# Patient Record
Sex: FEMALE | Race: WHITE | NOT HISPANIC OR LATINO | Employment: UNEMPLOYED | URBAN - METROPOLITAN AREA
[De-identification: names, ages, dates, MRNs, and addresses within clinical notes are randomized per-mention and may not be internally consistent; named-entity substitution may affect disease eponyms.]

---

## 2017-02-03 ENCOUNTER — POSTPARTUM VISIT (OUTPATIENT)
Dept: OBSTETRICS AND GYNECOLOGY | Facility: CLINIC | Age: 37
End: 2017-02-03
Attending: OBSTETRICS & GYNECOLOGY
Payer: COMMERCIAL

## 2017-02-03 VITALS
HEIGHT: 63 IN | WEIGHT: 122.81 LBS | BODY MASS INDEX: 21.76 KG/M2 | DIASTOLIC BLOOD PRESSURE: 74 MMHG | SYSTOLIC BLOOD PRESSURE: 112 MMHG

## 2017-02-03 PROCEDURE — 99999 PR PBB SHADOW E&M-EST. PATIENT-LVL II: CPT | Mod: PBBFAC,,, | Performed by: OBSTETRICS & GYNECOLOGY

## 2017-02-03 PROCEDURE — 0503F POSTPARTUM CARE VISIT: CPT | Mod: S$GLB,,, | Performed by: OBSTETRICS & GYNECOLOGY

## 2017-02-03 NOTE — PROGRESS NOTES
"CC: 6 wk pp check    Arabella Catalan is a 36 y.o. female  6 wk pp , baby with cleft lip/palate.  Doing well. Gets surgery 3-4 mo.  Not sure if going back to work.  breastfeeding    Past Medical History   Diagnosis Date    Abnormal Pap smear of cervix     HPV (human papilloma virus) infection        Past Surgical History   Procedure Laterality Date    Right oophorectomy Right     Oophorectomy       stage 1a dysgerminoma    Vaginal delivery  2016       OB History    Para Term  AB SAB TAB Ectopic Multiple Living   3 3 3 0 0 0 0 0 0 2      # Outcome Date GA Lbr Moose/2nd Weight Sex Delivery Anes PTL Lv   3 Term 16 39w3d  3.685 kg (8 lb 2 oz) M Vag-Spont EPI     2 Term 14 40w3d  3.572 kg (7 lb 14 oz) M INDUCTION  N Y      Complications: Nuchal cord   1 Term 12 40w0d  3.827 kg (8 lb 7 oz) M INDUCTION  N Y          Family History   Problem Relation Age of Onset    Breast cancer Mother 38       Social History   Substance Use Topics    Smoking status: Former Smoker    Smokeless tobacco: None    Alcohol use No       Visit Vitals    /74    Ht 5' 3" (1.6 m)    Wt 55.7 kg (122 lb 12.7 oz)    LMP 2016 (Exact Date)    Breastfeeding Yes    BMI 21.75 kg/m2       ROS:  GENERAL: Denies weight gain or weight loss. Feeling well overall.   SKIN: Denies rash or lesions.   HEAD: Denies head injury or headache.   NODES: Denies enlarged lymph nodes.   CHEST: Denies chest pain or shortness of breath.   CARDIOVASCULAR: Denies palpitations or left sided chest pain.   ABDOMEN: No abdominal pain, constipation, diarrhea, nausea, vomiting or rectal bleeding.   URINARY: No frequency, dysuria, hematuria, or burning on urination.  REPRODUCTIVE: See HPI.   BREASTS: denies pain, lumps, or nipple discharge.   HEMATOLOGIC: No easy bruisability or excessive bleeding.  MUSCULOSKELETAL: Denies joint pain or swelling.   NEUROLOGIC: Denies syncope or weakness.   PSYCHIATRIC: " Denies depression, anxiety or mood swings.    Physical Exam:    APPEARANCE: Well nourished, well developed, in no acute distress.  AFFECT: WNL, alert and oriented x 3  SKIN: No acne or hirsutism  NECK: Neck symmetric without masses or thyromegaly  NODES: No inguinal, cervical, axillary, or femoral lymph node enlargement  CHEST: Good respiratory effect  ABDOMEN: Soft.  No tenderness or masses.  No hepatosplenomegaly.  No hernias.  PELVIC: Normal external genitalia without lesions.  Normal hair distribution.  Adequate perineal body, normal urethral meatus.  Vagina moist and well rugated without lesions or discharge.  Cervix pink, without lesions, discharge or tenderness.  No significant cystocele or rectocele.  Bimanual exam shows uterus to be normal size, regular, mobile and nontender.  Adnexa without masses or tenderness.    EXTREMITIES: No edema.    ASSESSMENT AND PLAN    Arabella was seen today for postpartum care.    Diagnoses and all orders for this visit:    Encounter for postpartum visit    declines contraception.    Return in about 3 months (around 5/3/2017) for annual.

## 2020-07-20 ENCOUNTER — OFFICE VISIT (OUTPATIENT)
Dept: PODIATRY | Facility: CLINIC | Age: 40
End: 2020-07-20
Payer: COMMERCIAL

## 2020-07-20 VITALS
HEIGHT: 63 IN | WEIGHT: 115 LBS | DIASTOLIC BLOOD PRESSURE: 68 MMHG | HEART RATE: 80 BPM | BODY MASS INDEX: 20.38 KG/M2 | SYSTOLIC BLOOD PRESSURE: 101 MMHG

## 2020-07-20 DIAGNOSIS — M79.671 PAIN IN RIGHT FOOT: ICD-10-CM

## 2020-07-20 DIAGNOSIS — B07.0 VERRUCA PLANTARIS: Primary | ICD-10-CM

## 2020-07-20 PROCEDURE — 99203 OFFICE O/P NEW LOW 30 MIN: CPT | Mod: 25,S$GLB,, | Performed by: PODIATRIST

## 2020-07-20 PROCEDURE — 3008F PR BODY MASS INDEX (BMI) DOCUMENTED: ICD-10-PCS | Mod: CPTII,S$GLB,, | Performed by: PODIATRIST

## 2020-07-20 PROCEDURE — 17110 DESTRUCTION B9 LES UP TO 14: CPT | Mod: S$GLB,,, | Performed by: PODIATRIST

## 2020-07-20 PROCEDURE — 99999 PR PBB SHADOW E&M-NEW PATIENT-LVL III: CPT | Mod: PBBFAC,,, | Performed by: PODIATRIST

## 2020-07-20 PROCEDURE — 99203 PR OFFICE/OUTPT VISIT, NEW, LEVL III, 30-44 MIN: ICD-10-PCS | Mod: 25,S$GLB,, | Performed by: PODIATRIST

## 2020-07-20 PROCEDURE — 99999 PR PBB SHADOW E&M-NEW PATIENT-LVL III: ICD-10-PCS | Mod: PBBFAC,,, | Performed by: PODIATRIST

## 2020-07-20 PROCEDURE — 17110 PR DESTRUCTION BENIGN LESIONS UP TO 14: ICD-10-PCS | Mod: S$GLB,,, | Performed by: PODIATRIST

## 2020-07-20 PROCEDURE — 3008F BODY MASS INDEX DOCD: CPT | Mod: CPTII,S$GLB,, | Performed by: PODIATRIST

## 2020-07-20 NOTE — PROGRESS NOTES
Subjective:      Patient ID: Arabella Catalan is a 40 y.o. female.    Chief Complaint: Foot Pain (plantars wart )      HPI:  Arabella Catalan is a 40 y.o. female who presents to clinic with a chief complaint of warts.  Patient reports having warts on the bottom of her right foot for at least 2 years.  She denies any pain when non weight-bearing but relates pain when standing, walking, which she rates as 3/10 on the pain scale.  She informs of trying all types of over-the-counter wart treatments without resolution.  She denies using any type of OTC treatment within the last 2 weeks.  Patient denies any other pedal complaints at this time.    PCP:   Primary Doctor No  Date last seen: Not applicable    Review of Systems   Constitutional: Negative for appetite change, fever, chills, fatigue and unexpected weight change.   Respiratory: Negative for cough, wheezing, and shortness of breath.   Cardiovascular: Negative for chest pain, claudication, cyanosis, and leg swelling.  Endocrine:  Negative for intolerance to cold, intolerance to heat, polydipsia, polyphagia, and polyuria.    Gastrointestinal: Negative for nausea, vomiting, diarrhea, and constipation.   Musculoskeletal: Negative for back pain, arthritis, joint pain, joint swelling, myalgias, and stiffness.   Skin: Negative for nail bed changes, discoloration, rash, itching, poor wound healing, unusual hair distribution.  Positive for suspicious lesion.  Neurological: Negative for loss of balance, sensory change, paresthesias, and numbness. Positive for pain.  Hematological: Negative for adenopathy, bleeding, and bruising easily.   Psychiatric/Behavioral: The patient is not nervous/anxious.  Negative for altered mental status.    No results found for: HGBA1C    Past Medical History:   Diagnosis Date    Abnormal Pap smear of cervix     HPV (human papilloma virus) infection      Past Surgical History:   Procedure Laterality Date    OOPHORECTOMY       "stage 1a dysgerminoma    RIGHT OOPHORECTOMY Right     VAGINAL DELIVERY  12/13/2016     Family History   Problem Relation Age of Onset    Breast cancer Mother 38     Social History     Socioeconomic History    Marital status:      Spouse name: Not on file    Number of children: Not on file    Years of education: Not on file    Highest education level: Not on file   Occupational History    Not on file   Social Needs    Financial resource strain: Not on file    Food insecurity     Worry: Not on file     Inability: Not on file    Transportation needs     Medical: Not on file     Non-medical: Not on file   Tobacco Use    Smoking status: Former Smoker   Substance and Sexual Activity    Alcohol use: No    Drug use: No    Sexual activity: Not Currently     Partners: Male     Birth control/protection: None   Lifestyle    Physical activity     Days per week: Not on file     Minutes per session: Not on file    Stress: Not on file   Relationships    Social connections     Talks on phone: Not on file     Gets together: Not on file     Attends Yazidi service: Not on file     Active member of club or organization: Not on file     Attends meetings of clubs or organizations: Not on file     Relationship status: Not on file   Other Topics Concern    Not on file   Social History Narrative    Not on file           Objective:        /68   Pulse 80   Ht 5' 3" (1.6 m)   Wt 52.2 kg (115 lb)   BMI 20.37 kg/m²     Physical Exam   Constitutional: Patient is oriented to person, place, and time. Patient appears well-developed and well-nourished. No acute distress.     Psychiatric: Patient has a normal mood and affect. Patient's speech is normal and behavior is normal. Judgment is normal. Cognition and memory are normal.     Right pedal exam was performed today.  Vascular: Pedal pulses palpable 2/4 DP & PT.  CFT is < 3 seconds to the hallux.  Skin temperature is warm to warm proximal tibia to distal toes " without localized increase in calor noted.  No erythema, edema, or ecchymosis noted to the foot or ankle.  Hair growth present distally to the LE.     Musculoskeletal: Ankle joint ROM is decreased. Subtalar joint ROM is decreased.  Midtarsal joint ROM is decreased.  1st ray ROM is within normal limits.  1st  MTPJ ROM is decreased.  Ankle joint dorsiflexion is restricted with the knee extended and flexed per Silfverskiold exam.    Muscle strength is 5/5 for all LE muscle groups tested.    Neurological: Epicritic sensation is grossly Intact to the foot.   Achilles DTR and Chaddock STR are Intact to right lower extremity.   Negative Babinski sign right lower extremity.  Negative clonus sign right lower extremity.  Tenderness to palpation noted to verrucous lesions sub right 1st and 5th metatarsal heads.    Dermatological: Toenails 1-5 right are WNL in length and thickness.  Webspaces 1-4 right are clean, dry, and intact.  Skin turgor is supple.  No dry, flaky skin noted to the LE.  Focal, hyperkeratotic lesions noted sub right 1st and 5th metatarsal heads with underlying verrucous tissue, thrombosed capillaries, pinpoint bleeding upon debridement, and divergent skin lines.    Nursing note and vitals reviewed.        Assessment:       Encounter Diagnoses   Name Primary?    Verruca plantaris Yes    Pain in right foot          Plan:       Arabella was seen today for foot pain.    Diagnoses and all orders for this visit:    Verruca plantaris    Pain in right foot      I counseled the patient on her conditions, their implications and medical management.    - Discussed foot hygiene and treatment options for plantar warts to which patient opted for destruction of lesions via debridement and cantharidin.    - With the patient's permission, cleansed areas with alcohol swabs, debrided lesions down to pinpoint bleeding via 3 mm dermal curette to patient's tolerance, applied aperture pad and cantharidin, and covered with  elastoplast.    - Advised patient to cleanse the area with warm, soapy water in 4 hours.    Patient verbalized all understanding.    Patient was given the following recommendations and instructions:  Patient Instructions   - Remove dressing in 4 hours and cleanse area.    - Cover area with topical antibiotic cream with or without lidocaine cream and a bandaid and keep covered for at least the first 48 hours in case.  Discontinue if no blister has formed after 48 hours.    - Notify clinic if painful blister forms and needs to be drained.  DO NOT REMOVE BLISTERED SKIN.    - Wait a week before you begin applying DuoFilm.  Follow package directions.    - Notify clinic if any new or worsening condition arises.          Yenni Ramos DPM        Dictation was performed using M*Modal Fluency.  Transcription errors may be present.

## 2020-07-20 NOTE — PATIENT INSTRUCTIONS
- Remove dressing in 4 hours and cleanse area.    - Cover area with topical antibiotic cream with or without lidocaine cream and a bandaid and keep covered for at least the first 48 hours in case.  Discontinue if no blister has formed after 48 hours.    - Notify clinic if painful blister forms and needs to be drained.  DO NOT REMOVE BLISTERED SKIN.    - Wait a week before you begin applying DuoFilm.  Follow package directions.    - Notify clinic if any new or worsening condition arises.

## 2020-07-26 PROBLEM — M79.671 PAIN IN RIGHT FOOT: Status: ACTIVE | Noted: 2020-07-26

## 2020-07-26 PROBLEM — B07.0 VERRUCA PLANTARIS: Status: ACTIVE | Noted: 2020-07-26

## 2020-08-03 ENCOUNTER — OFFICE VISIT (OUTPATIENT)
Dept: PODIATRY | Facility: CLINIC | Age: 40
End: 2020-08-03
Payer: COMMERCIAL

## 2020-08-03 VITALS
DIASTOLIC BLOOD PRESSURE: 80 MMHG | WEIGHT: 115 LBS | SYSTOLIC BLOOD PRESSURE: 115 MMHG | HEIGHT: 63 IN | HEART RATE: 79 BPM | BODY MASS INDEX: 20.38 KG/M2

## 2020-08-03 DIAGNOSIS — B07.0 VERRUCA PLANTARIS: Primary | ICD-10-CM

## 2020-08-03 DIAGNOSIS — M79.671 PAIN IN RIGHT FOOT: ICD-10-CM

## 2020-08-03 PROCEDURE — 3008F BODY MASS INDEX DOCD: CPT | Mod: CPTII,S$GLB,, | Performed by: PODIATRIST

## 2020-08-03 PROCEDURE — 17110 DESTRUCTION B9 LES UP TO 14: CPT | Mod: S$GLB,,, | Performed by: PODIATRIST

## 2020-08-03 PROCEDURE — 99999 PR PBB SHADOW E&M-EST. PATIENT-LVL III: CPT | Mod: PBBFAC,,, | Performed by: PODIATRIST

## 2020-08-03 PROCEDURE — 99213 PR OFFICE/OUTPT VISIT, EST, LEVL III, 20-29 MIN: ICD-10-PCS | Mod: 25,S$GLB,, | Performed by: PODIATRIST

## 2020-08-03 PROCEDURE — 3008F PR BODY MASS INDEX (BMI) DOCUMENTED: ICD-10-PCS | Mod: CPTII,S$GLB,, | Performed by: PODIATRIST

## 2020-08-03 PROCEDURE — 17110 PR DESTRUCTION BENIGN LESIONS UP TO 14: ICD-10-PCS | Mod: S$GLB,,, | Performed by: PODIATRIST

## 2020-08-03 PROCEDURE — 99999 PR PBB SHADOW E&M-EST. PATIENT-LVL III: ICD-10-PCS | Mod: PBBFAC,,, | Performed by: PODIATRIST

## 2020-08-03 PROCEDURE — 99213 OFFICE O/P EST LOW 20 MIN: CPT | Mod: 25,S$GLB,, | Performed by: PODIATRIST

## 2020-08-03 NOTE — PROGRESS NOTES
Subjective:      Patient ID: Arabella Catalan is a 40 y.o. female.    Chief Complaint: Follow-up (plantar wart right )      HPI:  Arabella Catalan is a 40 y.o. female who presents to clinic with a chief complaint of warts.  Patient reports having washed treatment off her feet after 4 hours after her last visit as instructed and denies seeing any blister formation.  She denies any increased pain immediately after her treatment but states pain has increased over the past couple days when weight-bearing though she denied any pain today during the rooming process.  She rates her pain currently as 0/10 on the pain scale.  Patient denies any other pedal complaints at this time.    PCP:   Primary Doctor No  Date last seen: Not applicable    Review of Systems   Constitutional: Negative for appetite change, fever, chills, fatigue and unexpected weight change.   Respiratory: Negative for cough, wheezing, and shortness of breath.   Cardiovascular: Negative for chest pain, claudication, cyanosis, and leg swelling.  Endocrine:  Negative for intolerance to cold, intolerance to heat, polydipsia, polyphagia, and polyuria.    Gastrointestinal: Negative for nausea, vomiting, diarrhea, and constipation.   Musculoskeletal: Negative for back pain, arthritis, joint pain, joint swelling, myalgias, and stiffness.   Skin: Negative for nail bed changes, discoloration, rash, itching, poor wound healing, unusual hair distribution.  Positive for suspicious lesion.  Neurological: Negative for loss of balance, sensory change, paresthesias, and numbness. Positive for pain.  Hematological: Negative for adenopathy, bleeding, and bruising easily.   Psychiatric/Behavioral: The patient is not nervous/anxious.  Negative for altered mental status.    No results found for: HGBA1C    Past Medical History:   Diagnosis Date    Abnormal Pap smear of cervix     HPV (human papilloma virus) infection      Past Surgical History:   Procedure  "Laterality Date    OOPHORECTOMY      stage 1a dysgerminoma    RIGHT OOPHORECTOMY Right     VAGINAL DELIVERY  12/13/2016     Family History   Problem Relation Age of Onset    Breast cancer Mother 38     Social History     Socioeconomic History    Marital status:      Spouse name: Not on file    Number of children: Not on file    Years of education: Not on file    Highest education level: Not on file   Occupational History    Not on file   Social Needs    Financial resource strain: Not on file    Food insecurity     Worry: Not on file     Inability: Not on file    Transportation needs     Medical: Not on file     Non-medical: Not on file   Tobacco Use    Smoking status: Former Smoker   Substance and Sexual Activity    Alcohol use: No    Drug use: No    Sexual activity: Not Currently     Partners: Male     Birth control/protection: None   Lifestyle    Physical activity     Days per week: Not on file     Minutes per session: Not on file    Stress: Not on file   Relationships    Social connections     Talks on phone: Not on file     Gets together: Not on file     Attends Buddhist service: Not on file     Active member of club or organization: Not on file     Attends meetings of clubs or organizations: Not on file     Relationship status: Not on file   Other Topics Concern    Not on file   Social History Narrative    Not on file           Objective:        /80   Pulse 79   Ht 5' 3" (1.6 m)   Wt 52.2 kg (115 lb)   BMI 20.37 kg/m²     Physical Exam   Constitutional: Patient is oriented to person, place, and time. Patient appears well-developed and well-nourished. No acute distress.     Psychiatric: Patient has a normal mood and affect. Patient's speech is normal and behavior is normal. Judgment is normal. Cognition and memory are normal.     Right pedal exam was performed today.  Vascular: Pedal pulses palpable 2/4 DP & PT.  CFT is < 3 seconds to the hallux.  Skin temperature is warm " to warm proximal tibia to distal toes without localized increase in calor noted.  No erythema, edema, or ecchymosis noted to the foot or ankle.  Hair growth present distally to the LE.     Musculoskeletal: Ankle joint ROM is decreased. Subtalar joint ROM is decreased.  Midtarsal joint ROM is decreased.  1st ray ROM is within normal limits.  1st  MTPJ ROM is decreased.  Ankle joint dorsiflexion is restricted with the knee extended and flexed per Silfverskiold exam.    Muscle strength is 5/5 for all LE muscle groups tested.    Neurological: Epicritic sensation is grossly Intact to the foot.   Chaddock STR is Intact to right lower extremity.  Tenderness to palpation noted to verrucous lesions sub right 1st and 5th metatarsal heads.    Dermatological: Toenails 1-5 right are WNL in length and thickness.  Webspaces 1-4 right are clean, dry, and intact.  Skin turgor is supple.  No dry, flaky skin noted to the LE.  Focal, hyperkeratotic lesions noted sub right 1st and 5th metatarsal heads with underlying verrucous tissue, thrombosed capillaries, pinpoint bleeding upon debridement, and divergent skin lines.    Nursing note and vitals reviewed.        Assessment:       Encounter Diagnoses   Name Primary?    Verruca plantaris Yes    Pain in right foot          Plan:       Arabella was seen today for follow-up.    Diagnoses and all orders for this visit:    Verruca plantaris    Pain in right foot      I counseled the patient on her conditions, their implications and medical management.    - Reviewed with patient foot hygiene and treatment options for plantar warts to which patient opted for destruction of lesions via debridement and cantharidin.    - With the patient's permission, cleansed areas with alcohol swabs, debrided lesions down to pinpoint bleeding via 3 mm dermal curette to patient's tolerance, applied aperture pad and cantharidin, and covered with elastoplast.    - Advised patient to cleanse the area with warm,  soapy water in 4 hours.    Patient verbalized all understanding.    Patient was given the following recommendations and instructions:  Patient Instructions   - Remove dressing in 4 hours and cleanse area.    - Cover area with topical antibiotic cream with or without lidocaine cream and a bandaid and keep covered for at least the first 48 hours in case.  Discontinue if no blister has formed after 48 hours.    - Notify clinic if painful blister forms and needs to be drained.  DO NOT REMOVE BLISTERED SKIN.    - Wait a week before you begin applying DuoFilm.  Follow package directions.    - Notify clinic if any new or worsening condition arises.          Yenni Ramos DPM        Dictation was performed using M*Modal Fluency.  Transcription errors may be present.

## 2020-08-17 ENCOUNTER — OFFICE VISIT (OUTPATIENT)
Dept: PODIATRY | Facility: CLINIC | Age: 40
End: 2020-08-17
Payer: COMMERCIAL

## 2020-08-17 VITALS
BODY MASS INDEX: 20.25 KG/M2 | HEIGHT: 63 IN | DIASTOLIC BLOOD PRESSURE: 76 MMHG | WEIGHT: 114.31 LBS | SYSTOLIC BLOOD PRESSURE: 105 MMHG | HEART RATE: 85 BPM

## 2020-08-17 DIAGNOSIS — B07.0 VERRUCA PLANTARIS: Primary | ICD-10-CM

## 2020-08-17 DIAGNOSIS — M79.671 PAIN IN RIGHT FOOT: ICD-10-CM

## 2020-08-17 PROCEDURE — 3008F BODY MASS INDEX DOCD: CPT | Mod: CPTII,S$GLB,, | Performed by: PODIATRIST

## 2020-08-17 PROCEDURE — 99999 PR PBB SHADOW E&M-EST. PATIENT-LVL III: ICD-10-PCS | Mod: PBBFAC,,, | Performed by: PODIATRIST

## 2020-08-17 PROCEDURE — 99999 PR PBB SHADOW E&M-EST. PATIENT-LVL III: CPT | Mod: PBBFAC,,, | Performed by: PODIATRIST

## 2020-08-17 PROCEDURE — 17110 PR DESTRUCTION BENIGN LESIONS UP TO 14: ICD-10-PCS | Mod: S$GLB,,, | Performed by: PODIATRIST

## 2020-08-17 PROCEDURE — 99213 PR OFFICE/OUTPT VISIT, EST, LEVL III, 20-29 MIN: ICD-10-PCS | Mod: 25,S$GLB,, | Performed by: PODIATRIST

## 2020-08-17 PROCEDURE — 17110 DESTRUCTION B9 LES UP TO 14: CPT | Mod: S$GLB,,, | Performed by: PODIATRIST

## 2020-08-17 PROCEDURE — 99213 OFFICE O/P EST LOW 20 MIN: CPT | Mod: 25,S$GLB,, | Performed by: PODIATRIST

## 2020-08-17 PROCEDURE — 3008F PR BODY MASS INDEX (BMI) DOCUMENTED: ICD-10-PCS | Mod: CPTII,S$GLB,, | Performed by: PODIATRIST

## 2020-08-24 NOTE — PROGRESS NOTES
Subjective:      Patient ID: Arabella Catalan is a 40 y.o. female.    Chief Complaint: Follow-up (2 week f/u )      HPI:  Arabella Catalan is a 40 y.o. female who presents to clinic with a chief complaint of warts.  Patient reports having washed treatment off her feet after 4 hours after her last visit as instructed and denies seeing any blister formation.  She denies any increased pain immediately after her treatment but states pain has increased over the past couple days when weight-bearing though she denied any pain today during the rooming process.  She rates her pain currently as 0/10 on the pain scale.  Patient denies any other pedal complaints at this time.    PCP:   Primary Doctor No  Date last seen: Not applicable    Review of Systems   Constitutional: Negative for appetite change, fever, chills, fatigue and unexpected weight change.   Respiratory: Negative for cough, wheezing, and shortness of breath.   Cardiovascular: Negative for chest pain, claudication, cyanosis, and leg swelling.  Endocrine:  Negative for intolerance to cold, intolerance to heat, polydipsia, polyphagia, and polyuria.    Gastrointestinal: Negative for nausea, vomiting, diarrhea, and constipation.   Musculoskeletal: Negative for back pain, arthritis, joint pain, joint swelling, myalgias, and stiffness.   Skin: Negative for nail bed changes, discoloration, rash, itching, poor wound healing, unusual hair distribution.  Positive for suspicious lesion.  Neurological: Negative for loss of balance, sensory change, paresthesias, and numbness. Positive for pain.  Hematological: Negative for adenopathy, bleeding, and bruising easily.   Psychiatric/Behavioral: The patient is not nervous/anxious.  Negative for altered mental status.    No results found for: HGBA1C    Past Medical History:   Diagnosis Date    Abnormal Pap smear of cervix     HPV (human papilloma virus) infection      Past Surgical History:   Procedure  "Laterality Date    OOPHORECTOMY      stage 1a dysgerminoma    RIGHT OOPHORECTOMY Right     VAGINAL DELIVERY  12/13/2016     Family History   Problem Relation Age of Onset    Breast cancer Mother 38     Social History     Socioeconomic History    Marital status:      Spouse name: Not on file    Number of children: Not on file    Years of education: Not on file    Highest education level: Not on file   Occupational History    Not on file   Social Needs    Financial resource strain: Not on file    Food insecurity     Worry: Not on file     Inability: Not on file    Transportation needs     Medical: Not on file     Non-medical: Not on file   Tobacco Use    Smoking status: Former Smoker   Substance and Sexual Activity    Alcohol use: No    Drug use: No    Sexual activity: Not Currently     Partners: Male     Birth control/protection: None   Lifestyle    Physical activity     Days per week: Not on file     Minutes per session: Not on file    Stress: Not on file   Relationships    Social connections     Talks on phone: Not on file     Gets together: Not on file     Attends Uatsdin service: Not on file     Active member of club or organization: Not on file     Attends meetings of clubs or organizations: Not on file     Relationship status: Not on file   Other Topics Concern    Not on file   Social History Narrative    Not on file           Objective:        /76   Pulse 85   Ht 5' 3" (1.6 m)   Wt 51.9 kg (114 lb 4.9 oz)   BMI 20.25 kg/m²     Physical Exam   Constitutional: Patient is oriented to person, place, and time. Patient appears well-developed and well-nourished. No acute distress.     Psychiatric: Patient has a normal mood and affect. Patient's speech is normal and behavior is normal. Judgment is normal. Cognition and memory are normal.     Right pedal exam was performed today.  Vascular: Pedal pulses palpable 2/4 DP & PT.  CFT is < 3 seconds to the hallux.  Skin temperature is " warm to warm proximal tibia to distal toes without localized increase in calor noted.  No erythema, edema, or ecchymosis noted to the foot or ankle.  Hair growth present distally to the LE.     Musculoskeletal: Ankle joint ROM is decreased. Subtalar joint ROM is decreased.  Midtarsal joint ROM is decreased.  1st ray ROM is within normal limits.  1st  MTPJ ROM is decreased.  Ankle joint dorsiflexion is restricted with the knee extended and flexed per Silfverskiold exam.    Muscle strength is 5/5 for all LE muscle groups tested.    Neurological: Epicritic sensation is grossly Intact to the foot.   Chaddock STR is Intact to right lower extremity.  Tenderness to palpation noted to verrucous lesions sub right 1st and 5th metatarsal heads.    Dermatological: Toenails 1-5 right are WNL in length and thickness.  Webspaces 1-4 right are clean, dry, and intact.  Skin turgor is supple.  No dry, flaky skin noted to the LE.  Focal, hyperkeratotic lesions noted sub right 1st and 5th metatarsal heads with underlying verrucous tissue, thrombosed capillaries, pinpoint bleeding upon debridement, and divergent skin lines.    Nursing note and vitals reviewed.        Assessment:       Encounter Diagnoses   Name Primary?    Verruca plantaris Yes    Pain in right foot          Plan:       Arabella was seen today for follow-up.    Diagnoses and all orders for this visit:    Verruca plantaris    Pain in right foot      I counseled the patient on her conditions, their implications and medical management.    - Reviewed with patient foot hygiene and treatment options for plantar warts to which patient opted for destruction of lesions via debridement and cantharidin.    - With the patient's permission, cleansed areas with alcohol swabs, debrided lesions down to pinpoint bleeding via 3 mm dermal curette to patient's tolerance, applied aperture pad and cantharidin, and covered with elastoplast.    - Advised patient to cleanse the area with  warm, soapy water in 4 hours.    Patient verbalized all understanding.    Patient was given the following recommendations and instructions:  Patient Instructions   - Remove dressing in 4 hours and cleanse area.    - Cover area with topical antibiotic cream with or without lidocaine cream and a bandaid and keep covered for at least the first 48 hours in case.  Discontinue if no blister has formed after 48 hours.    - Notify clinic if painful blister forms and needs to be drained.  DO NOT REMOVE BLISTERED SKIN.    - Wait a week before you begin applying DuoFilm.  Follow package directions.    - Notify clinic if any new or worsening condition arises.          Yenni Ramos DPM        Dictation was performed using M*Modal Fluency.  Transcription errors may be present.

## 2020-09-01 ENCOUNTER — OFFICE VISIT (OUTPATIENT)
Dept: PODIATRY | Facility: CLINIC | Age: 40
End: 2020-09-01
Payer: COMMERCIAL

## 2020-09-01 VITALS — HEIGHT: 63 IN | WEIGHT: 114.88 LBS | BODY MASS INDEX: 20.36 KG/M2

## 2020-09-01 DIAGNOSIS — M79.671 PAIN IN RIGHT FOOT: ICD-10-CM

## 2020-09-01 DIAGNOSIS — B07.0 VERRUCA PLANTARIS: Primary | ICD-10-CM

## 2020-09-01 PROCEDURE — 3008F BODY MASS INDEX DOCD: CPT | Mod: CPTII,S$GLB,, | Performed by: PODIATRIST

## 2020-09-01 PROCEDURE — 99213 PR OFFICE/OUTPT VISIT, EST, LEVL III, 20-29 MIN: ICD-10-PCS | Mod: 25,S$GLB,, | Performed by: PODIATRIST

## 2020-09-01 PROCEDURE — 99213 OFFICE O/P EST LOW 20 MIN: CPT | Mod: 25,S$GLB,, | Performed by: PODIATRIST

## 2020-09-01 PROCEDURE — 99999 PR PBB SHADOW E&M-EST. PATIENT-LVL III: ICD-10-PCS | Mod: PBBFAC,,, | Performed by: PODIATRIST

## 2020-09-01 PROCEDURE — 17110 PR DESTRUCTION BENIGN LESIONS UP TO 14: ICD-10-PCS | Mod: S$GLB,,, | Performed by: PODIATRIST

## 2020-09-01 PROCEDURE — 17110 DESTRUCTION B9 LES UP TO 14: CPT | Mod: S$GLB,,, | Performed by: PODIATRIST

## 2020-09-01 PROCEDURE — 3008F PR BODY MASS INDEX (BMI) DOCUMENTED: ICD-10-PCS | Mod: CPTII,S$GLB,, | Performed by: PODIATRIST

## 2020-09-01 PROCEDURE — 99999 PR PBB SHADOW E&M-EST. PATIENT-LVL III: CPT | Mod: PBBFAC,,, | Performed by: PODIATRIST

## 2020-09-14 NOTE — PATIENT INSTRUCTIONS
- Remove dressing in 12 hours and cleanse area.    - Cover area with topical antibiotic cream with or without lidocaine cream and a bandaid and keep covered for at least the first 48 hours in case.  Discontinue if no blister has formed after 48 hours.    - Notify clinic if painful blister forms and needs to be drained.  DO NOT REMOVE BLISTERED SKIN.    - Wait a week before you begin applying DuoFilm.  Follow package directions.    - Notify clinic if any new or worsening condition arises.

## 2020-09-14 NOTE — PROGRESS NOTES
Subjective:      Patient ID: Arabella Catalan is a 40 y.o. female.    Chief Complaint: Foot Problem (wart plantar )      HPI:  Arabella Catalan is a 40 y.o. female who presents to clinic with a chief complaint of warts.  Patient reports having washed treatment off her feet after 4 hours after her last visit as instructed and informs that she developed large tender blisters.  She denies popping blisters and relates pain decreased over the past couple days when weight-bearing.  She resumed using prescription medication a few days ago once pain significantly decreased.  She rates her pain currently as 0/10 on the pain scale.  Patient denies any other pedal complaints at this time.    PCP:   Primary Doctor No  Date last seen: Not applicable    Review of Systems   Constitutional: Negative for appetite change, fever, chills, fatigue and unexpected weight change.   Respiratory: Negative for cough, wheezing, and shortness of breath.   Cardiovascular: Negative for chest pain, claudication, cyanosis, and leg swelling.  Musculoskeletal: Negative for back pain, arthritis, joint pain, joint swelling, myalgias, and stiffness.   Skin: Negative for nail bed changes, discoloration, rash, itching, poor wound healing, unusual hair distribution.  Positive for suspicious lesion.  Neurological: Negative for loss of balance, sensory change, paresthesias, and numbness. Positive for pain.  Hematological: Negative for adenopathy, bleeding, and bruising easily.   Psychiatric/Behavioral: The patient is not nervous/anxious.  Negative for altered mental status.    No results found for: HGBA1C    Past Medical History:   Diagnosis Date    Abnormal Pap smear of cervix     HPV (human papilloma virus) infection      Past Surgical History:   Procedure Laterality Date    OOPHORECTOMY      stage 1a dysgerminoma    RIGHT OOPHORECTOMY Right     VAGINAL DELIVERY  12/13/2016     Family History   Problem Relation Age of Onset     "Breast cancer Mother 38     Social History     Socioeconomic History    Marital status:      Spouse name: Not on file    Number of children: Not on file    Years of education: Not on file    Highest education level: Not on file   Occupational History    Not on file   Social Needs    Financial resource strain: Not on file    Food insecurity     Worry: Not on file     Inability: Not on file    Transportation needs     Medical: Not on file     Non-medical: Not on file   Tobacco Use    Smoking status: Former Smoker   Substance and Sexual Activity    Alcohol use: No    Drug use: No    Sexual activity: Not Currently     Partners: Male     Birth control/protection: None   Lifestyle    Physical activity     Days per week: Not on file     Minutes per session: Not on file    Stress: Not on file   Relationships    Social connections     Talks on phone: Not on file     Gets together: Not on file     Attends Voodoo service: Not on file     Active member of club or organization: Not on file     Attends meetings of clubs or organizations: Not on file     Relationship status: Not on file   Other Topics Concern    Not on file   Social History Narrative    Not on file           Objective:        Ht 5' 3" (1.6 m)   Wt 52.1 kg (114 lb 13.8 oz)   BMI 20.35 kg/m²     Physical Exam   Constitutional: Patient is oriented to person, place, and time. Patient appears well-developed and well-nourished. No acute distress.     Psychiatric: Patient has a normal mood and affect. Patient's speech is normal and behavior is normal. Judgment is normal. Cognition and memory are normal.     Right pedal exam was performed today.  Vascular: Pedal pulses palpable 2/4 DP & PT.  CFT is < 3 seconds to the hallux.  Skin temperature is warm to warm proximal tibia to distal toes without localized increase in calor noted.  No erythema, edema, or ecchymosis noted to the foot or ankle.  Hair growth present distally to the LE.   "   Musculoskeletal: Ankle and pedal joint ROM are decreased except 1st ray ROM, which is within normal limits.  Ankle joint dorsiflexion is restricted with the knee extended and flexed per Silfverskiold exam.    Muscle strength is 5/5 for all LE muscle groups tested.    Neurological: Epicritic sensation is grossly Intact to the foot.   Chaddock STR is Intact to right lower extremity.  Tenderness to palpation noted to verrucous lesions sub right 1st and 5th metatarsal heads.    Dermatological: Toenails 1-5 right are WNL in length and thickness.  Webspaces 1-4 right are clean, dry, and intact.  Skin turgor is supple.  No dry, flaky skin noted to the LE.  Focal, hyperkeratotic lesions noted sub right 1st and 5th metatarsal heads with underlying verrucous tissue, thrombosed capillaries, pinpoint bleeding upon debridement, and divergent skin lines.    Nursing note and vitals reviewed.        Assessment:       Encounter Diagnoses   Name Primary?    Verruca plantaris Yes    Pain in right foot          Plan:       Arabella was seen today for foot problem.    Diagnoses and all orders for this visit:    Verruca plantaris    Pain in right foot      I counseled the patient on her conditions, their implications and medical management.    - Reviewed with patient foot hygiene and treatment options for plantar warts to which patient opted for destruction of lesions via debridement and salinocaine today.    - With the patient's permission, cleansed areas with alcohol swabs, debrided lesions down to pinpoint bleeding via 3 mm dermal curette to patient's tolerance, applied aperture pad and salinocaine, and covered with elastoplast.    - Advised patient to cleanse the area with warm, soapy water in 12 hours.  Patient verbalized all understanding.    Patient was given the following recommendations and instructions:  Patient Instructions   - Remove dressing in 12 hours and cleanse area.    - Cover area with topical antibiotic cream with  or without lidocaine cream and a bandaid and keep covered for at least the first 48 hours in case.  Discontinue if no blister has formed after 48 hours.    - Notify clinic if painful blister forms and needs to be drained.  DO NOT REMOVE BLISTERED SKIN.    - Wait a week before you begin applying DuoFilm.  Follow package directions.    - Notify clinic if any new or worsening condition arises.          Yenni Ramos DPM        Dictation was performed using M*Modal Fluency.  Transcription errors may be present.

## 2020-09-15 ENCOUNTER — OFFICE VISIT (OUTPATIENT)
Dept: PODIATRY | Facility: CLINIC | Age: 40
End: 2020-09-15
Payer: COMMERCIAL

## 2020-09-15 VITALS — WEIGHT: 114 LBS | HEIGHT: 63 IN | BODY MASS INDEX: 20.2 KG/M2

## 2020-09-15 DIAGNOSIS — M79.671 PAIN IN RIGHT FOOT: ICD-10-CM

## 2020-09-15 DIAGNOSIS — B07.0 VERRUCA PLANTARIS: Primary | ICD-10-CM

## 2020-09-15 PROCEDURE — 99213 OFFICE O/P EST LOW 20 MIN: CPT | Mod: 25,S$GLB,, | Performed by: PODIATRIST

## 2020-09-15 PROCEDURE — 3008F PR BODY MASS INDEX (BMI) DOCUMENTED: ICD-10-PCS | Mod: CPTII,S$GLB,, | Performed by: PODIATRIST

## 2020-09-15 PROCEDURE — 3008F BODY MASS INDEX DOCD: CPT | Mod: CPTII,S$GLB,, | Performed by: PODIATRIST

## 2020-09-15 PROCEDURE — 99999 PR PBB SHADOW E&M-EST. PATIENT-LVL III: CPT | Mod: PBBFAC,,, | Performed by: PODIATRIST

## 2020-09-15 PROCEDURE — 99999 PR PBB SHADOW E&M-EST. PATIENT-LVL III: ICD-10-PCS | Mod: PBBFAC,,, | Performed by: PODIATRIST

## 2020-09-15 PROCEDURE — 17110 PR DESTRUCTION BENIGN LESIONS UP TO 14: ICD-10-PCS | Mod: S$GLB,,, | Performed by: PODIATRIST

## 2020-09-15 PROCEDURE — 17110 DESTRUCTION B9 LES UP TO 14: CPT | Mod: S$GLB,,, | Performed by: PODIATRIST

## 2020-09-15 PROCEDURE — 99213 PR OFFICE/OUTPT VISIT, EST, LEVL III, 20-29 MIN: ICD-10-PCS | Mod: 25,S$GLB,, | Performed by: PODIATRIST

## 2020-09-15 NOTE — PROGRESS NOTES
Subjective:      Patient ID: Arabella Catalan is a 40 y.o. female.    Chief Complaint: Follow-up      HPI:  Arabella Catalan is a 40 y.o. female who presents to clinic with a chief complaint of warts.  Patient reports developing tender blister by her right great toe.  She denies popping blisters and relates pain decreased over the past couple days but still hurts when weight-bearing.  She resumed using prescription medication yesterday.  She rates her pain currently as 3/10 on the pain scale.  Patient denies any other pedal complaints at this time.    PCP:   Primary Doctor No  Date last seen: Not applicable    Review of Systems   Constitutional: Negative for appetite change, fever, chills, fatigue and unexpected weight change.   Respiratory: Negative for cough, wheezing, and shortness of breath.   Cardiovascular: Negative for chest pain, claudication, cyanosis, and leg swelling.  Musculoskeletal: Negative for back pain, arthritis, joint pain, joint swelling, myalgias, and stiffness.   Skin: Negative for nail bed changes, discoloration, rash, itching, poor wound healing, unusual hair distribution.  Positive for suspicious lesion.  Neurological: Negative for loss of balance, sensory change, paresthesias, and numbness. Positive for pain.  Hematological: Negative for adenopathy, bleeding, and bruising easily.   Psychiatric/Behavioral: The patient is not nervous/anxious.  Negative for altered mental status.    No results found for: HGBA1C    Past Medical History:   Diagnosis Date    Abnormal Pap smear of cervix     HPV (human papilloma virus) infection      Past Surgical History:   Procedure Laterality Date    OOPHORECTOMY      stage 1a dysgerminoma    RIGHT OOPHORECTOMY Right     VAGINAL DELIVERY  12/13/2016     Family History   Problem Relation Age of Onset    Breast cancer Mother 38     Social History     Socioeconomic History    Marital status:      Spouse name: Not on file     "Number of children: Not on file    Years of education: Not on file    Highest education level: Not on file   Occupational History    Not on file   Social Needs    Financial resource strain: Not on file    Food insecurity     Worry: Not on file     Inability: Not on file    Transportation needs     Medical: Not on file     Non-medical: Not on file   Tobacco Use    Smoking status: Former Smoker   Substance and Sexual Activity    Alcohol use: No    Drug use: No    Sexual activity: Not Currently     Partners: Male     Birth control/protection: None   Lifestyle    Physical activity     Days per week: Not on file     Minutes per session: Not on file    Stress: Not on file   Relationships    Social connections     Talks on phone: Not on file     Gets together: Not on file     Attends [employer] service: Not on file     Active member of club or organization: Not on file     Attends meetings of clubs or organizations: Not on file     Relationship status: Not on file   Other Topics Concern    Not on file   Social History Narrative    Not on file           Objective:        Ht 5' 3" (1.6 m)   Wt 51.7 kg (114 lb)   BMI 20.19 kg/m²     Physical Exam   Constitutional: Patient is oriented to person, place, and time. Patient appears well-developed and well-nourished. No acute distress.     Psychiatric: Patient has a normal mood and affect. Patient's speech is normal and behavior is normal. Judgment is normal. Cognition and memory are normal.     Right pedal exam was performed today.  Vascular: Pedal pulses palpable 2/4 DP & PT.  CFT is < 3 seconds to the hallux.  Skin temperature is warm to warm proximal tibia to distal toes without localized increase in calor noted.  No erythema, edema, or ecchymosis noted to the foot or ankle.  Hair growth present distally to the LE.     Musculoskeletal: Ankle and pedal joint ROM are decreased except 1st ray ROM, which is within normal limits.  Ankle joint dorsiflexion is " restricted with the knee extended and flexed per Silfverskiold exam.    Muscle strength is 5/5 for all LE muscle groups tested.    Neurological: Epicritic sensation is grossly Intact to the foot.   Chaddock STR is Intact to right lower extremity.  Tenderness to palpation noted to verrucous lesions sub right 1st and 5th metatarsal heads.    Dermatological: Toenails 1-5 right are WNL in length and thickness.  Webspaces 1-4 right are clean, dry, and intact.  Skin turgor is supple.  No dry, flaky skin noted to the LE.  Focal, hyperkeratotic lesions noted sub right 1st and 5th metatarsal heads with underlying verrucous tissue, thrombosed capillaries, pinpoint bleeding upon debridement, and divergent skin lines.  Sub right 1st metatarsal head lesion noted to also exhibit resolving seroma.    Nursing note and vitals reviewed.        Assessment:       Encounter Diagnoses   Name Primary?    Verruca plantaris Yes    Pain in right foot          Plan:       Arabella was seen today for follow-up.    Diagnoses and all orders for this visit:    Verruca plantaris    Pain in right foot      I counseled the patient on her conditions, their implications and medical management.    - Reviewed with patient foot hygiene and treatment options for plantar warts to which patient opted for destruction of lesions via debridement and salinocaine today.    - With the patient's permission, cleansed areas with alcohol swabs, debrided lesions down to pinpoint bleeding via 3 mm dermal curette to patient's tolerance, applied aperture pads, salinocaine to only sub right 5th lesion, and covered with elastoplast.    - Advised patient to cleanse the area with warm, soapy water in 12 hours.  Patient verbalized all understanding.    Patient was given the following recommendations and instructions:  Patient Instructions   - Remove dressing in 12 hours and cleanse area.    - Cover area with topical antibiotic cream with or without lidocaine cream and a  bandaid and keep covered for at least the first 48 hours in case.  Discontinue if no blister has formed after 48 hours.    - Notify clinic if painful blister forms and needs to be drained.  DO NOT REMOVE BLISTERED SKIN.    - Wait a week before you begin applying DuoFilm.  Follow package directions.    - Notify clinic if any new or worsening condition arises.          Yenni Ramos DPM        Dictation was performed using M*Modal Fluency.  Transcription errors may be present.

## 2020-09-28 ENCOUNTER — OFFICE VISIT (OUTPATIENT)
Dept: PODIATRY | Facility: CLINIC | Age: 40
End: 2020-09-28
Payer: COMMERCIAL

## 2020-09-28 VITALS — WEIGHT: 114 LBS | BODY MASS INDEX: 20.2 KG/M2 | HEIGHT: 63 IN

## 2020-09-28 DIAGNOSIS — M79.671 PAIN IN RIGHT FOOT: ICD-10-CM

## 2020-09-28 DIAGNOSIS — B07.0 VERRUCA PLANTARIS: Primary | ICD-10-CM

## 2020-09-28 PROCEDURE — 99213 OFFICE O/P EST LOW 20 MIN: CPT | Mod: 25,S$GLB,, | Performed by: PODIATRIST

## 2020-09-28 PROCEDURE — 99999 PR PBB SHADOW E&M-EST. PATIENT-LVL III: CPT | Mod: PBBFAC,,, | Performed by: PODIATRIST

## 2020-09-28 PROCEDURE — 99999 PR PBB SHADOW E&M-EST. PATIENT-LVL III: ICD-10-PCS | Mod: PBBFAC,,, | Performed by: PODIATRIST

## 2020-09-28 PROCEDURE — 17110 PR DESTRUCTION BENIGN LESIONS UP TO 14: ICD-10-PCS | Mod: S$GLB,,, | Performed by: PODIATRIST

## 2020-09-28 PROCEDURE — 99213 PR OFFICE/OUTPT VISIT, EST, LEVL III, 20-29 MIN: ICD-10-PCS | Mod: 25,S$GLB,, | Performed by: PODIATRIST

## 2020-09-28 PROCEDURE — 3008F PR BODY MASS INDEX (BMI) DOCUMENTED: ICD-10-PCS | Mod: CPTII,S$GLB,, | Performed by: PODIATRIST

## 2020-09-28 PROCEDURE — 3008F BODY MASS INDEX DOCD: CPT | Mod: CPTII,S$GLB,, | Performed by: PODIATRIST

## 2020-09-28 PROCEDURE — 17110 DESTRUCTION B9 LES UP TO 14: CPT | Mod: S$GLB,,, | Performed by: PODIATRIST

## 2020-09-28 NOTE — PATIENT INSTRUCTIONS
- Remove dressing in 4 hours and cleanse area.    - Cover area with topical antibiotic cream or ointment and a bandaid and keep covered for at least the first 48 hours in case.  Discontinue if no blister has formed after 48 hours.    - Notify clinic if painful blister forms and needs to be drained.  DO NOT REMOVE BLISTERED SKIN.    - Follow up in 2 weeks for wart treatment.

## 2020-09-28 NOTE — PROGRESS NOTES
Subjective:      Patient ID: Arabella Catalan is a 40 y.o. female.    Chief Complaint: Follow-up      HPI:  Arabella Catalan is a 40 y.o. female who presents to clinic with a chief complaint of painful warts.  Patient reports having some tenderness when weight-bearing yesterday but did not experience any pain otherwise.  She states she has been using medicated wart/corn pads.  She rates her pain currently as 1/10 on the pain scale when weight-bearing.  Patient denies any other pedal complaints at this time.    PCP:   Primary Doctor No  Date last seen: Not applicable    Review of Systems   Constitutional: Negative for appetite change, fever, chills, fatigue and unexpected weight change.   Respiratory: Negative for cough, wheezing, and shortness of breath.   Cardiovascular: Negative for chest pain, claudication, cyanosis, and leg swelling.  Musculoskeletal: Negative for back pain, arthritis, joint pain, joint swelling, myalgias, and stiffness.   Skin: Negative for nail bed changes, discoloration, rash, itching, poor wound healing, unusual hair distribution.  Positive for suspicious lesion.  Neurological: Negative for loss of balance, sensory change, paresthesias, and numbness. Positive for pain.  Hematological: Negative for adenopathy, bleeding, and bruising easily.   Psychiatric/Behavioral: The patient is not nervous/anxious.  Negative for altered mental status.    No results found for: HGBA1C    Past Medical History:   Diagnosis Date    Abnormal Pap smear of cervix     HPV (human papilloma virus) infection      Past Surgical History:   Procedure Laterality Date    OOPHORECTOMY      stage 1a dysgerminoma    RIGHT OOPHORECTOMY Right     VAGINAL DELIVERY  12/13/2016     Family History   Problem Relation Age of Onset    Breast cancer Mother 38     Social History     Socioeconomic History    Marital status:      Spouse name: Not on file    Number of children: Not on file    Years of  "education: Not on file    Highest education level: Not on file   Occupational History    Not on file   Social Needs    Financial resource strain: Not on file    Food insecurity     Worry: Not on file     Inability: Not on file    Transportation needs     Medical: Not on file     Non-medical: Not on file   Tobacco Use    Smoking status: Former Smoker   Substance and Sexual Activity    Alcohol use: No    Drug use: No    Sexual activity: Not Currently     Partners: Male     Birth control/protection: None   Lifestyle    Physical activity     Days per week: Not on file     Minutes per session: Not on file    Stress: Not on file   Relationships    Social connections     Talks on phone: Not on file     Gets together: Not on file     Attends [employer] service: Not on file     Active member of club or organization: Not on file     Attends meetings of clubs or organizations: Not on file     Relationship status: Not on file   Other Topics Concern    Not on file   Social History Narrative    Not on file           Objective:        Ht 5' 3" (1.6 m)   Wt 51.7 kg (114 lb)   BMI 20.19 kg/m²     Physical Exam   Constitutional: Patient is oriented to person, place, and time. Patient appears well-developed and well-nourished. No acute distress.     Psychiatric: Patient has a normal mood and affect. Patient's speech is normal and behavior is normal. Judgment is normal. Cognition and memory are normal.     Right pedal exam was performed today.  Vascular: Pedal pulses palpable 2/4 DP & PT.  CFT is < 3 seconds to the hallux.  Skin temperature is warm to warm proximal tibia to distal toes without localized increase in calor noted.  No erythema, edema, or ecchymosis noted to the foot or ankle.  Hair growth present distally to the LE.     Musculoskeletal: Ankle and pedal joint ROM are decreased except 1st ray ROM, which is within normal limits.  Ankle joint dorsiflexion is restricted with the knee extended and flexed per " Silfverskiold exam.    Muscle strength is 5/5 for all LE muscle groups tested.    Neurological: Epicritic sensation is grossly Intact to the foot.   Chaddock STR is Intact to the LE.  Tenderness to palpation noted to verrucous lesions sub right 1st and 5th metatarsal heads.    Dermatological: Toenails 1-5 right are WNL in length and thickness.  Webspaces 1-4 right are clean, dry, and intact.  Skin turgor is supple.  No dry, flaky skin noted to the LE.  Focal, hyperkeratotic lesions noted sub right 1st and 5th metatarsal heads with underlying verrucous tissue, thrombosed capillaries, pinpoint bleeding upon debridement, and divergent skin lines.    Nursing note and vitals reviewed.        Assessment:       Encounter Diagnoses   Name Primary?    Verruca plantaris Yes    Pain in right foot          Plan:       Arabella was seen today for follow-up.    Diagnoses and all orders for this visit:    Verruca plantaris    Pain in right foot      I counseled the patient on her conditions, their implications and medical management.    - Reviewed with patient foot hygiene and treatment options for plantar warts to which patient opted for destruction of lesions via debridement and cantharidin today.    - With the patient's permission, cleansed areas with alcohol swabs, debrided lesions down to pinpoint bleeding via 3 mm dermal curette to patient's tolerance, applied aperture pads and cantharidin, and covered with elastoplast.    - Advised patient to cleanse the area with warm, soapy water in 4 hours.  Patient verbalized all understanding.    Patient was given the following recommendations and instructions:  Patient Instructions   - Remove dressing in 4 hours and cleanse area.    - Cover area with topical antibiotic cream or ointment and a bandaid and keep covered for at least the first 48 hours in case.  Discontinue if no blister has formed after 48 hours.    - Notify clinic if painful blister forms and needs to be drained.  DO  NOT REMOVE BLISTERED SKIN.    - Follow up in 2 weeks for wart treatment.          Yenni Ramos DPM        Dictation was performed using M*Modal Fluency.  Transcription errors may be present.

## 2020-10-19 ENCOUNTER — OFFICE VISIT (OUTPATIENT)
Dept: PODIATRY | Facility: CLINIC | Age: 40
End: 2020-10-19
Payer: COMMERCIAL

## 2020-10-19 VITALS — WEIGHT: 114 LBS | BODY MASS INDEX: 20.2 KG/M2 | HEIGHT: 63 IN

## 2020-10-19 DIAGNOSIS — B07.0 VERRUCA PLANTARIS: Primary | ICD-10-CM

## 2020-10-19 DIAGNOSIS — M79.671 PAIN IN RIGHT FOOT: ICD-10-CM

## 2020-10-19 PROCEDURE — 17110 DESTRUCTION B9 LES UP TO 14: CPT | Mod: S$GLB,,, | Performed by: PODIATRIST

## 2020-10-19 PROCEDURE — 17110 PR DESTRUCTION BENIGN LESIONS UP TO 14: ICD-10-PCS | Mod: S$GLB,,, | Performed by: PODIATRIST

## 2020-10-19 PROCEDURE — 99999 PR PBB SHADOW E&M-EST. PATIENT-LVL III: CPT | Mod: PBBFAC,,, | Performed by: PODIATRIST

## 2020-10-19 PROCEDURE — 99999 PR PBB SHADOW E&M-EST. PATIENT-LVL III: ICD-10-PCS | Mod: PBBFAC,,, | Performed by: PODIATRIST

## 2020-10-19 PROCEDURE — 99213 PR OFFICE/OUTPT VISIT, EST, LEVL III, 20-29 MIN: ICD-10-PCS | Mod: 25,S$GLB,, | Performed by: PODIATRIST

## 2020-10-19 PROCEDURE — 99213 OFFICE O/P EST LOW 20 MIN: CPT | Mod: 25,S$GLB,, | Performed by: PODIATRIST

## 2020-10-19 PROCEDURE — 3008F BODY MASS INDEX DOCD: CPT | Mod: CPTII,S$GLB,, | Performed by: PODIATRIST

## 2020-10-19 PROCEDURE — 3008F PR BODY MASS INDEX (BMI) DOCUMENTED: ICD-10-PCS | Mod: CPTII,S$GLB,, | Performed by: PODIATRIST

## 2020-10-26 NOTE — PROGRESS NOTES
Subjective:      Patient ID: Arabella Catalan is a 40 y.o. female.    Chief Complaint: Plantar Warts (2 week f/u)      HPI:  Arabella Catalan is a 40 y.o. female who presents to clinic with a chief complaint of painful warts.  Patient reports having some tenderness when weight-bearing a few days ago but not today.  She states she has been using medicated wart/corn pads but inquires if she can be doing more.  Patient denies any other pedal complaints at this time.    PCP:   Primary Doctor No  Date last seen: Not applicable    Review of Systems   Constitutional: Negative for appetite change, fever, chills, fatigue and unexpected weight change.   Respiratory: Negative for cough, wheezing, and shortness of breath.   Cardiovascular: Negative for chest pain, claudication, cyanosis, and leg swelling.  Musculoskeletal: Negative for back pain, arthritis, joint pain, joint swelling, myalgias, and stiffness.   Skin: Negative for nail bed changes, discoloration, rash, itching, poor wound healing, unusual hair distribution.  Positive for suspicious lesion.  Neurological: Negative for loss of balance, sensory change, paresthesias, and numbness. Positive for pain.  Hematological: Negative for adenopathy, bleeding, and bruising easily.   Psychiatric/Behavioral: The patient is not nervous/anxious.  Negative for altered mental status.    No results found for: HGBA1C    Past Medical History:   Diagnosis Date    Abnormal Pap smear of cervix     HPV (human papilloma virus) infection      Past Surgical History:   Procedure Laterality Date    OOPHORECTOMY      stage 1a dysgerminoma    RIGHT OOPHORECTOMY Right     VAGINAL DELIVERY  12/13/2016     Family History   Problem Relation Age of Onset    Breast cancer Mother 38     Social History     Socioeconomic History    Marital status:      Spouse name: Not on file    Number of children: Not on file    Years of education: Not on file    Highest education  "level: Not on file   Occupational History    Not on file   Social Needs    Financial resource strain: Not on file    Food insecurity     Worry: Not on file     Inability: Not on file    Transportation needs     Medical: Not on file     Non-medical: Not on file   Tobacco Use    Smoking status: Former Smoker   Substance and Sexual Activity    Alcohol use: No    Drug use: No    Sexual activity: Not Currently     Partners: Male     Birth control/protection: None   Lifestyle    Physical activity     Days per week: Not on file     Minutes per session: Not on file    Stress: Not on file   Relationships    Social connections     Talks on phone: Not on file     Gets together: Not on file     Attends Mandaen service: Not on file     Active member of club or organization: Not on file     Attends meetings of clubs or organizations: Not on file     Relationship status: Not on file   Other Topics Concern    Not on file   Social History Narrative    Not on file           Objective:        Ht 5' 3" (1.6 m)   Wt 51.7 kg (113 lb 15.7 oz)   BMI 20.19 kg/m²     Physical Exam   Constitutional: Patient is oriented to person, place, and time. Patient appears well-developed and well-nourished. No acute distress.     Psychiatric: Patient has a normal mood and affect. Patient's speech is normal and behavior is normal. Judgment is normal. Cognition and memory are normal.     Right pedal exam was performed today.  Vascular: Pedal pulses palpable 2/4 DP & PT.  CFT is < 3 seconds to the hallux.  Skin temperature is warm to warm proximal tibia to distal toes without localized increase in calor noted.  No erythema, edema, or ecchymosis noted to the foot or ankle.  Hair growth present distally to the LE.     Musculoskeletal: Ankle and pedal joint ROM are decreased except 1st ray ROM, which is within normal limits.  Ankle joint dorsiflexion is restricted with the knee extended and flexed per Silfverskiold exam.    Muscle strength is " 5/5 for all LE muscle groups tested.    Neurological: Epicritic sensation is grossly Intact to the foot.   Chaddock STR is Intact to the LE.  Tenderness to palpation noted to verrucous lesions sub right 1st and 5th metatarsal heads.    Dermatological: Toenails 1-5 right are WNL in length and thickness.  Webspaces 1-4 right are clean, dry, and intact.  Skin turgor is supple.  No dry, flaky skin noted to the LE.  Focal, hyperkeratotic lesions noted sub right 1st and 5th metatarsal heads with underlying verrucous tissue, thrombosed capillaries, pinpoint bleeding upon debridement, and divergent skin lines.    Nursing note and vitals reviewed.        Assessment:       Encounter Diagnoses   Name Primary?    Verruca plantaris Yes    Pain in right foot          Plan:       Arabella was seen today for plantar warts.    Diagnoses and all orders for this visit:    Verruca plantaris    Pain in right foot      I counseled the patient on her conditions, their implications and medical management.    - Reviewed with patient foot hygiene and treatment options for plantar warts to which patient opted for destruction of lesions via debridement and cantharidin today.  Patient also opted for prescription for topical wart medication for home treatment as well, which was prescribed and sent to [facility] today.    - With the patient's permission, cleansed sub right 1st and 5th metatarsal head lesions with alcohol swabs, debrided lesions down to pinpoint bleeding via 3 mm dermal curette to patient's tolerance, applied aperture pads and cantharidin, and covered with elastoplast.    - Advised patient to cleanse the area with warm, soapy water in 4 hours.  Patient verbalized all understanding.    Patient was given the following recommendations and instructions:  Patient Instructions   - Remove dressing in 4 hours and cleanse area.    - Cover area with topical antibiotic cream or ointment and a bandaid and keep covered for at  least the first 48 hours in case.  Discontinue if no blister has formed after 48 hours.    - Notify clinic if painful blister forms and needs to be drained.  DO NOT REMOVE BLISTERED SKIN.    - Follow up in 2 weeks for wart treatment.          Yenni Ramos DPM        Dictation was performed using M*Modal Fluency.  Transcription errors may be present.

## 2020-12-24 ENCOUNTER — OFFICE VISIT (OUTPATIENT)
Dept: PODIATRY | Facility: CLINIC | Age: 40
End: 2020-12-24
Payer: COMMERCIAL

## 2020-12-24 VITALS
DIASTOLIC BLOOD PRESSURE: 77 MMHG | BODY MASS INDEX: 20.2 KG/M2 | HEART RATE: 85 BPM | HEIGHT: 63 IN | WEIGHT: 114 LBS | SYSTOLIC BLOOD PRESSURE: 115 MMHG

## 2020-12-24 DIAGNOSIS — B07.0 VERRUCA PLANTARIS: Primary | ICD-10-CM

## 2020-12-24 PROCEDURE — 99999 PR PBB SHADOW E&M-EST. PATIENT-LVL III: ICD-10-PCS | Mod: PBBFAC,,, | Performed by: PODIATRIST

## 2020-12-24 PROCEDURE — 99212 PR OFFICE/OUTPT VISIT, EST, LEVL II, 10-19 MIN: ICD-10-PCS | Mod: S$GLB,,, | Performed by: PODIATRIST

## 2020-12-24 PROCEDURE — 1126F PR PAIN SEVERITY QUANTIFIED, NO PAIN PRESENT: ICD-10-PCS | Mod: S$GLB,,, | Performed by: PODIATRIST

## 2020-12-24 PROCEDURE — 99999 PR PBB SHADOW E&M-EST. PATIENT-LVL III: CPT | Mod: PBBFAC,,, | Performed by: PODIATRIST

## 2020-12-24 PROCEDURE — 99212 OFFICE O/P EST SF 10 MIN: CPT | Mod: S$GLB,,, | Performed by: PODIATRIST

## 2020-12-24 PROCEDURE — 3008F BODY MASS INDEX DOCD: CPT | Mod: CPTII,S$GLB,, | Performed by: PODIATRIST

## 2020-12-24 PROCEDURE — 3008F PR BODY MASS INDEX (BMI) DOCUMENTED: ICD-10-PCS | Mod: CPTII,S$GLB,, | Performed by: PODIATRIST

## 2020-12-24 PROCEDURE — 1126F AMNT PAIN NOTED NONE PRSNT: CPT | Mod: S$GLB,,, | Performed by: PODIATRIST

## 2021-01-13 ENCOUNTER — OFFICE VISIT (OUTPATIENT)
Dept: PODIATRY | Facility: CLINIC | Age: 41
End: 2021-01-13
Payer: COMMERCIAL

## 2021-01-13 VITALS
HEIGHT: 63 IN | SYSTOLIC BLOOD PRESSURE: 115 MMHG | WEIGHT: 114 LBS | HEART RATE: 95 BPM | DIASTOLIC BLOOD PRESSURE: 74 MMHG | BODY MASS INDEX: 20.2 KG/M2

## 2021-01-13 DIAGNOSIS — M79.671 PAIN IN RIGHT FOOT: ICD-10-CM

## 2021-01-13 DIAGNOSIS — B07.0 VERRUCA PLANTARIS: Primary | ICD-10-CM

## 2021-01-13 PROCEDURE — 99213 PR OFFICE/OUTPT VISIT, EST, LEVL III, 20-29 MIN: ICD-10-PCS | Mod: S$GLB,,, | Performed by: PODIATRIST

## 2021-01-13 PROCEDURE — 99999 PR PBB SHADOW E&M-EST. PATIENT-LVL III: CPT | Mod: PBBFAC,,, | Performed by: PODIATRIST

## 2021-01-13 PROCEDURE — 3008F PR BODY MASS INDEX (BMI) DOCUMENTED: ICD-10-PCS | Mod: CPTII,S$GLB,, | Performed by: PODIATRIST

## 2021-01-13 PROCEDURE — 99999 PR PBB SHADOW E&M-EST. PATIENT-LVL III: ICD-10-PCS | Mod: PBBFAC,,, | Performed by: PODIATRIST

## 2021-01-13 PROCEDURE — 3008F BODY MASS INDEX DOCD: CPT | Mod: CPTII,S$GLB,, | Performed by: PODIATRIST

## 2021-01-13 PROCEDURE — 99213 OFFICE O/P EST LOW 20 MIN: CPT | Mod: S$GLB,,, | Performed by: PODIATRIST

## 2021-01-13 PROCEDURE — 1125F PR PAIN SEVERITY QUANTIFIED, PAIN PRESENT: ICD-10-PCS | Mod: S$GLB,,, | Performed by: PODIATRIST

## 2021-01-13 PROCEDURE — 1125F AMNT PAIN NOTED PAIN PRSNT: CPT | Mod: S$GLB,,, | Performed by: PODIATRIST

## 2021-02-08 ENCOUNTER — OFFICE VISIT (OUTPATIENT)
Dept: PODIATRY | Facility: CLINIC | Age: 41
End: 2021-02-08
Payer: COMMERCIAL

## 2021-02-08 VITALS
BODY MASS INDEX: 20.2 KG/M2 | WEIGHT: 114 LBS | DIASTOLIC BLOOD PRESSURE: 74 MMHG | HEART RATE: 88 BPM | HEIGHT: 63 IN | SYSTOLIC BLOOD PRESSURE: 109 MMHG

## 2021-02-08 DIAGNOSIS — B07.0 VERRUCA PLANTARIS: Primary | ICD-10-CM

## 2021-02-08 PROCEDURE — 1126F AMNT PAIN NOTED NONE PRSNT: CPT | Mod: S$GLB,,, | Performed by: PODIATRIST

## 2021-02-08 PROCEDURE — 99999 PR PBB SHADOW E&M-EST. PATIENT-LVL III: CPT | Mod: PBBFAC,,, | Performed by: PODIATRIST

## 2021-02-08 PROCEDURE — 3008F PR BODY MASS INDEX (BMI) DOCUMENTED: ICD-10-PCS | Mod: CPTII,S$GLB,, | Performed by: PODIATRIST

## 2021-02-08 PROCEDURE — 99213 PR OFFICE/OUTPT VISIT, EST, LEVL III, 20-29 MIN: ICD-10-PCS | Mod: S$GLB,,, | Performed by: PODIATRIST

## 2021-02-08 PROCEDURE — 3008F BODY MASS INDEX DOCD: CPT | Mod: CPTII,S$GLB,, | Performed by: PODIATRIST

## 2021-02-08 PROCEDURE — 99999 PR PBB SHADOW E&M-EST. PATIENT-LVL III: ICD-10-PCS | Mod: PBBFAC,,, | Performed by: PODIATRIST

## 2021-02-08 PROCEDURE — 1126F PR PAIN SEVERITY QUANTIFIED, NO PAIN PRESENT: ICD-10-PCS | Mod: S$GLB,,, | Performed by: PODIATRIST

## 2021-02-08 PROCEDURE — 99213 OFFICE O/P EST LOW 20 MIN: CPT | Mod: S$GLB,,, | Performed by: PODIATRIST

## 2021-03-21 PROBLEM — M79.671 PAIN IN RIGHT FOOT: Status: RESOLVED | Noted: 2020-07-26 | Resolved: 2021-03-21

## 2021-05-10 ENCOUNTER — PATIENT MESSAGE (OUTPATIENT)
Dept: RESEARCH | Facility: HOSPITAL | Age: 41
End: 2021-05-10

## 2022-08-12 DIAGNOSIS — M79.642 PAIN IN BOTH HANDS: Primary | ICD-10-CM

## 2022-08-12 DIAGNOSIS — M79.641 PAIN IN BOTH HANDS: Primary | ICD-10-CM

## 2022-08-17 ENCOUNTER — OFFICE VISIT (OUTPATIENT)
Dept: ORTHOPEDICS | Facility: CLINIC | Age: 42
End: 2022-08-17
Payer: COMMERCIAL

## 2022-08-17 ENCOUNTER — HOSPITAL ENCOUNTER (OUTPATIENT)
Dept: RADIOLOGY | Facility: HOSPITAL | Age: 42
Discharge: HOME | End: 2022-08-17
Attending: ORTHOPAEDIC SURGERY
Payer: COMMERCIAL

## 2022-08-17 VITALS — BODY MASS INDEX: 20.02 KG/M2 | WEIGHT: 113 LBS | HEIGHT: 63 IN | RESPIRATION RATE: 16 BRPM

## 2022-08-17 DIAGNOSIS — M77.8 THUMB TENDONITIS: Primary | ICD-10-CM

## 2022-08-17 DIAGNOSIS — M79.641 PAIN IN BOTH HANDS: ICD-10-CM

## 2022-08-17 DIAGNOSIS — M77.8 TENDINITIS OF THUMB: Primary | ICD-10-CM

## 2022-08-17 DIAGNOSIS — M79.642 PAIN IN BOTH HANDS: ICD-10-CM

## 2022-08-17 PROCEDURE — 3008F PR BODY MASS INDEX (BMI) DOCUMENTED: ICD-10-PCS | Mod: CPTII,S$GLB,, | Performed by: ORTHOPAEDIC SURGERY

## 2022-08-17 PROCEDURE — 73130 X-RAY EXAM OF HAND: CPT | Mod: 26,50,, | Performed by: RADIOLOGY

## 2022-08-17 PROCEDURE — 1159F MED LIST DOCD IN RCRD: CPT | Mod: CPTII,S$GLB,, | Performed by: ORTHOPAEDIC SURGERY

## 2022-08-17 PROCEDURE — 99203 OFFICE O/P NEW LOW 30 MIN: CPT | Mod: S$GLB,,, | Performed by: ORTHOPAEDIC SURGERY

## 2022-08-17 PROCEDURE — 1160F RVW MEDS BY RX/DR IN RCRD: CPT | Mod: CPTII,S$GLB,, | Performed by: ORTHOPAEDIC SURGERY

## 2022-08-17 PROCEDURE — 73130 X-RAY EXAM OF HAND: CPT | Mod: TC,50,PO

## 2022-08-17 PROCEDURE — 1159F PR MEDICATION LIST DOCUMENTED IN MEDICAL RECORD: ICD-10-PCS | Mod: CPTII,S$GLB,, | Performed by: ORTHOPAEDIC SURGERY

## 2022-08-17 PROCEDURE — 99999 PR PBB SHADOW E&M-EST. PATIENT-LVL III: ICD-10-PCS | Mod: PBBFAC,,, | Performed by: ORTHOPAEDIC SURGERY

## 2022-08-17 PROCEDURE — 3008F BODY MASS INDEX DOCD: CPT | Mod: CPTII,S$GLB,, | Performed by: ORTHOPAEDIC SURGERY

## 2022-08-17 PROCEDURE — 99203 PR OFFICE/OUTPT VISIT, NEW, LEVL III, 30-44 MIN: ICD-10-PCS | Mod: S$GLB,,, | Performed by: ORTHOPAEDIC SURGERY

## 2022-08-17 PROCEDURE — 99999 PR PBB SHADOW E&M-EST. PATIENT-LVL III: CPT | Mod: PBBFAC,,, | Performed by: ORTHOPAEDIC SURGERY

## 2022-08-17 PROCEDURE — 1160F PR REVIEW ALL MEDS BY PRESCRIBER/CLIN PHARMACIST DOCUMENTED: ICD-10-PCS | Mod: CPTII,S$GLB,, | Performed by: ORTHOPAEDIC SURGERY

## 2022-08-17 PROCEDURE — 73130 XR HAND COMPLETE 3 VIEWS BILATERAL: ICD-10-PCS | Mod: 26,50,, | Performed by: RADIOLOGY

## 2022-08-17 RX ORDER — METHYLPREDNISOLONE 4 MG/1
TABLET ORAL
Qty: 21 EACH | Refills: 0 | Status: SHIPPED | OUTPATIENT
Start: 2022-08-17 | End: 2022-09-07

## 2022-08-17 NOTE — PROGRESS NOTES
Past Medical History:   Diagnosis Date    Abnormal Pap smear of cervix     HPV (human papilloma virus) infection     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       Past Surgical History:   Procedure Laterality Date    BREAST BIOPSY Right 2015    benign    OOPHORECTOMY Right 2009    stage 1a dysgerminoma    VAGINAL DELIVERY  12/13/2016       No current outpatient medications on file.     No current facility-administered medications for this visit.       Review of patient's allergies indicates:   Allergen Reactions    Codeine Anaphylaxis       Family History   Problem Relation Age of Onset    Breast cancer Mother 38       Social History     Socioeconomic History    Marital status:    Tobacco Use    Smoking status: Former Smoker   Substance and Sexual Activity    Alcohol use: No    Drug use: No    Sexual activity: Not Currently     Partners: Male     Birth control/protection: None       Chief Complaint:   Chief Complaint   Patient presents with    Left Hand - Pain    Right Hand - Pain       History of present illness:  42-year-old left-hand dominant female seen for bilateral thumb pain.  Pain is at the IP joints of both thumbs.  Patient denies an injury or trauma.  Started about a month ago after she was using some barbells at the gym.  She has subsequently stopped doing it but the pain has not improved.  Pain is a 2/10.  Denies numbness or tingling.  Denies triggering.  Denies previous treatment.    Answers for HPI/ROS submitted by the patient on 8/12/2022  unexpected weight change: No  appetite change : No  sleep disturbance: No  IMMUNOCOMPROMISED: No  nervous/ anxious: No  dysphoric mood: No  rash: No  visual disturbance: No  eye redness: No  eye pain: No  ear pain: No  tinnitus: No  hearing loss: No  sinus pressure : Yes  nosebleeds: No  enviro allergies: Yes  food allergies: No  cough: No  shortness of breath: No  sweating: No  dysuria: No  frequency: No  difficulty urinating:  No  hematuria: No  painful intercourse: No  chest pain: No  palpitations: No  nausea: No  vomiting: No  diarrhea: No  blood in stool: No  constipation: No  headaches: No  dizziness: No  numbness: No  seizures: No  joint swelling: No  myalgia: No  weakness: No  back pain: No  Pain Chronicity: new  History of trauma: No  Onset: more than 1 month ago  Frequency: constantly  Progression since onset: waxing and waning  Injury mechanism: reaching  injury location: at home  pain- numeric: 4/10  pain location: left wrist, left fingers, right fingers  pain quality: tight, burning  Radiating Pain: No  Aggravating factors: bending, contact  fever: No  inability to bear weight: No  itching: No  joint locking: No  limited range of motion: No  stiffness: Yes  tingling: No  Treatments tried: rest, other  physical therapy: not tried  Improvement on treatment: no relief        Physical Examination:    Vital Signs:    Vitals:    08/17/22 0857   Resp: 16       Body mass index is 20.02 kg/m².    This a well-developed, well nourished patient in no acute distress.  They are alert and oriented and cooperative to examination.  Pt. walks without an antalgic gait.      Examination of bilateral hands and wrist shows no signs of rashes or erythema. Patient has no masses ecchymosis or effusions. Patient has full range of motion of the wrist in flexion and extension as well as ulnar and radial deviation. The patient also has full range of motion of all joints in the hand. There are 2+ radial pulse and intact light touch sensation in all 5 digits. Nontender over the anatomic snuffbox. Negative Tinel's. Negative Finkelstein's test.  A little tenderness over the IP joints of both thumbs      X-rays:  X-rays of both hands are ordered and reviewed which show no atypical findings     Assessment::  Bilateral thumb flexor tendinitis    Plan:  Reviewed the findings with her today.  I think she just has some thumb tendinitis.  Recommended a Medrol  Dosepak.  Talked about modifying her activity until the pain is improved.  Follow-up if the pain does not improve or if it returns.    This note was created using eSoft voice recognition software that occasionally misinterpreted phrases or words.    Consult note is delivered via Epic messaging service.

## 2023-05-03 ENCOUNTER — HOSPITAL ENCOUNTER (OUTPATIENT)
Dept: RADIOLOGY | Facility: HOSPITAL | Age: 43
Discharge: HOME | End: 2023-05-03
Attending: OBSTETRICS & GYNECOLOGY
Payer: COMMERCIAL

## 2023-05-03 DIAGNOSIS — Z12.31 SCREENING MAMMOGRAM, ENCOUNTER FOR: ICD-10-CM

## 2023-08-01 NOTE — PROGRESS NOTES
[facility] - Breast Surgery   History and Physical    Subjective:      Arabella Catalan is a 43 y.o. female     Palpable mass.  2023  Some pain in right breast    neg MMG     Right oophorectomy for dysgerminoma. No chemo, no xrt    Mother breast cancer at 38    Wants to think about genetics      Menarche at age 14 year old. . Age at first live birth 31. Did  breast feed 2 year and 6 months . LMP  . OCP-yes 20 years ago  Menopause at none. HRT-none   Personal history of ovarian/breast yes ovarian cancer in . Had previous breast biopsy  - stereo needle biopsy, benign.    Genetic testing none     Family history cancer:  Mother breast cancer at 38 and cervical cancer    Smoker Pk/yr quit date , smoked around 0.5 pack a day    No Covid-19 vaccine     Relevant info: patient mother had breast and cervical cancer diagnosed in mid 30s.   Patient  Roman is with patient today.       Relevant medical history:  Right Ovarian Cancer Stage 1a Dysgerminoma Dr. Jose Angel Zazueta,  Right oophorectomy    Right Breast Biopsy  [facility]        Patient Active Problem List   Diagnosis    Fetal Cleft Lip and Palate    Verruca plantaris    Breast cancer of lower-outer quadrant of right female breast    Invasive ductal carcinoma of breast, female, right    Anxiety about health    BRCA gene positive     No current outpatient medications on file prior to visit.     No current facility-administered medications on file prior to visit.     Review of patient's allergies indicates:   Allergen Reactions    Codeine Anaphylaxis     Past Medical History:   Diagnosis Date    Abnormal Pap smear of cervix     HPV (human papilloma virus) infection     Ovarian cancer     stage 1a dysgerminoma     Past Surgical History:   Procedure Laterality Date    BREAST BIOPSY Right     benign    BREAST BIOPSY Right 2023    INSERTION OF TUNNELED CENTRAL VENOUS CATHETER (CVC) WITH  "SUBCUTANEOUS PORT N/A 8/15/2023    Procedure: [id number];  Surgeon: Skyler Aldrich MD;  Location: [facility] OR;  Service: General;  Laterality: N/A;    OOPHORECTOMY Right 2009    stage 1a dysgerminoma    VAGINAL DELIVERY  12/13/2016     Family History   Problem Relation Age of Onset    Breast cancer Mother 38    Cervical cancer Mother      Social History     Socioeconomic History    Marital status:    Tobacco Use    Smoking status: Former     Types: Cigarettes   Substance and Sexual Activity    Alcohol use: Yes     Comment: occasionally    Drug use: No    Sexual activity: Not Currently     Partners: Male     Birth control/protection: None         Review of Systems    No CP, No SOB      Objective:      /80 (BP Location: Right arm, Patient Position: Sitting, BP Method: Medium (Automatic))   Pulse 97   Temp 98.2 °F (36.8 °C) (Temporal)   Resp 16   Ht 5' 3" (1.6 m)   Wt 55 kg (121 lb 4.1 oz)   LMP 07/24/2023   SpO2 100%   BMI 21.48 kg/m²     Constitutional: NAD AAOX4  HEENT: EOMI, nonicteric sclera  NECK: no mass, no thyromegaly, no lymphadenopathy  CV: regular rate  PULM: good respiratory effort  ABDOMEN: soft, Nontender, nondistended. No guarding. No rebound.  MUSCULOSKELETAL: Good ROM  SKIN: No rashes or ulcerations seen.   NEUROLOGIC: CN grossly intact. Motor, sensory grossly intact    Breast exam   C some ptosis  Right: No discharge, dimpling, lymphadenopathy  Right 0800 3.5cm FN 2x2cm movable mass  Right 0700 2cm FN 1.5cm movable mass  R1 and R2 are 2cm apart    Left:  No mass, discharge, dimpling, lymphadenopathy          Patient Active Problem List   Diagnosis    Fetal Cleft Lip and Palate    Verruca plantaris    Breast cancer of lower-outer quadrant of right female breast    Invasive ductal carcinoma of breast, female, right    Anxiety about health    BRCA gene positive        High complexity of problems addressed - breast cancer, treatment options, expectations, effects of " treatment, risks, benefits. Extensive and complex data reviewed, independent interpretation of tests, discussion of management with another health care provider.    Alternative efficacious methods of treatment of breast cancer were given.  Options including lumpectomy, mastectomy, reconstruction options with advantages/disadvantages of each, provisions assuring coverage of reconstructive surgery costs, how the patient may access reconstructive care including the potential to be transferred to a facility that provides reconstructive care or choosing reconstruction after completion of breast cancer surgery and treatment.  [facility], [facility] breast cancer brochure given.    I have given her all options - lumpectomy XRT, unilateral or bilateral mastectomy +/- reconstruction. I have explained procedure, risks, benefits, postop expectations. All questions answered. Risks include but are not limited to infection, bleeding, injury to nerves, vessels, numbness, weakness, difficulty lifting arm, swelling of arm (lymphedema), inability to remove all lesion, residual breast tissue, recurrence of malignancy, need for further procedure, loss of skin flap, seroma (fluid collection), inability to find sentinel lymph node, need for axillary dissection, chronic pain, radioactive substance may be given, allergic reaction, staining of the skin, difficulty with future imaging, close or positive margins requiring additional surgery.    Saw in [facility] with Dr Mcgovern medical oncologist and Dr Roman radiation oncologist.        Imaging and Chronology:     9/25/2012 Bilaterals Screening Mammo - [facility]  No mammographic evidence of maliganancy.   BI-RADS: 1 Negative      9/29/2015 Bilateral Screening Mammo - [facility]  Right 7 O'C anterior depth, new clustered indistinct calcifications.   BI-RADS : 0 Needs additional imaging    Recommendations: Additional views are recommended.      10/6/2015 Right Dx Mammo - [facility]  Right 7 O'C, anterior  depth new clustered slightly heterogeneous punctate calcifications, appear suspicious of malignancy.    BI-RADS: 4 Suspicious abnormality    Recommendations: Stereotactic Biopsy is recommended.       10/12/2015 Right Breast Microcalcification's,Stereotactic Bx, 7 O'C - [facility]  Benign fibrocystic changes including papillary apocrine change, cysts, areas of dense stromal fibrosis,   Fibroadenomatoid change.   Microcalcifications are present.  Negative for AH, and malignancy.     4/22/2022 Bilateral Screening Mammo - [facility]   No mammographic evidence of malignancy.    5/3/2023 Bilateral Screening Mammo - [facility]   No mammographic evidence of malignancy.  BI-RADS Category: Overall: 2 - Benign       7/20/2023 Right Ultrasound Complete - [facility]   Right 8 O'C,  1.5 cm x 1 cm x 1.6 cm mass. Assessment: 4 - Suspicious finding. Biopsy and possible aspiration are recommended.   Right 7 O'C,   2 cm x 1.1 cm x 1.6 cm mass. Assessment: 4 - Suspicious finding. Biopsy is recommended.   BI-RADS Category: Overall: 4 - Suspicious       7/26/2023 Right Breast 8 O'C 7 CFN, CNB, Heart Clip - [facility]    IDC, High Grade (3,2,3)   TNBC ki67 61.4 %     Right Breast 7 O'C 3 CFN, CNB, Ribbon Clip   In Situ and IDC High Grade (3,3,2)  TNBC Ki67 81.3%     8/2/2023 MRI Breast - [facility]   Right  Mass: 8OC 7 CMFN   (1.3 cm from the skin, and 0.8 cm from the chest wall.)  2.7 cm x 2.2 cm x 2 cm mass   Right Mass: 7OC  3 CMFN   (1.1 cm from the skin, and 2.3 cm from the chest wall.)   2 cm x 1.6 cm x 1.6 cm mass      Left  There is no MR evidence of malignancy in the left breast.     8/10/23  CT CAP  5 liver lesions 5-9mm, indeterminate  RLL lung 2mm nodule, rec FU imaging  Prominent right axilla LN  Increased attenuation right iliac    08/16/2023 right axillary lymph node biopsy  9 mm right axillary node with 5 mm cortical thickness  Prominent intramammary node at 9:00 a.m. 9 cm from nipple 8 mm with 3 mm cortex  Biopsy showed fragments of benign lymph node  Benign and  concordant    08/16/2023  SHANDA  placed in 2 right breast areas      Assessment/Plan:      Cancer Staging   Breast cancer of lower-outer quadrant of right female breast  Staging form: Breast, AJCC 8th Edition  - Clinical stage from 8/7/2023: Stage IIB (cT2, cN0, cM0, G3, ER-, KS-, HER2-) - Signed by Monica Min MD on 8/7/2023      Right multifocal breast cancer Grade 3 IDC TNBC  Right LOQ 2.7cm and 2cm    H/o ovarian dysgerminoma  Mother breast cancer at 38    Genetics - wants to think about it.  Discussed importance.  Discussed her mother having breast cancer at 38.  Patient personal history of ovarian cancer.  Young age.  Triple negative cancer.  She will let us know what she decides.    Discuss surgical options.  Discussed our preference for mastectomy due to the multifocal triple negative breast cancer.  2.7 and 2 cm lesions 2 cm apart.  This spans 6.7 cm.  In addition she is 43.  Mother breast cancer at 38.  Patient with personal history of ovarian cancer.  She initially was inclined to have lumpectomy but will consider mastectomy.  Will go ahead and place SHANDA  to the 2 lesions.  She will be getting neoadjuvant chemotherapy.  And then she will continue to think about her surgical options.  No evidence of abnormal lymphadenopathy by exam or imaging.  Will review again at tumor conference.  Would recommend right sentinel node biopsy at the time of surgery as well.    Will follow her progression.  Will need right ultrasound and follow-up midway through chemo or sooner if needed.    To get staging CT scans.  Port.  Neoadjuvant chemo.  Keynote 522 trial.  At this point no need for radiation but await final results, pathology.      nipples clear from imaging standpoint  Lesions are 1cm from skin right LOQ    Addendum 8/10/23  CT CAP  5 liver lesions 5-9mm, indeterminate  RLL lung 2mm nodule, rec FU imaging  Prominent right axilla LN  Increased attenuation right iliac    Case reviewed with oncology  team Dr. Bender medical Oncology, [facility], [facility] breast radiologist, navigators.  Plan second-look right axillary ultrasound with biopsy.  Likely reactive nodes from core biopsy.  MRI liver  Bone scan  Medical oncology to follow-up other findings (lung)  Will try to coordinate most studies at the [facility] [place] as able  Patient notified    8/17/23  Pt decided she wants bilateral mastectomy, right SLNB, recon after NACT  Refer Razia goldsmith     08/21/2023  Right axillary lymph node biopsy benign  SHANDA  placed into to right breast lesions.  Although patient has decided she wants bilateral mastectomy right SLNB, recon after NACT    9/1/23  BRCA1 pos  Wants bilat mast, right SLNB, recon  Referral gyn onc kirsten Brar  derm

## 2023-08-02 DIAGNOSIS — C50.911 INVASIVE DUCTAL CARCINOMA OF BREAST, FEMALE, RIGHT: Primary | ICD-10-CM

## 2023-08-07 ENCOUNTER — DOCUMENTATION ONLY (OUTPATIENT)
Dept: SURGERY | Facility: CLINIC | Age: 43
End: 2023-08-07
Payer: COMMERCIAL

## 2023-08-07 ENCOUNTER — TELEPHONE (OUTPATIENT)
Dept: RADIATION ONCOLOGY | Facility: CLINIC | Age: 43
End: 2023-08-07

## 2023-08-07 ENCOUNTER — OFFICE VISIT (OUTPATIENT)
Dept: RADIATION ONCOLOGY | Facility: CLINIC | Age: 43
End: 2023-08-07
Payer: COMMERCIAL

## 2023-08-07 ENCOUNTER — OFFICE VISIT (OUTPATIENT)
Dept: HEMATOLOGY/ONCOLOGY | Facility: CLINIC | Age: 43
End: 2023-08-07
Payer: COMMERCIAL

## 2023-08-07 ENCOUNTER — OFFICE VISIT (OUTPATIENT)
Dept: SURGERY | Facility: CLINIC | Age: 43
End: 2023-08-07
Payer: COMMERCIAL

## 2023-08-07 VITALS
TEMPERATURE: 98 F | SYSTOLIC BLOOD PRESSURE: 126 MMHG | DIASTOLIC BLOOD PRESSURE: 80 MMHG | OXYGEN SATURATION: 100 % | TEMPERATURE: 98 F | BODY MASS INDEX: 21.48 KG/M2 | WEIGHT: 121.25 LBS | SYSTOLIC BLOOD PRESSURE: 126 MMHG | BODY MASS INDEX: 21.48 KG/M2 | BODY MASS INDEX: 21.48 KG/M2 | RESPIRATION RATE: 16 BRPM | RESPIRATION RATE: 16 BRPM | HEART RATE: 97 BPM | DIASTOLIC BLOOD PRESSURE: 80 MMHG | OXYGEN SATURATION: 100 % | DIASTOLIC BLOOD PRESSURE: 80 MMHG | HEART RATE: 97 BPM | SYSTOLIC BLOOD PRESSURE: 126 MMHG | OXYGEN SATURATION: 100 % | HEIGHT: 63 IN | HEART RATE: 97 BPM | HEIGHT: 63 IN | WEIGHT: 121.25 LBS | HEIGHT: 63 IN | TEMPERATURE: 98 F | RESPIRATION RATE: 16 BRPM | WEIGHT: 121.25 LBS

## 2023-08-07 DIAGNOSIS — Z17.1 MALIGNANT NEOPLASM OF LOWER-OUTER QUADRANT OF RIGHT BREAST OF FEMALE, ESTROGEN RECEPTOR NEGATIVE: Primary | ICD-10-CM

## 2023-08-07 DIAGNOSIS — C50.911 INVASIVE DUCTAL CARCINOMA OF BREAST, FEMALE, RIGHT: Primary | ICD-10-CM

## 2023-08-07 DIAGNOSIS — C50.911 INVASIVE DUCTAL CARCINOMA OF BREAST, FEMALE, RIGHT: ICD-10-CM

## 2023-08-07 DIAGNOSIS — Z17.1 MALIGNANT NEOPLASM OF LOWER-OUTER QUADRANT OF RIGHT BREAST OF FEMALE, ESTROGEN RECEPTOR NEGATIVE: ICD-10-CM

## 2023-08-07 DIAGNOSIS — C50.511 MALIGNANT NEOPLASM OF LOWER-OUTER QUADRANT OF RIGHT BREAST OF FEMALE, ESTROGEN RECEPTOR NEGATIVE: ICD-10-CM

## 2023-08-07 DIAGNOSIS — Z85.43 HISTORY OF OVARIAN CANCER: ICD-10-CM

## 2023-08-07 DIAGNOSIS — C50.511 MALIGNANT NEOPLASM OF LOWER-OUTER QUADRANT OF RIGHT BREAST OF FEMALE, ESTROGEN RECEPTOR NEGATIVE: Primary | ICD-10-CM

## 2023-08-07 PROCEDURE — 99999 PR PBB SHADOW E&M-EST. PATIENT-LVL III: ICD-10-PCS | Mod: PBBFAC,,, | Performed by: SURGERY

## 2023-08-07 PROCEDURE — 99205 OFFICE O/P NEW HI 60 MIN: CPT | Mod: S$GLB,,, | Performed by: SURGERY

## 2023-08-07 PROCEDURE — 99205 OFFICE O/P NEW HI 60 MIN: CPT | Mod: S$GLB,,, | Performed by: INTERNAL MEDICINE

## 2023-08-07 PROCEDURE — 99205 PR OFFICE/OUTPT VISIT, NEW, LEVL V, 60-74 MIN: ICD-10-PCS | Mod: S$GLB,,, | Performed by: SURGERY

## 2023-08-07 PROCEDURE — 99999 PR PBB SHADOW E&M-EST. PATIENT-LVL III: CPT | Mod: PBBFAC,,, | Performed by: SURGERY

## 2023-08-07 PROCEDURE — 99205 PR OFFICE/OUTPT VISIT, NEW, LEVL V, 60-74 MIN: ICD-10-PCS | Mod: S$GLB,,, | Performed by: INTERNAL MEDICINE

## 2023-08-07 PROCEDURE — 99204 OFFICE O/P NEW MOD 45 MIN: CPT | Mod: S$GLB,,, | Performed by: RADIOLOGY

## 2023-08-07 PROCEDURE — 99204 PR OFFICE/OUTPT VISIT, NEW, LEVL IV, 45-59 MIN: ICD-10-PCS | Mod: S$GLB,,, | Performed by: RADIOLOGY

## 2023-08-07 PROCEDURE — 99999 PR PBB SHADOW E&M-EST. PATIENT-LVL IV: CPT | Mod: PBBFAC,,, | Performed by: INTERNAL MEDICINE

## 2023-08-07 PROCEDURE — 99999 PR PBB SHADOW E&M-EST. PATIENT-LVL IV: ICD-10-PCS | Mod: PBBFAC,,, | Performed by: INTERNAL MEDICINE

## 2023-08-07 PROCEDURE — 99999 PR PBB SHADOW E&M-EST. PATIENT-LVL III: ICD-10-PCS | Mod: PBBFAC,,, | Performed by: RADIOLOGY

## 2023-08-07 PROCEDURE — 99999 PR PBB SHADOW E&M-EST. PATIENT-LVL III: CPT | Mod: PBBFAC,,, | Performed by: RADIOLOGY

## 2023-08-07 NOTE — PROGRESS NOTES
08/07/2023    [facility]   Radiation Oncology Consultation    ASSESSMENT  This is a 43 y.o. y/o female with Stage IIB (T2(m2), N0, M0, Grade 3, ER-/GA-/HER2-, ki67 61%) Invasive ductal carcinoma of the RIGHT breast. She is seen as part of Multidisciplinary Breast Clinic prior to initiation of therapy for discussion of treatment options.       PLAN    Treatment options were discussed with the patient including mastectomy vs breast conservation with lumpectomy and adjuvant RT.  We discussed the goals of treatment to be curative.  The risks, benefits, scheduling, alternatives to and rationale of radiation therapy were explained in detail.   We discussed the indications, course, and potential risks associated with radiation therapy, including but not limited to fatigue, local skin reaction such as hyperpigmentation, tenderness or erythema, breast pain, and potential long term toxicities such as rib fragility, and remote risks of lung inflammation, esophageal inflammation, heart inflammation, or secondary malignancy.  She expressed understanding of these risks, all questions were answered to her apparent satisfaction.   She understands that if she opts for lumpectomy she will require radiation therapy adjuvantly, long course, and that even after mastectomy in some cases she may still benefit from radiation therapy.   After this discussion, she elected to proceed with neoadjuvant chemotherapy.    She was given our contact information, and she was told that she could call our clinic at any time if she has any questions or concerns.      Radiation Treatment Details:   Pending findings at surgery      Chief Complaint   Patient presents with    Invasive ductal carcinoma of breast, female, right       HPI: The patient is a 43 y.o. year old female with a new diagnosis of breast cancer. She initially presented due to palpable right breast mass.     5/3/23 Screening mammogram:  extremely dense breasts,  BI-RADS 2    7/20/23 Right Diagnostic mammogram/ultrasound: 2 masses  1.5 x 1.0 x 1.6cm mass at 8:00 7cmfn  2.0 x 1.1 x 1.6cm mass at 7:00 3cmfn; biopsies recommended    7/26/23 Biopsy x 2:  Right 8:00 7cmfn core biopsy: invasive ductal carcinoma, G3  Right 7:00 3cmfn core: in situe and invasive duct carcinoma, G3    8/2/23 MRI Breasts: 2 masses  Right 8:00 7cmfn: 2.7 x 2.2 x 2cm irregular mass with angular margins  Right 7:00 3cmfn 2.0 x 1.6 x 1.6cm irregular mass with angular margins  No lymphadenopathy    Possibility of pregnancy: Yes: hcg prior to treatment  History of prior irradiation: No  History of prior systemic anti-cancer therapy: No  History of collagen vascular disease: No  Implanted electronic device (pacer/defib/nerve stimulator): No      Past Medical History:   Diagnosis Date    Abnormal Pap smear of cervix     HPV (human papilloma virus) infection     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       Past Surgical History:   Procedure Laterality Date    BREAST BIOPSY Right 2015    benign    OOPHORECTOMY Right 2009    stage 1a dysgerminoma    VAGINAL DELIVERY  12/13/2016       Social History     Tobacco Use    Smoking status: Former     Current packs/day: 0.00   Substance Use Topics    Alcohol use: Yes     Comment: occasionally    Drug use: No       Cancer-related family history includes Breast cancer (age of onset: 38) in her mother; Cervical cancer in her mother.    No current outpatient medications on file prior to visit.     No current facility-administered medications on file prior to visit.       Review of patient's allergies indicates:   Allergen Reactions    Codeine Anaphylaxis       Review of Systems   Constitutional:  Negative for chills, fever and malaise/fatigue.   HENT:  Negative for hearing loss.    Eyes:  Negative for blurred vision and double vision.   Respiratory:  Negative for cough and shortness of breath.    Cardiovascular:  Negative for chest pain.   Gastrointestinal:  Negative  "for abdominal pain, constipation, diarrhea, nausea and vomiting.   Genitourinary: Negative.    Musculoskeletal:  Negative for back pain, falls and myalgias.   Skin:  Negative for rash.   Neurological:  Negative for dizziness, sensory change, speech change, focal weakness, loss of consciousness, weakness and headaches.   Endo/Heme/Allergies: Negative.    Psychiatric/Behavioral: Negative.          Vital Signs: /80 (BP Location: Right arm, Patient Position: Sitting, BP Method: Medium (Automatic))   Pulse 97   Temp 98.2 °F (36.8 °C) (Temporal)   Resp 16   Ht 5' 3" (1.6 m)   Wt 55 kg (121 lb 4.1 oz)   LMP 07/24/2023   SpO2 100%   BMI 21.48 kg/m²     ECOG Performance Status: 0 - Fully Active    Physical Exam  Vitals reviewed.   Constitutional:       General: She is not in acute distress.     Appearance: Normal appearance. She is not ill-appearing or toxic-appearing.   HENT:      Head: Normocephalic and atraumatic.      Nose: Nose normal.   Eyes:      Extraocular Movements: Extraocular movements intact.      Conjunctiva/sclera: Conjunctivae normal.      Pupils: Pupils are equal, round, and reactive to light.   Pulmonary:      Effort: Pulmonary effort is normal.   Musculoskeletal:         General: Normal range of motion.      Cervical back: Normal range of motion.   Skin:     General: Skin is warm.   Neurological:      General: No focal deficit present.      Mental Status: She is alert and oriented to person, place, and time.      Cranial Nerves: No cranial nerve deficit.      Gait: Gait normal.   Psychiatric:         Mood and Affect: Mood normal.         Behavior: Behavior normal.         Thought Content: Thought content normal.         Judgment: Judgment normal.            Imaging: I have personally reviewed the patient's available images and reports and summarized pertinent findings above in HPI.     Pathology: I have personally reviewed the patient's available pathology and summarized pertinent findings " above in HPI.     This case was discussed with Dr. Min      - Thank you for allowing me to participate in the care of your patient.    Rosemarie Roman MD

## 2023-08-07 NOTE — PROGRESS NOTES
CONSULT NOTE    Subjective:       Patient ID: Arabella Catalan is a 43 y.o. female.  MRN: [id number]  : 1980    Chief Complaint: Invasive ductal carcinoma of breast, female, right      History of Present Illness:   Arabella Catalan is a 43 y.o. female who is referred for newly diagnosed TNBC. She was seen in our multidisciplinary breast Clinic with Dr. Min and Dr. Roman prior to initiation of therapy for discussion of treatment options.  I have discussed the plan with them.        Started screening mammograms for a few years after her ovarian cancer , in her late 20's. Routine pelvic exam revealed an ovarian cancer, s/p right oophorectomy for dysgerminoma.     Resumed routine mammograms at 42, had a normal screening mammogram in May 2023 but felt a lump in the right breast in July. Further imaging showed 2 lesions, both > 2 cm , both biopsied to be G3 IDC, ER/DC and Her 2 jose eduardo negative.       Menarche at age 14 year old. . Age at first live birth 31. Did  breast feed 2 year and 6 months . LMP  . OCP-yes 20 years ago  Menopause at none. HRT-none     No genetic testing done yet.     Family history cancer:  Mother breast cancer at 38 and cervical cancer     Smoker Pk/yr quit date , smoked around 0.5 pack a day    No Covid-19 vaccine       Oncology History:  5/3/23  Impression:  Bilateral  There is no mammographic evidence of malignancy.     BI-RADS Category:   Overall: 2 - Benign    23  US  Impression:  Right  Mass: Right breast 1.5 cm x 1 cm x 1.6 cm mass at the 8 o'clock position. Assessment: 4 - Suspicious finding. Biopsy and possible aspiration are recommended.   Mass: Right breast 2 cm x 1.1 cm x 1.6 cm mass at the 7 o'clock position. Assessment: 4 - Suspicious finding. Biopsy is recommended.      BI-RADS Category:   Overall: 4 - Suspicious    23  MRI  Impression:  Right  Mass: Right breast 2.7 cm x 2.2 cm x 2 cm  mass at the 8 o'clock position. Assessment: 6 - Known biopsy, proven malignancy.   Mass: Right breast 2 cm x 1.6 cm x 1.6 cm mass at the 7 o'clock position. Assessment: 6 - Known biopsy, proven malignancy.      Left  There is no MR evidence of malignancy in the left breast.     BI-RADS Category:   Overall: 6 - Known Biopsy-Proven Malignancy  7/31/23:  DIAGNOSIS:   07/28/2023 JL:tml     1. RIGHT BREAST AT 8:00 7 CM FROM NIPPLE CORE NEEDLE BIOPSIES:   - INVASIVE DUCT CARCINOMA, HIGH-GRADE (3,2,3).     2. RIGHT BREAST AT 7:00 3 CM FROM NIPPLE CORE NEEDLE BIOPSIES:   - IN SITU AND INVASIVE DUCT CARCINOMA, HIGH-GRADE (3,3,2).     RIGHT BREAST: INVASIVE DUCTAL CARCINOMA   GRADE: HIGH     ER: NEGATIVE, OR: NEGATIVE, HER2**: NEGATIVE   History:  Past Medical History:   Diagnosis Date    Abnormal Pap smear of cervix     HPV (human papilloma virus) infection     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      Past Surgical History:   Procedure Laterality Date    BREAST BIOPSY Right 2015    benign    OOPHORECTOMY Right 2009    stage 1a dysgerminoma    VAGINAL DELIVERY  12/13/2016     Family History   Problem Relation Age of Onset    Breast cancer Mother 38    Cervical cancer Mother       Social History     Tobacco Use    Smoking status: Former     Current packs/day: 0.00    Smokeless tobacco: Not on file   Substance and Sexual Activity    Alcohol use: Yes     Comment: occasionally    Drug use: No    Sexual activity: Not Currently     Partners: Male     Birth control/protection: None        ROS:   Review of Systems   Constitutional:  Negative for fever, malaise/fatigue and weight loss.   HENT:  Negative for congestion, hearing loss, nosebleeds and sore throat.    Eyes:  Negative for double vision and photophobia.   Respiratory:  Negative for cough, hemoptysis, sputum production, shortness of breath and wheezing.    Cardiovascular:  Negative for chest pain, palpitations, orthopnea and leg swelling.   Gastrointestinal:   "Negative for abdominal pain, blood in stool, constipation, diarrhea, heartburn, nausea and vomiting.   Genitourinary:  Negative for dysuria, frequency, hematuria and urgency.   Musculoskeletal:  Negative for back pain, joint pain and myalgias.   Skin:  Negative for itching and rash.   Neurological:  Negative for dizziness, tingling, seizures, weakness and headaches.   Endo/Heme/Allergies:  Negative for polydipsia. Does not bruise/bleed easily.   Psychiatric/Behavioral:  Negative for depression and memory loss. The patient is nervous/anxious. The patient does not have insomnia.         Objective:     Vitals:    08/07/23 1334   BP: 126/80   Pulse: 97   Resp: 16   Temp: 98.2 °F (36.8 °C)   TempSrc: Temporal   SpO2: 100%   Weight: 55 kg (121 lb 4.1 oz)   Height: 5' 3" (1.6 m)   PainSc: 0-No pain       Physical Examination:   Physical Exam  Vitals and nursing note reviewed.   Constitutional:       General: She is not in acute distress.     Appearance: She is not diaphoretic.   HENT:      Head: Normocephalic.      Mouth/Throat:      Pharynx: No oropharyngeal exudate.   Eyes:      General: No scleral icterus.     Conjunctiva/sclera: Conjunctivae normal.   Neck:      Thyroid: No thyromegaly.   Cardiovascular:      Rate and Rhythm: Normal rate and regular rhythm.      Heart sounds: Normal heart sounds. No murmur heard.  Pulmonary:      Effort: Pulmonary effort is normal. No respiratory distress.      Breath sounds: No stridor. No wheezing or rales.   Chest:      Chest wall: No tenderness.       Abdominal:      General: Bowel sounds are normal. There is no distension.      Palpations: Abdomen is soft. There is no mass.      Tenderness: There is no abdominal tenderness. There is no rebound.   Musculoskeletal:         General: No tenderness or deformity. Normal range of motion.      Cervical back: Neck supple.   Lymphadenopathy:      Cervical: No cervical adenopathy.   Skin:     General: Skin is warm and dry.      Findings: No " erythema or rash.   Neurological:      Mental Status: She is alert and oriented to person, place, and time.      Cranial Nerves: No cranial nerve deficit.      Coordination: Coordination normal.      Gait: Gait is intact.   Psychiatric:         Mood and Affect: Affect normal.         Cognition and Memory: Memory normal.         Judgment: Judgment normal.          Diagnostic Tests:  Significant Imaging: I have reviewed and interpreted all pertinent imaging results/findings.  CT Chest Without Contrast No results found for this or any previous visit.    CT Chest With ContrastNo results found for this or any previous visit.    CT Abdomen/Pelvis Without Contrast No results found for this or any previous visit.     CT Abdomen/Pelvis With Contrast No results found for this or any previous visit.    CT Abdomen/Pelvis With Without Contrast No results found for this or any previous visit.     PET Scan No results found for this or any previous visit.      Laboratory Data:  All pertinent labs have been reviewed.    Labs:   Lab Results   Component Value Date    WBC 15.97 (H) 12/14/2016    HGB 11.3 (L) 12/14/2016    HCT 33.7 (L) 12/14/2016    MCV 93 12/14/2016     12/14/2016       Assessment/Plan:   Invasive ductal carcinoma of breast, female, right  cT2 cN0 G3 IDC, TNBC    We discussed the more agressive nature and multifocal nature of her TNBC. Clinically node negative. Based on size, she is a candidate for neoadjuvant chemo immunotherapy based on Keynote-522 to prevent systemic spread and to monitor response to therapy and that 64% of the patients on trial had a pCR.     She is agreeable, will start therapy as soon as we have port placed and approval. Needs chemo school, port, echo and baseline Cts.    Will monitor her closely while on treatment.   Offer supportive care with IO and onc psych.     After completing neoadjuvant therapy, she would be referred back for surgery. She is thinking about surgical options and  genetic testing.       -     Ambulatory referral/consult to Hematology / Oncology  -     CT Chest Abdomen Pelvis With Contrast (xpd); Future; Expected date: 08/07/2023  -     Ambulatory referral/consult to Integrative Oncology; Future; Expected date: 08/14/2023  -     Ambulatory referral/consult to Hematology/Oncology/Psychology; Future; Expected date: 08/14/2023  -     Ambulatory referral/consult to Chemo School; Future; Expected date: 08/14/2023  -     Echo; Future    History of ovarian cancer       ECOG SCORE               Discussion:   No follow-ups on file.    Plan was discussed with the patient at length, and she verbalized understanding. Arabella was given an opportunity to ask questions that were answered to her satisfaction, and she was advised to call in the interval if any problems or questions arise.    Electronically signed by Corina Mcgovern MD          Answers submitted by the patient for this visit:  Review of Systems Questionnaire (Submitted on 8/6/2023)  appetite change : No  unexpected weight change: No  mouth sores: No  visual disturbance: No  adenopathy: No    Route Chart for Scheduling    Med Onc Chart Routing  Urgent    Follow up with physician . Topher Pro   Follow up with [facility]    Infusion scheduling note    Injection scheduling note    Labs    Imaging    Pharmacy appointment    Other referrals              Treatment Plan Information   OP PEMBROLIZUMAB WITH WEEKLY PACLITAXEL CARBOPLATIN (AUC 1.5) FOLLOWED BY PEMBROLIZUMAB DOXORUBICIN CYCLOPHOSPHAMIDE FOLLOWED BY PEMBROLIZUMAB 200MG Q3W   Corina Mcgovern MD   Upcoming Treatment Dates - OP PEMBROLIZUMAB WITH WEEKLY PACLITAXEL CARBOPLATIN (AUC 1.5) FOLLOWED BY PEMBROLIZUMAB DOXORUBICIN CYCLOPHOSPHAMIDE FOLLOWED BY PEMBROLIZUMAB 200MG Q3W    8/7/2023       Pre-Medications       diphenhydrAMINE (BENADRYL) 50 mg in NS 50 mL IVPB       famotidine (PF) injection 20 mg       Chemotherapy       PACLitaxeL (TAXOL) 80 mg/m2 = 126 mg in sodium chloride  0.9% 250 mL chemo infusion       CARBOplatin (PARAPLATIN) in sodium chloride 0.9% 250 mL chemo infusion       Antiemetics       palonosetron 0.25mg/dexAMETHasone 12mg in NS IVPB 0.25 mg 50 mL       Immunotherapy       pembrolizumab (KEYTRUDA) 200 mg in sodium chloride 0.9% SolP 108 mL infusion  8/14/2023       Pre-Medications       DIPHENHYDRAMINE IV ORDERABLE       FAMOTIDINE (PF) 20 MG/2 ML IV SOLN       Chemotherapy       PACLitaxeL (TAXOL) 80 mg/m2 = 126 mg in sodium chloride 0.9% 250 mL chemo infusion       CARBOplatin (PARAPLATIN) in sodium chloride 0.9% 250 mL chemo infusion       Antiemetics       PALONOSETRON 0.25MG/DEXAMETHASONE 12MG ORDERABLE  8/21/2023       Pre-Medications       DIPHENHYDRAMINE IV ORDERABLE       FAMOTIDINE (PF) 20 MG/2 ML IV SOLN       Chemotherapy       PACLitaxeL (TAXOL) 80 mg/m2 = 126 mg in sodium chloride 0.9% 250 mL chemo infusion       CARBOplatin (PARAPLATIN) in sodium chloride 0.9% 250 mL chemo infusion       Antiemetics       PALONOSETRON 0.25MG/DEXAMETHASONE 12MG ORDERABLE  8/28/2023       Pre-Medications       diphenhydrAMINE (BENADRYL) 50 mg in NS 50 mL IVPB       famotidine (PF) injection 20 mg       Chemotherapy       PACLitaxeL (TAXOL) 80 mg/m2 = 126 mg in sodium chloride 0.9% 250 mL chemo infusion       CARBOplatin (PARAPLATIN) in sodium chloride 0.9% 250 mL chemo infusion       Antiemetics       palonosetron 0.25mg/dexAMETHasone 12mg in NS IVPB 0.25 mg 50 mL       Immunotherapy       pembrolizumab (KEYTRUDA) 200 mg in sodium chloride 0.9% SolP 108 mL infusion

## 2023-08-07 NOTE — NURSING
Met with patient in multi disciplinary clinic today.  Explained my role as navigator.  Reviewed plan of care.  Will call pt tomorrow to schedule CT, Echo and chemo class appts.  Briefly reviewed chemo class and what to expect.  Answered questions.  My contact information was provided and she was encouraged to call with any questions or concerns.  She thanked me and verbalized understanding.

## 2023-08-07 NOTE — PROGRESS NOTES
Met with the patient to discuss her new diagnosis and what she has learned thus far about her diagnosis. All questions and concerns addressed.  Follow-up appts discussed and a folder with NCCN patient guidelines book on Invasive Breast Cancer given to her. Also given a copy of the [facility] Board of Medical Examiners brochure on Introductory Guide to Breast Cancer Treatment Options. Detailed information was discussed with the patient on Integrative Oncology services offered, support groups and classes, genetics and lymphedema management. Written copies were provided as well. Contact information given to her for nurse navigation and she was told to call for any questions or concerns. She verbalized understanding.   Refused genetic testing at this time

## 2023-08-07 NOTE — PLAN OF CARE
START ON PATHWAY REGIMEN - Breast    [id number]        Pembrolizumab (Keytruda)       Paclitaxel       Carboplatin       Filgrastim-xxxx       Pembrolizumab (Keytruda)       Doxorubicin       Cyclophosphamide       Pegfilgrastim-xxxx           Additional Orders: In the KEYNOTE-522 trial (Kelsie et al., 2020),   patients received 12 weeks of the first neoadjuvant treatment with carboplatin +   paclitaxel + pembrolizumab followed by 12 weeks of the second neoadjuvant   treatment with doxorubicin + cyclophosphamide + pembrolizumab. In the adjuvant   phase, patients received up to 27 weeks of pembrolizumab. G-CSF was recommended   after each chemotherapy cycle beginning 24 hours after the last dose of   chemotherapy. For the paclitaxel + carboplatin cycles, filgrastim was   recommended and it continued daily at least until 72 hours after the last dose   of chemotherapy. For the doxorubicin + cyclophosphamide cycles, pegfilgrastim   could be used. See protocol for details. Assess LVEF prior to initiation of   doxorubicin and as clinically indicated during and after treatment. Doxorubicin   can cause myocardial damage, including acute left ventricular failure. Risk of   cardiomyopathy is generally proportional to cumulative   anthracycline/anthracenedione exposure. Use of concomitant cardiotoxic agents,   previous radiotherapy to the mediastinum, or presence/history of cardiovascular   disease can additionally increase cardiomyopathy risk. See PI for details.   Serious immune-mediated adverse events can occur with pembrolizumab. Please   monitor your patient and refer to the linked immune-mediated adverse reaction   management materials for more information.    **Always confirm dose/schedule in your pharmacy ordering system**    Patient Characteristics:  Preoperative or Nonsurgical Candidate (Clinical Staging), Neoadjuvant Therapy   followed by Surgery, Invasive Disease, Chemotherapy, HER2   Negative/Unknown/Equivocal, ER  Negative/Unknown, Platinum Therapy Indicated and   Candidate for Checkpoint Inhibitor  Therapeutic Status: Preoperative or Nonsurgical Candidate (Clinical Staging)  AJCC M Category: cM0  AJCC Grade: G3  Breast Surgical Plan: Neoadjuvant Therapy followed by Surgery  ER Status: Negative (-)  AJCC 8 Stage Grouping: IIB  HER2 Status: Negative (-)  AJCC T Category: cT2  AJCC N Category: cN0  NM Status: Negative (-)  Intent of Therapy:  Curative Intent, Discussed with Patient

## 2023-08-08 ENCOUNTER — PATIENT MESSAGE (OUTPATIENT)
Dept: HEMATOLOGY/ONCOLOGY | Facility: CLINIC | Age: 43
End: 2023-08-08
Payer: COMMERCIAL

## 2023-08-08 ENCOUNTER — TELEPHONE (OUTPATIENT)
Dept: HEMATOLOGY/ONCOLOGY | Facility: CLINIC | Age: 43
End: 2023-08-08
Payer: COMMERCIAL

## 2023-08-08 NOTE — TELEPHONE ENCOUNTER
See other documentation.     ----- Message from Lucia Joseph, Patient Care Assistant sent at 8/8/2023  2:55 PM CDT -----  Type:  Patient Returning Call    Who Called:  emilee  Who Left Message for Patient:  meño  Does the patient know what this is regarding?:  ?  Best Call Back Number:  [phone]    Additional Information:  emilee is returning call , please call to further discuss, thank you

## 2023-08-09 NOTE — NURSING
Spoke with pt.  Reviewed all upcoming scheduled appointment dates, times and locations.  She is aware that depending on her port placement date, some appts may be rescheduled.   She would like to start chemo on a Monday.  Will work with infusion to accommodate.  Briefly discussed and answered questions regarding her chemo treatment schedule.  Encouraged her to reach out to me with any other questions or concerns.  She thanked me and verbalized understanding.

## 2023-08-10 ENCOUNTER — OFFICE VISIT (OUTPATIENT)
Dept: HEMATOLOGY/ONCOLOGY | Facility: CLINIC | Age: 43
End: 2023-08-10
Payer: COMMERCIAL

## 2023-08-10 ENCOUNTER — HOSPITAL ENCOUNTER (OUTPATIENT)
Dept: RADIOLOGY | Facility: HOSPITAL | Age: 43
Discharge: HOME | End: 2023-08-10
Attending: INTERNAL MEDICINE
Payer: COMMERCIAL

## 2023-08-10 VITALS
HEIGHT: 63 IN | WEIGHT: 119.19 LBS | TEMPERATURE: 98 F | BODY MASS INDEX: 21.12 KG/M2 | HEART RATE: 78 BPM | SYSTOLIC BLOOD PRESSURE: 112 MMHG | OXYGEN SATURATION: 100 % | DIASTOLIC BLOOD PRESSURE: 76 MMHG

## 2023-08-10 DIAGNOSIS — Z17.1 MALIGNANT NEOPLASM OF LOWER-OUTER QUADRANT OF RIGHT BREAST OF FEMALE, ESTROGEN RECEPTOR NEGATIVE: Primary | ICD-10-CM

## 2023-08-10 DIAGNOSIS — C50.911 INVASIVE DUCTAL CARCINOMA OF BREAST, FEMALE, RIGHT: ICD-10-CM

## 2023-08-10 DIAGNOSIS — R45.89 ANXIETY ABOUT HEALTH: Primary | ICD-10-CM

## 2023-08-10 DIAGNOSIS — C50.511 MALIGNANT NEOPLASM OF LOWER-OUTER QUADRANT OF RIGHT BREAST OF FEMALE, ESTROGEN RECEPTOR NEGATIVE: Primary | ICD-10-CM

## 2023-08-10 DIAGNOSIS — K76.9 LIVER DISEASE, UNSPECIFIED: Primary | ICD-10-CM

## 2023-08-10 PROBLEM — F41.8 ANXIETY ABOUT HEALTH: Status: ACTIVE | Noted: 2023-08-10

## 2023-08-10 PROCEDURE — 3008F PR BODY MASS INDEX (BMI) DOCUMENTED: ICD-10-PCS | Mod: CPTII,S$GLB,, | Performed by: NURSE PRACTITIONER

## 2023-08-10 PROCEDURE — 99999 PR PBB SHADOW E&M-EST. PATIENT-LVL IV: ICD-10-PCS | Mod: PBBFAC,,, | Performed by: NURSE PRACTITIONER

## 2023-08-10 PROCEDURE — 99215 PR OFFICE/OUTPT VISIT, EST, LEVL V, 40-54 MIN: ICD-10-PCS | Mod: S$GLB,,, | Performed by: NURSE PRACTITIONER

## 2023-08-10 PROCEDURE — 3074F PR MOST RECENT SYSTOLIC BLOOD PRESSURE < 130 MM HG: ICD-10-PCS | Mod: CPTII,S$GLB,, | Performed by: NURSE PRACTITIONER

## 2023-08-10 PROCEDURE — 74177 CT CHEST ABDOMEN PELVIS WITH CONTRAST (XPD): ICD-10-PCS | Mod: 26,,, | Performed by: RADIOLOGY

## 2023-08-10 PROCEDURE — A9698 NON-RAD CONTRAST MATERIALNOC: HCPCS | Mod: PO | Performed by: INTERNAL MEDICINE

## 2023-08-10 PROCEDURE — 71260 CT CHEST ABDOMEN PELVIS WITH CONTRAST (XPD): ICD-10-PCS | Mod: 26,,, | Performed by: RADIOLOGY

## 2023-08-10 PROCEDURE — 1159F MED LIST DOCD IN RCRD: CPT | Mod: CPTII,S$GLB,, | Performed by: NURSE PRACTITIONER

## 2023-08-10 PROCEDURE — 74177 CT ABD & PELVIS W/CONTRAST: CPT | Mod: TC,PO

## 2023-08-10 PROCEDURE — 1160F RVW MEDS BY RX/DR IN RCRD: CPT | Mod: CPTII,S$GLB,, | Performed by: NURSE PRACTITIONER

## 2023-08-10 PROCEDURE — 3008F BODY MASS INDEX DOCD: CPT | Mod: CPTII,S$GLB,, | Performed by: NURSE PRACTITIONER

## 2023-08-10 PROCEDURE — 71260 CT THORAX DX C+: CPT | Mod: TC,PO

## 2023-08-10 PROCEDURE — 99215 OFFICE O/P EST HI 40 MIN: CPT | Mod: S$GLB,,, | Performed by: NURSE PRACTITIONER

## 2023-08-10 PROCEDURE — 3078F DIAST BP <80 MM HG: CPT | Mod: CPTII,S$GLB,, | Performed by: NURSE PRACTITIONER

## 2023-08-10 PROCEDURE — 1160F PR REVIEW ALL MEDS BY PRESCRIBER/CLIN PHARMACIST DOCUMENTED: ICD-10-PCS | Mod: CPTII,S$GLB,, | Performed by: NURSE PRACTITIONER

## 2023-08-10 PROCEDURE — 1159F PR MEDICATION LIST DOCUMENTED IN MEDICAL RECORD: ICD-10-PCS | Mod: CPTII,S$GLB,, | Performed by: NURSE PRACTITIONER

## 2023-08-10 PROCEDURE — 99999 PR PBB SHADOW E&M-EST. PATIENT-LVL IV: CPT | Mod: PBBFAC,,, | Performed by: NURSE PRACTITIONER

## 2023-08-10 PROCEDURE — 25500020 PHARM REV CODE 255: Mod: PO | Performed by: INTERNAL MEDICINE

## 2023-08-10 PROCEDURE — 71260 CT THORAX DX C+: CPT | Mod: 26,,, | Performed by: RADIOLOGY

## 2023-08-10 PROCEDURE — 3074F SYST BP LT 130 MM HG: CPT | Mod: CPTII,S$GLB,, | Performed by: NURSE PRACTITIONER

## 2023-08-10 PROCEDURE — 74177 CT ABD & PELVIS W/CONTRAST: CPT | Mod: 26,,, | Performed by: RADIOLOGY

## 2023-08-10 PROCEDURE — 3078F PR MOST RECENT DIASTOLIC BLOOD PRESSURE < 80 MM HG: ICD-10-PCS | Mod: CPTII,S$GLB,, | Performed by: NURSE PRACTITIONER

## 2023-08-10 RX ADMIN — IOHEXOL 1000 ML: 9 SOLUTION ORAL at 10:08

## 2023-08-10 RX ADMIN — IOHEXOL 75 ML: 350 INJECTION, SOLUTION INTRAVENOUS at 10:08

## 2023-08-10 NOTE — PROGRESS NOTES
Spoke with radiologist about the CT scan findings, given the size of her liver lesions we will proceed with MRI liver protocol and bone scan to rule out any bone metastatic disease.     Orders Placed This Encounter   Procedures    MRI Abdomen W WO Contrast     Standing Status:   Future     Standing Expiration Date:   8/10/2024     Order Specific Question:   Does the patient have a pacemaker or a defibrillator (Note: Some facilities may not be able to schedule an MRI for patients with pacemakers and defibrillators. You should contact your local radiology department to determine if this is the case.)?     Answer:   No     Order Specific Question:   Does the patient have an aneurysm or surgical clip, pump, nerve/brain stimulator, middle/inner ear prosthesis, or other metal implant or foreign object (bullet, shrapnel)? If they have a card related to their implant, ask them to bring it. Issues related to the implant may cause the MRI to be delayed.     Answer:   No     Order Specific Question:   Is the patient claustrophobic?     Answer:   No     Order Specific Question:   Will the patient require sedation?     Answer:   No     Order Specific Question:   Does the patient have any of the following conditions? Diabetes, History of Renal Disease or Hypertension requiring medical therapy?     Answer:   No     Order Specific Question:   May the Radiologist modify the order per protocol to meet the clinical needs of the patient?     Answer:   Yes     Order Specific Question:   Is this part of a Research Study?     Answer:   No     Order Specific Question:   Does the patient have on a skin patch for medication with aluminized backing?     Answer:   No    NM Bone Scan Whole Body     Standing Status:   Future     Standing Expiration Date:   8/10/2024     Order Specific Question:   May the Radiologist modify the order per protocol to meet the clinical needs of the patient?     Answer:   Yes     Verito Bender MD  Hematology and  Oncology  [facility]  [facility] [place] [facility]

## 2023-08-10 NOTE — PROGRESS NOTES
Arabella Catalan  43 y.o. is here to seek an integrative approach to discuss side effects related to breast cancer treatment. Arabella Catalan  was referred by Dr. Mcgovern     HPI  She had a routine mammogram in May which was clear. A month later in June she felt a lump and began having pain.   She has a history of ovarian cancer with the right ovary removed when she was 29 years old. She saw Dr. Aldrich yesterday and is awaiting port placement. She has a CT scan today. She states she is sleeping well and does not have fatigue. She has stress and anxiety due to the diagnosis, but has been busy with the kids and her mother which has kept her mind occupied. The kids went back to school today and she has more time to think and became tearful. Their children know she has cancer and will get medicine weekly, but they have not addressed the possible side effects that may come with treatment.   She has a good appetite and eats healthy and low or healthy carbs. She started beach body and then took a few weeks off due to doctor's appointments.   She is  and has 3 sons. She has a good support system. She helps her mother who has early onset Alzheimer's. She works as needed from home.       7 pillars Assessment    Sleep  How many hours of sleep per night? 7-8 hours  Do you have trouble falling asleep, staying asleep or waking up earlier than you need to? no  Do you have daytime fatigue? no  Do you need medication for sleep? no  Do you use any supplements or other interventions for sleep? no    Resilience  Rate your current level of stress- high    Purpose  Do you feel you have a vision or a life purpose? Yes    Environment  Any exposures:no known exposures    Spirituality-  Church, practicing [facility]    Nutrition   Food allergies or sensitivities: no  Do you adhere to a particular type of diet? no  Do you have any concerns with your eating habits? no    Exercise  How would you describe your physical  "activity level? moderate  What do you do for physical activity? Beach Body    Past Medical History  Past Medical History:   Diagnosis Date    Abnormal Pap smear of cervix     HPV (human papilloma virus) infection     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      Past Surgical History   Past Surgical History:   Procedure Laterality Date    BREAST BIOPSY Right 2015    benign    OOPHORECTOMY Right 2009    stage 1a dysgerminoma    VAGINAL DELIVERY  12/13/2016      Family History   Family History   Problem Relation Age of Onset    Breast cancer Mother 38    Cervical cancer Mother       Allergies  Review of patient's allergies indicates:   Allergen Reactions    Codeine Anaphylaxis      Current Medications:  No current outpatient medications on file.     Review of Systems  Review of Systems   Constitutional: Negative.    HENT: Negative.     Eyes: Negative.    Respiratory: Negative.     Cardiovascular: Negative.    Gastrointestinal: Negative.    Genitourinary: Negative.    Musculoskeletal: Negative.    Skin: Negative.    Neurological: Negative.    Endo/Heme/Allergies: Negative.    Psychiatric/Behavioral:  The patient is nervous/anxious.       Physical Exam      Vitals:    08/10/23 0857   BP: 112/76   Pulse: 78   Temp: 97.7 °F (36.5 °C)   TempSrc: Temporal   SpO2: 100%   Weight: 54.1 kg (119 lb 3.2 oz)   Height: 5' 3" (1.6 m)     Body mass index is 21.12 kg/m².  Physical Exam  Vitals reviewed.   Constitutional:       Appearance: Normal appearance.   Neurological:      Mental Status: She is alert.   Psychiatric:         Mood and Affect: Mood normal.         Behavior: Behavior normal.        ASSESSMENT :  1. Anxiety about health    2. Invasive ductal carcinoma of breast, female, right       PLAN:  Reviewed all information discussed at today's visit and all questions were answered.    Counseled on healthy lifestyle and behavior modifications: Counseled on the benefits of exercise during treatment   See Nutrition during " treatment as needed   Referral to Oncology Psychology  I discussed and recommended the following support services:  [employer] [facility] [employer]   Music and Relaxation therapy   Meditation    Follow up with Integrative Services on 3rd chemo treatment chairside.     I spent a total of 40 minutes on the day of the visit.This includes face to face time and non-face to face time preparing to see the patient (eg, review of tests), obtaining and/or reviewing separately obtained history, documenting clinical information in the electronic or other health record, independently interpreting results and communicating results to the patient/family/caregiver, or care coordinator.

## 2023-08-14 ENCOUNTER — TELEPHONE (OUTPATIENT)
Dept: HEMATOLOGY/ONCOLOGY | Facility: CLINIC | Age: 43
End: 2023-08-14
Payer: COMMERCIAL

## 2023-08-14 NOTE — NURSING
Spoke with pt.  She had questions regarding Dignicap.  Information given.  Bone scan is scheduled for 8/17/23.

## 2023-08-14 NOTE — PROGRESS NOTES
Pharmacist Treatment Plan Review Note    The patient's treatment plan was reviewed by a pharmacist and adjustments, if needed, were made in collaboration with the physician including premedications, chemotherapy/immunotherapy/biotherapy, supportive care, and drug interactions. The treatment plan was compared to primary literature and NCCN guidelines. Available laboratory values were reviewed.    [place] PharmD     Pharmacy New Oncology Treatment Plan Review    Chemotherapy Protocol:  OP PEMBROLIZUMAB WITH WEEKLY PACLITAXEL CARBOPLATIN (AUC 1.5) FOLLOWED BY PEMBROLIZUMAB DOXORUBICIN CYCLOPHOSPHAMIDE FOLLOWED BY PEMBROLIZUMAB 200MG Q3W     Indication: Invasive ductal carcinoma of breast, female, right. Triple Negative.    Line of treatment: Neoadjuvant    Previous treatment plans: N/A      Appropriate protocol selected for indication: Yes    Labs appropriate for treatment? ECHO ordered, not yet obtained. Will need CBC, CMP, thyroid panel within 7 days of C1D1.     Drug interactions reviewed: yes, no new or actionable interactions noted between oncology treatment plan and preexisting home medications    PGx evaluation: N/A    Baseline dose adjustment required? no    Appropriate supportive care ordered?                Antiemesis: Chemo Anti-Emesis PPx : High emetogenic risk (>90%)               Infusion reaction PPx: Appropriate infusion reaction ppx ordered              Neutropenic prophylaxis: Low risk for febrile neutropenia (<10%). GCSF not indicated.  Infectious prophylaxis: Antimicrobial prophylaxis not recommended for current regimen   Fluids:  n/a              Other supportive care?: Paclitaxel Pepcid/Benadryl premeds ordered.    Cycle 1 Day 1 planned: Not yet scheduled.         Added Adult Antiemetic Standardization orders.

## 2023-08-15 DIAGNOSIS — C50.911 INVASIVE DUCTAL CARCINOMA OF BREAST, FEMALE, RIGHT: Primary | ICD-10-CM

## 2023-08-16 ENCOUNTER — TUMOR BOARD CONFERENCE (OUTPATIENT)
Dept: SURGERY | Facility: CLINIC | Age: 43
End: 2023-08-16
Payer: COMMERCIAL

## 2023-08-16 NOTE — PROGRESS NOTES
Interdisciplinary Breast Cancer Conference    Arabella Adkins    Date Presented to Tumor Board: 08/14/23    Presenting Hospital / Clinic: [facility] [place] [facility]    Tumor Laterality: Left    Breast Tumor Site: LOQ    Presenter: Dr Monica Min    Reason for Consultation: Initial Presentation    Specialties Present: Medical Oncology;Radiation Oncology;Surgical Oncology;Pathology;Navigation;Integrative Oncology;Plastic Surgery    Patient Status: a new patient    Treatment to Date: None    Clinical Trial Eligibility: None available    Cancer Staging   Breast cancer of lower-outer quadrant of right female breast  Staging form: Breast, AJCC 8th Edition  - Clinical stage from 8/7/2023: Stage IIB (cT2, cN0, cM0, G3, ER-, FL-, HER2-) - Signed by Monica Min MD on 8/7/2023      Metastatic Site(s): None    Presentation at Cancer Conference: Prospective    Recommended Therapy:  Genetic testing-declined at this time  Imaging   PAC  NACT  Surgery  Final recommendations pending surgical path

## 2023-08-17 ENCOUNTER — HOSPITAL ENCOUNTER (OUTPATIENT)
Dept: RADIOLOGY | Facility: HOSPITAL | Age: 43
Discharge: HOME | End: 2023-08-17
Attending: STUDENT IN AN ORGANIZED HEALTH CARE EDUCATION/TRAINING PROGRAM
Payer: COMMERCIAL

## 2023-08-17 ENCOUNTER — TELEPHONE (OUTPATIENT)
Dept: RESEARCH | Facility: HOSPITAL | Age: 43
End: 2023-08-17
Payer: COMMERCIAL

## 2023-08-17 DIAGNOSIS — K76.9 LIVER DISEASE, UNSPECIFIED: ICD-10-CM

## 2023-08-17 DIAGNOSIS — C50.911 INVASIVE DUCTAL CARCINOMA OF BREAST, FEMALE, RIGHT: ICD-10-CM

## 2023-08-17 PROCEDURE — A9503 TC99M MEDRONATE: HCPCS | Mod: PO

## 2023-08-17 PROCEDURE — 78306 NM BONE SCAN WHOLE BODY: ICD-10-PCS | Mod: 26,,, | Performed by: RADIOLOGY

## 2023-08-17 PROCEDURE — 78306 BONE IMAGING WHOLE BODY: CPT | Mod: TC,PO

## 2023-08-17 PROCEDURE — 78306 BONE IMAGING WHOLE BODY: CPT | Mod: 26,,, | Performed by: RADIOLOGY

## 2023-08-17 NOTE — TELEPHONE ENCOUNTER
8/17/23  14:36    Called the patient to let her know that she is eligible for the [id number] trial and to see if she was interested in learning more. The patient did not answer the phone. A voicemail was left detailing the reason for the call.    14:42  Patient returned this CRC's previous call. The [id number] trial's purpose and procedures were briefly explained to the patient. The patient said she was interested and would contact this CRC by Monday to state if she would like to proceed with the informed consent process or not. Asked the patient if she wanted a copy of the consent form sent to her email to review at her convenience, to which the patient agreed. A copy of the consent was emailed to the patient and the patient was encouraged to call this CRC with any questions or concerns.    Tamia Marti, CRC

## 2023-08-18 ENCOUNTER — CLINICAL SUPPORT (OUTPATIENT)
Dept: CARDIOLOGY | Facility: HOSPITAL | Age: 43
End: 2023-08-18
Attending: INTERNAL MEDICINE
Payer: COMMERCIAL

## 2023-08-18 ENCOUNTER — TELEPHONE (OUTPATIENT)
Dept: SURGERY | Facility: CLINIC | Age: 43
End: 2023-08-18
Payer: COMMERCIAL

## 2023-08-18 VITALS
BODY MASS INDEX: 21.26 KG/M2 | WEIGHT: 120 LBS | SYSTOLIC BLOOD PRESSURE: 120 MMHG | DIASTOLIC BLOOD PRESSURE: 76 MMHG | HEIGHT: 63 IN

## 2023-08-18 DIAGNOSIS — C50.911 INVASIVE DUCTAL CARCINOMA OF BREAST, FEMALE, RIGHT: Primary | ICD-10-CM

## 2023-08-18 DIAGNOSIS — C50.911 INVASIVE DUCTAL CARCINOMA OF BREAST, FEMALE, RIGHT: ICD-10-CM

## 2023-08-18 PROCEDURE — 93306 ECHO (CUPID ONLY): ICD-10-PCS | Mod: 26,,, | Performed by: INTERNAL MEDICINE

## 2023-08-18 PROCEDURE — 93306 TTE W/DOPPLER COMPLETE: CPT | Mod: 26,,, | Performed by: INTERNAL MEDICINE

## 2023-08-18 PROCEDURE — 93306 TTE W/DOPPLER COMPLETE: CPT | Mod: PO

## 2023-08-18 NOTE — NURSING
Spoke with pt.  Reviewed all upcoming appts, times and locations.  Answered questions regarding treatment, dignicap and schedule.  Infusion is aware pt is doing dignicaps and cryotherapy.  Encouraged her to call or message with any questions or concerns.  She thanked me and verbalized understanding.

## 2023-08-18 NOTE — TELEPHONE ENCOUNTER
11:51am called to talk to her about [facility] appts, [facility]ia referral and genetic testing    1:02pm patient returned call and I explained the [facility] appts. All questions and concerns addressed, patient verbalized understanding. Will meet her at the Cancer center on Monday to collect genetic testing. Referral placed and sent to Dr Randle.

## 2023-08-21 ENCOUNTER — RESEARCH ENCOUNTER (OUTPATIENT)
Dept: RESEARCH | Facility: HOSPITAL | Age: 43
End: 2023-08-21
Payer: COMMERCIAL

## 2023-08-21 ENCOUNTER — DOCUMENTATION ONLY (OUTPATIENT)
Dept: SURGERY | Facility: CLINIC | Age: 43
End: 2023-08-21
Payer: COMMERCIAL

## 2023-08-21 LAB
ASCENDING AORTA: 2.29 CM
AV INDEX (PROSTH): 0.8
AV MEAN GRADIENT: 3 MMHG
AV PEAK GRADIENT: 4 MMHG
AV VALVE AREA BY VELOCITY RATIO: 2.36 CM²
AV VALVE AREA: 2.53 CM²
AV VELOCITY RATIO: 0.75
BSA FOR ECHO PROCEDURE: 1.56 M2
CV ECHO LV RWT: 0.32 CM
DOP CALC AO PEAK VEL: 1.06 M/S
DOP CALC AO VTI: 21.3 CM
DOP CALC LVOT AREA: 3.2 CM2
DOP CALC LVOT DIAMETER: 2.01 CM
DOP CALC LVOT PEAK VEL: 0.79 M/S
DOP CALC LVOT STROKE VOLUME: 53.92 CM3
DOP CALCLVOT PEAK VEL VTI: 17 CM
E WAVE DECELERATION TIME: 228.43 MSEC
E/A RATIO: 1.19
E/E' RATIO: 4.96 M/S
ECHO LV POSTERIOR WALL: 0.73 CM (ref 0.6–1.1)
FRACTIONAL SHORTENING: 37 % (ref 28–44)
GLOBAL LONGITUIDAL STRAIN: -20.1 %
INTERVENTRICULAR SEPTUM: 0.71 CM (ref 0.6–1.1)
IVRT: 110.37 MSEC
LA MAJOR: 4.6 CM
LA MINOR: 4.33 CM
LA WIDTH: 3.1 CM
LEFT ATRIUM SIZE: 2.64 CM
LEFT ATRIUM VOLUME INDEX: 19.9 ML/M2
LEFT ATRIUM VOLUME: 31.03 CM3
LEFT INTERNAL DIMENSION IN SYSTOLE: 2.89 CM (ref 2.1–4)
LEFT VENTRICLE DIASTOLIC VOLUME INDEX: 61.35 ML/M2
LEFT VENTRICLE DIASTOLIC VOLUME: 95.7 ML
LEFT VENTRICLE MASS INDEX: 65 G/M2
LEFT VENTRICLE SYSTOLIC VOLUME INDEX: 20.6 ML/M2
LEFT VENTRICLE SYSTOLIC VOLUME: 32.06 ML
LEFT VENTRICULAR INTERNAL DIMENSION IN DIASTOLE: 4.57 CM (ref 3.5–6)
LEFT VENTRICULAR MASS: 101.8 G
LV LATERAL E/E' RATIO: 4.43 M/S
LV SEPTAL E/E' RATIO: 5.64 M/S
LVOT MG: 1.47 MMHG
LVOT MV: 0.58 CM/S
MV PEAK A VEL: 0.52 M/S
MV PEAK E VEL: 0.62 M/S
MV STENOSIS PRESSURE HALF TIME: 66.24 MS
MV VALVE AREA P 1/2 METHOD: 3.32 CM2
PISA TR MAX VEL: 2.22 M/S
PULM VEIN S/D RATIO: 1.15
PV PEAK D VEL: 0.66 M/S
PV PEAK S VEL: 0.76 M/S
RA MAJOR: 4.28 CM
RA PRESSURE ESTIMATED: 3 MMHG
RA WIDTH: 3.3 CM
RIGHT VENTRICULAR END-DIASTOLIC DIMENSION: 3.13 CM
RIGHT VENTRICULAR LENGTH IN DIASTOLE (APICAL 4-CHAMBER VIEW): 7 CM
RV MID DIAMA: 2.12 CM
RV TB RVSP: 5 MMHG
RV TISSUE DOPPLER FREE WALL SYSTOLIC VELOCITY 1 (APICAL 4 CHAMBER VIEW): 15.73 CM/S
SINUS: 2.46 CM
STJ: 2.37 CM
TDI LATERAL: 0.14 M/S
TDI SEPTAL: 0.11 M/S
TDI: 0.13 M/S
TR MAX PG: 20 MMHG
TRICUSPID ANNULAR PLANE SYSTOLIC EXCURSION: 2.27 CM
TV REST PULMONARY ARTERY PRESSURE: 23 MMHG
Z-SCORE OF LEFT VENTRICULAR DIMENSION IN END DIASTOLE: 0.16
Z-SCORE OF LEFT VENTRICULAR DIMENSION IN END SYSTOLE: 0.27

## 2023-08-21 NOTE — PROGRESS NOTES
Met with patient at the cancer center to collect genetics.   The following was explained in detail. The patient understands that this is a voluntary test.    PURPOSE: This test analyzes a specific gene or genes for genetic changes called mutations. This test will help determine if a person has a significantly increased risk if developing certain tumors due to a mutation in a cancer predisposing gene.    TEST RESULTS & INTERPRETATION: Possible result outcomes include positive, negative, and uncertain. A positive mutation is associated with an increased risk for hereditary cancer. A negative result is interpreted that a mutation was not identified. Uncertain means a genetic change was detected but it is not known if this change is linked to cancer risk.    BENEFITS: The benefits include informed choices about health care such as screening, risk reducing surgeries and preventive medication strategies.    RISKS: Concerns regarding possible health insurance discrimination, most states and the federal government have enacted laws to prohibit genetic discrimination.    LIMITATIONS: This test analyzes only certain important genes associated with a specific hereditary cancer syndrome. Genetic testing clarifies cancer risks for only those cancers related to the genes analyzed.    FINANCIAL RESPONSIBILITY: Genetic testing of appropriate individuals is typically reimbursed by health insurance. You are responsible for any cost of the genetic test not reimbursed by insurance.     PATIENT INSTRUCTIONS & COLLECTION PROCESS:  Saliva collected.  Place tube into plastic box and sent off through [facility].  Informed patient results can take up to 2-4 weeks.  Will call patient with results.

## 2023-08-21 NOTE — PROGRESS NOTES
Protocol: [id number], Disparities in Results of Immune Checkpoint Inhibitor Treatment (DIRECT): A Prospective Cohort Study of Cancer Survivors Treated with anti-PD-L1 Immunotherapy in a Community Oncology Setting     IRB# 2022.126  Version Date: 1/11/2022 8/21/2023      Followed up with the patient via telephone regarding meeting to review the [id number] trial consent. The patient requested to review the consent at 8:45AM tomorrow morning. This CRC confirmed with the patient that we would meet tomorrow, 8/22/23, at the [facility] laboratory at 8:45AM to review the [id number] trials consent form.     Tamia Marti, CRC

## 2023-08-22 ENCOUNTER — DOCUMENTATION ONLY (OUTPATIENT)
Dept: INFUSION THERAPY | Facility: HOSPITAL | Age: 43
End: 2023-08-22
Payer: COMMERCIAL

## 2023-08-22 ENCOUNTER — RESEARCH ENCOUNTER (OUTPATIENT)
Dept: RESEARCH | Facility: HOSPITAL | Age: 43
End: 2023-08-22
Payer: COMMERCIAL

## 2023-08-22 ENCOUNTER — CLINICAL SUPPORT (OUTPATIENT)
Dept: HEMATOLOGY/ONCOLOGY | Facility: CLINIC | Age: 43
End: 2023-08-22
Payer: COMMERCIAL

## 2023-08-22 ENCOUNTER — PATIENT MESSAGE (OUTPATIENT)
Dept: ADMINISTRATIVE | Facility: OTHER | Age: 43
End: 2023-08-22
Payer: COMMERCIAL

## 2023-08-22 VITALS
BODY MASS INDEX: 19.16 KG/M2 | DIASTOLIC BLOOD PRESSURE: 70 MMHG | HEIGHT: 66 IN | SYSTOLIC BLOOD PRESSURE: 106 MMHG | OXYGEN SATURATION: 99 % | WEIGHT: 119.25 LBS | TEMPERATURE: 98 F | HEART RATE: 91 BPM

## 2023-08-22 DIAGNOSIS — Z17.1 MALIGNANT NEOPLASM OF LOWER-OUTER QUADRANT OF RIGHT BREAST OF FEMALE, ESTROGEN RECEPTOR NEGATIVE: Primary | ICD-10-CM

## 2023-08-22 DIAGNOSIS — C50.511 MALIGNANT NEOPLASM OF LOWER-OUTER QUADRANT OF RIGHT BREAST OF FEMALE, ESTROGEN RECEPTOR NEGATIVE: Primary | ICD-10-CM

## 2023-08-22 DIAGNOSIS — C50.911 INVASIVE DUCTAL CARCINOMA OF BREAST, FEMALE, RIGHT: ICD-10-CM

## 2023-08-22 PROCEDURE — 99215 PR OFFICE/OUTPT VISIT, EST, LEVL V, 40-54 MIN: ICD-10-PCS | Mod: S$GLB,,, | Performed by: NURSE PRACTITIONER

## 2023-08-22 PROCEDURE — 99999 PR PBB SHADOW E&M-EST. PATIENT-LVL V: ICD-10-PCS | Mod: PBBFAC,,, | Performed by: NURSE PRACTITIONER

## 2023-08-22 PROCEDURE — 99999 PR PBB SHADOW E&M-EST. PATIENT-LVL V: CPT | Mod: PBBFAC,,, | Performed by: NURSE PRACTITIONER

## 2023-08-22 PROCEDURE — 99215 OFFICE O/P EST HI 40 MIN: CPT | Mod: S$GLB,,, | Performed by: NURSE PRACTITIONER

## 2023-08-22 RX ORDER — LIDOCAINE AND PRILOCAINE 25; 25 MG/G; MG/G
CREAM TOPICAL
Qty: 30 G | Refills: 2 | Status: SHIPPED | OUTPATIENT
Start: 2023-08-22

## 2023-08-22 RX ORDER — PROMETHAZINE HYDROCHLORIDE 12.5 MG/1
TABLET ORAL
Qty: 40 TABLET | Refills: 3 | Status: SHIPPED | OUTPATIENT
Start: 2023-08-22 | End: 2024-02-21

## 2023-08-22 RX ORDER — OLANZAPINE 5 MG/1
5 TABLET ORAL NIGHTLY
Qty: 10 TABLET | Refills: 11 | Status: SHIPPED | OUTPATIENT
Start: 2023-08-22 | End: 2023-12-12 | Stop reason: SDUPTHER

## 2023-08-22 RX ORDER — DEXAMETHASONE 4 MG/1
TABLET ORAL
Qty: 40 TABLET | Refills: 3 | Status: SHIPPED | OUTPATIENT
Start: 2023-08-22 | End: 2024-02-21

## 2023-08-22 RX ORDER — DEXAMETHASONE 4 MG/1
8 TABLET ORAL DAILY
Qty: 16 TABLET | Refills: 11 | Status: SHIPPED | OUTPATIENT
Start: 2023-08-23 | End: 2023-08-24

## 2023-08-22 NOTE — PROGRESS NOTES
Subjective:      Name: Arabella Catalan  : 1980  MRN: [id number]    CC:  Education    HPI:   Arabella Catalan is a 43 y.o. female presents to the clinic with her  for a new diagnosis of IDC, right, triple negative.    Seen in our multidisciplinary breast Clinic with Dr. Min and Dr. Roman prior to initiation of therapy for discussion of treatment options.        Started screening mammograms for a few years after her ovarian cancer, in her late 20's. Routine pelvic exam revealed an ovarian cancer, s/p right oophorectomy for dysgerminoma.      Resumed routine mammograms at 42, had a normal screening mammogram in May 2023 but felt a lump in the right breast in July. Further imaging showed 2 lesions, both > 2 cm , both biopsied to be G3 IDC, ER/[place] and Her 2 jose eduardo negative.      Menarche at age 14 year old. . Age at first live birth 31. Did  breast feed 2 year and 6 months . LMP  . OCP-yes 20 years ago  Menopause at none. HRT-none      No genetic testing done yet.      Family history cancer:  Mother breast cancer at 38 and cervical cancer     Smoker Pk/yr quit date , smoked around 0.5 pack a day   No Covid-19 vaccine      Oncology History:  5/3/23  Impression:  Bilateral  There is no mammographic evidence of malignancy.     BI-RADS Category:   Overall: 2 - Benign     23  US  Impression:  Right  Mass: Right breast 1.5 cm x 1 cm x 1.6 cm mass at the 8 o'clock position. Assessment: 4 - Suspicious finding. Biopsy and possible aspiration are recommended.   Mass: Right breast 2 cm x 1.1 cm x 1.6 cm mass at the 7 o'clock position. Assessment: 4 - Suspicious finding. Biopsy is recommended.      BI-RADS Category:   Overall: 4 - Suspicious     23  MRI  Impression:  Right  Mass: Right breast 2.7 cm x 2.2 cm x 2 cm mass at the 8 o'clock position. Assessment: 6 - Known biopsy, proven malignancy.   Mass: Right breast 2 cm x 1.6 cm x 1.6 cm mass at the 7 o'clock position.  Assessment: 6 - Known biopsy, proven malignancy.      Left  There is no MR evidence of malignancy in the left breast.     BI-RADS Category:   Overall: 6 - Known Biopsy-Proven Malignancy  7/31/23:  DIAGNOSIS:   07/28/2023 JL:nidia     1. RIGHT BREAST AT 8:00 7 CM FROM NIPPLE CORE NEEDLE BIOPSIES:   - INVASIVE DUCT CARCINOMA, HIGH-GRADE (3,2,3).     2. RIGHT BREAST AT 7:00 3 CM FROM NIPPLE CORE NEEDLE BIOPSIES:   - IN SITU AND INVASIVE DUCT CARCINOMA, HIGH-GRADE (3,3,2).      RIGHT BREAST: INVASIVE DUCTAL CARCINOMA   GRADE: HIGH     ER: NEGATIVE, IN: NEGATIVE, HER2**: NEGATIVE         Oncology History   Breast cancer of lower-outer quadrant of right female breast   8/7/2023 Initial Diagnosis    Breast cancer of lower-outer quadrant of right female breast     8/7/2023 Cancer Staged    Staging form: Breast, AJCC 8th Edition  - Clinical stage from 8/7/2023: Stage IIB (cT2, cN0, cM0, G3, ER-, IN-, HER2-)     Invasive ductal carcinoma of breast, female, right   8/7/2023 Initial Diagnosis    Invasive ductal carcinoma of breast, female, right     8/7/2023 -  Chemotherapy    Treatment Summary   Plan Name: OP PEMBROLIZUMAB WITH WEEKLY PACLITAXEL CARBOPLATIN (AUC 1.5) FOLLOWED BY PEMBROLIZUMAB DOXORUBICIN CYCLOPHOSPHAMIDE FOLLOWED BY PEMBROLIZUMAB 200MG Q3W  Treatment Goal: Curative  Status: Active  Start Date: 8/7/2023 (Planned)  End Date: 7/8/2024 (Planned)  Provider: Corina Mcgovern MD  Chemotherapy: DOXOrubicin chemo injection 94 mg, 60 mg/m2, Intravenous, Clinic/HOD 1 time, 0 of 4 cycles  CARBOplatin (PARAPLATIN) in sodium chloride 0.9% 250 mL chemo infusion, , Intravenous, Clinic/HOD 1 time, 0 of 4 cycles  cycloPHOSphamide 600 mg/m2 = 940 mg in sodium chloride 0.9% 254.7 mL chemo infusion, 600 mg/m2, Intravenous, Clinic/HOD 1 time, 0 of 4 cycles  PACLitaxeL (TAXOL) 80 mg/m2 = 126 mg in sodium chloride 0.9% 250 mL chemo infusion, 80 mg/m2, Intravenous, Clinic/HOD 1 time, 0 of 4 cycles            Past Medical History:  "  Diagnosis Date    Abnormal Pap smear of cervix     HPV (human papilloma virus) infection     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       Past Surgical History:   Procedure Laterality Date    BREAST BIOPSY Right 2015    benign    BREAST BIOPSY Right 07/26/2023    INSERTION OF TUNNELED CENTRAL VENOUS CATHETER (CVC) WITH SUBCUTANEOUS PORT N/A 8/15/2023    Procedure: [id number];  Surgeon: Skyler Aldrich MD;  Location: [facility] OR;  Service: General;  Laterality: N/A;    OOPHORECTOMY Right 2009    stage 1a dysgerminoma    VAGINAL DELIVERY  12/13/2016       Family History   Problem Relation Age of Onset    Breast cancer Mother 38    Cervical cancer Mother        Social History     Socioeconomic History    Marital status:    Tobacco Use    Smoking status: Former     Current packs/day: 0.00     Types: Cigarettes   Substance and Sexual Activity    Alcohol use: Yes     Comment: occasionally    Drug use: No    Sexual activity: Not Currently     Partners: Male     Birth control/protection: None       Review of patient's allergies indicates:   Allergen Reactions    Codeine Anaphylaxis       Review of Systems   Eyes:  Negative for visual disturbance.   Cardiovascular:  Negative for chest pain.   Respiratory:  Negative for cough and shortness of breath.    Hematologic/Lymphatic: Negative for adenopathy.   Skin:  Negative for rash.   Musculoskeletal:  Negative for back pain.   Gastrointestinal:  Negative for abdominal pain and diarrhea.   Genitourinary:  Negative for frequency.   Neurological:  Negative for headaches.   Psychiatric/Behavioral:  The patient is not nervous/anxious.           Objective:     Vitals:    08/22/23 1308   BP: 106/70   BP Location: Right arm   Patient Position: Sitting   BP Method: Medium (Manual)   Pulse: 91   Temp: 97.6 °F (36.4 °C)   TempSrc: Temporal   SpO2: 99%   Weight: 54.1 kg (119 lb 4.3 oz)   Height: 5' 6" (1.676 m)        Physical Exam  Vitals reviewed.   Constitutional:  "      General: She is not in acute distress.  HENT:      Head: Normocephalic.   Pulmonary:      Effort: Pulmonary effort is normal.   Skin:         Neurological:      Mental Status: She is alert and oriented to person, place, and time.   Psychiatric:         Behavior: Behavior normal.         Thought Content: Thought content normal.             Current Outpatient Medications on File Prior to Visit   Medication Sig    ondansetron (ZOFRAN-ODT) 8 MG TbDL Take 1 tablet (8 mg total) by mouth every 6 (six) hours as needed (nausea).    traMADoL (ULTRAM) 50 mg tablet Take 1 tablet (50 mg total) by mouth every 6 (six) hours.     Current Facility-Administered Medications on File Prior to Visit   Medication    HYDROmorphone injection 0.5 mg    ondansetron injection 4 mg    prochlorperazine injection Soln 10 mg         Assessment:       1. Malignant neoplasm of lower-outer quadrant of right breast of female, estrogen receptor negative    2. Invasive ductal carcinoma of breast, female, right         Plan:     Malignant neoplasm of lower-outer quadrant of right breast of female, estrogen receptor negative    Invasive ductal carcinoma of breast, female, right  -     Ambulatory referral/consult to Chemo School  -     Ambulatory referral/consult to Hematology/Oncology/Nutrition  -     Ambulatory referral/consult to Oncology Social Work  -     dexAMETHasone (DECADRON) 4 MG Tab; Take 1 tab as needed twice a day for nausea that is persistent despite zofran. Take morning and afternoon, no later than 3pm.  Dispense: 40 tablet; Refill: 3  -     promethazine (PHENERGAN) 12.5 MG Tab; (Take 1-2 tabs every 6 hours as needed for nausea persistent despite zofran)  Dispense: 40 tablet; Refill: 3  -     LIDOcaine-prilocaine (EMLA) cream; Apply topically as needed (apply 30 to 60 minutes prior to chemo).  Dispense: 30 g; Refill: 2  -     Care Companion Enrollment Chemotherapy; [facility] Chemotherapy Program Consent Questionaire  -      OLANZapine (ZYPREXA) 5 MG tablet; Take 1 tablet (5 mg total) by mouth every evening. Take as directed on days 1-4, 8-10, and 15-17 of your chemotherapy cycles 1-4. Take on days 1, 2, and 3 of cycles 5-8.  Dispense: 10 tablet; Refill: 11  -     dexAMETHasone (DECADRON) 4 MG Tab; Take 2 tablets (8 mg total) by mouth once daily. Take as directed on days 2-4, 9-10, and 16-17, and of your chemotherapy cycles 1-4. Take on days 2 and 3 of cycle 5-8.  Dispense: 16 tablet; Refill: 11       1.  Treatment plan of Keynote -522 involving Keytruda, Taxol, Carboplatin, AC every 3 weeks x 4, then surgery, followed by Keytruda every 3 weeks x 9 additional cycles discussed with patient and family.    2.  Handouts provided on chemotherapy agents.    3.  Discussed the mechanism of action, potential side effects of this treatment as well as ways to manage them at home.   4.  Dietary modifications discussed.  Detail instructions to be provided by dietician.   5.  Chemotherapy Portfolio supplied with contact information.   6.  Discussed follow-up with the physician for toxicity monitoring throughout treatment.    7.  Scripts were escribed (Zyprexa, Dexamethasone, Zofran ODT, Phenergan, EMLA) and explained for home use.    8.  Consented the patient to the treatment plan and the patient was educated on the planned duration of the treatment and schedule of the treatment administration.  9.  Care companion enrolled.     60 minutes were spent in coordination of patient's care, record review and counseling.                Answers submitted by the patient for this visit:  Review of Systems Questionnaire (Submitted on 8/17/2023)  appetite change : No  unexpected weight change: No  mouth sores: No

## 2023-08-22 NOTE — PROGRESS NOTES
Oncology   Chemotherapy School Education  Arabella Catalan   1980    Social Service Education   This is a 43 y.o.female with a medical diagnosis of breast cancer. Pt has a port and will be starting treatment later this week. SW provided pt with a port pillow.     Current Support System: Pt is here with her  Roman. They have three boys ages 11,9 and 6. They have started back to school and are in the 1st,4th and 6th grade. Pt said they have good support from family, friends and their [employer]ism. Many people have offered to help with meals and other needs.     Met with pt for new pt education. Reviewed role of  during cancer treatment. Educated on role of SW on care team and resources available at the cancer center. SW and RD provided pt and  a tour of infusion.     Answered all psychosocial/socioeconomic related questions. Pt was working part time but is taking a break from her job during this time. Her  will still be working. He said he does  have some flexibility in his job to help his wife with getting to treatment and other needs oft he family. Currently denied financial concerns. SW encouraged them to reach out if concerns arise.     Patient provided with social contact number and advised to call with questions or make future appointment if further intervention is needed. SW to follow throughout tx.    Radha Newell, UZMA  08/22/2023  4:50 PM

## 2023-08-22 NOTE — PROGRESS NOTES
Protocol: [id number], Disparities in Results of Immune Checkpoint Inhibitor Treatment (DIRECT): A Prospective Cohort Study of Cancer Survivors Treated with anti-PD-L1 Immunotherapy in a Community Oncology Setting  IRB# 2022.126  Version Date: 1/11/2022  Treating MD: Dr. Mcgovern  August 22nd, 2023     Informed Consent Note:     Tamia Herrera (Turner) , CRC, met with the patient to discuss the above-mentioned protocol in the [facility] 1st floor laboratory space. Mrs. Catalan was alert and oriented x 3. Mood and affect appropriate to the situation. The patient freely agreed to continue with [id number] informed consent today.  This CRC reviewed all elements of the informed consent with the patient.     Purpose of the study is to understand the treatment experiences and outcomes of both Black and White patients on Immune Checkpoint Inhibitors.   Length of study and number of participants reviewed with the patient.   Study procedures discussed in detail with the patient to include baseline and follow-up specimen collection and questionnaire collection as well as all information that would be taken from her EPIC chart.    Patient responsibilities discussed in detail with patient.   The processing and submission of blood and salvia samples were explained to the patient.     Risks discussed with patient to include blood draws, more time in the doctors' office, and feeling uncomfortable in answering some of the questions within the PRO questionnaires. The patient was informed that if she felt uncomfortable answering any of the questions that she is allowed to not answer those specific questions.   Benefits, costs and/or payment reimbursement and source of funding all reviewed with the patient.   Alternative treatment methods discussed with patient; she verbalized understanding that this is an observation only study and participation will not change any aspect of her planned treatment regimen.  Study related  questions/compensation for injury, and whom to contact regarding rights as a research subject all reviewed with the patient.     This CRC explained to the patient that researchers would be studying DNA Sequencing from blood and tissue samples. The patient was also informed that the testing performed by the study is for research purposes only, not for medical reasons, and not considered medical results. And therefore, it will not affect her treatment, nor will she be informed of the results.     Voluntary participation and withdrawal from research without compromising her medical care was stressed to patient. The patient verbalized understanding.      Confidentiality and HIPAA discussed with patient; process of protection and security of database explained to patient.   Informed the patient that detailed information on this trial can be found at www.clinicaltrials.gov.     Throughout the consenting process, the patient was given several opportunities to ask questions; all questions were addressed and answered to the patient's satisfaction.  The patient verbalized her willingness to participate in the study. There the patient signed and dated the consent form. A copy of the signed and dated consent form was provided to the patient, along with contact information for myself and Brenda Nolasco RN.       The patient and was encouraged to contact Dr. Mcgovern or this CRC with any questions and/or concerns. Patient verbalized understanding.     Tamia Herrera (Turner), CRC     See separate CRC eligibility and Assessment 1/ Baseline notes.

## 2023-08-22 NOTE — PROGRESS NOTES
Oncology Nutrition   New Patient Education  Arabella Koroma Teraotel   1980    Nutrition Education   This is a 43 y.o.female with a medical diagnosis of breast cancer.     Met w/ pt and her , Roman, to discuss current nutritional status and nutrition as it relates to cancer and cancer treatment. Pt currently low nutrition risk. Pt denies any changes in appetite/weight since diagnosis.     Wt Readings from Last 10 Encounters:   08/22/23 54.1 kg (119 lb 4.3 oz)   08/18/23 54.4 kg (120 lb)   08/16/23 54.4 kg (120 lb)   08/15/23 55 kg (121 lb 4.1 oz)   08/10/23 54.1 kg (119 lb 3.2 oz)   08/09/23 55 kg (121 lb 4.1 oz)   08/07/23 55 kg (121 lb 4.1 oz)   08/07/23 55 kg (121 lb 4.1 oz)   08/07/23 55 kg (121 lb 4.1 oz)   07/26/23 54.4 kg (120 lb)      [x] PMHx reviewed  [x] Labs reviewed    Educated on food safety and common nutrition impact symptoms associated with chemotherapy treatment. Reinforced the importance of good hydration. Handouts provided.    Answered all nutrition related questions.     Patient provided with dietitian contact number and advised to call with questions or make future appointment if further intervention is needed. RD to follow throughout tx PRN.    Orquidea Vu, MS, RD, LDN  08/22/2023  3:08 PM

## 2023-08-23 ENCOUNTER — PATIENT MESSAGE (OUTPATIENT)
Dept: HEMATOLOGY/ONCOLOGY | Facility: CLINIC | Age: 43
End: 2023-08-23
Payer: COMMERCIAL

## 2023-08-23 ENCOUNTER — PATIENT MESSAGE (OUTPATIENT)
Dept: ADMINISTRATIVE | Facility: OTHER | Age: 43
End: 2023-08-23
Payer: COMMERCIAL

## 2023-08-23 ENCOUNTER — LAB VISIT (OUTPATIENT)
Dept: LAB | Facility: HOSPITAL | Age: 43
End: 2023-08-23
Attending: INTERNAL MEDICINE
Payer: COMMERCIAL

## 2023-08-23 ENCOUNTER — RESEARCH ENCOUNTER (OUTPATIENT)
Dept: RESEARCH | Facility: HOSPITAL | Age: 43
End: 2023-08-23
Payer: COMMERCIAL

## 2023-08-23 DIAGNOSIS — C50.911 INVASIVE DUCTAL CARCINOMA OF BREAST, FEMALE, RIGHT: ICD-10-CM

## 2023-08-23 LAB
ALBUMIN SERPL BCP-MCNC: 4.2 G/DL (ref 3.5–5.2)
ALP SERPL-CCNC: 41 U/L (ref 55–135)
ALT SERPL W/O P-5'-P-CCNC: 7 U/L (ref 10–44)
ANION GAP SERPL CALC-SCNC: 10 MMOL/L (ref 8–16)
AST SERPL-CCNC: 12 U/L (ref 10–40)
B-HCG UR QL: NEGATIVE
BASOPHILS # BLD AUTO: 0.06 K/UL (ref 0–0.2)
BASOPHILS NFR BLD: 0.8 % (ref 0–1.9)
BILIRUB SERPL-MCNC: 0.4 MG/DL (ref 0.1–1)
BUN SERPL-MCNC: 13 MG/DL (ref 6–20)
CALCIUM SERPL-MCNC: 9.1 MG/DL (ref 8.7–10.5)
CHLORIDE SERPL-SCNC: 106 MMOL/L (ref 95–110)
CO2 SERPL-SCNC: 23 MMOL/L (ref 23–29)
CORTIS SERPL-MCNC: 8 UG/DL (ref 4.3–22.4)
CREAT SERPL-MCNC: 0.7 MG/DL (ref 0.5–1.4)
DIFFERENTIAL METHOD: ABNORMAL
EOSINOPHIL # BLD AUTO: 0.2 K/UL (ref 0–0.5)
EOSINOPHIL NFR BLD: 2.8 % (ref 0–8)
ERYTHROCYTE [DISTWIDTH] IN BLOOD BY AUTOMATED COUNT: 12.9 % (ref 11.5–14.5)
EST. GFR  (NO RACE VARIABLE): >60 ML/MIN/1.73 M^2
GLUCOSE SERPL-MCNC: 86 MG/DL (ref 70–110)
HCT VFR BLD AUTO: 39.5 % (ref 37–48.5)
HGB BLD-MCNC: 12.7 G/DL (ref 12–16)
IMM GRANULOCYTES # BLD AUTO: 0.02 K/UL (ref 0–0.04)
IMM GRANULOCYTES NFR BLD AUTO: 0.3 % (ref 0–0.5)
LYMPHOCYTES # BLD AUTO: 2.7 K/UL (ref 1–4.8)
LYMPHOCYTES NFR BLD: 34.5 % (ref 18–48)
MCH RBC QN AUTO: 30.5 PG (ref 27–31)
MCHC RBC AUTO-ENTMCNC: 32.2 G/DL (ref 32–36)
MCV RBC AUTO: 95 FL (ref 82–98)
MONOCYTES # BLD AUTO: 0.6 K/UL (ref 0.3–1)
MONOCYTES NFR BLD: 7.1 % (ref 4–15)
NEUTROPHILS # BLD AUTO: 4.2 K/UL (ref 1.8–7.7)
NEUTROPHILS NFR BLD: 54.5 % (ref 38–73)
NRBC BLD-RTO: 0 /100 WBC
PLATELET # BLD AUTO: 265 K/UL (ref 150–450)
PMV BLD AUTO: 8.9 FL (ref 9.2–12.9)
POTASSIUM SERPL-SCNC: 4.1 MMOL/L (ref 3.5–5.1)
PROT SERPL-MCNC: 7.1 G/DL (ref 6–8.4)
RBC # BLD AUTO: 4.17 M/UL (ref 4–5.4)
SODIUM SERPL-SCNC: 139 MMOL/L (ref 136–145)
TSH SERPL DL<=0.005 MIU/L-ACNC: 1.41 UIU/ML (ref 0.4–4)
WBC # BLD AUTO: 7.74 K/UL (ref 3.9–12.7)

## 2023-08-23 PROCEDURE — 84443 ASSAY THYROID STIM HORMONE: CPT | Performed by: INTERNAL MEDICINE

## 2023-08-23 PROCEDURE — 82533 TOTAL CORTISOL: CPT | Performed by: INTERNAL MEDICINE

## 2023-08-23 PROCEDURE — 81025 URINE PREGNANCY TEST: CPT | Mod: PN | Performed by: INTERNAL MEDICINE

## 2023-08-23 PROCEDURE — 85025 COMPLETE CBC W/AUTO DIFF WBC: CPT | Mod: PN | Performed by: INTERNAL MEDICINE

## 2023-08-23 PROCEDURE — 80053 COMPREHEN METABOLIC PANEL: CPT | Mod: PN | Performed by: INTERNAL MEDICINE

## 2023-08-23 PROCEDURE — 36415 COLL VENOUS BLD VENIPUNCTURE: CPT | Mod: PN | Performed by: INTERNAL MEDICINE

## 2023-08-23 NOTE — PROGRESS NOTES
Protocol: [id number], Disparities in Results of Immune Checkpoint Inhibitor Treatment (DIRECT): A Prospective Cohort Study of Cancer Survivors Treated with anti-PD-L1 Immunotherapy in a Community Oncology Setting     IRB# 2022.126  Version Date: 1/11/2022  Treating Physician:   PID # [id number]     Eligibility/Registration Note:    All pre-study testing/procedures required prior to OPEN registration were completed per protocol. CRCs Tamia Marti & Brenda Nolasco reviewed all protocol eligibility criteria for the study mentioned above.   also reviewed, signed, and dated inclusion/exclusion eligibility, and the patient was deemed eligible to participate. Dr. Mcgovern was not at the [facility] on 8/22/23 or 8/23/23, therefore, she reviewed, signed, and dated the patient's eligibility and FAXed the document back to this CRC. The patient was then registered in OPEN to the [id number]: Observation study.     All baseline (A1) requirements: blood and saliva samples and patient completed paper PROs are planned to be collected and completed before her 1st Keytruda infusion tomorrow, 8/24/23. After completion of study procedures, planned study payment of one Visa compensation debit card # [id number] - [id number] will be given to the patient.      Tamia Marti, CRC

## 2023-08-24 ENCOUNTER — INFUSION (OUTPATIENT)
Dept: INFUSION THERAPY | Facility: HOSPITAL | Age: 43
End: 2023-08-24
Attending: INTERNAL MEDICINE
Payer: COMMERCIAL

## 2023-08-24 ENCOUNTER — OFFICE VISIT (OUTPATIENT)
Dept: HEMATOLOGY/ONCOLOGY | Facility: CLINIC | Age: 43
End: 2023-08-24
Payer: COMMERCIAL

## 2023-08-24 ENCOUNTER — RESEARCH ENCOUNTER (OUTPATIENT)
Dept: RESEARCH | Facility: HOSPITAL | Age: 43
End: 2023-08-24
Payer: COMMERCIAL

## 2023-08-24 VITALS
HEART RATE: 78 BPM | TEMPERATURE: 98 F | DIASTOLIC BLOOD PRESSURE: 81 MMHG | SYSTOLIC BLOOD PRESSURE: 125 MMHG | HEIGHT: 66 IN | WEIGHT: 118.63 LBS | RESPIRATION RATE: 16 BRPM | OXYGEN SATURATION: 99 % | BODY MASS INDEX: 19.07 KG/M2

## 2023-08-24 VITALS
WEIGHT: 118.63 LBS | DIASTOLIC BLOOD PRESSURE: 71 MMHG | OXYGEN SATURATION: 99 % | HEIGHT: 66 IN | HEART RATE: 74 BPM | SYSTOLIC BLOOD PRESSURE: 102 MMHG | BODY MASS INDEX: 19.07 KG/M2 | RESPIRATION RATE: 16 BRPM | TEMPERATURE: 98 F

## 2023-08-24 DIAGNOSIS — C50.911 INVASIVE DUCTAL CARCINOMA OF BREAST, FEMALE, RIGHT: Primary | ICD-10-CM

## 2023-08-24 DIAGNOSIS — Z17.1 MALIGNANT NEOPLASM OF LOWER-OUTER QUADRANT OF RIGHT BREAST OF FEMALE, ESTROGEN RECEPTOR NEGATIVE: ICD-10-CM

## 2023-08-24 DIAGNOSIS — C50.511 MALIGNANT NEOPLASM OF LOWER-OUTER QUADRANT OF RIGHT BREAST OF FEMALE, ESTROGEN RECEPTOR NEGATIVE: Primary | ICD-10-CM

## 2023-08-24 DIAGNOSIS — C50.511 MALIGNANT NEOPLASM OF LOWER-OUTER QUADRANT OF RIGHT BREAST OF FEMALE, ESTROGEN RECEPTOR NEGATIVE: ICD-10-CM

## 2023-08-24 DIAGNOSIS — R45.89 ANXIETY ABOUT HEALTH: Primary | ICD-10-CM

## 2023-08-24 DIAGNOSIS — Z17.1 MALIGNANT NEOPLASM OF LOWER-OUTER QUADRANT OF RIGHT BREAST OF FEMALE, ESTROGEN RECEPTOR NEGATIVE: Primary | ICD-10-CM

## 2023-08-24 DIAGNOSIS — Z85.43 HISTORY OF OVARIAN CANCER: ICD-10-CM

## 2023-08-24 PROCEDURE — 99999 PR PBB SHADOW E&M-EST. PATIENT-LVL III: CPT | Mod: PBBFAC,,, | Performed by: INTERNAL MEDICINE

## 2023-08-24 PROCEDURE — A4216 STERILE WATER/SALINE, 10 ML: HCPCS | Mod: PN | Performed by: INTERNAL MEDICINE

## 2023-08-24 PROCEDURE — 99215 PR OFFICE/OUTPT VISIT, EST, LEVL V, 40-54 MIN: ICD-10-PCS | Mod: S$GLB,,, | Performed by: INTERNAL MEDICINE

## 2023-08-24 PROCEDURE — 3079F PR MOST RECENT DIASTOLIC BLOOD PRESSURE 80-89 MM HG: ICD-10-PCS | Mod: CPTII,S$GLB,, | Performed by: INTERNAL MEDICINE

## 2023-08-24 PROCEDURE — 3074F SYST BP LT 130 MM HG: CPT | Mod: CPTII,S$GLB,, | Performed by: INTERNAL MEDICINE

## 2023-08-24 PROCEDURE — 3079F DIAST BP 80-89 MM HG: CPT | Mod: CPTII,S$GLB,, | Performed by: INTERNAL MEDICINE

## 2023-08-24 PROCEDURE — 96417 CHEMO IV INFUS EACH ADDL SEQ: CPT | Mod: PN

## 2023-08-24 PROCEDURE — 96413 CHEMO IV INFUSION 1 HR: CPT | Mod: PN

## 2023-08-24 PROCEDURE — 3008F BODY MASS INDEX DOCD: CPT | Mod: CPTII,S$GLB,, | Performed by: INTERNAL MEDICINE

## 2023-08-24 PROCEDURE — 99215 OFFICE O/P EST HI 40 MIN: CPT | Mod: S$GLB,,, | Performed by: INTERNAL MEDICINE

## 2023-08-24 PROCEDURE — 3008F PR BODY MASS INDEX (BMI) DOCUMENTED: ICD-10-PCS | Mod: CPTII,S$GLB,, | Performed by: INTERNAL MEDICINE

## 2023-08-24 PROCEDURE — 99999 PR PBB SHADOW E&M-EST. PATIENT-LVL III: ICD-10-PCS | Mod: PBBFAC,,, | Performed by: INTERNAL MEDICINE

## 2023-08-24 PROCEDURE — 3074F PR MOST RECENT SYSTOLIC BLOOD PRESSURE < 130 MM HG: ICD-10-PCS | Mod: CPTII,S$GLB,, | Performed by: INTERNAL MEDICINE

## 2023-08-24 PROCEDURE — 96375 TX/PRO/DX INJ NEW DRUG ADDON: CPT | Mod: PN

## 2023-08-24 PROCEDURE — 63600175 PHARM REV CODE 636 W HCPCS: Mod: PN | Performed by: INTERNAL MEDICINE

## 2023-08-24 PROCEDURE — 25000003 PHARM REV CODE 250: Mod: PN | Performed by: INTERNAL MEDICINE

## 2023-08-24 PROCEDURE — 96367 TX/PROPH/DG ADDL SEQ IV INF: CPT | Mod: PN

## 2023-08-24 RX ORDER — LORAZEPAM 2 MG/ML
1 INJECTION INTRAMUSCULAR
Status: DISCONTINUED | OUTPATIENT
Start: 2023-08-24 | End: 2023-08-24 | Stop reason: HOSPADM

## 2023-08-24 RX ORDER — DIPHENHYDRAMINE HYDROCHLORIDE 50 MG/ML
50 INJECTION INTRAMUSCULAR; INTRAVENOUS ONCE AS NEEDED
Status: DISCONTINUED | OUTPATIENT
Start: 2023-08-24 | End: 2023-08-24 | Stop reason: HOSPADM

## 2023-08-24 RX ORDER — FAMOTIDINE 10 MG/ML
20 INJECTION INTRAVENOUS
Status: CANCELLED | OUTPATIENT
Start: 2023-08-24

## 2023-08-24 RX ORDER — HEPARIN 100 UNIT/ML
500 SYRINGE INTRAVENOUS
Status: DISCONTINUED | OUTPATIENT
Start: 2023-08-24 | End: 2023-08-24 | Stop reason: HOSPADM

## 2023-08-24 RX ORDER — SODIUM CHLORIDE 0.9 % (FLUSH) 0.9 %
10 SYRINGE (ML) INJECTION
Status: CANCELLED | OUTPATIENT
Start: 2023-08-31

## 2023-08-24 RX ORDER — FAMOTIDINE 10 MG/ML
20 INJECTION INTRAVENOUS
Status: COMPLETED | OUTPATIENT
Start: 2023-08-24 | End: 2023-08-24

## 2023-08-24 RX ORDER — PROCHLORPERAZINE EDISYLATE 5 MG/ML
10 INJECTION INTRAMUSCULAR; INTRAVENOUS ONCE AS NEEDED
Status: CANCELLED
Start: 2023-09-07

## 2023-08-24 RX ORDER — SODIUM CHLORIDE 0.9 % (FLUSH) 0.9 %
10 SYRINGE (ML) INJECTION
Status: DISCONTINUED | OUTPATIENT
Start: 2023-08-24 | End: 2023-08-24 | Stop reason: HOSPADM

## 2023-08-24 RX ORDER — PROCHLORPERAZINE EDISYLATE 5 MG/ML
10 INJECTION INTRAMUSCULAR; INTRAVENOUS ONCE AS NEEDED
Status: CANCELLED
Start: 2023-08-24

## 2023-08-24 RX ORDER — LORAZEPAM 2 MG/ML
1 INJECTION INTRAMUSCULAR
Status: CANCELLED
Start: 2023-08-31

## 2023-08-24 RX ORDER — EPINEPHRINE 0.3 MG/.3ML
0.3 INJECTION SUBCUTANEOUS ONCE AS NEEDED
Status: CANCELLED | OUTPATIENT
Start: 2023-08-24

## 2023-08-24 RX ORDER — HEPARIN 100 UNIT/ML
500 SYRINGE INTRAVENOUS
Status: CANCELLED | OUTPATIENT
Start: 2023-08-24

## 2023-08-24 RX ORDER — FAMOTIDINE 10 MG/ML
20 INJECTION INTRAVENOUS
Status: CANCELLED | OUTPATIENT
Start: 2023-08-31

## 2023-08-24 RX ORDER — DIPHENHYDRAMINE HYDROCHLORIDE 50 MG/ML
50 INJECTION INTRAMUSCULAR; INTRAVENOUS ONCE AS NEEDED
Status: CANCELLED | OUTPATIENT
Start: 2023-08-31

## 2023-08-24 RX ORDER — EPINEPHRINE 0.3 MG/.3ML
0.3 INJECTION SUBCUTANEOUS ONCE AS NEEDED
Status: CANCELLED | OUTPATIENT
Start: 2023-09-07

## 2023-08-24 RX ORDER — DIPHENHYDRAMINE HYDROCHLORIDE 50 MG/ML
50 INJECTION INTRAMUSCULAR; INTRAVENOUS ONCE AS NEEDED
Status: CANCELLED | OUTPATIENT
Start: 2023-09-07

## 2023-08-24 RX ORDER — FAMOTIDINE 10 MG/ML
20 INJECTION INTRAVENOUS
Status: CANCELLED | OUTPATIENT
Start: 2023-09-07

## 2023-08-24 RX ORDER — SODIUM CHLORIDE 0.9 % (FLUSH) 0.9 %
10 SYRINGE (ML) INJECTION
Status: CANCELLED | OUTPATIENT
Start: 2023-09-07

## 2023-08-24 RX ORDER — HEPARIN 100 UNIT/ML
500 SYRINGE INTRAVENOUS
Status: CANCELLED | OUTPATIENT
Start: 2023-08-31

## 2023-08-24 RX ORDER — PROCHLORPERAZINE EDISYLATE 5 MG/ML
10 INJECTION INTRAMUSCULAR; INTRAVENOUS ONCE AS NEEDED
Status: DISCONTINUED | OUTPATIENT
Start: 2023-08-24 | End: 2023-08-24 | Stop reason: HOSPADM

## 2023-08-24 RX ORDER — EPINEPHRINE 0.3 MG/.3ML
0.3 INJECTION SUBCUTANEOUS ONCE AS NEEDED
Status: CANCELLED | OUTPATIENT
Start: 2023-08-31

## 2023-08-24 RX ORDER — SODIUM CHLORIDE 0.9 % (FLUSH) 0.9 %
10 SYRINGE (ML) INJECTION
Status: CANCELLED | OUTPATIENT
Start: 2023-08-24

## 2023-08-24 RX ORDER — ONDANSETRON HYDROCHLORIDE 8 MG/1
8 TABLET, FILM COATED ORAL EVERY 8 HOURS PRN
Qty: 60 TABLET | Refills: 2 | Status: SHIPPED | OUTPATIENT
Start: 2023-08-24 | End: 2024-02-21

## 2023-08-24 RX ORDER — DIPHENHYDRAMINE HYDROCHLORIDE 50 MG/ML
50 INJECTION INTRAMUSCULAR; INTRAVENOUS ONCE AS NEEDED
Status: CANCELLED | OUTPATIENT
Start: 2023-08-24

## 2023-08-24 RX ORDER — HEPARIN 100 UNIT/ML
500 SYRINGE INTRAVENOUS
Status: CANCELLED | OUTPATIENT
Start: 2023-09-07

## 2023-08-24 RX ORDER — PROCHLORPERAZINE EDISYLATE 5 MG/ML
10 INJECTION INTRAMUSCULAR; INTRAVENOUS ONCE AS NEEDED
Status: CANCELLED
Start: 2023-08-31

## 2023-08-24 RX ORDER — LORAZEPAM 2 MG/ML
1 INJECTION INTRAMUSCULAR
Status: CANCELLED
Start: 2023-09-07

## 2023-08-24 RX ORDER — LORAZEPAM 2 MG/ML
1 INJECTION INTRAMUSCULAR
Status: CANCELLED
Start: 2023-08-24

## 2023-08-24 RX ORDER — EPINEPHRINE 0.3 MG/.3ML
0.3 INJECTION SUBCUTANEOUS ONCE AS NEEDED
Status: DISCONTINUED | OUTPATIENT
Start: 2023-08-24 | End: 2023-08-24 | Stop reason: HOSPADM

## 2023-08-24 RX ADMIN — SODIUM CHLORIDE 200 MG: 9 INJECTION, SOLUTION INTRAVENOUS at 10:08

## 2023-08-24 RX ADMIN — LORAZEPAM 1 MG: 2 INJECTION INTRAMUSCULAR; INTRAVENOUS at 11:08

## 2023-08-24 RX ADMIN — DIPHENHYDRAMINE HYDROCHLORIDE 50 MG: 50 INJECTION, SOLUTION INTRAMUSCULAR; INTRAVENOUS at 11:08

## 2023-08-24 RX ADMIN — CARBOPLATIN 175 MG: 10 INJECTION, SOLUTION INTRAVENOUS at 01:08

## 2023-08-24 RX ADMIN — PALONOSETRON 0.25 MG: 0.05 INJECTION, SOLUTION INTRAVENOUS at 11:08

## 2023-08-24 RX ADMIN — Medication 10 ML: at 02:08

## 2023-08-24 RX ADMIN — APREPITANT 130 MG: 130 INJECTION, EMULSION INTRAVENOUS at 11:08

## 2023-08-24 RX ADMIN — PACLITAXEL 126 MG: 6 INJECTION, SOLUTION, CONCENTRATE INTRAVENOUS at 12:08

## 2023-08-24 RX ADMIN — SODIUM CHLORIDE: 9 INJECTION, SOLUTION INTRAVENOUS at 10:08

## 2023-08-24 RX ADMIN — FAMOTIDINE 20 MG: 10 INJECTION INTRAVENOUS at 12:08

## 2023-08-24 NOTE — PROGRESS NOTES
PROGRESS NOTE    Subjective:       Patient ID: Arabella Catalan is a 43 y.o. female.  MRN: [id number]  : 1980    Chief Complaint: cT2 cN0 TNBC     History of Present Illness:   Arabella Catalan is a 43 y.o. female who is referred for newly diagnosed TNBC.      As previously documented she started screening mammograms for a few years after her ovarian cancer , in her late 20's. Routine pelvic exam revealed an ovarian cancer, s/p right oophorectomy for dysgerminoma.     Resumed routine mammograms at 42, had a normal screening mammogram in May 2023 but felt a lump in the right breast in July. Further imaging showed 2 lesions, both > 2 cm , both biopsied to be G3 IDC, ER/VT and Her 2 jose eduardo negative.       Menarche at age 14 year old. . Age at first live birth 31. Did  breast feed 2 year and 6 months . LMP  . OCP-yes 20 years ago  Menopause at none. HRT-none     No genetic testing done yet.     Family history cancer:  Mother breast cancer at 38 and cervical cancer     Smoker Pk/yr quit date , smoked around 0.5 pack a day     Interim history:    She has completed staging work up, echo, port, chemo school and is here to obtain clearance for cycle 1, day 1 of neoadjuvant chemo based on KEYNOTE-522. She will be using Dignicap.       Oncology History:  5/3/23  Impression:  Bilateral  There is no mammographic evidence of malignancy.     BI-RADS Category:   Overall: 2 - Benign    23  US  Impression:  Right  Mass: Right breast 1.5 cm x 1 cm x 1.6 cm mass at the 8 o'clock position. Assessment: 4 - Suspicious finding. Biopsy and possible aspiration are recommended.   Mass: Right breast 2 cm x 1.1 cm x 1.6 cm mass at the 7 o'clock position. Assessment: 4 - Suspicious finding. Biopsy is recommended.      BI-RADS Category:   Overall: 4 - Suspicious    23  MRI  Impression:  Right  Mass: Right breast 2.7 cm x 2.2 cm x 2 cm mass at the 8 o'clock  position. Assessment: 6 - Known biopsy, proven malignancy.   Mass: Right breast 2 cm x 1.6 cm x 1.6 cm mass at the 7 o'clock position. Assessment: 6 - Known biopsy, proven malignancy.      Left  There is no MR evidence of malignancy in the left breast.     BI-RADS Category:   Overall: 6 - Known Biopsy-Proven Malignancy  7/31/23:  DIAGNOSIS:   07/28/2023 JL:tml     1. RIGHT BREAST AT 8:00 7 CM FROM NIPPLE CORE NEEDLE BIOPSIES:   - INVASIVE DUCT CARCINOMA, HIGH-GRADE (3,2,3).     2. RIGHT BREAST AT 7:00 3 CM FROM NIPPLE CORE NEEDLE BIOPSIES:   - IN SITU AND INVASIVE DUCT CARCINOMA, HIGH-GRADE (3,3,2).     RIGHT BREAST: INVASIVE DUCTAL CARCINOMA   GRADE: HIGH     ER: NEGATIVE, WA: NEGATIVE, HER2**: NEGATIVE       8/7/23  CT chest abd pelvis  Impression:     1. Known right breast malignancy, better evaluated on comparison breast MRI exam.  2. Mild asymmetrically prominent, but not pathologically enlarged right axillary lymph nodes, measuring up to 6 mm in short axis dimension.  No mediastinal or hilar lymphadenopathy.  3. Mild hepatomegaly with multiple small hypoattenuating hepatic lesions, the largest measuring 0.9 cm in the right hepatic lobe, which are too small to definitively characterize.  Metastases not excluded.  Consider further evaluation with PET-CT and/or contrast enhanced liver protocol MRI.  4. Solid, noncalcified 2 mm pulmonary nodule in the right lower lobe, nonspecific. Attention on follow-up imaging recommended.     8/18/23  Liver MRI      Impression:     Small circumscribed lesions in the liver are most compatible with hemangiomas.  No suspicious liver lesions.    8/10/123  Bone scan   Impression:     No evidence of osseous metastatic disease.     History:  Past Medical History:   Diagnosis Date    Abnormal Pap smear of cervix     HPV (human papilloma virus) infection     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      Past Surgical History:   Procedure Laterality Date    BREAST BIOPSY Right 2015     benign    BREAST BIOPSY Right 07/26/2023    INSERTION OF TUNNELED CENTRAL VENOUS CATHETER (CVC) WITH SUBCUTANEOUS PORT N/A 8/15/2023    Procedure: [id number];  Surgeon: Skyler Aldrich MD;  Location: [facility];  Service: General;  Laterality: N/A;    OOPHORECTOMY Right 2009    stage 1a dysgerminoma    VAGINAL DELIVERY  12/13/2016     Family History   Problem Relation Age of Onset    Breast cancer Mother 38    Cervical cancer Mother       Social History     Tobacco Use    Smoking status: Former     Current packs/day: 0.00     Types: Cigarettes    Smokeless tobacco: Not on file   Substance and Sexual Activity    Alcohol use: Yes     Comment: occasionally    Drug use: No    Sexual activity: Not Currently     Partners: Male     Birth control/protection: None        ROS:   Review of Systems   Constitutional:  Negative for fever, malaise/fatigue and weight loss.   HENT:  Negative for congestion, hearing loss, nosebleeds and sore throat.    Eyes:  Negative for double vision and photophobia.   Respiratory:  Negative for cough, hemoptysis, sputum production, shortness of breath and wheezing.    Cardiovascular:  Negative for chest pain, palpitations, orthopnea and leg swelling.   Gastrointestinal:  Negative for abdominal pain, blood in stool, constipation, diarrhea, heartburn, nausea and vomiting.   Genitourinary:  Negative for dysuria, frequency, hematuria and urgency.   Musculoskeletal:  Negative for back pain, joint pain and myalgias.   Skin:  Negative for itching and rash.   Neurological:  Negative for dizziness, tingling, seizures, weakness and headaches.   Endo/Heme/Allergies:  Negative for polydipsia. Does not bruise/bleed easily.   Psychiatric/Behavioral:  Negative for depression and memory loss. The patient is nervous/anxious. The patient does not have insomnia.         Objective:     Vitals:    08/24/23 0911   BP: 125/81   Pulse: 78   Resp: 16   Temp: 98.1 °F (36.7 °C)   TempSrc: Temporal   SpO2: 99%  "  Weight: 53.8 kg (118 lb 9.7 oz)   Height: 5' 6" (1.676 m)   PainSc: 0-No pain       Physical Examination:   Physical Exam  Vitals and nursing note reviewed.   Constitutional:       General: She is not in acute distress.     Appearance: She is not diaphoretic.   HENT:      Head: Normocephalic.      Mouth/Throat:      Pharynx: No oropharyngeal exudate.   Eyes:      General: No scleral icterus.     Conjunctiva/sclera: Conjunctivae normal.   Neck:      Thyroid: No thyromegaly.   Cardiovascular:      Rate and Rhythm: Normal rate and regular rhythm.      Heart sounds: Normal heart sounds. No murmur heard.  Pulmonary:      Effort: Pulmonary effort is normal. No respiratory distress.      Breath sounds: No stridor. No wheezing or rales.   Chest:      Chest wall: No tenderness.       Abdominal:      General: Bowel sounds are normal. There is no distension.      Palpations: Abdomen is soft. There is no mass.      Tenderness: There is no abdominal tenderness. There is no rebound.   Musculoskeletal:         General: No tenderness or deformity. Normal range of motion.      Cervical back: Neck supple.   Lymphadenopathy:      Cervical: No cervical adenopathy.   Skin:     General: Skin is warm and dry.      Findings: No erythema or rash.   Neurological:      Mental Status: She is alert and oriented to person, place, and time.      Cranial Nerves: No cranial nerve deficit.      Coordination: Coordination normal.      Gait: Gait is intact.   Psychiatric:         Mood and Affect: Affect normal.         Cognition and Memory: Memory normal.         Judgment: Judgment normal.          Diagnostic Tests:  Significant Imaging: I have reviewed and interpreted all pertinent imaging results/findings.      Laboratory Data:  All pertinent labs have been reviewed.    Labs:   Lab Results   Component Value Date    WBC 7.74 08/23/2023    HGB 12.7 08/23/2023    HCT 39.5 08/23/2023    MCV 95 08/23/2023     08/23/2023       Assessment/Plan: "   Invasive ductal carcinoma of breast, female, right  cT2 cN0 G3 IDC, TNBC    We discussed the more agressive nature and multifocal nature of her TNBC. Clinically node negative. Based on size, she is a candidate for neoadjuvant chemo immunotherapy based on Keynote-522 to prevent systemic spread and to monitor response to therapy and that 64% of the patients on trial had a pCR.     She is agreeable,echo is normal, no evidence for distant metastatic disease noted. Cleared to receive C1D1 today, will monitor weekly.   Went over anti emetic regimen and supportive care at length.     Offer supportive care with IO and onc psych.     After completing neoadjuvant therapy, she would be referred back for surgery.       She has also been enrolled in the Protocol [id number], Disparities in Results of Immune Checkpoint Inhibitor Treatment (DIRECT): A Prospective Cohort Study of Cancer Survivors Treated with anti-PD-L1 Immunotherapy in a Community Oncology Setting.      History of ovarian cancer  Noted. Continue gyn follow up.      ECOG SCORE    0 - Fully active-able to carry on all pre-disease performance without restriction           Discussion:   No follow-ups on file.    Plan was discussed with the patient at length, and she verbalized understanding. Arabella was given an opportunity to ask questions that were answered to her satisfaction, and she was advised to call in the interval if any problems or questions arise.    Electronically signed by Corina Mcgovern MD          Answers submitted by the patient for this visit:  Review of Systems Questionnaire (Submitted on 8/6/2023)  appetite change : No  unexpected weight change: No  mouth sores: No  visual disturbance: No  adenopathy: No    Route Chart for Scheduling    Med Onc Chart Routing      Follow up with physician . 8/31, overbook for 9:40, she will be a bit late for chemo. see me every week before chemo, or NP if I am not here   Follow up with SAUL    Infusion scheduling  note    Injection scheduling note    Labs CBC, CMP and TSH   Scheduling:  Preferred lab:  Lab interval:  cbc cmp every week, tsh every 3 weeks   Imaging    Pharmacy appointment    Other referrals              Treatment Plan Information   OP PEMBROLIZUMAB WITH WEEKLY PACLITAXEL CARBOPLATIN (AUC 1.5) FOLLOWED BY PEMBROLIZUMAB DOXORUBICIN CYCLOPHOSPHAMIDE FOLLOWED BY PEMBROLIZUMAB 200MG Q3W   Corina Mcgovern MD   Upcoming Treatment Dates - OP PEMBROLIZUMAB WITH WEEKLY PACLITAXEL CARBOPLATIN (AUC 1.5) FOLLOWED BY PEMBROLIZUMAB DOXORUBICIN CYCLOPHOSPHAMIDE FOLLOWED BY PEMBROLIZUMAB 200MG Q3W    8/24/2023       Pre-Medications       diphenhydrAMINE (BENADRYL) 50 mg in NS 50 mL IVPB       famotidine (PF) injection 20 mg       Chemotherapy       PACLitaxeL (TAXOL) 80 mg/m2 = 126 mg in sodium chloride 0.9% 250 mL chemo infusion       CARBOplatin (PARAPLATIN) 175 mg in sodium chloride 0.9% 267.5 mL chemo infusion       Antiemetics       palonosetron 0.25mg/dexAMETHasone 12mg in NS IVPB 0.25 mg 50 mL       aprepitant (CINVANTI) 130 mg in sodium chloride 0.9% 148 mL infusion       LORazepam injection 1 mg       Immunotherapy       pembrolizumab (KEYTRUDA) 200 mg in sodium chloride 0.9% SolP 108 mL infusion  8/31/2023       Pre-Medications       diphenhydrAMINE (BENADRYL) 50 mg in NS 50 mL IVPB       famotidine (PF) injection 20 mg       Chemotherapy       PACLitaxeL (TAXOL) 80 mg/m2 = 126 mg in sodium chloride 0.9% 250 mL chemo infusion       CARBOplatin (PARAPLATIN) 175 mg in sodium chloride 0.9% 267.5 mL chemo infusion       Antiemetics       palonosetron 0.25mg/dexAMETHasone 12mg in NS IVPB 0.25 mg 50 mL       LORazepam injection 1 mg  9/7/2023       Pre-Medications       diphenhydrAMINE (BENADRYL) 50 mg in NS 50 mL IVPB       famotidine (PF) injection 20 mg       Chemotherapy       PACLitaxeL (TAXOL) 80 mg/m2 = 126 mg in sodium chloride 0.9% 250 mL chemo infusion       CARBOplatin (PARAPLATIN) 175 mg in sodium chloride  0.9% 267.5 mL chemo infusion       Antiemetics       LORazepam injection 1 mg       palonosetron 0.25mg/dexAMETHasone 12mg in NS IVPB 0.25 mg 50 mL  9/14/2023       Pre-Medications       diphenhydrAMINE (BENADRYL) 50 mg in NS 50 mL IVPB       famotidine (PF) injection 20 mg       Chemotherapy       PACLitaxeL (TAXOL) 80 mg/m2 = 126 mg in sodium chloride 0.9% 250 mL chemo infusion       CARBOplatin (PARAPLATIN) 175 mg in sodium chloride 0.9% 267.5 mL chemo infusion       Antiemetics       aprepitant (CINVANTI) 130 mg in sodium chloride 0.9% 148 mL infusion       palonosetron 0.25mg/dexAMETHasone 12mg in NS IVPB 0.25 mg 50 mL       LORazepam injection 1 mg       Immunotherapy       pembrolizumab (KEYTRUDA) 200 mg in sodium chloride 0.9% SolP 108 mL infusion      Answers submitted by the patient for this visit:  Review of Systems Questionnaire (Submitted on 8/23/2023)  appetite change : No  unexpected weight change: No  mouth sores: No  visual disturbance: No  adenopathy: No

## 2023-08-24 NOTE — PLAN OF CARE
Pt arrived to clinic for C1D1 treatment with Keytruda, Taxol and Carboplatin and tolerated well with no changes throughout therapy. Dignicap used per pt request and tolerated well with warm blankets used for comfort and spouse at chairside. Cryotherapy performed to pt bilateral hands/feet and tolerated feet but not hands. Pt with home prn meds on hand if needed and aware of side effects and f/u appts and discharged to home in NAD with . Pt is aware of instructions for Dignicap supplies and hair maintenance per company.

## 2023-08-24 NOTE — PLAN OF CARE
Problem: Fatigue  Goal: Improved Activity Tolerance  Outcome: Ongoing, Progressing  Intervention: Promote Improved Energy  Flowsheets (Taken 8/24/2023 1630)  Fatigue Management:   paced activity encouraged   frequent rest breaks encouraged   fatigue-related activity identified   activity assistance provided  Sleep/Rest Enhancement:   relaxation techniques promoted   therapeutic touch utilized   noise level reduced   family presence promoted   awakenings minimized   natural light exposure provided   consistent schedule promoted   room darkened  Activity Management:   Ambulated -L4   Ambulated to bathroom - L4   Up in chair - L3     Problem: Adult Inpatient Plan of Care  Goal: Plan of Care Review  Outcome: Ongoing, Progressing  Flowsheets (Taken 8/24/2023 1630)  Plan of Care Reviewed With:   patient   spouse  Goal: Patient-Specific Goal (Individualized)  Outcome: Ongoing, Progressing  Flowsheets (Taken 8/24/2023 1630)  Anxieties, Fears or Concerns: generalized anxiety about the treatmetn, dignicap, cryotherapy  Individualized Care Needs: education, conversation, blankets,  at chairside, recliner, sleep, pillow, cryotherapy, dignicap  Goal: Optimal Comfort and Wellbeing  Outcome: Ongoing, Progressing  Intervention: Provide Person-Centered Care  Flowsheets (Taken 8/24/2023 1630)  Trust Relationship/Rapport:   care explained   questions answered   choices provided   questions encouraged   reassurance provided   emotional support provided   empathic listening provided   thoughts/feelings acknowledged     Problem: Fall Injury Risk  Goal: Absence of Fall and Fall-Related Injury  Outcome: Ongoing, Progressing  Intervention: Promote Injury-Free Environment  Flowsheets (Taken 8/24/2023 1630)  Safety Promotion/Fall Prevention:   instructed to call staff for mobility   lighting adjusted   in recliner, wheels locked   family to remain at bedside   high risk medications identified   medications reviewed   Fall Risk  reviewed with patient/family   room near unit station   supervised activity

## 2023-08-24 NOTE — PROGRESS NOTES
Protocol: [id number], Disparities in Results of Immune Checkpoint Inhibitor Treatment (DIRECT): A Prospective Cohort Study of Cancer Survivors Treated with anti-PD-L1 Immunotherapy in a Community Oncology Setting    IRB# 2022.126  Version Date: 1/11/2022  Treating MD:   ROBERT # [id number]   August 24th, 2023     Blood/Saliva Collection: Assessment # A1 / Kit # [id number]    Met the patient in the exam room on the second floor of [facility]. The patient presented with her , A&O, without noted distress, and with appropriate mood and affect to the situation.The patient continues to freely agree with participation in the referenced study.    In the exam room, the patient gave this CRC her completed A1 PROs. This CRC gave the patient her re-loadable Visa card #[id number] - [id number] and explained that is may take up to 48 hours to upload. The patient expressed understanding. Encouraged the patient to call this CRC if she has any issues with the card.    After the patient's MD visit, this CRC met the patient in the 3rd floor infusion center. The infusion nurse froilan the patient's A1 blood specimens from her port using 2 patient identifiers with this CRC present. The saliva collection tube was given to and collected by the patient. Blood was then processed by this CRC in the first floor lab and placed in the -20 C freezer along with the patient's saliva sample. The patient and her  denied having any questions or concerns at the moment. Encouraged the patient to call if any questions or concerns arise in the future.      See source documents:  Blood/Saliva Requisition Form  Completed A1 Paper PROs  Saliva PRO    Planning to request the patient's tissue from [facility] either today or tomorrow, 8/25/23.  Planning to ship A1 and A2 specimen once A2 is completed - estimated date of shipment: September 18th, 2023    Next Study appointment:    - September 14th, 2023 (A2 procedures - before or at 2nd  infusion)    Tamia Herrera (Turner), CRC

## 2023-08-25 ENCOUNTER — PATIENT MESSAGE (OUTPATIENT)
Dept: ADMINISTRATIVE | Facility: OTHER | Age: 43
End: 2023-08-25
Payer: COMMERCIAL

## 2023-08-25 ENCOUNTER — TELEPHONE (OUTPATIENT)
Dept: HEMATOLOGY/ONCOLOGY | Facility: CLINIC | Age: 43
End: 2023-08-25
Payer: COMMERCIAL

## 2023-08-25 RX ORDER — ONDANSETRON 8 MG/1
8 TABLET, ORALLY DISINTEGRATING ORAL EVERY 6 HOURS PRN
Qty: 60 TABLET | Refills: 3 | Status: SHIPPED | OUTPATIENT
Start: 2023-08-25 | End: 2024-02-21

## 2023-08-25 NOTE — NURSING
Spoke with pt.  She is doing well today.  She said she slept all afternoon.  She asked if Ativan dose can be cut in half.  She will discuss with Dr. Mcgovern at her next appt.  Reviewed anti nausea protocol.  Encouraged her to call me with any questions or concerns.  She thanked me and verbalized understanding.

## 2023-08-26 ENCOUNTER — PATIENT MESSAGE (OUTPATIENT)
Dept: ADMINISTRATIVE | Facility: OTHER | Age: 43
End: 2023-08-26
Payer: COMMERCIAL

## 2023-08-28 ENCOUNTER — PATIENT MESSAGE (OUTPATIENT)
Dept: ADMINISTRATIVE | Facility: OTHER | Age: 43
End: 2023-08-28
Payer: COMMERCIAL

## 2023-08-29 ENCOUNTER — PATIENT MESSAGE (OUTPATIENT)
Dept: ADMINISTRATIVE | Facility: OTHER | Age: 43
End: 2023-08-29
Payer: COMMERCIAL

## 2023-08-30 ENCOUNTER — PATIENT MESSAGE (OUTPATIENT)
Dept: ADMINISTRATIVE | Facility: OTHER | Age: 43
End: 2023-08-30
Payer: COMMERCIAL

## 2023-08-31 ENCOUNTER — PATIENT MESSAGE (OUTPATIENT)
Dept: ADMINISTRATIVE | Facility: OTHER | Age: 43
End: 2023-08-31
Payer: COMMERCIAL

## 2023-08-31 ENCOUNTER — LAB VISIT (OUTPATIENT)
Dept: LAB | Facility: HOSPITAL | Age: 43
End: 2023-08-31
Attending: INTERNAL MEDICINE
Payer: COMMERCIAL

## 2023-08-31 ENCOUNTER — INFUSION (OUTPATIENT)
Dept: INFUSION THERAPY | Facility: HOSPITAL | Age: 43
End: 2023-08-31
Attending: INTERNAL MEDICINE
Payer: COMMERCIAL

## 2023-08-31 ENCOUNTER — DOCUMENTATION ONLY (OUTPATIENT)
Dept: INFUSION THERAPY | Facility: HOSPITAL | Age: 43
End: 2023-08-31
Payer: COMMERCIAL

## 2023-08-31 ENCOUNTER — OFFICE VISIT (OUTPATIENT)
Dept: HEMATOLOGY/ONCOLOGY | Facility: CLINIC | Age: 43
End: 2023-08-31
Payer: COMMERCIAL

## 2023-08-31 VITALS
HEART RATE: 83 BPM | WEIGHT: 119.06 LBS | OXYGEN SATURATION: 98 % | TEMPERATURE: 98 F | SYSTOLIC BLOOD PRESSURE: 122 MMHG | RESPIRATION RATE: 16 BRPM | DIASTOLIC BLOOD PRESSURE: 82 MMHG | BODY MASS INDEX: 19.13 KG/M2 | HEIGHT: 66 IN

## 2023-08-31 VITALS
RESPIRATION RATE: 16 BRPM | OXYGEN SATURATION: 98 % | SYSTOLIC BLOOD PRESSURE: 104 MMHG | BODY MASS INDEX: 19.13 KG/M2 | HEIGHT: 66 IN | WEIGHT: 119.06 LBS | DIASTOLIC BLOOD PRESSURE: 72 MMHG | TEMPERATURE: 98 F | HEART RATE: 92 BPM

## 2023-08-31 DIAGNOSIS — C50.511 MALIGNANT NEOPLASM OF LOWER-OUTER QUADRANT OF RIGHT BREAST OF FEMALE, ESTROGEN RECEPTOR NEGATIVE: ICD-10-CM

## 2023-08-31 DIAGNOSIS — Z17.1 MALIGNANT NEOPLASM OF LOWER-OUTER QUADRANT OF RIGHT BREAST OF FEMALE, ESTROGEN RECEPTOR NEGATIVE: ICD-10-CM

## 2023-08-31 DIAGNOSIS — C50.911 INVASIVE DUCTAL CARCINOMA OF BREAST, FEMALE, RIGHT: Primary | ICD-10-CM

## 2023-08-31 DIAGNOSIS — Z15.01 BRCA1 GENE MUTATION POSITIVE: ICD-10-CM

## 2023-08-31 DIAGNOSIS — C50.911 INVASIVE DUCTAL CARCINOMA OF BREAST, FEMALE, RIGHT: ICD-10-CM

## 2023-08-31 DIAGNOSIS — R45.89 ANXIETY ABOUT HEALTH: ICD-10-CM

## 2023-08-31 DIAGNOSIS — Z15.09 BRCA1 GENE MUTATION POSITIVE: ICD-10-CM

## 2023-08-31 LAB
ALBUMIN SERPL BCP-MCNC: 4.3 G/DL (ref 3.5–5.2)
ALP SERPL-CCNC: 44 U/L (ref 55–135)
ALT SERPL W/O P-5'-P-CCNC: 10 U/L (ref 10–44)
ANION GAP SERPL CALC-SCNC: 10 MMOL/L (ref 8–16)
AST SERPL-CCNC: 13 U/L (ref 10–40)
B-HCG UR QL: NEGATIVE
BASOPHILS # BLD AUTO: 0.06 K/UL (ref 0–0.2)
BASOPHILS NFR BLD: 1.1 % (ref 0–1.9)
BILIRUB SERPL-MCNC: 0.4 MG/DL (ref 0.1–1)
BUN SERPL-MCNC: 9 MG/DL (ref 6–20)
CALCIUM SERPL-MCNC: 8.9 MG/DL (ref 8.7–10.5)
CHLORIDE SERPL-SCNC: 106 MMOL/L (ref 95–110)
CO2 SERPL-SCNC: 22 MMOL/L (ref 23–29)
CREAT SERPL-MCNC: 0.7 MG/DL (ref 0.5–1.4)
DIFFERENTIAL METHOD: ABNORMAL
EOSINOPHIL # BLD AUTO: 0.2 K/UL (ref 0–0.5)
EOSINOPHIL NFR BLD: 3.5 % (ref 0–8)
ERYTHROCYTE [DISTWIDTH] IN BLOOD BY AUTOMATED COUNT: 12.5 % (ref 11.5–14.5)
EST. GFR  (NO RACE VARIABLE): >60 ML/MIN/1.73 M^2
GLUCOSE SERPL-MCNC: 93 MG/DL (ref 70–110)
HCT VFR BLD AUTO: 37.5 % (ref 37–48.5)
HGB BLD-MCNC: 12.3 G/DL (ref 12–16)
IMM GRANULOCYTES # BLD AUTO: 0.03 K/UL (ref 0–0.04)
IMM GRANULOCYTES NFR BLD AUTO: 0.5 % (ref 0–0.5)
LYMPHOCYTES # BLD AUTO: 1.3 K/UL (ref 1–4.8)
LYMPHOCYTES NFR BLD: 22.9 % (ref 18–48)
MCH RBC QN AUTO: 30.7 PG (ref 27–31)
MCHC RBC AUTO-ENTMCNC: 32.8 G/DL (ref 32–36)
MCV RBC AUTO: 94 FL (ref 82–98)
MONOCYTES # BLD AUTO: 0.6 K/UL (ref 0.3–1)
MONOCYTES NFR BLD: 9.9 % (ref 4–15)
NEUTROPHILS # BLD AUTO: 3.5 K/UL (ref 1.8–7.7)
NEUTROPHILS NFR BLD: 62.1 % (ref 38–73)
NRBC BLD-RTO: 0 /100 WBC
PLATELET # BLD AUTO: 238 K/UL (ref 150–450)
PMV BLD AUTO: 8.9 FL (ref 9.2–12.9)
POTASSIUM SERPL-SCNC: 4 MMOL/L (ref 3.5–5.1)
PROT SERPL-MCNC: 7.1 G/DL (ref 6–8.4)
RBC # BLD AUTO: 4.01 M/UL (ref 4–5.4)
SODIUM SERPL-SCNC: 138 MMOL/L (ref 136–145)
WBC # BLD AUTO: 5.64 K/UL (ref 3.9–12.7)

## 2023-08-31 PROCEDURE — 96367 TX/PROPH/DG ADDL SEQ IV INF: CPT | Mod: PN

## 2023-08-31 PROCEDURE — 96413 CHEMO IV INFUSION 1 HR: CPT | Mod: PN

## 2023-08-31 PROCEDURE — 81025 URINE PREGNANCY TEST: CPT | Mod: PN | Performed by: INTERNAL MEDICINE

## 2023-08-31 PROCEDURE — 3074F SYST BP LT 130 MM HG: CPT | Mod: CPTII,S$GLB,, | Performed by: INTERNAL MEDICINE

## 2023-08-31 PROCEDURE — 99999 PR PBB SHADOW E&M-EST. PATIENT-LVL III: ICD-10-PCS | Mod: PBBFAC,,, | Performed by: INTERNAL MEDICINE

## 2023-08-31 PROCEDURE — 63600175 PHARM REV CODE 636 W HCPCS: Mod: PN | Performed by: INTERNAL MEDICINE

## 2023-08-31 PROCEDURE — 3074F PR MOST RECENT SYSTOLIC BLOOD PRESSURE < 130 MM HG: ICD-10-PCS | Mod: CPTII,S$GLB,, | Performed by: INTERNAL MEDICINE

## 2023-08-31 PROCEDURE — 36415 COLL VENOUS BLD VENIPUNCTURE: CPT | Mod: PN | Performed by: INTERNAL MEDICINE

## 2023-08-31 PROCEDURE — 80053 COMPREHEN METABOLIC PANEL: CPT | Mod: PN | Performed by: INTERNAL MEDICINE

## 2023-08-31 PROCEDURE — 3008F BODY MASS INDEX DOCD: CPT | Mod: CPTII,S$GLB,, | Performed by: INTERNAL MEDICINE

## 2023-08-31 PROCEDURE — 25000003 PHARM REV CODE 250: Mod: PN | Performed by: INTERNAL MEDICINE

## 2023-08-31 PROCEDURE — 3008F PR BODY MASS INDEX (BMI) DOCUMENTED: ICD-10-PCS | Mod: CPTII,S$GLB,, | Performed by: INTERNAL MEDICINE

## 2023-08-31 PROCEDURE — 96417 CHEMO IV INFUS EACH ADDL SEQ: CPT | Mod: PN

## 2023-08-31 PROCEDURE — 85025 COMPLETE CBC W/AUTO DIFF WBC: CPT | Mod: PN | Performed by: INTERNAL MEDICINE

## 2023-08-31 PROCEDURE — 3079F DIAST BP 80-89 MM HG: CPT | Mod: CPTII,S$GLB,, | Performed by: INTERNAL MEDICINE

## 2023-08-31 PROCEDURE — 99215 OFFICE O/P EST HI 40 MIN: CPT | Mod: S$GLB,,, | Performed by: INTERNAL MEDICINE

## 2023-08-31 PROCEDURE — 99999 PR PBB SHADOW E&M-EST. PATIENT-LVL III: CPT | Mod: PBBFAC,,, | Performed by: INTERNAL MEDICINE

## 2023-08-31 PROCEDURE — A4216 STERILE WATER/SALINE, 10 ML: HCPCS | Mod: PN | Performed by: INTERNAL MEDICINE

## 2023-08-31 PROCEDURE — 99215 PR OFFICE/OUTPT VISIT, EST, LEVL V, 40-54 MIN: ICD-10-PCS | Mod: S$GLB,,, | Performed by: INTERNAL MEDICINE

## 2023-08-31 PROCEDURE — 3079F PR MOST RECENT DIASTOLIC BLOOD PRESSURE 80-89 MM HG: ICD-10-PCS | Mod: CPTII,S$GLB,, | Performed by: INTERNAL MEDICINE

## 2023-08-31 PROCEDURE — 96375 TX/PRO/DX INJ NEW DRUG ADDON: CPT | Mod: PN

## 2023-08-31 RX ORDER — DIPHENHYDRAMINE HYDROCHLORIDE 50 MG/ML
50 INJECTION INTRAMUSCULAR; INTRAVENOUS ONCE AS NEEDED
Status: DISCONTINUED | OUTPATIENT
Start: 2023-08-31 | End: 2023-08-31 | Stop reason: HOSPADM

## 2023-08-31 RX ORDER — SODIUM CHLORIDE 0.9 % (FLUSH) 0.9 %
10 SYRINGE (ML) INJECTION
Status: DISCONTINUED | OUTPATIENT
Start: 2023-08-31 | End: 2023-08-31 | Stop reason: HOSPADM

## 2023-08-31 RX ORDER — EPINEPHRINE 0.3 MG/.3ML
0.3 INJECTION SUBCUTANEOUS ONCE AS NEEDED
Status: DISCONTINUED | OUTPATIENT
Start: 2023-08-31 | End: 2023-08-31 | Stop reason: HOSPADM

## 2023-08-31 RX ORDER — FAMOTIDINE 10 MG/ML
20 INJECTION INTRAVENOUS
Status: COMPLETED | OUTPATIENT
Start: 2023-08-31 | End: 2023-08-31

## 2023-08-31 RX ORDER — PROCHLORPERAZINE EDISYLATE 5 MG/ML
10 INJECTION INTRAMUSCULAR; INTRAVENOUS ONCE AS NEEDED
Status: DISCONTINUED | OUTPATIENT
Start: 2023-08-31 | End: 2023-08-31 | Stop reason: HOSPADM

## 2023-08-31 RX ORDER — LORAZEPAM 2 MG/ML
0.5 INJECTION INTRAMUSCULAR
Status: DISCONTINUED | OUTPATIENT
Start: 2023-08-31 | End: 2023-08-31 | Stop reason: HOSPADM

## 2023-08-31 RX ADMIN — SODIUM CHLORIDE: 9 INJECTION, SOLUTION INTRAVENOUS at 10:08

## 2023-08-31 RX ADMIN — CARBOPLATIN 175 MG: 10 INJECTION, SOLUTION INTRAVENOUS at 01:08

## 2023-08-31 RX ADMIN — PALONOSETRON 0.25 MG: 0.05 INJECTION, SOLUTION INTRAVENOUS at 11:08

## 2023-08-31 RX ADMIN — LORAZEPAM 0.5 MG: 2 INJECTION INTRAMUSCULAR; INTRAVENOUS at 11:08

## 2023-08-31 RX ADMIN — Medication 10 ML: at 11:08

## 2023-08-31 RX ADMIN — FAMOTIDINE 20 MG: 10 INJECTION INTRAVENOUS at 11:08

## 2023-08-31 RX ADMIN — DIPHENHYDRAMINE HYDROCHLORIDE 50 MG: 50 INJECTION INTRAMUSCULAR; INTRAVENOUS at 10:08

## 2023-08-31 RX ADMIN — PACLITAXEL 126 MG: 6 INJECTION, SOLUTION, CONCENTRATE INTRAVENOUS at 12:08

## 2023-08-31 NOTE — PROGRESS NOTES
PROGRESS NOTE    Subjective:       Patient ID: Arabella Catalan is a 43 y.o. female.  MRN: [id number]  : 1980    Chief Complaint: cT2 cN0 TNBC     History of Present Illness:   Arabella Catalan is a 43 y.o. female who is referred for newly diagnosed TNBC.      As previously documented she started screening mammograms for a few years after her ovarian cancer , in her late 20's. Routine pelvic exam revealed an ovarian cancer, s/p right oophorectomy for dysgerminoma.     Resumed routine mammograms at 42, had a normal screening mammogram in May 2023 but felt a lump in the right breast in July. Further imaging showed 2 lesions, both > 2 cm , both biopsied to be G3 IDC, ER/AL and Her 2 jose eduardo negative.       Menarche at age 14 year old. . Age at first live birth 31. Did  breast feed 2 year and 6 months . LMP  . OCP-yes 20 years ago  Menopause at none. HRT-none     No genetic testing done yet.     Family history cancer:  Mother breast cancer at 38 and cervical cancer     Smoker Pk/yr quit date , smoked around 0.5 pack a day     Interim history:    She has completed staging work up, started  neoadjuvant chemo based on KEYNOTE-522. She is using Dignicap. Ativan helped but made her too sleepy, will cut down the dose to 0.5 mg.     Here for C1 D8. Denies nausea, is on Zofran every 8 hours. Has has intermittent diarrhea followed by constipation but not too bothersome. Denies any other issues.     Oncology History:  5/3/23  Impression:  Bilateral  There is no mammographic evidence of malignancy.     BI-RADS Category:   Overall: 2 - Benign    23  US  Impression:  Right  Mass: Right breast 1.5 cm x 1 cm x 1.6 cm mass at the 8 o'clock position. Assessment: 4 - Suspicious finding. Biopsy and possible aspiration are recommended.   Mass: Right breast 2 cm x 1.1 cm x 1.6 cm mass at the 7 o'clock position. Assessment: 4 - Suspicious finding. Biopsy  is recommended.      BI-RADS Category:   Overall: 4 - Suspicious    8/2/23  MRI  Impression:  Right  Mass: Right breast 2.7 cm x 2.2 cm x 2 cm mass at the 8 o'clock position. Assessment: 6 - Known biopsy, proven malignancy.   Mass: Right breast 2 cm x 1.6 cm x 1.6 cm mass at the 7 o'clock position. Assessment: 6 - Known biopsy, proven malignancy.      Left  There is no MR evidence of malignancy in the left breast.     BI-RADS Category:   Overall: 6 - Known Biopsy-Proven Malignancy  7/31/23:  DIAGNOSIS:   07/28/2023 JL:nidia     1. RIGHT BREAST AT 8:00 7 CM FROM NIPPLE CORE NEEDLE BIOPSIES:   - INVASIVE DUCT CARCINOMA, HIGH-GRADE (3,2,3).     2. RIGHT BREAST AT 7:00 3 CM FROM NIPPLE CORE NEEDLE BIOPSIES:   - IN SITU AND INVASIVE DUCT CARCINOMA, HIGH-GRADE (3,3,2).     RIGHT BREAST: INVASIVE DUCTAL CARCINOMA   GRADE: HIGH     ER: NEGATIVE, TN: NEGATIVE, HER2**: NEGATIVE       8/7/23  CT chest abd pelvis  Impression:     1. Known right breast malignancy, better evaluated on comparison breast MRI exam.  2. Mild asymmetrically prominent, but not pathologically enlarged right axillary lymph nodes, measuring up to 6 mm in short axis dimension.  No mediastinal or hilar lymphadenopathy.  3. Mild hepatomegaly with multiple small hypoattenuating hepatic lesions, the largest measuring 0.9 cm in the right hepatic lobe, which are too small to definitively characterize.  Metastases not excluded.  Consider further evaluation with PET-CT and/or contrast enhanced liver protocol MRI.  4. Solid, noncalcified 2 mm pulmonary nodule in the right lower lobe, nonspecific. Attention on follow-up imaging recommended.     8/18/23  Liver MRI      Impression:     Small circumscribed lesions in the liver are most compatible with hemangiomas.  No suspicious liver lesions.    8/10/123  Bone scan   Impression:     No evidence of osseous metastatic disease.     History:  Past Medical History:   Diagnosis Date    Abnormal Pap smear of cervix     HPV  (human papilloma virus) infection     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      Past Surgical History:   Procedure Laterality Date    BREAST BIOPSY Right 2015    benign    BREAST BIOPSY Right 07/26/2023    INSERTION OF TUNNELED CENTRAL VENOUS CATHETER (CVC) WITH SUBCUTANEOUS PORT N/A 8/15/2023    Procedure: [id number]-[id number];  Surgeon: Skyler Aldrich MD;  Location: [facility];  Service: General;  Laterality: N/A;    OOPHORECTOMY Right 2009    stage 1a dysgerminoma    VAGINAL DELIVERY  12/13/2016     Family History   Problem Relation Age of Onset    Breast cancer Mother 38    Cervical cancer Mother       Social History     Tobacco Use    Smoking status: Former     Types: Cigarettes    Smokeless tobacco: Not on file   Substance and Sexual Activity    Alcohol use: Yes     Comment: occasionally    Drug use: No    Sexual activity: Not Currently     Partners: Male     Birth control/protection: None        ROS:   Review of Systems   Constitutional:  Negative for fever, malaise/fatigue and weight loss.   HENT:  Negative for congestion, hearing loss, nosebleeds and sore throat.    Eyes:  Negative for double vision and photophobia.   Respiratory:  Negative for cough, hemoptysis, sputum production, shortness of breath and wheezing.    Cardiovascular:  Negative for chest pain, palpitations, orthopnea and leg swelling.   Gastrointestinal:  Negative for abdominal pain, blood in stool, constipation, diarrhea, heartburn, nausea and vomiting.   Genitourinary:  Negative for dysuria, frequency, hematuria and urgency.   Musculoskeletal:  Negative for back pain, joint pain and myalgias.   Skin:  Negative for itching and rash.   Neurological:  Negative for dizziness, tingling, seizures, weakness and headaches.   Endo/Heme/Allergies:  Negative for polydipsia. Does not bruise/bleed easily.   Psychiatric/Behavioral:  Negative for depression and memory loss. The patient is nervous/anxious. The patient does not have insomnia.      "    Objective:     Vitals:    08/31/23 0910   BP: 122/82   Pulse: 83   Resp: 16   Temp: 97.9 °F (36.6 °C)   TempSrc: Temporal   SpO2: 98%   Weight: 54 kg (119 lb 0.8 oz)   Height: 5' 6" (1.676 m)   PainSc: 0-No pain         Physical Examination:   Physical Exam  Vitals and nursing note reviewed.   Constitutional:       General: She is not in acute distress.     Appearance: She is not diaphoretic.   HENT:      Head: Normocephalic.      Mouth/Throat:      Pharynx: No oropharyngeal exudate.   Eyes:      General: No scleral icterus.     Conjunctiva/sclera: Conjunctivae normal.   Neck:      Thyroid: No thyromegaly.   Cardiovascular:      Rate and Rhythm: Normal rate and regular rhythm.      Heart sounds: Normal heart sounds. No murmur heard.  Pulmonary:      Effort: Pulmonary effort is normal. No respiratory distress.      Breath sounds: No stridor. No wheezing or rales.   Chest:      Chest wall: No tenderness.   Abdominal:      General: Bowel sounds are normal. There is no distension.      Palpations: Abdomen is soft. There is no mass.      Tenderness: There is no abdominal tenderness. There is no rebound.   Musculoskeletal:         General: No tenderness or deformity. Normal range of motion.      Cervical back: Neck supple.   Lymphadenopathy:      Cervical: No cervical adenopathy.   Skin:     General: Skin is warm and dry.      Findings: No erythema or rash.   Neurological:      Mental Status: She is alert and oriented to person, place, and time.      Cranial Nerves: No cranial nerve deficit.      Coordination: Coordination normal.      Gait: Gait is intact.   Psychiatric:         Mood and Affect: Affect normal.         Cognition and Memory: Memory normal.         Judgment: Judgment normal.          Diagnostic Tests:  Significant Imaging: I have reviewed and interpreted all pertinent imaging results/findings.      Laboratory Data:  All pertinent labs have been reviewed.    Labs:   Lab Results   Component Value Date    " WBC 5.64 08/31/2023    HGB 12.3 08/31/2023    HCT 37.5 08/31/2023    MCV 94 08/31/2023     08/31/2023       Assessment/Plan:   Invasive ductal carcinoma of breast, female, right  cT2 cN0 G3 IDC, TNBC    We discussed the more agressive nature and multifocal nature of her TNBC. Clinically node negative. Based on size, she is a candidate for neoadjuvant chemo immunotherapy based on Keynote-522 to prevent systemic spread and to monitor response to therapy and that 64% of the patients on trial had a pCR.     She is agreeable,echo is normal, no evidence for distant metastatic disease noted. Cleared to receive C1D8 today, will monitor weekly.   Went over anti emetic regimen and supportive care at length.     Offer supportive care with IO and onc psych.     After completing neoadjuvant therapy, she would be referred back for surgery.       She has also been enrolled in the Protocol [id number], Disparities in Results of Immune Checkpoint Inhibitor Treatment (DIRECT): A Prospective Cohort Study of Cancer Survivors Treated with anti-PD-L1 Immunotherapy in a Community Oncology Setting.      History of ovarian cancer  Noted. Continue gyn follow up.     MDM includes  :    - Acute or chronic illness or injury that poses a threat to life or bodily function  - Independent review and explanation of 3+ results from unique tests  - Discussion of management and ordering 3+ unique tests  - Extensive discussion of treatment and management  - Prescription drug management  - Drug therapy requiring intensive monitoring for toxicity      ECOG SCORE               Discussion:   No follow-ups on file.    Plan was discussed with the patient at length, and she verbalized understanding. Arabella was given an opportunity to ask questions that were answered to her satisfaction, and she was advised to call in the interval if any problems or questions arise.    Electronically signed by Corina Mcgovern MD        Route Chart for Scheduling    Med  Onc Chart Routing      Follow up with physician . 9/21,10/5, then every week alternating between MD and NP   Follow up with SAUL . 9/7,9/14,9/28   Infusion scheduling note    Injection scheduling note    Labs CBC and CMP   Scheduling:  Preferred lab:  Lab interval:  urine preg q weekly   Imaging    Pharmacy appointment    Other referrals              Treatment Plan Information   OP PEMBROLIZUMAB WITH WEEKLY PACLITAXEL CARBOPLATIN (AUC 1.5) FOLLOWED BY PEMBROLIZUMAB DOXORUBICIN CYCLOPHOSPHAMIDE FOLLOWED BY PEMBROLIZUMAB 200MG Q3W   Corina Mcgovern MD   Upcoming Treatment Dates - OP PEMBROLIZUMAB WITH WEEKLY PACLITAXEL CARBOPLATIN (AUC 1.5) FOLLOWED BY PEMBROLIZUMAB DOXORUBICIN CYCLOPHOSPHAMIDE FOLLOWED BY PEMBROLIZUMAB 200MG Q3W    8/31/2023       Pre-Medications       diphenhydrAMINE (BENADRYL) 50 mg in NS 50 mL IVPB       famotidine (PF) injection 20 mg       Chemotherapy       PACLitaxeL (TAXOL) 80 mg/m2 = 126 mg in sodium chloride 0.9% 250 mL chemo infusion       CARBOplatin (PARAPLATIN) 175 mg in sodium chloride 0.9% 267.5 mL chemo infusion       Antiemetics       palonosetron 0.25mg/dexAMETHasone 12mg in NS IVPB 0.25 mg 50 mL       LORazepam (ATIVAN) injection 0.5 mg  9/7/2023       Pre-Medications       diphenhydrAMINE (BENADRYL) 50 mg in NS 50 mL IVPB       famotidine (PF) injection 20 mg       Chemotherapy       PACLitaxeL (TAXOL) 80 mg/m2 = 126 mg in sodium chloride 0.9% 250 mL chemo infusion       CARBOplatin (PARAPLATIN) 175 mg in sodium chloride 0.9% 267.5 mL chemo infusion       Antiemetics       LORazepam (ATIVAN) injection 0.5 mg       palonosetron 0.25mg/dexAMETHasone 12mg in NS IVPB 0.25 mg 50 mL  9/14/2023       Pre-Medications       diphenhydrAMINE (BENADRYL) 50 mg in NS 50 mL IVPB       famotidine (PF) injection 20 mg       Chemotherapy       PACLitaxeL (TAXOL) 80 mg/m2 = 126 mg in sodium chloride 0.9% 250 mL chemo infusion       CARBOplatin (PARAPLATIN) 175 mg in sodium chloride 0.9% 267.5 mL  chemo infusion       Antiemetics       palonosetron 0.25mg/dexAMETHasone 12mg in NS IVPB 0.25 mg 50 mL       LORazepam (ATIVAN) injection 0.5 mg       aprepitant (CINVANTI) injection 130 mg       Immunotherapy       pembrolizumab (KEYTRUDA) 200 mg in sodium chloride 0.9% SolP 108 mL infusion  9/21/2023       Pre-Medications       diphenhydrAMINE (BENADRYL) 50 mg in NS 50 mL IVPB       famotidine (PF) injection 20 mg       Chemotherapy       PACLitaxeL (TAXOL) 80 mg/m2 = 126 mg in sodium chloride 0.9% 250 mL chemo infusion       CARBOplatin (PARAPLATIN) 175 mg in sodium chloride 0.9% 267.5 mL chemo infusion       Antiemetics       LORazepam (ATIVAN) injection 0.5 mg       palonosetron 0.25mg/dexAMETHasone 12mg in NS IVPB 0.25 mg 50 mL    Answers submitted by the patient for this visit:  Review of Systems Questionnaire (Submitted on 8/28/2023)  appetite change : No  unexpected weight change: No  mouth sores: No  visual disturbance: No  adenopathy: No

## 2023-08-31 NOTE — PLAN OF CARE
Problem: Fatigue  Goal: Improved Activity Tolerance  Outcome: Ongoing, Progressing  Intervention: Promote Improved Energy  Flowsheets (Taken 8/31/2023 1532)  Fatigue Management:   activity schedule adjusted   frequent rest breaks encouraged   activity assistance provided   paced activity encouraged   fatigue-related activity identified  Sleep/Rest Enhancement:   natural light exposure provided   noise level reduced   reading promoted   regular sleep/rest pattern promoted   family presence promoted   room darkened  Activity Management:   Ambulated -L4   Ambulated to bathroom - L4   Ambulated in hansen - L4   Up in stretcher chair - L1     Problem: Adult Inpatient Plan of Care  Goal: Plan of Care Review  Outcome: Ongoing, Progressing  Flowsheets (Taken 8/31/2023 1532)  Plan of Care Reviewed With:   patient   family  Goal: Patient-Specific Goal (Individualized)  Outcome: Ongoing, Progressing  Flowsheets (Taken 8/31/2023 1532)  Anxieties, Fears or Concerns: dignicap/cryotherapy  Individualized Care Needs: recliner, home electric blanket, conversation, family at chairside, cryotherapy, dignicap  Goal: Optimal Comfort and Wellbeing  Outcome: Ongoing, Progressing   Pt tolerated Taxol/Carbo well today with cryotherapy and dignicap. NAD/no concerns voiced. Reviewed follow-up appointments. All questions were answered, ambulated independently at d/c with family.

## 2023-08-31 NOTE — PROGRESS NOTES
Oncology Nutrition   Chemotherapy Infusion Visit    Nutrition Follow Up   RD met with patient at chairside during infusion treatment. Pt reports continues to do well nutritionally- eating without difficulty, maintaining weight, and denies nutrition related side effects.    Wt Readings from Last 10 Encounters:   08/31/23 54 kg (119 lb 0.8 oz)   08/31/23 54 kg (119 lb 0.8 oz)   08/24/23 53.8 kg (118 lb 9.7 oz)   08/24/23 53.8 kg (118 lb 9.7 oz)   08/22/23 54.1 kg (119 lb 4.3 oz)   08/18/23 54.4 kg (120 lb)   08/16/23 54.4 kg (120 lb)   08/15/23 55 kg (121 lb 4.1 oz)   08/10/23 54.1 kg (119 lb 3.2 oz)   08/09/23 55 kg (121 lb 4.1 oz)       All other nutrition questions/concerns addressed as appropriate. Will continue to follow and monitor throughout treatment PRN.     Orquidea Vu, MS, RD, LDN  08/31/2023  1:54 PM

## 2023-09-01 ENCOUNTER — PATIENT MESSAGE (OUTPATIENT)
Dept: ADMINISTRATIVE | Facility: OTHER | Age: 43
End: 2023-09-01
Payer: COMMERCIAL

## 2023-09-01 ENCOUNTER — TELEPHONE (OUTPATIENT)
Dept: SURGERY | Facility: CLINIC | Age: 43
End: 2023-09-01
Payer: COMMERCIAL

## 2023-09-01 ENCOUNTER — TELEPHONE (OUTPATIENT)
Dept: HEMATOLOGY/ONCOLOGY | Facility: CLINIC | Age: 43
End: 2023-09-01
Payer: COMMERCIAL

## 2023-09-01 PROBLEM — Z15.09 BRCA GENE POSITIVE: Status: ACTIVE | Noted: 2023-09-01

## 2023-09-01 PROBLEM — Z15.01 BRCA GENE POSITIVE: Status: ACTIVE | Noted: 2023-09-01

## 2023-09-01 NOTE — TELEPHONE ENCOUNTER
----- Message from Farideh Perera sent at 9/1/2023  3:43 PM CDT -----  Patient has a referral in the system and is ready to schedule. Patient can be contacted at [phone] (home) to schedule accordingly.    TY

## 2023-09-01 NOTE — NURSING
Spoke with pt.  Scheduled her to see Dr. Calderon in Oct.  Asked her to call with any questions or concerns.  She verbalized understanding.

## 2023-09-01 NOTE — TELEPHONE ENCOUNTER
Patient's results in the [employer] Common Heriditary Panel through [employer] (08/07/2023) showed that she's a carrier of a pathogenic mutation in the BRCA 1 gene.    Per Dr. Min's instructions, patient was advised to have a genetic, gastroenterology, dermatology and gynecology oncology consult. Referrals to Genetics, GI, GynOnc and Dermatology placed.      Advised patient to contact the genetic counselor through [employer], at no additional cost to her, to make a genetic counseling appointment. Contact information given.     Patient's genetic testing results folder has been placed in the outgoing-mail bin to be mailed to patient, and she should contact us if not received soon.

## 2023-09-02 ENCOUNTER — PATIENT MESSAGE (OUTPATIENT)
Dept: ADMINISTRATIVE | Facility: OTHER | Age: 43
End: 2023-09-02
Payer: COMMERCIAL

## 2023-09-03 ENCOUNTER — PATIENT MESSAGE (OUTPATIENT)
Dept: ADMINISTRATIVE | Facility: OTHER | Age: 43
End: 2023-09-03
Payer: COMMERCIAL

## 2023-09-04 ENCOUNTER — PATIENT MESSAGE (OUTPATIENT)
Dept: ADMINISTRATIVE | Facility: OTHER | Age: 43
End: 2023-09-04
Payer: COMMERCIAL

## 2023-09-04 NOTE — PROGRESS NOTES
"                                        Name: Arabella Catalan  MRN:  [id number]  :  1980 Age 43 y.o.  Date of Service: 2023    Reason for visit:  Arabella Catalan is a 43 y.o. female here regarding...    #Triple Negative Right Sided Breast Cancer   Date of Original Diagnosis: 23  Original Stage: [id number] Stage 2B grade 3 Triple neg   Current Sites of Disease: right breast   Current Goals of Therapy: curative   Current Therapy: Keynote 522 Neoadjuvant chemotherapy    Oncologic History/History of Present Illness:   Per Dr. Rivas Note:   As previously documented she started screening mammograms for a few years after her ovarian cancer , in her late 20's. Routine pelvic exam revealed an ovarian cancer, s/p right oophorectomy for dysgerminoma.    Resumed routine mammograms at 42, had a normal screening mammogram in May 2023 but felt a lump in the right breast in July. Further imaging showed 2 lesions, both > 2 cm , both biopsied to be G3 IDC, ER/IN and Her 2 jose eduardo negative.   Menarche at age 14 year old. . Age at first live birth 31. Did  breast feed 2 year and 6 months . LMP  . OCP-yes 20 years ago  Menopause at none. HRT-none      Genetics completed- + BRCA1     Family history cancer:  Mother breast cancer at 38 and cervical cancer  Smoker Pk/yr quit date , smoked around 0.5 pack a day      Interim history:  Reports diffuse lacy rash, moderately pruritic after chemotherapy, reports rash is now improving and mainly involves her upper chest, no pustules or papules      PHYSICAL EXAMINATION:  /60 (BP Location: Right arm, Patient Position: Sitting, BP Method: Medium (Manual))   Pulse 96   Temp 97.9 °F (36.6 °C) (Temporal)   Ht 5' 6" (1.676 m)   Wt 53.8 kg (118 lb 9.7 oz)   LMP 2023 (Exact Date)   SpO2 99%   BMI 19.14 kg/m²   Wt Readings from Last 3 Encounters:   23 53.8 kg (118 lb 9.7 oz)   23 54 kg (119 lb 0.8 oz)   23 54 kg (119 lb 0.8 oz) "     ECOG PERFORMANCE STATUS: 0  Physical Exam   General:  Well-appearing, nontoxic  Eyes:  Equal and round pupils, EOMI, no scleral icterus  Mouth:  No lesions, moist  Cardiovascular:  Warm, well-perfused, no peripheral edema  Lungs:  Unlabored on room air, no wheezing  Neurologic:  Awake, alert and oriented, participating in the exam  Psych:  Appropriate mood and affect  Skin:  Normal pallor, + rashes--lacy rash involving the upper trunk, mainly bilateral breast and chest  Heme:  No petechiae, no purpura      LABORATORY:  CBC  Lab Results   Component Value Date    WBC 4.59 09/06/2023    HGB 11.4 (L) 09/06/2023    HCT 34.8 (L) 09/06/2023    MCV 93 09/06/2023     09/06/2023         BMP  Lab Results   Component Value Date     09/06/2023    K 3.7 09/06/2023     09/06/2023    CO2 27 09/06/2023    BUN 8 09/06/2023    CREATININE 0.7 09/06/2023    CALCIUM 8.7 09/06/2023    ANIONGAP 12 09/06/2023    ESTGFRAFRICA >60 01/04/2010    EGFRNONAA >60 01/04/2010         PATHOLOGY:        RADIOLOGY:  MRI Breast  Right  There is a 2.7 cm x 2.2 cm x 2 cm irregularly shaped, homogeneous mass with angular margins seen in the right breast at 8 o'clock, 7 cm from the nipple, 1.3 cm from the skin, and 0.8 cm from the chest wall. Kinetics initial phase is fast. Delayed phase is washout.      There is a 2 cm x 1.6 cm x 1.6 cm irregularly shaped, homogeneous mass with angular margins seen in the right breast at 7 o'clock, 3 cm from the nipple, 1.1 cm from the skin, and 2.3 cm from the chest wall. Kinetics initial phase is fast. Delayed phase is washout.       ASSESSMENT AND PLAN:  Arabella Catalan is a 43 y.o. female with...    #Triple Negative Right Sided Breast Cancer   Date of Original Diagnosis: 7/26/23  Original Stage: [id number] Stage 2B grade 3 Triple neg   Current Sites of Disease: right breast   Current Goals of Therapy: curative   Current Therapy: Keynote 522 Neoadjuvant chemotherapy    We discussed the more  agressive nature and multifocal nature of her TNBC. Clinically node negative. Based on size, she is a candidate for neoadjuvant chemo immunotherapy based on Keynote-522 to prevent systemic spread and to monitor response to therapy and that 64% of the patients on trial had a pCR.      She is agreeable,echo is normal, no evidence for distant metastatic disease noted. Cleared to receive C1D15 today, will monitor weekly.   After completing neoadjuvant therapy, she would be referred back for surgery.   She has also been enrolled in the Protocol [id number], Disparities in Results of Immune Checkpoint Inhibitor Treatment (DIRECT): A Prospective Cohort Study of Cancer Survivors Treated with anti-PD-L1 Immunotherapy in a Community Oncology Setting.     #IO induced rash- grade 1 involving 30% the body, no sloughing or breakdown, no pupules or pustules, +puritis, RX for hydrocortisone cream, counseled about emollients     #BRCA1 + - found as part of genetic work up- will  her at her next appointment, will also send notice to Armando Ragsdale () regarding need for genetic counseling and family testing      #CINV- well controlled, continue monitoring      #anxiety about health- referral sent to onc psych, good mood today     Sole Sexton M.D.  Hematology/Oncology   Route Chart for Scheduling    Med Onc Chart Routing      Follow up with physician . Follow up as scheudled   Follow up with SAUL    Infusion scheduling note    Injection scheduling note    Labs   Scheduling:  Preferred lab:  Lab interval:  Standing labs   Imaging    Pharmacy appointment    Other referrals              Treatment Plan Information   OP PEMBROLIZUMAB WITH WEEKLY PACLITAXEL CARBOPLATIN (AUC 1.5) FOLLOWED BY PEMBROLIZUMAB DOXORUBICIN CYCLOPHOSPHAMIDE FOLLOWED BY PEMBROLIZUMAB 200MG Q3W   Corina Mcgovern MD   Upcoming Treatment Dates - OP PEMBROLIZUMAB WITH WEEKLY PACLITAXEL CARBOPLATIN (AUC 1.5) FOLLOWED BY PEMBROLIZUMAB DOXORUBICIN  CYCLOPHOSPHAMIDE FOLLOWED BY PEMBROLIZUMAB 200MG Q3W    9/7/2023       Pre-Medications       diphenhydrAMINE (BENADRYL) 50 mg in NS 50 mL IVPB       famotidine (PF) injection 20 mg       Chemotherapy       PACLitaxeL (TAXOL) 80 mg/m2 = 126 mg in sodium chloride 0.9% 250 mL chemo infusion       CARBOplatin (PARAPLATIN) 175 mg in sodium chloride 0.9% 267.5 mL chemo infusion       Antiemetics       LORazepam injection 0.5 mg       palonosetron 0.25mg/dexAMETHasone 12mg in NS IVPB 0.25 mg 50 mL  9/14/2023       Pre-Medications       diphenhydrAMINE (BENADRYL) 50 mg in NS 50 mL IVPB       famotidine (PF) injection 20 mg       Chemotherapy       PACLitaxeL (TAXOL) 80 mg/m2 = 126 mg in sodium chloride 0.9% 250 mL chemo infusion       CARBOplatin (PARAPLATIN) 175 mg in sodium chloride 0.9% 267.5 mL chemo infusion       Antiemetics       palonosetron 0.25mg/dexAMETHasone 12mg in NS IVPB 0.25 mg 50 mL       LORazepam (ATIVAN) injection 0.5 mg       aprepitant (CINVANTI) injection 130 mg       Immunotherapy       pembrolizumab (KEYTRUDA) 200 mg in sodium chloride 0.9% SolP 108 mL infusion  9/21/2023       Pre-Medications       diphenhydrAMINE (BENADRYL) 50 mg in NS 50 mL IVPB       famotidine (PF) injection 20 mg       Chemotherapy       PACLitaxeL (TAXOL) 80 mg/m2 = 126 mg in sodium chloride 0.9% 250 mL chemo infusion       CARBOplatin (PARAPLATIN) 175 mg in sodium chloride 0.9% 267.5 mL chemo infusion       Antiemetics       LORazepam (ATIVAN) injection 0.5 mg       palonosetron 0.25mg/dexAMETHasone 12mg in NS IVPB 0.25 mg 50 mL  9/28/2023       Pre-Medications       diphenhydrAMINE (BENADRYL) 50 mg in NS 50 mL IVPB       famotidine (PF) injection 20 mg       Chemotherapy       PACLitaxeL (TAXOL) 80 mg/m2 = 126 mg in sodium chloride 0.9% 250 mL chemo infusion       CARBOplatin (PARAPLATIN) 175 mg in sodium chloride 0.9% 267.5 mL chemo infusion       Antiemetics       LORazepam (ATIVAN) injection 0.5 mg       palonosetron  0.25mg/dexAMETHasone 12mg in NS IVPB 0.25 mg 50 mL

## 2023-09-05 ENCOUNTER — PATIENT MESSAGE (OUTPATIENT)
Dept: ADMINISTRATIVE | Facility: OTHER | Age: 43
End: 2023-09-05
Payer: COMMERCIAL

## 2023-09-06 ENCOUNTER — LAB VISIT (OUTPATIENT)
Dept: LAB | Facility: HOSPITAL | Age: 43
End: 2023-09-06
Attending: INTERNAL MEDICINE
Payer: COMMERCIAL

## 2023-09-06 ENCOUNTER — OFFICE VISIT (OUTPATIENT)
Dept: HEMATOLOGY/ONCOLOGY | Facility: CLINIC | Age: 43
End: 2023-09-06
Payer: COMMERCIAL

## 2023-09-06 VITALS
HEART RATE: 96 BPM | SYSTOLIC BLOOD PRESSURE: 104 MMHG | OXYGEN SATURATION: 99 % | BODY MASS INDEX: 19.07 KG/M2 | WEIGHT: 118.63 LBS | HEIGHT: 66 IN | DIASTOLIC BLOOD PRESSURE: 60 MMHG | TEMPERATURE: 98 F

## 2023-09-06 DIAGNOSIS — R11.2 CINV (CHEMOTHERAPY-INDUCED NAUSEA AND VOMITING): ICD-10-CM

## 2023-09-06 DIAGNOSIS — L27.0 DRUG RASH: Primary | ICD-10-CM

## 2023-09-06 DIAGNOSIS — T45.1X5A CINV (CHEMOTHERAPY-INDUCED NAUSEA AND VOMITING): ICD-10-CM

## 2023-09-06 DIAGNOSIS — C50.511 MALIGNANT NEOPLASM OF LOWER-OUTER QUADRANT OF RIGHT BREAST OF FEMALE, ESTROGEN RECEPTOR NEGATIVE: ICD-10-CM

## 2023-09-06 DIAGNOSIS — C50.911 INVASIVE DUCTAL CARCINOMA OF BREAST, FEMALE, RIGHT: ICD-10-CM

## 2023-09-06 DIAGNOSIS — Z15.01 BRCA GENE POSITIVE: ICD-10-CM

## 2023-09-06 DIAGNOSIS — Z17.1 MALIGNANT NEOPLASM OF LOWER-OUTER QUADRANT OF RIGHT BREAST OF FEMALE, ESTROGEN RECEPTOR NEGATIVE: ICD-10-CM

## 2023-09-06 DIAGNOSIS — Z15.09 BRCA GENE POSITIVE: ICD-10-CM

## 2023-09-06 DIAGNOSIS — R45.89 ANXIETY ABOUT HEALTH: ICD-10-CM

## 2023-09-06 LAB
ALBUMIN SERPL BCP-MCNC: 3.6 G/DL (ref 3.5–5.2)
ALP SERPL-CCNC: 38 U/L (ref 55–135)
ALT SERPL W/O P-5'-P-CCNC: 19 U/L (ref 10–44)
ANION GAP SERPL CALC-SCNC: 12 MMOL/L (ref 8–16)
AST SERPL-CCNC: 28 U/L (ref 10–40)
B-HCG UR QL: NEGATIVE
BASOPHILS # BLD AUTO: 0.02 K/UL (ref 0–0.2)
BASOPHILS NFR BLD: 0.4 % (ref 0–1.9)
BILIRUB SERPL-MCNC: 0.2 MG/DL (ref 0.1–1)
BUN SERPL-MCNC: 8 MG/DL (ref 6–20)
CALCIUM SERPL-MCNC: 8.7 MG/DL (ref 8.7–10.5)
CHLORIDE SERPL-SCNC: 102 MMOL/L (ref 95–110)
CO2 SERPL-SCNC: 27 MMOL/L (ref 23–29)
CREAT SERPL-MCNC: 0.7 MG/DL (ref 0.5–1.4)
DIFFERENTIAL METHOD: ABNORMAL
EOSINOPHIL # BLD AUTO: 0.2 K/UL (ref 0–0.5)
EOSINOPHIL NFR BLD: 4.1 % (ref 0–8)
ERYTHROCYTE [DISTWIDTH] IN BLOOD BY AUTOMATED COUNT: 13.2 % (ref 11.5–14.5)
EST. GFR  (NO RACE VARIABLE): >60 ML/MIN/1.73 M^2
GLUCOSE SERPL-MCNC: 138 MG/DL (ref 70–110)
HCT VFR BLD AUTO: 34.8 % (ref 37–48.5)
HGB BLD-MCNC: 11.4 G/DL (ref 12–16)
IMM GRANULOCYTES # BLD AUTO: 0.01 K/UL (ref 0–0.04)
IMM GRANULOCYTES NFR BLD AUTO: 0.2 % (ref 0–0.5)
LYMPHOCYTES # BLD AUTO: 1.5 K/UL (ref 1–4.8)
LYMPHOCYTES NFR BLD: 32.9 % (ref 18–48)
MCH RBC QN AUTO: 30.6 PG (ref 27–31)
MCHC RBC AUTO-ENTMCNC: 32.8 G/DL (ref 32–36)
MCV RBC AUTO: 93 FL (ref 82–98)
MONOCYTES # BLD AUTO: 0.2 K/UL (ref 0.3–1)
MONOCYTES NFR BLD: 4.8 % (ref 4–15)
NEUTROPHILS # BLD AUTO: 2.6 K/UL (ref 1.8–7.7)
NEUTROPHILS NFR BLD: 57.6 % (ref 38–73)
NRBC BLD-RTO: 0 /100 WBC
PLATELET # BLD AUTO: 221 K/UL (ref 150–450)
PLATELET BLD QL SMEAR: ABNORMAL
PMV BLD AUTO: 9.5 FL (ref 9.2–12.9)
POTASSIUM SERPL-SCNC: 3.7 MMOL/L (ref 3.5–5.1)
PROT SERPL-MCNC: 6.4 G/DL (ref 6–8.4)
RBC # BLD AUTO: 3.73 M/UL (ref 4–5.4)
SODIUM SERPL-SCNC: 141 MMOL/L (ref 136–145)
TSH SERPL DL<=0.005 MIU/L-ACNC: 1.13 UIU/ML (ref 0.4–4)
WBC # BLD AUTO: 4.59 K/UL (ref 3.9–12.7)

## 2023-09-06 PROCEDURE — 99999 PR PBB SHADOW E&M-EST. PATIENT-LVL III: CPT | Mod: PBBFAC,,, | Performed by: INTERNAL MEDICINE

## 2023-09-06 PROCEDURE — 3074F PR MOST RECENT SYSTOLIC BLOOD PRESSURE < 130 MM HG: ICD-10-PCS | Mod: CPTII,S$GLB,, | Performed by: INTERNAL MEDICINE

## 2023-09-06 PROCEDURE — 3008F BODY MASS INDEX DOCD: CPT | Mod: CPTII,S$GLB,, | Performed by: INTERNAL MEDICINE

## 2023-09-06 PROCEDURE — 3008F PR BODY MASS INDEX (BMI) DOCUMENTED: ICD-10-PCS | Mod: CPTII,S$GLB,, | Performed by: INTERNAL MEDICINE

## 2023-09-06 PROCEDURE — 1159F MED LIST DOCD IN RCRD: CPT | Mod: CPTII,S$GLB,, | Performed by: INTERNAL MEDICINE

## 2023-09-06 PROCEDURE — 99215 PR OFFICE/OUTPT VISIT, EST, LEVL V, 40-54 MIN: ICD-10-PCS | Mod: S$GLB,,, | Performed by: INTERNAL MEDICINE

## 2023-09-06 PROCEDURE — 80053 COMPREHEN METABOLIC PANEL: CPT | Mod: PO | Performed by: INTERNAL MEDICINE

## 2023-09-06 PROCEDURE — 3078F DIAST BP <80 MM HG: CPT | Mod: CPTII,S$GLB,, | Performed by: INTERNAL MEDICINE

## 2023-09-06 PROCEDURE — 1159F PR MEDICATION LIST DOCUMENTED IN MEDICAL RECORD: ICD-10-PCS | Mod: CPTII,S$GLB,, | Performed by: INTERNAL MEDICINE

## 2023-09-06 PROCEDURE — 36415 COLL VENOUS BLD VENIPUNCTURE: CPT | Mod: PN | Performed by: INTERNAL MEDICINE

## 2023-09-06 PROCEDURE — 85025 COMPLETE CBC W/AUTO DIFF WBC: CPT | Mod: PN | Performed by: INTERNAL MEDICINE

## 2023-09-06 PROCEDURE — 3078F PR MOST RECENT DIASTOLIC BLOOD PRESSURE < 80 MM HG: ICD-10-PCS | Mod: CPTII,S$GLB,, | Performed by: INTERNAL MEDICINE

## 2023-09-06 PROCEDURE — 84443 ASSAY THYROID STIM HORMONE: CPT | Performed by: INTERNAL MEDICINE

## 2023-09-06 PROCEDURE — 99999 PR PBB SHADOW E&M-EST. PATIENT-LVL III: ICD-10-PCS | Mod: PBBFAC,,, | Performed by: INTERNAL MEDICINE

## 2023-09-06 PROCEDURE — 3074F SYST BP LT 130 MM HG: CPT | Mod: CPTII,S$GLB,, | Performed by: INTERNAL MEDICINE

## 2023-09-06 PROCEDURE — 99215 OFFICE O/P EST HI 40 MIN: CPT | Mod: S$GLB,,, | Performed by: INTERNAL MEDICINE

## 2023-09-06 PROCEDURE — 81025 URINE PREGNANCY TEST: CPT | Mod: PN | Performed by: INTERNAL MEDICINE

## 2023-09-06 RX ORDER — HYDROCORTISONE 1 %
CREAM (GRAM) TOPICAL
Qty: 453 G | Refills: 1 | Status: SHIPPED | OUTPATIENT
Start: 2023-09-06 | End: 2023-09-11

## 2023-09-06 RX ORDER — LORAZEPAM 2 MG/ML
0.5 INJECTION INTRAMUSCULAR
Status: CANCELLED
Start: 2023-09-07

## 2023-09-07 ENCOUNTER — INFUSION (OUTPATIENT)
Dept: INFUSION THERAPY | Facility: HOSPITAL | Age: 43
End: 2023-09-07
Attending: INTERNAL MEDICINE
Payer: COMMERCIAL

## 2023-09-07 VITALS
TEMPERATURE: 99 F | RESPIRATION RATE: 16 BRPM | HEART RATE: 91 BPM | HEIGHT: 66 IN | WEIGHT: 118.63 LBS | BODY MASS INDEX: 19.07 KG/M2 | DIASTOLIC BLOOD PRESSURE: 64 MMHG | SYSTOLIC BLOOD PRESSURE: 113 MMHG

## 2023-09-07 DIAGNOSIS — C50.911 INVASIVE DUCTAL CARCINOMA OF BREAST, FEMALE, RIGHT: Primary | ICD-10-CM

## 2023-09-07 PROCEDURE — 96417 CHEMO IV INFUS EACH ADDL SEQ: CPT | Mod: PN

## 2023-09-07 PROCEDURE — 96413 CHEMO IV INFUSION 1 HR: CPT | Mod: PN

## 2023-09-07 PROCEDURE — 25000003 PHARM REV CODE 250: Mod: PN | Performed by: INTERNAL MEDICINE

## 2023-09-07 PROCEDURE — 63600175 PHARM REV CODE 636 W HCPCS: Mod: PN | Performed by: INTERNAL MEDICINE

## 2023-09-07 PROCEDURE — A4216 STERILE WATER/SALINE, 10 ML: HCPCS | Mod: PN | Performed by: INTERNAL MEDICINE

## 2023-09-07 PROCEDURE — 96367 TX/PROPH/DG ADDL SEQ IV INF: CPT | Mod: PN

## 2023-09-07 PROCEDURE — 96375 TX/PRO/DX INJ NEW DRUG ADDON: CPT | Mod: PN

## 2023-09-07 RX ORDER — LORAZEPAM 2 MG/ML
0.5 INJECTION INTRAMUSCULAR
Status: DISCONTINUED | OUTPATIENT
Start: 2023-09-07 | End: 2023-09-07 | Stop reason: HOSPADM

## 2023-09-07 RX ORDER — FAMOTIDINE 10 MG/ML
20 INJECTION INTRAVENOUS
Status: COMPLETED | OUTPATIENT
Start: 2023-09-07 | End: 2023-09-07

## 2023-09-07 RX ORDER — SODIUM CHLORIDE 0.9 % (FLUSH) 0.9 %
10 SYRINGE (ML) INJECTION
Status: DISCONTINUED | OUTPATIENT
Start: 2023-09-07 | End: 2023-09-07 | Stop reason: HOSPADM

## 2023-09-07 RX ADMIN — SODIUM CHLORIDE: 9 INJECTION, SOLUTION INTRAVENOUS at 10:09

## 2023-09-07 RX ADMIN — LORAZEPAM 0.5 MG: 2 INJECTION INTRAMUSCULAR; INTRAVENOUS at 10:09

## 2023-09-07 RX ADMIN — PACLITAXEL 126 MG: 6 INJECTION, SOLUTION, CONCENTRATE INTRAVENOUS at 11:09

## 2023-09-07 RX ADMIN — CARBOPLATIN 175 MG: 10 INJECTION, SOLUTION INTRAVENOUS at 12:09

## 2023-09-07 RX ADMIN — Medication 10 ML: at 03:09

## 2023-09-07 RX ADMIN — FAMOTIDINE 20 MG: 10 INJECTION INTRAVENOUS at 10:09

## 2023-09-07 RX ADMIN — DIPHENHYDRAMINE HYDROCHLORIDE 50 MG: 50 INJECTION, SOLUTION INTRAMUSCULAR; INTRAVENOUS at 10:09

## 2023-09-07 RX ADMIN — PALONOSETRON 0.25 MG: 0.05 INJECTION, SOLUTION INTRAVENOUS at 10:09

## 2023-09-08 ENCOUNTER — PATIENT MESSAGE (OUTPATIENT)
Dept: ADMINISTRATIVE | Facility: OTHER | Age: 43
End: 2023-09-08
Payer: COMMERCIAL

## 2023-09-08 ENCOUNTER — TELEPHONE (OUTPATIENT)
Dept: HEMATOLOGY/ONCOLOGY | Facility: CLINIC | Age: 43
End: 2023-09-08
Payer: COMMERCIAL

## 2023-09-08 NOTE — TELEPHONE ENCOUNTER
Chemo  questionare with patient c/o chills and sign of infections. Spoke to patient and she advised she had mild chills yesterday for 2 hours after infusion here. Nothing since then , no fever and feels fine. Patient instructed to call  back if symptoms return. Patient verbalized understanding.

## 2023-09-09 ENCOUNTER — PATIENT MESSAGE (OUTPATIENT)
Dept: ADMINISTRATIVE | Facility: OTHER | Age: 43
End: 2023-09-09
Payer: COMMERCIAL

## 2023-09-10 ENCOUNTER — PATIENT MESSAGE (OUTPATIENT)
Dept: ADMINISTRATIVE | Facility: OTHER | Age: 43
End: 2023-09-10
Payer: COMMERCIAL

## 2023-09-11 ENCOUNTER — RESEARCH ENCOUNTER (OUTPATIENT)
Dept: RESEARCH | Facility: HOSPITAL | Age: 43
End: 2023-09-11
Payer: COMMERCIAL

## 2023-09-11 ENCOUNTER — LAB VISIT (OUTPATIENT)
Dept: LAB | Facility: HOSPITAL | Age: 43
End: 2023-09-11
Attending: INTERNAL MEDICINE
Payer: COMMERCIAL

## 2023-09-11 ENCOUNTER — TELEPHONE (OUTPATIENT)
Dept: HEMATOLOGY/ONCOLOGY | Facility: CLINIC | Age: 43
End: 2023-09-11
Payer: COMMERCIAL

## 2023-09-11 ENCOUNTER — OFFICE VISIT (OUTPATIENT)
Dept: HEMATOLOGY/ONCOLOGY | Facility: CLINIC | Age: 43
End: 2023-09-11
Payer: COMMERCIAL

## 2023-09-11 VITALS
TEMPERATURE: 98 F | RESPIRATION RATE: 16 BRPM | DIASTOLIC BLOOD PRESSURE: 72 MMHG | HEIGHT: 63 IN | SYSTOLIC BLOOD PRESSURE: 116 MMHG | HEART RATE: 86 BPM | WEIGHT: 115.94 LBS | BODY MASS INDEX: 20.54 KG/M2 | OXYGEN SATURATION: 99 %

## 2023-09-11 DIAGNOSIS — Z15.09 BRCA GENE POSITIVE: ICD-10-CM

## 2023-09-11 DIAGNOSIS — C50.911 INVASIVE DUCTAL CARCINOMA OF BREAST, FEMALE, RIGHT: ICD-10-CM

## 2023-09-11 DIAGNOSIS — C50.511 MALIGNANT NEOPLASM OF LOWER-OUTER QUADRANT OF RIGHT BREAST OF FEMALE, ESTROGEN RECEPTOR NEGATIVE: Primary | ICD-10-CM

## 2023-09-11 DIAGNOSIS — Z00.6 EXAMINATION OF PARTICIPANT IN CLINICAL TRIAL: ICD-10-CM

## 2023-09-11 DIAGNOSIS — Z17.1 MALIGNANT NEOPLASM OF LOWER-OUTER QUADRANT OF RIGHT BREAST OF FEMALE, ESTROGEN RECEPTOR NEGATIVE: Primary | ICD-10-CM

## 2023-09-11 DIAGNOSIS — L27.0 DRUG RASH: ICD-10-CM

## 2023-09-11 DIAGNOSIS — R45.89 ANXIETY ABOUT HEALTH: ICD-10-CM

## 2023-09-11 DIAGNOSIS — T45.1X5A CINV (CHEMOTHERAPY-INDUCED NAUSEA AND VOMITING): ICD-10-CM

## 2023-09-11 DIAGNOSIS — R11.2 CINV (CHEMOTHERAPY-INDUCED NAUSEA AND VOMITING): ICD-10-CM

## 2023-09-11 DIAGNOSIS — Z15.01 BRCA GENE POSITIVE: ICD-10-CM

## 2023-09-11 DIAGNOSIS — Z17.1 MALIGNANT NEOPLASM OF LOWER-OUTER QUADRANT OF RIGHT BREAST OF FEMALE, ESTROGEN RECEPTOR NEGATIVE: ICD-10-CM

## 2023-09-11 DIAGNOSIS — D84.821 IMMUNOCOMPROMISED STATE DUE TO DRUG THERAPY: ICD-10-CM

## 2023-09-11 DIAGNOSIS — Z79.899 IMMUNOCOMPROMISED STATE DUE TO DRUG THERAPY: ICD-10-CM

## 2023-09-11 DIAGNOSIS — C50.511 MALIGNANT NEOPLASM OF LOWER-OUTER QUADRANT OF RIGHT BREAST OF FEMALE, ESTROGEN RECEPTOR NEGATIVE: ICD-10-CM

## 2023-09-11 LAB
ALBUMIN SERPL BCP-MCNC: 3.9 G/DL (ref 3.5–5.2)
ALP SERPL-CCNC: 41 U/L (ref 55–135)
ALT SERPL W/O P-5'-P-CCNC: 124 U/L (ref 10–44)
ANION GAP SERPL CALC-SCNC: 10 MMOL/L (ref 8–16)
AST SERPL-CCNC: 142 U/L (ref 10–40)
B-HCG UR QL: NEGATIVE
BASOPHILS # BLD AUTO: 0.05 K/UL (ref 0–0.2)
BASOPHILS NFR BLD: 1 % (ref 0–1.9)
BILIRUB SERPL-MCNC: 0.4 MG/DL (ref 0.1–1)
BUN SERPL-MCNC: 13 MG/DL (ref 6–20)
CALCIUM SERPL-MCNC: 9.2 MG/DL (ref 8.7–10.5)
CHLORIDE SERPL-SCNC: 104 MMOL/L (ref 95–110)
CO2 SERPL-SCNC: 26 MMOL/L (ref 23–29)
CREAT SERPL-MCNC: 0.6 MG/DL (ref 0.5–1.4)
DIFFERENTIAL METHOD: ABNORMAL
EOSINOPHIL # BLD AUTO: 0.1 K/UL (ref 0–0.5)
EOSINOPHIL NFR BLD: 1.9 % (ref 0–8)
ERYTHROCYTE [DISTWIDTH] IN BLOOD BY AUTOMATED COUNT: 13.3 % (ref 11.5–14.5)
EST. GFR  (NO RACE VARIABLE): >60 ML/MIN/1.73 M^2
GLUCOSE SERPL-MCNC: 94 MG/DL (ref 70–110)
HCT VFR BLD AUTO: 35.7 % (ref 37–48.5)
HGB BLD-MCNC: 11.6 G/DL (ref 12–16)
IMM GRANULOCYTES # BLD AUTO: 0.01 K/UL (ref 0–0.04)
IMM GRANULOCYTES NFR BLD AUTO: 0.2 % (ref 0–0.5)
LYMPHOCYTES # BLD AUTO: 1.8 K/UL (ref 1–4.8)
LYMPHOCYTES NFR BLD: 34.1 % (ref 18–48)
MCH RBC QN AUTO: 30.5 PG (ref 27–31)
MCHC RBC AUTO-ENTMCNC: 32.5 G/DL (ref 32–36)
MCV RBC AUTO: 94 FL (ref 82–98)
MONOCYTES # BLD AUTO: 0.3 K/UL (ref 0.3–1)
MONOCYTES NFR BLD: 5.2 % (ref 4–15)
NEUTROPHILS # BLD AUTO: 3 K/UL (ref 1.8–7.7)
NEUTROPHILS NFR BLD: 57.6 % (ref 38–73)
NRBC BLD-RTO: 0 /100 WBC
PLATELET # BLD AUTO: 401 K/UL (ref 150–450)
PMV BLD AUTO: 8.9 FL (ref 9.2–12.9)
POTASSIUM SERPL-SCNC: 4.3 MMOL/L (ref 3.5–5.1)
PROT SERPL-MCNC: 7.2 G/DL (ref 6–8.4)
RBC # BLD AUTO: 3.8 M/UL (ref 4–5.4)
SODIUM SERPL-SCNC: 140 MMOL/L (ref 136–145)
TSH SERPL DL<=0.005 MIU/L-ACNC: 0.41 UIU/ML (ref 0.4–4)
WBC # BLD AUTO: 5.16 K/UL (ref 3.9–12.7)

## 2023-09-11 PROCEDURE — 80053 COMPREHEN METABOLIC PANEL: CPT | Mod: PN | Performed by: INTERNAL MEDICINE

## 2023-09-11 PROCEDURE — 3074F PR MOST RECENT SYSTOLIC BLOOD PRESSURE < 130 MM HG: ICD-10-PCS | Mod: CPTII,S$GLB,, | Performed by: INTERNAL MEDICINE

## 2023-09-11 PROCEDURE — 84443 ASSAY THYROID STIM HORMONE: CPT | Performed by: INTERNAL MEDICINE

## 2023-09-11 PROCEDURE — 81025 URINE PREGNANCY TEST: CPT | Mod: PN | Performed by: INTERNAL MEDICINE

## 2023-09-11 PROCEDURE — 3078F PR MOST RECENT DIASTOLIC BLOOD PRESSURE < 80 MM HG: ICD-10-PCS | Mod: CPTII,S$GLB,, | Performed by: INTERNAL MEDICINE

## 2023-09-11 PROCEDURE — 85025 COMPLETE CBC W/AUTO DIFF WBC: CPT | Mod: PN | Performed by: INTERNAL MEDICINE

## 2023-09-11 PROCEDURE — 1159F PR MEDICATION LIST DOCUMENTED IN MEDICAL RECORD: ICD-10-PCS | Mod: CPTII,S$GLB,, | Performed by: INTERNAL MEDICINE

## 2023-09-11 PROCEDURE — 99999 PR PBB SHADOW E&M-EST. PATIENT-LVL IV: ICD-10-PCS | Mod: PBBFAC,,, | Performed by: INTERNAL MEDICINE

## 2023-09-11 PROCEDURE — 3008F BODY MASS INDEX DOCD: CPT | Mod: CPTII,S$GLB,, | Performed by: INTERNAL MEDICINE

## 2023-09-11 PROCEDURE — 3074F SYST BP LT 130 MM HG: CPT | Mod: CPTII,S$GLB,, | Performed by: INTERNAL MEDICINE

## 2023-09-11 PROCEDURE — 36415 COLL VENOUS BLD VENIPUNCTURE: CPT | Mod: PN | Performed by: INTERNAL MEDICINE

## 2023-09-11 PROCEDURE — 3078F DIAST BP <80 MM HG: CPT | Mod: CPTII,S$GLB,, | Performed by: INTERNAL MEDICINE

## 2023-09-11 PROCEDURE — 3008F PR BODY MASS INDEX (BMI) DOCUMENTED: ICD-10-PCS | Mod: CPTII,S$GLB,, | Performed by: INTERNAL MEDICINE

## 2023-09-11 PROCEDURE — 1159F MED LIST DOCD IN RCRD: CPT | Mod: CPTII,S$GLB,, | Performed by: INTERNAL MEDICINE

## 2023-09-11 PROCEDURE — 99215 OFFICE O/P EST HI 40 MIN: CPT | Mod: S$GLB,,, | Performed by: INTERNAL MEDICINE

## 2023-09-11 PROCEDURE — 99215 PR OFFICE/OUTPT VISIT, EST, LEVL V, 40-54 MIN: ICD-10-PCS | Mod: S$GLB,,, | Performed by: INTERNAL MEDICINE

## 2023-09-11 PROCEDURE — 99999 PR PBB SHADOW E&M-EST. PATIENT-LVL IV: CPT | Mod: PBBFAC,,, | Performed by: INTERNAL MEDICINE

## 2023-09-11 RX ORDER — HYDROCORTISONE 10 MG/G
CREAM TOPICAL
Qty: 28.4 G | Refills: 1 | Status: SHIPPED | OUTPATIENT
Start: 2023-09-11 | End: 2023-11-30

## 2023-09-11 NOTE — PROGRESS NOTES
Protocol: [id number], Disparities in Results of Immune Checkpoint Inhibitor Treatment (DIRECT): A Prospective Cohort Study of Cancer Survivors Treated with anti-PD-L1 Immunotherapy in a Community Oncology Setting  IRB# 2022.126  Version Date: 1/11/2022  Treating MD: Dr. Mcgovern   Study ID# [id number]   9/11/2023     Assessment #2    This CRC met the patient in the 1st floor laboratory of the [facility]. The patient presented A&O, without noted distress, and with appropriate mood and affect to the situation.The patient continues to freely agree with participation in the referenced study and denied taking any oral or IV antibiotics, antifungals agents, or steroids since his last infusion. The patient denied having any new or worsening symptoms. The patient reported that she completed all A2 QOLs via online. A2 saliva collection tube was given to the patient to complete and was picked up completed before the patient's MD appointment. Blood specimens were then collected by the phlebotomist using two patient identifiers with CRC present. Blood samples were then processed and placed in the -20 freezer.    The patient's treating physician, Dr. Mcgovern, was out of office this week. Therefore, the patient saw another oncologist, Dr. Sexton. Dr. Sexton is aware of the trial and the assessments as part of it.    9/13/23  The patient returned to clinic to see treating physician again before planned Cycle 2 day 1 infusion. The irAEs found between the patient's 9/11/23 and 9/13/23 MD visits are reported below.     Toxicities attributable to last infusion of Keytruda :    Grade 1 Rash (9/3/23 - 9/8/23): treating physician deemed definitely attributable to immunotherapy. Hydrocortisone cream prescribed by treating physician.   Grade 1 ALT increased (9/11/23 - ongoing) - treating physician deemed probably attributable to immunotherapy.   Grade 2 AST increased (9/11/23- 9/13/23) - treating physician deemed probably attributable  to immunotherapy.  Grade 1 AST increased (9/13/23 - ongoing) - treating physician deemed probably attributable to immunotherapy.     Due to the Grade 2 AST increased and Grade 1 ALT increased, the treating MD opted to hold Cycle 2 Keytruda.                  Clinical Record Information:  Between the last study visit, the patient has not been diagnosed with any of the following autoimmune diseases or conditions:          The patient denied having any questions for this CRC. Encouraged the patient to call with any questions or concerns. Next infusion of Keytruda planned for 10/5/23. MAR will be uploaded into [employer].    Next set of [id number] questionnaires and labs due:              -  February 24th, 2024 (6 months after initiation of immunotherapy)     Tamia Herrera, CRC

## 2023-09-11 NOTE — TELEPHONE ENCOUNTER
Spoke with the patient to schedule her new acup appt from referral. Was approved for 12 visits. Location and appt details were reviewed and patient accepted appt time.

## 2023-09-11 NOTE — PROGRESS NOTES
"                                        Name: Arabella Catalan  MRN:  [id number]  :  1980 Age 43 y.o.  Date of Service: 2023    Reason for visit:  Arabella Catalan is a 43 y.o. female here regarding...    #Triple Negative Right Sided Breast Cancer   Date of Original Diagnosis: 23  Original Stage: [id number] Stage 2B grade 3 Triple neg   Current Sites of Disease: right breast   Current Goals of Therapy: curative   Current Therapy: Keynote 522 Neoadjuvant chemotherapy    Oncologic History/History of Present Illness:   Per Dr. Rivas Note:   As previously documented she started screening mammograms for a few years after her ovarian cancer , in her late 20's. Routine pelvic exam revealed an ovarian cancer, s/p right oophorectomy for dysgerminoma.    Resumed routine mammograms at 42, had a normal screening mammogram in May 2023 but felt a lump in the right breast in July. Further imaging showed 2 lesions, both > 2 cm , both biopsied to be G3 IDC, ER/[place] and Her 2 jose eduardo negative.   Menarche at age 14 year old. . Age at first live birth 31. Did  breast feed 2 year and 6 months . LMP  . OCP-yes 20 years ago  Menopause at none. HRT-none      Genetics completed- + BRCA1     Family history cancer:  Mother breast cancer at 38 and cervical cancer  Smoker Pk/yr quit date , smoked around 0.5 pack a day      Interim history:  Rash improved, tolerating chemotherapy well, minor SE thus far       PHYSICAL EXAMINATION:  /72   Pulse 86   Temp 97.8 °F (36.6 °C) (Temporal)   Resp 16   Ht 5' 3" (1.6 m)   Wt 52.6 kg (115 lb 15.4 oz)   LMP 2023 (Exact Date)   SpO2 99%   BMI 20.54 kg/m²   Wt Readings from Last 3 Encounters:   23 52.6 kg (115 lb 15.4 oz)   23 53.8 kg (118 lb 9.7 oz)   23 53.8 kg (118 lb 9.7 oz)     ECOG PERFORMANCE STATUS: 0  Physical Exam   General:  Well-appearing, nontoxic  Eyes:  Equal and round pupils, EOMI, no scleral icterus  Mouth:  No " lesions, moist  Cardiovascular:  Warm, well-perfused, no peripheral edema  Lungs:  Unlabored on room air, no wheezing  Neurologic:  Awake, alert and oriented, participating in the exam  Psych:  Appropriate mood and affect  Skin:  Normal pallor, + rashes--lacy rash involving the upper trunk, mainly bilateral breast and chest  Heme:  No petechiae, no purpura  Breast: no palpable breast masses in either breast, no axillary LAD appreciated     LABORATORY:  CBC  Lab Results   Component Value Date    WBC 5.16 09/11/2023    HGB 11.6 (L) 09/11/2023    HCT 35.7 (L) 09/11/2023    MCV 94 09/11/2023     09/11/2023         BMP  Lab Results   Component Value Date     09/11/2023    K 4.3 09/11/2023     09/11/2023    CO2 26 09/11/2023    BUN 13 09/11/2023    CREATININE 0.6 09/11/2023    CALCIUM 9.2 09/11/2023    ANIONGAP 10 09/11/2023    ESTGFRAFRICA >60 01/04/2010    EGFRNONAA >60 01/04/2010         PATHOLOGY:        RADIOLOGY:  MRI Breast  Right  There is a 2.7 cm x 2.2 cm x 2 cm irregularly shaped, homogeneous mass with angular margins seen in the right breast at 8 o'clock, 7 cm from the nipple, 1.3 cm from the skin, and 0.8 cm from the chest wall. Kinetics initial phase is fast. Delayed phase is washout.      There is a 2 cm x 1.6 cm x 1.6 cm irregularly shaped, homogeneous mass with angular margins seen in the right breast at 7 o'clock, 3 cm from the nipple, 1.1 cm from the skin, and 2.3 cm from the chest wall. Kinetics initial phase is fast. Delayed phase is washout.       ASSESSMENT AND PLAN:  Arabella Catalan is a 43 y.o. female with...    #Triple Negative Right Sided Breast Cancer   Date of Original Diagnosis: 7/26/23  Original Stage: [id number] Stage 2B grade 3 Triple neg   Current Sites of Disease: right breast   Current Goals of Therapy: curative   Current Therapy: Keynote 522 Neoadjuvant chemotherapy    We discussed the more agressive nature and multifocal nature of her TNBC. Clinically node  negative. Based on size, she is a candidate for neoadjuvant chemo immunotherapy based on Keynote-522 to prevent systemic spread and to monitor response to therapy and that 64% of the patients on trial had a pCR.      She is agreeable,echo is normal, no evidence for distant metastatic disease noted. Cleared to receive C1D15 today, will monitor weekly.   After completing neoadjuvant therapy, she would be referred back for surgery.   She has also been enrolled in the Protocol [id number], Disparities in Results of Immune Checkpoint Inhibitor Treatment (DIRECT): A Prospective Cohort Study of Cancer Survivors Treated with anti-PD-L1 Immunotherapy in a Community Oncology Setting.     Clinically is having a great response neoadjuvant chemotherapy, prior palpable masses are no longer palpable    # immunocompromised due to drug see chemo treatment above, monitoring for infection    #IO induced rash- grade 1 involving 30% the body after C1D1,, no sloughing or breakdown, no pupules or pustules, +puritis, RX for hydrocortisone cream, counseled about emollients, nearly resolved one week later    #BRCA1 + - found as part of genetic work up- counseled her about breast and ovarian risks, advised her to get 1 remaining ovary removed.  She plans to get a double mastectomy.  She plans to meet with Genetics for further counseling.    #CINV- well controlled, continue monitoring      #anxiety about health- referral sent to onc psych, good mood today     Sole Sexton M.D.  Hematology/Oncology   Route Chart for Scheduling    Med Onc Chart Routing      Follow up with physician . Follow up with Dr. Mcgovern in 4 weeks   Follow up with SAUL . As scheduled   Infusion scheduling note    Injection scheduling note    Labs   Scheduling:  Preferred lab:  Lab interval:  Standing labs with chemo   Imaging    Pharmacy appointment    Other referrals            Treatment Plan Information   OP PEMBROLIZUMAB WITH WEEKLY PACLITAXEL CARBOPLATIN (AUC 1.5)  FOLLOWED BY PEMBROLIZUMAB DOXORUBICIN CYCLOPHOSPHAMIDE FOLLOWED BY PEMBROLIZUMAB 200MG Q3W   Corina Mcgovern MD   Upcoming Treatment Dates - OP PEMBROLIZUMAB WITH WEEKLY PACLITAXEL CARBOPLATIN (AUC 1.5) FOLLOWED BY PEMBROLIZUMAB DOXORUBICIN CYCLOPHOSPHAMIDE FOLLOWED BY PEMBROLIZUMAB 200MG Q3W    9/14/2023       Pre-Medications       diphenhydrAMINE (BENADRYL) 50 mg in NS 50 mL IVPB       famotidine (PF) injection 20 mg       Chemotherapy       PACLitaxeL (TAXOL) 80 mg/m2 = 126 mg in sodium chloride 0.9% 250 mL chemo infusion       CARBOplatin (PARAPLATIN) 175 mg in sodium chloride 0.9% 267.5 mL chemo infusion       Antiemetics       palonosetron 0.25mg/dexAMETHasone 12mg in NS IVPB 0.25 mg 50 mL       LORazepam (ATIVAN) injection 0.5 mg       aprepitant (CINVANTI) injection 130 mg       Immunotherapy       pembrolizumab (KEYTRUDA) 200 mg in sodium chloride 0.9% SolP 108 mL infusion  9/21/2023       Pre-Medications       diphenhydrAMINE (BENADRYL) 50 mg in NS 50 mL IVPB       famotidine (PF) injection 20 mg       Chemotherapy       PACLitaxeL (TAXOL) 80 mg/m2 = 126 mg in sodium chloride 0.9% 250 mL chemo infusion       CARBOplatin (PARAPLATIN) 175 mg in sodium chloride 0.9% 267.5 mL chemo infusion       Antiemetics       LORazepam (ATIVAN) injection 0.5 mg       palonosetron 0.25mg/dexAMETHasone 12mg in NS IVPB 0.25 mg 50 mL  9/28/2023       Pre-Medications       diphenhydrAMINE (BENADRYL) 50 mg in NS 50 mL IVPB       famotidine (PF) injection 20 mg       Chemotherapy       PACLitaxeL (TAXOL) 80 mg/m2 = 126 mg in sodium chloride 0.9% 250 mL chemo infusion       CARBOplatin (PARAPLATIN) 175 mg in sodium chloride 0.9% 267.5 mL chemo infusion       Antiemetics       LORazepam (ATIVAN) injection 0.5 mg       palonosetron 0.25mg/dexAMETHasone 12mg in NS IVPB 0.25 mg 50 mL  10/5/2023       Pre-Medications       diphenhydrAMINE (BENADRYL) 50 mg in NS 50 mL IVPB       famotidine (PF) injection 20 mg       Chemotherapy        PACLitaxeL (TAXOL) 80 mg/m2 = 126 mg in sodium chloride 0.9% 250 mL chemo infusion       CARBOplatin (PARAPLATIN) 175 mg in sodium chloride 0.9% 267.5 mL chemo infusion       Antiemetics       aprepitant (CINVANTI) injection 130 mg       LORazepam (ATIVAN) injection 0.5 mg       palonosetron 0.25mg/dexAMETHasone 12mg in NS IVPB 0.25 mg 50 mL       Immunotherapy       pembrolizumab (KEYTRUDA) 200 mg in sodium chloride 0.9% SolP 108 mL infusion

## 2023-09-13 ENCOUNTER — OFFICE VISIT (OUTPATIENT)
Dept: HEMATOLOGY/ONCOLOGY | Facility: CLINIC | Age: 43
End: 2023-09-13
Payer: COMMERCIAL

## 2023-09-13 ENCOUNTER — LAB VISIT (OUTPATIENT)
Dept: LAB | Facility: HOSPITAL | Age: 43
End: 2023-09-13
Attending: INTERNAL MEDICINE
Payer: COMMERCIAL

## 2023-09-13 ENCOUNTER — PATIENT MESSAGE (OUTPATIENT)
Dept: ADMINISTRATIVE | Facility: OTHER | Age: 43
End: 2023-09-13
Payer: COMMERCIAL

## 2023-09-13 ENCOUNTER — CLINICAL SUPPORT (OUTPATIENT)
Dept: REHABILITATION | Facility: HOSPITAL | Age: 43
End: 2023-09-13
Payer: COMMERCIAL

## 2023-09-13 VITALS
BODY MASS INDEX: 20.43 KG/M2 | HEIGHT: 63 IN | SYSTOLIC BLOOD PRESSURE: 98 MMHG | DIASTOLIC BLOOD PRESSURE: 72 MMHG | HEART RATE: 90 BPM | WEIGHT: 115.31 LBS | OXYGEN SATURATION: 9 % | RESPIRATION RATE: 16 BRPM | TEMPERATURE: 98 F

## 2023-09-13 DIAGNOSIS — L27.0 DRUG-INDUCED SKIN RASH: ICD-10-CM

## 2023-09-13 DIAGNOSIS — Z17.1 MALIGNANT NEOPLASM OF LOWER-OUTER QUADRANT OF RIGHT BREAST OF FEMALE, ESTROGEN RECEPTOR NEGATIVE: ICD-10-CM

## 2023-09-13 DIAGNOSIS — D84.821 IMMUNOCOMPROMISED STATE DUE TO DRUG THERAPY: ICD-10-CM

## 2023-09-13 DIAGNOSIS — K71.6 DRUG-INDUCED HEPATITIS: Primary | ICD-10-CM

## 2023-09-13 DIAGNOSIS — T45.1X5A CINV (CHEMOTHERAPY-INDUCED NAUSEA AND VOMITING): ICD-10-CM

## 2023-09-13 DIAGNOSIS — C50.511 MALIGNANT NEOPLASM OF LOWER-OUTER QUADRANT OF RIGHT BREAST OF FEMALE, ESTROGEN RECEPTOR NEGATIVE: ICD-10-CM

## 2023-09-13 DIAGNOSIS — G89.4 CHRONIC PAIN SYNDROME: Primary | ICD-10-CM

## 2023-09-13 DIAGNOSIS — T50.905A DRUG-INDUCED HEPATITIS: ICD-10-CM

## 2023-09-13 DIAGNOSIS — R11.2 CINV (CHEMOTHERAPY-INDUCED NAUSEA AND VOMITING): ICD-10-CM

## 2023-09-13 DIAGNOSIS — R45.89 ANXIETY ABOUT HEALTH: ICD-10-CM

## 2023-09-13 DIAGNOSIS — C50.911 INVASIVE DUCTAL CARCINOMA OF BREAST, FEMALE, RIGHT: ICD-10-CM

## 2023-09-13 DIAGNOSIS — K71.6 DRUG-INDUCED HEPATITIS: ICD-10-CM

## 2023-09-13 DIAGNOSIS — T50.905A DRUG-INDUCED HEPATITIS: Primary | ICD-10-CM

## 2023-09-13 DIAGNOSIS — Z79.899 IMMUNOCOMPROMISED STATE DUE TO DRUG THERAPY: ICD-10-CM

## 2023-09-13 LAB
ALBUMIN SERPL BCP-MCNC: 4.2 G/DL (ref 3.5–5.2)
ALP SERPL-CCNC: 45 U/L (ref 55–135)
ALT SERPL W/O P-5'-P-CCNC: 122 U/L (ref 10–44)
ANION GAP SERPL CALC-SCNC: 10 MMOL/L (ref 8–16)
AST SERPL-CCNC: 90 U/L (ref 10–40)
BILIRUB SERPL-MCNC: 0.3 MG/DL (ref 0.1–1)
BUN SERPL-MCNC: 13 MG/DL (ref 6–20)
CALCIUM SERPL-MCNC: 9.4 MG/DL (ref 8.7–10.5)
CHLORIDE SERPL-SCNC: 105 MMOL/L (ref 95–110)
CO2 SERPL-SCNC: 25 MMOL/L (ref 23–29)
CREAT SERPL-MCNC: 0.6 MG/DL (ref 0.5–1.4)
EST. GFR  (NO RACE VARIABLE): >60 ML/MIN/1.73 M^2
GLUCOSE SERPL-MCNC: 84 MG/DL (ref 70–110)
POTASSIUM SERPL-SCNC: 4.3 MMOL/L (ref 3.5–5.1)
PROT SERPL-MCNC: 7.4 G/DL (ref 6–8.4)
SODIUM SERPL-SCNC: 140 MMOL/L (ref 136–145)

## 2023-09-13 PROCEDURE — 99213 OFFICE O/P EST LOW 20 MIN: CPT | Mod: S$GLB,,, | Performed by: INTERNAL MEDICINE

## 2023-09-13 PROCEDURE — 36415 COLL VENOUS BLD VENIPUNCTURE: CPT | Mod: PN | Performed by: INTERNAL MEDICINE

## 2023-09-13 PROCEDURE — 80053 COMPREHEN METABOLIC PANEL: CPT | Mod: PO | Performed by: INTERNAL MEDICINE

## 2023-09-13 PROCEDURE — 3078F DIAST BP <80 MM HG: CPT | Mod: CPTII,S$GLB,, | Performed by: INTERNAL MEDICINE

## 2023-09-13 PROCEDURE — 99213 PR OFFICE/OUTPT VISIT, EST, LEVL III, 20-29 MIN: ICD-10-PCS | Mod: S$GLB,,, | Performed by: INTERNAL MEDICINE

## 2023-09-13 PROCEDURE — 3074F PR MOST RECENT SYSTOLIC BLOOD PRESSURE < 130 MM HG: ICD-10-PCS | Mod: CPTII,S$GLB,, | Performed by: INTERNAL MEDICINE

## 2023-09-13 PROCEDURE — 3008F PR BODY MASS INDEX (BMI) DOCUMENTED: ICD-10-PCS | Mod: CPTII,S$GLB,, | Performed by: INTERNAL MEDICINE

## 2023-09-13 PROCEDURE — 3078F PR MOST RECENT DIASTOLIC BLOOD PRESSURE < 80 MM HG: ICD-10-PCS | Mod: CPTII,S$GLB,, | Performed by: INTERNAL MEDICINE

## 2023-09-13 PROCEDURE — 99999 PR PBB SHADOW E&M-EST. PATIENT-LVL IV: ICD-10-PCS | Mod: PBBFAC,,, | Performed by: INTERNAL MEDICINE

## 2023-09-13 PROCEDURE — 3074F SYST BP LT 130 MM HG: CPT | Mod: CPTII,S$GLB,, | Performed by: INTERNAL MEDICINE

## 2023-09-13 PROCEDURE — 3008F BODY MASS INDEX DOCD: CPT | Mod: CPTII,S$GLB,, | Performed by: INTERNAL MEDICINE

## 2023-09-13 PROCEDURE — 1159F MED LIST DOCD IN RCRD: CPT | Mod: CPTII,S$GLB,, | Performed by: INTERNAL MEDICINE

## 2023-09-13 PROCEDURE — 1159F PR MEDICATION LIST DOCUMENTED IN MEDICAL RECORD: ICD-10-PCS | Mod: CPTII,S$GLB,, | Performed by: INTERNAL MEDICINE

## 2023-09-13 PROCEDURE — 99999 PR PBB SHADOW E&M-EST. PATIENT-LVL IV: CPT | Mod: PBBFAC,,, | Performed by: INTERNAL MEDICINE

## 2023-09-13 PROCEDURE — 97813 ACUP 1/> W/ESTIM 1ST 15 MIN: CPT | Mod: PN | Performed by: ACUPUNCTURIST

## 2023-09-13 PROCEDURE — 97814 ACUP 1/> W/ESTIM EA ADDL 15: CPT | Mod: PN | Performed by: ACUPUNCTURIST

## 2023-09-13 RX ORDER — EPINEPHRINE 0.3 MG/.3ML
0.3 INJECTION SUBCUTANEOUS ONCE AS NEEDED
Status: CANCELLED | OUTPATIENT
Start: 2023-09-28

## 2023-09-13 RX ORDER — FAMOTIDINE 10 MG/ML
20 INJECTION INTRAVENOUS
Status: CANCELLED | OUTPATIENT
Start: 2023-09-28

## 2023-09-13 RX ORDER — FAMOTIDINE 10 MG/ML
20 INJECTION INTRAVENOUS
Status: CANCELLED | OUTPATIENT
Start: 2023-09-21

## 2023-09-13 RX ORDER — DIPHENHYDRAMINE HYDROCHLORIDE 50 MG/ML
50 INJECTION INTRAMUSCULAR; INTRAVENOUS ONCE AS NEEDED
Status: CANCELLED | OUTPATIENT
Start: 2023-09-14

## 2023-09-13 RX ORDER — DIPHENHYDRAMINE HYDROCHLORIDE 50 MG/ML
50 INJECTION INTRAMUSCULAR; INTRAVENOUS ONCE AS NEEDED
Status: CANCELLED | OUTPATIENT
Start: 2023-09-28

## 2023-09-13 RX ORDER — SODIUM CHLORIDE 0.9 % (FLUSH) 0.9 %
10 SYRINGE (ML) INJECTION
Status: CANCELLED | OUTPATIENT
Start: 2023-09-14

## 2023-09-13 RX ORDER — DIPHENHYDRAMINE HYDROCHLORIDE 50 MG/ML
50 INJECTION INTRAMUSCULAR; INTRAVENOUS ONCE AS NEEDED
Status: CANCELLED | OUTPATIENT
Start: 2023-09-21

## 2023-09-13 RX ORDER — FAMOTIDINE 10 MG/ML
20 INJECTION INTRAVENOUS
Status: CANCELLED | OUTPATIENT
Start: 2023-09-14

## 2023-09-13 RX ORDER — PREDNISONE 50 MG/1
50 TABLET ORAL DAILY
Qty: 6 TABLET | Refills: 0 | Status: SHIPPED | OUTPATIENT
Start: 2023-09-13 | End: 2023-09-20 | Stop reason: ALTCHOICE

## 2023-09-13 RX ORDER — PROCHLORPERAZINE EDISYLATE 5 MG/ML
10 INJECTION INTRAMUSCULAR; INTRAVENOUS ONCE AS NEEDED
Status: CANCELLED
Start: 2023-09-21

## 2023-09-13 RX ORDER — HEPARIN 100 UNIT/ML
500 SYRINGE INTRAVENOUS
Status: CANCELLED | OUTPATIENT
Start: 2023-09-21

## 2023-09-13 RX ORDER — PROCHLORPERAZINE EDISYLATE 5 MG/ML
10 INJECTION INTRAMUSCULAR; INTRAVENOUS ONCE AS NEEDED
Status: CANCELLED
Start: 2023-09-14

## 2023-09-13 RX ORDER — SODIUM CHLORIDE 0.9 % (FLUSH) 0.9 %
10 SYRINGE (ML) INJECTION
Status: CANCELLED | OUTPATIENT
Start: 2023-09-28

## 2023-09-13 RX ORDER — EPINEPHRINE 0.3 MG/.3ML
0.3 INJECTION SUBCUTANEOUS ONCE AS NEEDED
Status: CANCELLED | OUTPATIENT
Start: 2023-09-14

## 2023-09-13 RX ORDER — HEPARIN 100 UNIT/ML
500 SYRINGE INTRAVENOUS
Status: CANCELLED | OUTPATIENT
Start: 2023-09-14

## 2023-09-13 RX ORDER — PROCHLORPERAZINE EDISYLATE 5 MG/ML
10 INJECTION INTRAMUSCULAR; INTRAVENOUS ONCE AS NEEDED
Status: CANCELLED
Start: 2023-09-28

## 2023-09-13 RX ORDER — SODIUM CHLORIDE 0.9 % (FLUSH) 0.9 %
10 SYRINGE (ML) INJECTION
Status: CANCELLED | OUTPATIENT
Start: 2023-09-21

## 2023-09-13 RX ORDER — HEPARIN 100 UNIT/ML
500 SYRINGE INTRAVENOUS
Status: CANCELLED | OUTPATIENT
Start: 2023-09-28

## 2023-09-13 RX ORDER — EPINEPHRINE 0.3 MG/.3ML
0.3 INJECTION SUBCUTANEOUS ONCE AS NEEDED
Status: CANCELLED | OUTPATIENT
Start: 2023-09-21

## 2023-09-13 NOTE — PROGRESS NOTES
"  Acupuncture Evaluation Note     Name: Arabella Koroma [facility] Number: [id number]    Traditional Chinese Medicine (TCM) Diagnosis: Qi Stagnation, Blood Stasis, Qi Deficiency, Blood Deficiency, and Damp  Medical Diagnosis:   Encounter Diagnoses   Name Primary?    Anxiety about health     Malignant neoplasm of lower-outer quadrant of right breast of female, estrogen receptor negative     Chronic pain syndrome Yes        Evaluation Date: 9/13/2023    Visit #/Visits authorized:     Precautions: Standard    Subjective     Chief Concern: Low-back Pain (Patient reports pain today is 5/10 bilaterally across the psis with referral to trochanters bilaterally, since chemo treatment./), Leg Pain (Patient reports leg and hip joint pain at 7/10, since first chemo treatment  ), and Knee Pain (Patient reports bilateral knee pain at 7/10, which also began after first infusion.)        Medical necessity is demonstrated by the following IMPAIRMENTS: Medical Necessity: Decreased mobility limits day to day activities, social, and emergent situations              Aggravating Factors:  movement   Relieving Factors:  rest    Symptom Description:     Quality:  Aching, Dull, and Deep  Severity:  5-7/10  Frequency:  every day    Previous Treatments Tried:  Medication      Digestion:     Diet: in general, a "healthy" diet     Fluids:  moderate amount of water  Taste: no change  Appetite: no change       Sleep: 5/10 insomnia    Energy Levels:  5/10 fatigue    Psychological Symptoms:  3/10 anxiety    Other Symptoms: 7/10 leg muscle and joint pain    GYN Symptoms: 4/10 hot flashes    Objective     Observation: Patient is in good spirits.  She is concerned about the deep bone pain she is experiencing since she began chemotherapy.  She also reported about bilateral hip and leg muscle pain A/P.    Pulse:        slippery       New Findings:  na    Treatment     Treatment Principles:  move qi and blood, nourish yin, resolve dampness and " relieve bi     Acupuncture points used:  4 GONZALEZ, Gb20, and Gb21  and Du20   Bilateral points: BL 23-25, GB 29-30  Unilateral points:  Auricular Treatment:  perez men    Needles In: 22  Needles Out: 22  Needles W/ STIM placed: 1005  Needles W/ STIM removed: 1025      Other Traditional Chinese Medicine Modalities -  na    Assessment     After treatment, patient felt less hip and leg pain.  Less neck and headache pain.     Patient prognosis is Good.     Patient will continue to benefit from acupuncture treatment to address the deficits listed in the problem list box on initial evaluation, provide patient family education and to maximize pt's level of independence in the home and community environment.     Patient's spiritual, cultural and educational needs considered and pt agreeable to plan of care and goals.     Anticipated barriers to treatment: none    Plan     Recommend 1 /week for 12 sessions then re-assess.      Education:  Patient is aware of cumulative benefit of acupuncture

## 2023-09-13 NOTE — PROGRESS NOTES
Name: Arabella Catalan  MRN:  [id number]  :  1980 Age 43 y.o.  Date of Service: 2023    Reason for visit:  Arabella Catalan is a 43 y.o. female here regarding...    #Triple Negative Right Sided Breast Cancer   Date of Original Diagnosis: 23  Original Stage: [id number] Stage 2B grade 3 Triple neg   Current Sites of Disease: right breast   Current Goals of Therapy: curative   Current Therapy: Keynote 522 Neoadjuvant chemotherapy    Oncologic History/History of Present Illness:   Per Dr. Rivas Note:   As previously documented she started screening mammograms for a few years after her ovarian cancer , in her late 20's. Routine pelvic exam revealed an ovarian cancer, s/p right oophorectomy for dysgerminoma.    Resumed routine mammograms at 42, had a normal screening mammogram in May 2023 but felt a lump in the right breast in July. Further imaging showed 2 lesions, both > 2 cm , both biopsied to be G3 IDC, ER/[place] and Her 2 jose eduardo negative.   Menarche at age 14 year old. . Age at first live birth 31. Did  breast feed 2 year and 6 months . LMP  . OCP-yes 20 years ago  Menopause at none. HRT-none      Genetics completed- + BRCA1     Family history cancer:  Mother breast cancer at 38 and cervical cancer  Smoker Pk/yr quit date , smoked around 0.5 pack a day      Interim history:  Rash improved, tolerating chemotherapy well, minor SE thus far       PHYSICAL EXAMINATION:  LMP 2023 (Exact Date)   Wt Readings from Last 3 Encounters:   23 52.6 kg (115 lb 15.4 oz)   23 53.8 kg (118 lb 9.7 oz)   23 53.8 kg (118 lb 9.7 oz)     ECOG PERFORMANCE STATUS: 0  Physical Exam   General:  Well-appearing, nontoxic  Eyes:  Equal and round pupils, EOMI, no scleral icterus  Mouth:  No lesions, moist  Cardiovascular:  Warm, well-perfused, no peripheral edema  Lungs:  Unlabored on room air, no wheezing  Neurologic:  Awake, alert and oriented,  participating in the exam  Psych:  Appropriate mood and affect  Skin:  Normal pallor, + rashes--lacy rash involving the upper trunk, mainly bilateral breast and chest  Heme:  No petechiae, no purpura  Breast: no palpable breast masses in either breast, no axillary LAD appreciated     LABORATORY:  CBC  Lab Results   Component Value Date    WBC 5.16 09/11/2023    HGB 11.6 (L) 09/11/2023    HCT 35.7 (L) 09/11/2023    MCV 94 09/11/2023     09/11/2023         BMP  Lab Results   Component Value Date     09/11/2023    K 4.3 09/11/2023     09/11/2023    CO2 26 09/11/2023    BUN 13 09/11/2023    CREATININE 0.6 09/11/2023    CALCIUM 9.2 09/11/2023    ANIONGAP 10 09/11/2023    ESTGFRAFRICA >60 01/04/2010    EGFRNONAA >60 01/04/2010         PATHOLOGY:        RADIOLOGY:  MRI Breast  Right  There is a 2.7 cm x 2.2 cm x 2 cm irregularly shaped, homogeneous mass with angular margins seen in the right breast at 8 o'clock, 7 cm from the nipple, 1.3 cm from the skin, and 0.8 cm from the chest wall. Kinetics initial phase is fast. Delayed phase is washout.      There is a 2 cm x 1.6 cm x 1.6 cm irregularly shaped, homogeneous mass with angular margins seen in the right breast at 7 o'clock, 3 cm from the nipple, 1.1 cm from the skin, and 2.3 cm from the chest wall. Kinetics initial phase is fast. Delayed phase is washout.       ASSESSMENT AND PLAN:  Arabella Catalan is a 43 y.o. female with...    #Triple Negative Right Sided Breast Cancer   Date of Original Diagnosis: 7/26/23  Original Stage: [id number] Stage 2B grade 3 Triple neg   Current Sites of Disease: right breast   Current Goals of Therapy: curative   Current Therapy: Keynote 522 Neoadjuvant chemotherapy    We discussed the more agressive nature and multifocal nature of her TNBC. Clinically node negative. Based on size, she is a candidate for neoadjuvant chemo immunotherapy based on Keynote-522 to prevent systemic spread and to monitor response to therapy  and that 64% of the patients on trial had a pCR.      She is agreeable,echo is normal, no evidence for distant metastatic disease noted. Cleared to receive C1D15 today, will monitor weekly.   After completing neoadjuvant therapy, she would be referred back for surgery.   She has also been enrolled in the Protocol [id number], Disparities in Results of Immune Checkpoint Inhibitor Treatment (DIRECT): A Prospective Cohort Study of Cancer Survivors Treated with anti-PD-L1 Immunotherapy in a Community Oncology Setting.     Clinically is having a great response neoadjuvant chemotherapy, prior palpable masses are no longer palpable    # immunocompromised due to drug see chemo treatment above, monitoring for infection    #IO induced rash- grade 1 involving 30% the body after C1D1,, no sloughing or breakdown, no pupules or pustules, +puritis, RX for hydrocortisone cream, counseled about emollients, nearly resolved one week later  #IO induced hepatitis- grade 2, improving without steroids but still elevated, holding pembro for cycle 2, 6 days of steroids with full meal and pepcid, recheck next week.     #BRCA1 + - found as part of genetic work up- counseled her about breast and ovarian risks, advised her to get 1 remaining ovary removed.  She plans to get a double mastectomy.  She plans to meet with Genetics for further counseling.    #CINV- well controlled, continue monitoring      #anxiety about health- referral sent to onc psych, good mood today     Sole Sexton M.D.  Hematology/Oncology   Route Chart for Scheduling  Med Onc Route Chart for Scheduling    Treatment Plan Information   OP PEMBROLIZUMAB WITH WEEKLY PACLITAXEL CARBOPLATIN (AUC 1.5) FOLLOWED BY PEMBROLIZUMAB DOXORUBICIN CYCLOPHOSPHAMIDE FOLLOWED BY PEMBROLIZUMAB 200MG Q3W   Corina Mcgovern MD   Upcoming Treatment Dates - OP PEMBROLIZUMAB WITH WEEKLY PACLITAXEL CARBOPLATIN (AUC 1.5) FOLLOWED BY PEMBROLIZUMAB DOXORUBICIN CYCLOPHOSPHAMIDE FOLLOWED BY  PEMBROLIZUMAB 200MG Q3W    9/14/2023       Pre-Medications       diphenhydrAMINE (BENADRYL) 50 mg in NS 50 mL IVPB       famotidine (PF) injection 20 mg       Chemotherapy       PACLitaxeL (TAXOL) 80 mg/m2 = 126 mg in sodium chloride 0.9% 250 mL chemo infusion       CARBOplatin (PARAPLATIN) 175 mg in sodium chloride 0.9% 267.5 mL chemo infusion       Antiemetics       palonosetron 0.25mg/dexAMETHasone 12mg in NS IVPB 0.25 mg 50 mL       LORazepam (ATIVAN) injection 0.5 mg       aprepitant (CINVANTI) injection 130 mg       Immunotherapy       pembrolizumab (KEYTRUDA) 200 mg in sodium chloride 0.9% SolP 108 mL infusion  9/21/2023       Pre-Medications       diphenhydrAMINE (BENADRYL) 50 mg in NS 50 mL IVPB       famotidine (PF) injection 20 mg       Chemotherapy       PACLitaxeL (TAXOL) 80 mg/m2 = 126 mg in sodium chloride 0.9% 250 mL chemo infusion       CARBOplatin (PARAPLATIN) 175 mg in sodium chloride 0.9% 267.5 mL chemo infusion       Antiemetics       LORazepam (ATIVAN) injection 0.5 mg       palonosetron 0.25mg/dexAMETHasone 12mg in NS IVPB 0.25 mg 50 mL  9/28/2023       Pre-Medications       diphenhydrAMINE (BENADRYL) 50 mg in NS 50 mL IVPB       famotidine (PF) injection 20 mg       Chemotherapy       PACLitaxeL (TAXOL) 80 mg/m2 = 126 mg in sodium chloride 0.9% 250 mL chemo infusion       CARBOplatin (PARAPLATIN) 175 mg in sodium chloride 0.9% 267.5 mL chemo infusion       Antiemetics       LORazepam (ATIVAN) injection 0.5 mg       palonosetron 0.25mg/dexAMETHasone 12mg in NS IVPB 0.25 mg 50 mL  10/5/2023       Pre-Medications       diphenhydrAMINE (BENADRYL) 50 mg in NS 50 mL IVPB       famotidine (PF) injection 20 mg       Chemotherapy       PACLitaxeL (TAXOL) 80 mg/m2 = 126 mg in sodium chloride 0.9% 250 mL chemo infusion       CARBOplatin (PARAPLATIN) 175 mg in sodium chloride 0.9% 267.5 mL chemo infusion       Antiemetics       aprepitant (CINVANTI) injection 130 mg       LORazepam (ATIVAN) injection 0.5  mg       palonosetron 0.25mg/dexAMETHasone 12mg in NS IVPB 0.25 mg 50 mL       Immunotherapy       pembrolizumab (KEYTRUDA) 200 mg in sodium chloride 0.9% SolP 108 mL infusion

## 2023-09-14 ENCOUNTER — PATIENT MESSAGE (OUTPATIENT)
Dept: ADMINISTRATIVE | Facility: OTHER | Age: 43
End: 2023-09-14
Payer: COMMERCIAL

## 2023-09-14 ENCOUNTER — DOCUMENTATION ONLY (OUTPATIENT)
Dept: INFUSION THERAPY | Facility: HOSPITAL | Age: 43
End: 2023-09-14
Payer: COMMERCIAL

## 2023-09-14 ENCOUNTER — INFUSION (OUTPATIENT)
Dept: INFUSION THERAPY | Facility: HOSPITAL | Age: 43
End: 2023-09-14
Attending: INTERNAL MEDICINE
Payer: COMMERCIAL

## 2023-09-14 VITALS
SYSTOLIC BLOOD PRESSURE: 98 MMHG | DIASTOLIC BLOOD PRESSURE: 66 MMHG | WEIGHT: 115.94 LBS | TEMPERATURE: 98 F | HEIGHT: 63 IN | HEART RATE: 94 BPM | BODY MASS INDEX: 20.54 KG/M2 | OXYGEN SATURATION: 99 % | RESPIRATION RATE: 16 BRPM

## 2023-09-14 DIAGNOSIS — C50.911 INVASIVE DUCTAL CARCINOMA OF BREAST, FEMALE, RIGHT: Primary | ICD-10-CM

## 2023-09-14 PROCEDURE — A4216 STERILE WATER/SALINE, 10 ML: HCPCS | Mod: PN | Performed by: INTERNAL MEDICINE

## 2023-09-14 PROCEDURE — 96413 CHEMO IV INFUSION 1 HR: CPT | Mod: PN

## 2023-09-14 PROCEDURE — 96367 TX/PROPH/DG ADDL SEQ IV INF: CPT | Mod: PN

## 2023-09-14 PROCEDURE — 25000003 PHARM REV CODE 250: Mod: PN | Performed by: INTERNAL MEDICINE

## 2023-09-14 PROCEDURE — 63600175 PHARM REV CODE 636 W HCPCS: Mod: PN | Performed by: INTERNAL MEDICINE

## 2023-09-14 PROCEDURE — 96417 CHEMO IV INFUS EACH ADDL SEQ: CPT | Mod: PN

## 2023-09-14 PROCEDURE — 96375 TX/PRO/DX INJ NEW DRUG ADDON: CPT | Mod: PN

## 2023-09-14 RX ORDER — FAMOTIDINE 10 MG/ML
20 INJECTION INTRAVENOUS
Status: COMPLETED | OUTPATIENT
Start: 2023-09-14 | End: 2023-09-14

## 2023-09-14 RX ORDER — PROCHLORPERAZINE EDISYLATE 5 MG/ML
10 INJECTION INTRAMUSCULAR; INTRAVENOUS ONCE AS NEEDED
Status: DISCONTINUED | OUTPATIENT
Start: 2023-09-14 | End: 2023-09-14 | Stop reason: HOSPADM

## 2023-09-14 RX ORDER — EPINEPHRINE 0.3 MG/.3ML
0.3 INJECTION SUBCUTANEOUS ONCE AS NEEDED
Status: DISCONTINUED | OUTPATIENT
Start: 2023-09-14 | End: 2023-09-14 | Stop reason: HOSPADM

## 2023-09-14 RX ORDER — SODIUM CHLORIDE 0.9 % (FLUSH) 0.9 %
10 SYRINGE (ML) INJECTION
Status: DISCONTINUED | OUTPATIENT
Start: 2023-09-14 | End: 2023-09-14 | Stop reason: HOSPADM

## 2023-09-14 RX ORDER — LORAZEPAM 2 MG/ML
0.5 INJECTION INTRAMUSCULAR
Status: DISCONTINUED | OUTPATIENT
Start: 2023-09-14 | End: 2023-09-14 | Stop reason: HOSPADM

## 2023-09-14 RX ORDER — DIPHENHYDRAMINE HYDROCHLORIDE 50 MG/ML
50 INJECTION INTRAMUSCULAR; INTRAVENOUS ONCE AS NEEDED
Status: DISCONTINUED | OUTPATIENT
Start: 2023-09-14 | End: 2023-09-14 | Stop reason: HOSPADM

## 2023-09-14 RX ADMIN — CARBOPLATIN 200 MG: 10 INJECTION, SOLUTION INTRAVENOUS at 12:09

## 2023-09-14 RX ADMIN — Medication 10 ML: at 02:09

## 2023-09-14 RX ADMIN — FAMOTIDINE 20 MG: 10 INJECTION INTRAVENOUS at 11:09

## 2023-09-14 RX ADMIN — PALONOSETRON 0.25 MG: 0.05 INJECTION, SOLUTION INTRAVENOUS at 10:09

## 2023-09-14 RX ADMIN — LORAZEPAM 0.5 MG: 2 INJECTION INTRAMUSCULAR; INTRAVENOUS at 10:09

## 2023-09-14 RX ADMIN — SODIUM CHLORIDE: 9 INJECTION, SOLUTION INTRAVENOUS at 09:09

## 2023-09-14 RX ADMIN — APREPITANT 130 MG: 130 INJECTION, EMULSION INTRAVENOUS at 10:09

## 2023-09-14 RX ADMIN — PACLITAXEL 126 MG: 6 INJECTION, SOLUTION, CONCENTRATE INTRAVENOUS at 11:09

## 2023-09-14 RX ADMIN — DIPHENHYDRAMINE HYDROCHLORIDE 50 MG: 50 INJECTION, SOLUTION INTRAMUSCULAR; INTRAVENOUS at 10:09

## 2023-09-14 NOTE — PLAN OF CARE
Pt arrived to clinic for C2D1 treatment with Taxol and Carboplatin and tolerated well with no changes throughout therapy. Dignicap used per pt request and tolerated well with home electric blanket used and spouse at chairside. Pt also performed cryotherapy to bilateral hands/feet with home supplies and tolerated well. Pt aware of side effects and f/u appts and discharged to home in NAD with  and has aware of instructions for Dignicap supplies and hair maintenance.

## 2023-09-14 NOTE — PROGRESS NOTES
Oncology Nutrition   Chemotherapy Infusion Visit    Nutrition Follow Up   RD met with patient at chairside during infusion treatment. Pt reports continues to do well nutritionally- eating without difficulty, maintaining weight, and denies nutrition related side effects.    Wt Readings from Last 10 Encounters:   09/14/23 52.6 kg (115 lb 15.4 oz)   09/13/23 52.3 kg (115 lb 4.8 oz)   09/11/23 52.6 kg (115 lb 15.4 oz)   09/07/23 53.8 kg (118 lb 9.7 oz)   09/06/23 53.8 kg (118 lb 9.7 oz)   08/31/23 54 kg (119 lb 0.8 oz)   08/31/23 54 kg (119 lb 0.8 oz)   08/24/23 53.8 kg (118 lb 9.7 oz)   08/24/23 53.8 kg (118 lb 9.7 oz)   08/22/23 54.1 kg (119 lb 4.3 oz)       All other nutrition questions/concerns addressed as appropriate. Will continue to follow and monitor throughout treatment PRN.     Orquidea Vu, MS, RD, LDN  09/14/2023  3:08 PM

## 2023-09-14 NOTE — PLAN OF CARE
Problem: Fatigue  Goal: Improved Activity Tolerance  Outcome: Ongoing, Progressing  Intervention: Promote Improved Energy  Flowsheets (Taken 9/14/2023 1442)  Fatigue Management:   fatigue-related activity identified   paced activity encouraged   activity assistance provided   frequent rest breaks encouraged  Sleep/Rest Enhancement:   therapeutic touch utilized   room darkened   relaxation techniques promoted   noise level reduced   family presence promoted   consistent schedule promoted   natural light exposure provided   awakenings minimized  Activity Management:   Ambulated -L4   Ambulated to bathroom - L4   Up in chair - L3     Problem: Adult Inpatient Plan of Care  Goal: Plan of Care Review  Outcome: Ongoing, Progressing  Flowsheets (Taken 9/14/2023 1442)  Plan of Care Reviewed With:   patient   spouse  Goal: Patient-Specific Goal (Individualized)  Outcome: Ongoing, Progressing  Flowsheets (Taken 9/14/2023 1442)  Anxieties, Fears or Concerns: pain with cryotherapy  Individualized Care Needs: recliner, home electric blanket, education, conversation, family at chairside, home cryotherapy to bilateral heels/wrists, dignicap, snacks, sleep  Goal: Optimal Comfort and Wellbeing  Outcome: Ongoing, Progressing  Intervention: Provide Person-Centered Care  Flowsheets (Taken 9/14/2023 1442)  Trust Relationship/Rapport:   thoughts/feelings acknowledged   reassurance provided   empathic listening provided   emotional support provided   questions encouraged   choices provided   care explained   questions answered     Problem: Fall Injury Risk  Goal: Absence of Fall and Fall-Related Injury  Outcome: Ongoing, Progressing  Intervention: Promote Injury-Free Environment  Flowsheets (Taken 9/14/2023 1442)  Safety Promotion/Fall Prevention:   instructed to call staff for mobility   supervised activity   room near unit station   medications reviewed   Fall Risk reviewed with patient/family   high risk medications identified   in  recliner, wheels locked   lighting adjusted   family to remain at bedside

## 2023-09-15 ENCOUNTER — PATIENT MESSAGE (OUTPATIENT)
Dept: HEMATOLOGY/ONCOLOGY | Facility: CLINIC | Age: 43
End: 2023-09-15
Payer: COMMERCIAL

## 2023-09-15 ENCOUNTER — PATIENT MESSAGE (OUTPATIENT)
Dept: ADMINISTRATIVE | Facility: OTHER | Age: 43
End: 2023-09-15
Payer: COMMERCIAL

## 2023-09-16 ENCOUNTER — PATIENT MESSAGE (OUTPATIENT)
Dept: ADMINISTRATIVE | Facility: OTHER | Age: 43
End: 2023-09-16
Payer: COMMERCIAL

## 2023-09-17 ENCOUNTER — PATIENT MESSAGE (OUTPATIENT)
Dept: ADMINISTRATIVE | Facility: OTHER | Age: 43
End: 2023-09-17
Payer: COMMERCIAL

## 2023-09-18 ENCOUNTER — PATIENT MESSAGE (OUTPATIENT)
Dept: ADMINISTRATIVE | Facility: OTHER | Age: 43
End: 2023-09-18
Payer: COMMERCIAL

## 2023-09-19 ENCOUNTER — PATIENT MESSAGE (OUTPATIENT)
Dept: ADMINISTRATIVE | Facility: OTHER | Age: 43
End: 2023-09-19
Payer: COMMERCIAL

## 2023-09-20 ENCOUNTER — LAB VISIT (OUTPATIENT)
Dept: LAB | Facility: HOSPITAL | Age: 43
End: 2023-09-20
Attending: INTERNAL MEDICINE
Payer: COMMERCIAL

## 2023-09-20 ENCOUNTER — PATIENT MESSAGE (OUTPATIENT)
Dept: ADMINISTRATIVE | Facility: OTHER | Age: 43
End: 2023-09-20
Payer: COMMERCIAL

## 2023-09-20 ENCOUNTER — OFFICE VISIT (OUTPATIENT)
Dept: HEMATOLOGY/ONCOLOGY | Facility: CLINIC | Age: 43
End: 2023-09-20
Payer: COMMERCIAL

## 2023-09-20 VITALS
OXYGEN SATURATION: 99 % | BODY MASS INDEX: 20.43 KG/M2 | HEART RATE: 100 BPM | DIASTOLIC BLOOD PRESSURE: 60 MMHG | HEIGHT: 63 IN | WEIGHT: 115.31 LBS | TEMPERATURE: 98 F | SYSTOLIC BLOOD PRESSURE: 110 MMHG

## 2023-09-20 DIAGNOSIS — Z17.1 MALIGNANT NEOPLASM OF LOWER-OUTER QUADRANT OF RIGHT BREAST OF FEMALE, ESTROGEN RECEPTOR NEGATIVE: Primary | ICD-10-CM

## 2023-09-20 DIAGNOSIS — C50.511 MALIGNANT NEOPLASM OF LOWER-OUTER QUADRANT OF RIGHT BREAST OF FEMALE, ESTROGEN RECEPTOR NEGATIVE: Primary | ICD-10-CM

## 2023-09-20 DIAGNOSIS — C50.911 INVASIVE DUCTAL CARCINOMA OF BREAST, FEMALE, RIGHT: ICD-10-CM

## 2023-09-20 DIAGNOSIS — R45.89 ANXIETY ABOUT HEALTH: ICD-10-CM

## 2023-09-20 DIAGNOSIS — Z79.899 IMMUNOCOMPROMISED STATE DUE TO DRUG THERAPY: ICD-10-CM

## 2023-09-20 DIAGNOSIS — T45.1X5A ANTINEOPLASTIC CHEMOTHERAPY INDUCED ANEMIA: ICD-10-CM

## 2023-09-20 DIAGNOSIS — D84.821 IMMUNOCOMPROMISED STATE DUE TO DRUG THERAPY: ICD-10-CM

## 2023-09-20 DIAGNOSIS — L27.0 DRUG-INDUCED SKIN RASH: ICD-10-CM

## 2023-09-20 DIAGNOSIS — Z15.01 BRCA GENE POSITIVE: ICD-10-CM

## 2023-09-20 DIAGNOSIS — T50.905A DRUG-INDUCED HEPATITIS: ICD-10-CM

## 2023-09-20 DIAGNOSIS — R11.2 CINV (CHEMOTHERAPY-INDUCED NAUSEA AND VOMITING): ICD-10-CM

## 2023-09-20 DIAGNOSIS — Z15.09 BRCA GENE POSITIVE: ICD-10-CM

## 2023-09-20 DIAGNOSIS — T45.1X5A CINV (CHEMOTHERAPY-INDUCED NAUSEA AND VOMITING): ICD-10-CM

## 2023-09-20 DIAGNOSIS — D64.81 ANTINEOPLASTIC CHEMOTHERAPY INDUCED ANEMIA: ICD-10-CM

## 2023-09-20 DIAGNOSIS — K71.6 DRUG-INDUCED HEPATITIS: ICD-10-CM

## 2023-09-20 LAB
ALBUMIN SERPL BCP-MCNC: 4.2 G/DL (ref 3.5–5.2)
ALP SERPL-CCNC: 48 U/L (ref 55–135)
ALT SERPL W/O P-5'-P-CCNC: 23 U/L (ref 10–44)
ANION GAP SERPL CALC-SCNC: 11 MMOL/L (ref 8–16)
AST SERPL-CCNC: 12 U/L (ref 10–40)
B-HCG UR QL: NEGATIVE
BASOPHILS # BLD AUTO: 0.02 K/UL (ref 0–0.2)
BASOPHILS NFR BLD: 0.3 % (ref 0–1.9)
BILIRUB SERPL-MCNC: 0.2 MG/DL (ref 0.1–1)
BUN SERPL-MCNC: 18 MG/DL (ref 6–20)
CALCIUM SERPL-MCNC: 9.2 MG/DL (ref 8.7–10.5)
CHLORIDE SERPL-SCNC: 104 MMOL/L (ref 95–110)
CO2 SERPL-SCNC: 24 MMOL/L (ref 23–29)
CREAT SERPL-MCNC: 0.7 MG/DL (ref 0.5–1.4)
DIFFERENTIAL METHOD: ABNORMAL
EOSINOPHIL # BLD AUTO: 0 K/UL (ref 0–0.5)
EOSINOPHIL NFR BLD: 0 % (ref 0–8)
ERYTHROCYTE [DISTWIDTH] IN BLOOD BY AUTOMATED COUNT: 14.3 % (ref 11.5–14.5)
EST. GFR  (NO RACE VARIABLE): >60 ML/MIN/1.73 M^2
GLUCOSE SERPL-MCNC: 118 MG/DL (ref 70–110)
HCT VFR BLD AUTO: 35.6 % (ref 37–48.5)
HGB BLD-MCNC: 11.4 G/DL (ref 12–16)
IMM GRANULOCYTES # BLD AUTO: 0.07 K/UL (ref 0–0.04)
IMM GRANULOCYTES NFR BLD AUTO: 1.1 % (ref 0–0.5)
LYMPHOCYTES # BLD AUTO: 1.1 K/UL (ref 1–4.8)
LYMPHOCYTES NFR BLD: 17.3 % (ref 18–48)
MCH RBC QN AUTO: 30.8 PG (ref 27–31)
MCHC RBC AUTO-ENTMCNC: 32 G/DL (ref 32–36)
MCV RBC AUTO: 96 FL (ref 82–98)
MONOCYTES # BLD AUTO: 0.1 K/UL (ref 0.3–1)
MONOCYTES NFR BLD: 1.2 % (ref 4–15)
NEUTROPHILS # BLD AUTO: 5.2 K/UL (ref 1.8–7.7)
NEUTROPHILS NFR BLD: 80.1 % (ref 38–73)
NRBC BLD-RTO: 0 /100 WBC
PLATELET # BLD AUTO: 315 K/UL (ref 150–450)
PMV BLD AUTO: 8.8 FL (ref 9.2–12.9)
POTASSIUM SERPL-SCNC: 4.3 MMOL/L (ref 3.5–5.1)
PROT SERPL-MCNC: 7.2 G/DL (ref 6–8.4)
RBC # BLD AUTO: 3.7 M/UL (ref 4–5.4)
SODIUM SERPL-SCNC: 139 MMOL/L (ref 136–145)
T4 FREE SERPL-MCNC: 0.94 NG/DL (ref 0.71–1.51)
TSH SERPL DL<=0.005 MIU/L-ACNC: 0.28 UIU/ML (ref 0.4–4)
WBC # BLD AUTO: 6.52 K/UL (ref 3.9–12.7)

## 2023-09-20 PROCEDURE — 3008F BODY MASS INDEX DOCD: CPT | Mod: CPTII,S$GLB,,

## 2023-09-20 PROCEDURE — 3008F PR BODY MASS INDEX (BMI) DOCUMENTED: ICD-10-PCS | Mod: CPTII,S$GLB,,

## 2023-09-20 PROCEDURE — 99999 PR PBB SHADOW E&M-EST. PATIENT-LVL III: CPT | Mod: PBBFAC,,,

## 2023-09-20 PROCEDURE — 3078F DIAST BP <80 MM HG: CPT | Mod: CPTII,S$GLB,,

## 2023-09-20 PROCEDURE — 3074F SYST BP LT 130 MM HG: CPT | Mod: CPTII,S$GLB,,

## 2023-09-20 PROCEDURE — 3074F PR MOST RECENT SYSTOLIC BLOOD PRESSURE < 130 MM HG: ICD-10-PCS | Mod: CPTII,S$GLB,,

## 2023-09-20 PROCEDURE — 99214 OFFICE O/P EST MOD 30 MIN: CPT | Mod: S$GLB,,,

## 2023-09-20 PROCEDURE — 84439 ASSAY OF FREE THYROXINE: CPT | Performed by: INTERNAL MEDICINE

## 2023-09-20 PROCEDURE — 85025 COMPLETE CBC W/AUTO DIFF WBC: CPT | Mod: PN | Performed by: INTERNAL MEDICINE

## 2023-09-20 PROCEDURE — 36415 COLL VENOUS BLD VENIPUNCTURE: CPT | Mod: PN | Performed by: INTERNAL MEDICINE

## 2023-09-20 PROCEDURE — 81025 URINE PREGNANCY TEST: CPT | Mod: PN | Performed by: INTERNAL MEDICINE

## 2023-09-20 PROCEDURE — 99214 PR OFFICE/OUTPT VISIT, EST, LEVL IV, 30-39 MIN: ICD-10-PCS | Mod: S$GLB,,,

## 2023-09-20 PROCEDURE — 99999 PR PBB SHADOW E&M-EST. PATIENT-LVL III: ICD-10-PCS | Mod: PBBFAC,,,

## 2023-09-20 PROCEDURE — 80053 COMPREHEN METABOLIC PANEL: CPT | Mod: PN | Performed by: INTERNAL MEDICINE

## 2023-09-20 PROCEDURE — 3078F PR MOST RECENT DIASTOLIC BLOOD PRESSURE < 80 MM HG: ICD-10-PCS | Mod: CPTII,S$GLB,,

## 2023-09-20 PROCEDURE — 84443 ASSAY THYROID STIM HORMONE: CPT | Performed by: INTERNAL MEDICINE

## 2023-09-20 NOTE — PROGRESS NOTES
PATIENT: Arabella Catalan  MRN: [id number]  DATE: 2023      Diagnosis:   1. Malignant neoplasm of lower-outer quadrant of right breast of female, estrogen receptor negative    2. 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 Immunotherapy induced skin rash    4. Drug-induced hepatitis    5. CINV (chemotherapy-induced nausea and vomiting)    6. Anxiety about health    7. BRCA gene positive    8. Antineoplastic chemotherapy induced anemia        Chief Complaint: Breast Cancer (1 week follow up)    Subjective:   HPI: Ms. Catalan is a 43 y.o. female who returns for follow up of triple negative breast cancer     Triple Negative Right Sided Breast Cancer   Date of Original Diagnosis: 23  Original Stage: [id number] Stage 2B grade 3 Triple neg   Current Sites of Disease: right breast   Current Goals of Therapy: curative   Current Therapy: Keynote 522 Neoadjuvant chemotherapy     Oncologic History/History of Present Illness:   Per Dr. Rivas Note:   As previously documented she started screening mammograms for a few years after her ovarian cancer , in her late 's. Routine pelvic exam revealed an ovarian cancer, s/p right oophorectomy for dysgerminoma.    Resumed routine mammograms at 42, had a normal screening mammogram in May 2023 but felt a lump in the right breast in July. Further imaging showed 2 lesions, both > 2 cm , both biopsied to be G3 IDC, ER/OR and Her 2 jose eduardo negative.   Menarche at age 14 year old. . Age at first live birth 31. Did  breast feed 2 year and 6 months . LMP  . OCP-yes 20 years ago  Menopause at none. HRT-none      Genetics completed- + BRCA1     Family history cancer:  Mother breast cancer at 38 and cervical cancer  Smoker Pk/yr quit date , smoked around 0.5 pack a day     Today 2023  She is feeling well. Completed her course of steroids today, has been taking Pepcid daily and had no difficulty.   Rash has completely resolved. No nausea this past week.   Denies  HA, CP, cough, SOB, abd pain, diarrhea, constipation, N/V, dysuria, hematuria, swelling, new lumps or bumps. No fevers, chills.     Oncology History   Breast cancer of lower-outer quadrant of right female breast   8/7/2023 Initial Diagnosis    Breast cancer of lower-outer quadrant of right female breast     8/7/2023 Cancer Staged    Staging form: Breast, AJCC 8th Edition  - Clinical stage from 8/7/2023: Stage IIB (cT2, cN0, cM0, G3, ER-, NC-, HER2-)     Invasive ductal carcinoma of breast, female, right   8/7/2023 Initial Diagnosis    Invasive ductal carcinoma of breast, female, right     8/24/2023 -  Chemotherapy    Treatment Summary   Plan Name: OP PEMBROLIZUMAB WITH WEEKLY PACLITAXEL CARBOPLATIN (AUC 1.5) FOLLOWED BY PEMBROLIZUMAB DOXORUBICIN CYCLOPHOSPHAMIDE FOLLOWED BY PEMBROLIZUMAB 200MG Q3W  Treatment Goal: Curative  Status: Active  Start Date: 8/24/2023  End Date: 7/25/2024 (Planned)  Provider: Corina Mcgovern MD  Chemotherapy: DOXOrubicin chemo injection 94 mg, 60 mg/m2, Intravenous, Clinic/HOD 1 time, 0 of 4 cycles  CARBOplatin (PARAPLATIN) 175 mg in sodium chloride 0.9% 302.5 mL chemo infusion, 175 mg, Intravenous, Clinic/HOD 1 time, 2 of 4 cycles  Administration: 175 mg (8/24/2023), 175 mg (8/31/2023), 200 mg (9/14/2023), 175 mg (9/7/2023)  cycloPHOSphamide 600 mg/m2 = 940 mg in sodium chloride 0.9% 254.7 mL chemo infusion, 600 mg/m2, Intravenous, Clinic/HOD 1 time, 0 of 4 cycles  PACLitaxeL (TAXOL) 80 mg/m2 = 126 mg in sodium chloride 0.9% 250 mL chemo infusion, 80 mg/m2 = 126 mg, Intravenous, Clinic/HOD 1 time, 2 of 4 cycles  Administration: 126 mg (8/24/2023), 126 mg (8/31/2023), 126 mg (9/14/2023), 126 mg (9/7/2023)        Past Medical History:   Past Medical History:   Diagnosis Date    Abnormal Pap smear of cervix     HPV (human papilloma virus) infection     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       Past Surgical HIstory:   Past Surgical History:   Procedure Laterality Date    BREAST  BIOPSY Right 2015    benign    BREAST BIOPSY Right 07/26/2023    INSERTION OF TUNNELED CENTRAL VENOUS CATHETER (CVC) WITH SUBCUTANEOUS PORT N/A 8/15/2023    Procedure: [id number];  Surgeon: Skyler Aldrich MD;  Location: [facility] OR;  Service: General;  Laterality: N/A;    OOPHORECTOMY Right 2009    stage 1a dysgerminoma    VAGINAL DELIVERY  12/13/2016       Family History:   Family History   Problem Relation Age of Onset    Breast cancer Mother 38    Cervical cancer Mother        Social History:  reports that she has quit smoking. Her smoking use included cigarettes. She does not have any smokeless tobacco history on file. She reports current alcohol use. She reports that she does not use drugs.    Allergies:  Review of patient's allergies indicates:   Allergen Reactions    Codeine Anaphylaxis       Medications:  Current Outpatient Medications   Medication Sig Dispense Refill    dexAMETHasone (DECADRON) 4 MG Tab Take 1 tab as needed twice a day for nausea that is persistent despite zofran. Take morning and afternoon, no later than 3pm. 40 tablet 3    hydrocortisone (PROCTOCORT) 1 % crpe Apply to affected area two times a day 28.4 g 1    LIDOcaine-prilocaine (EMLA) cream Apply topically as needed (apply 30 to 60 minutes prior to chemo). 30 g 2    OLANZapine (ZYPREXA) 5 MG tablet Take 1 tablet (5 mg total) by mouth every evening. Take as directed on days 1-4, 8-10, and 15-17 of your chemotherapy cycles 1-4. Take on days 1, 2, and 3 of cycles 5-8. 10 tablet 11    ondansetron (ZOFRAN) 8 MG tablet Take 1 tablet (8 mg total) by mouth every 8 (eight) hours as needed for Nausea. 60 tablet 2    promethazine (PHENERGAN) 12.5 MG Tab (Take 1-2 tabs every 6 hours as needed for nausea persistent despite zofran) 40 tablet 3    ondansetron (ZOFRAN-ODT) 8 MG TbDL Take 1 tablet (8 mg total) by mouth every 6 (six) hours as needed (nausea). (Patient not taking: Reported on 9/14/2023) 60 tablet 3     No current  "facility-administered medications for this visit.     Facility-Administered Medications Ordered in Other Visits   Medication Dose Route Frequency Provider Last Rate Last Admin    HYDROmorphone injection 0.5 mg  0.5 mg Intravenous Q5 Min PRN Rosales Sousa MD        ondansetron injection 4 mg  4 mg Intravenous Daily PRN Rosales Sousa MD        prochlorperazine injection Soln 10 mg  10 mg Intravenous Q30 Min PRN Rosales Sousa MD           Review of Systems   Constitutional:  Negative for appetite change, chills, fatigue and fever.   HENT:  Negative for trouble swallowing.    Respiratory:  Negative for cough and shortness of breath.    Cardiovascular:  Negative for chest pain and leg swelling.   Gastrointestinal:  Negative for abdominal pain, constipation, diarrhea and nausea.   Genitourinary:  Negative for dysuria.   Musculoskeletal:  Negative for arthralgias.   Skin:  Negative for rash.   Neurological:  Negative for headaches.   Hematological:  Negative for adenopathy.   Psychiatric/Behavioral:  The patient is not nervous/anxious.        ECOG Performance Status:   ECOG SCORE    0 - Fully active-able to carry on all pre-disease performance without restriction         Objective:      Vitals:   Vitals:    09/20/23 1322   BP: 110/60   BP Location: Left arm   Patient Position: Sitting   BP Method: Medium (Manual)   Pulse: 100   Temp: 98.2 °F (36.8 °C)   TempSrc: Temporal   SpO2: 99%   Weight: 52.3 kg (115 lb 4.8 oz)   Height: 5' 3" (1.6 m)     BMI: Body mass index is 20.42 kg/m².    Physical Exam  Vitals reviewed.   Constitutional:       General: She is not in acute distress.     Appearance: She is not diaphoretic.   HENT:      Head: Normocephalic and atraumatic.      Mouth/Throat:      Mouth: Mucous membranes are moist.      Pharynx: No oropharyngeal exudate.   Eyes:      General: No scleral icterus.  Cardiovascular:      Rate and Rhythm: Normal rate and regular rhythm.      Heart sounds: Normal heart " sounds. No murmur heard.  Pulmonary:      Effort: Pulmonary effort is normal. No respiratory distress.      Breath sounds: No wheezing.   Abdominal:      General: There is no distension.   Musculoskeletal:      Cervical back: No tenderness.      Right lower leg: No edema.      Left lower leg: No edema.   Lymphadenopathy:      Cervical: No cervical adenopathy.   Skin:     General: Skin is warm.      Coloration: Skin is not jaundiced.      Findings: No rash.   Neurological:      Mental Status: She is alert and oriented to person, place, and time.   Psychiatric:         Mood and Affect: Mood normal.         Behavior: Behavior normal.         Laboratory Data:  Lab Results   Component Value Date    WBC 6.52 09/20/2023    HGB 11.4 (L) 09/20/2023    HCT 35.6 (L) 09/20/2023    MCV 96 09/20/2023     09/20/2023      Imaging:   Assessment:       1. Malignant neoplasm of lower-outer quadrant of right breast of female, estrogen receptor negative    2. 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 Immunotherapy induced skin rash    4. Drug-induced hepatitis    5. CINV (chemotherapy-induced nausea and vomiting)    6. Anxiety about health    7. BRCA gene positive    8. Antineoplastic chemotherapy induced anemia           Plan:   Triple Negative Right Sided Breast Cancer   Date of Original Diagnosis: 7/26/23  Original Stage: [id number] Stage 2B grade 3 Triple neg   Current Sites of Disease: right breast   Current Goals of Therapy: curative   Current Therapy: Keynote 522 Neoadjuvant chemotherapy  BRCA positive- to meet with genetics   -She has also been enrolled in the Protocol [id number], Disparities in Results of Immune Checkpoint Inhibitor Treatment (DIRECT): A Prospective Cohort Study of Cancer Survivors Treated with anti-PD-L1 Immunotherapy in a Community Oncology Setting.   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  -Patient at risk for infection   -No current signs/symptoms of infection  -Continue to monitor closely while on therapy     IO induced rash  -grade 1 involving 30% the body after C1D1,, no sloughing or breakdown, no pupules or pustules, +puritis  -RX for hydrocortisone cream, counseled about emollients  -Resolved     IO induced hepatitis  -grade 2, improving without steroids but still elevated  -holding pembro for cycle 2  -Completed steroids, LFTs are back to baseline   -Monitor      CINV  -Taking Promethazine, ondansetron PRN   -Managed, continue to monitor      anxiety about health  -referral sent to onc psych; patient declines   -Monitor     Chemotherapy induced anemia   -Stable with Hgb 11.4 g/dL today   -Patient states she recalls being told she has thalassemia as child, will check Hb electrophoresis with labs next week  -Will check Ferritin, Iron/TIBC with labs next week as well    -Monitor     Patient queried and all questions were answered.   Assessment/Plan reviewed and approved by Dr. Sexton .    30 minutes were spent in coordination of patient's care, record review and counseling.  Route Chart for Scheduling    Med Onc Chart Routing      Follow up with physician    Follow up with SAUL 1 week. with Edgar, labs prior- add Ferritin, Iron/TIBC, Hgb electrophoresis to labs in one week   Infusion scheduling note   Proceed with C2D8 tomorrow   Injection scheduling note    Labs    Imaging    Pharmacy appointment    Other referrals                  Treatment Plan Information   OP PEMBROLIZUMAB WITH WEEKLY PACLITAXEL CARBOPLATIN (AUC 1.5) FOLLOWED BY PEMBROLIZUMAB DOXORUBICIN CYCLOPHOSPHAMIDE FOLLOWED BY PEMBROLIZUMAB 200MG Q3W   Corina Mcgovern MD   Upcoming Treatment Dates - OP PEMBROLIZUMAB WITH WEEKLY PACLITAXEL CARBOPLATIN (AUC 1.5) FOLLOWED BY PEMBROLIZUMAB DOXORUBICIN CYCLOPHOSPHAMIDE FOLLOWED BY  PEMBROLIZUMAB 200MG Q3W    9/21/2023       Pre-Medications       diphenhydrAMINE (BENADRYL) 50 mg in NS 50 mL IVPB       famotidine (PF) injection 20 mg       Chemotherapy       PACLitaxeL (TAXOL) 80 mg/m2 = 126 mg in sodium chloride 0.9% 250 mL chemo infusion       CARBOplatin (PARAPLATIN) 200 mg in sodium chloride 0.9% 270 mL chemo infusion       Antiemetics       LORazepam (ATIVAN) injection 0.5 mg       palonosetron 0.25mg/dexAMETHasone 12mg in NS IVPB 0.25 mg 50 mL  9/28/2023       Pre-Medications       diphenhydrAMINE (BENADRYL) 50 mg in NS 50 mL IVPB       famotidine (PF) injection 20 mg       Chemotherapy       PACLitaxeL (TAXOL) 80 mg/m2 = 126 mg in sodium chloride 0.9% 250 mL chemo infusion       CARBOplatin (PARAPLATIN) 200 mg in sodium chloride 0.9% 270 mL chemo infusion       Antiemetics       LORazepam (ATIVAN) injection 0.5 mg       palonosetron 0.25mg/dexAMETHasone 12mg in NS IVPB 0.25 mg 50 mL  10/5/2023       Pre-Medications       diphenhydrAMINE (BENADRYL) 50 mg in NS 50 mL IVPB       famotidine (PF) injection 20 mg       Chemotherapy       PACLitaxeL (TAXOL) 80 mg/m2 = 126 mg in sodium chloride 0.9% 250 mL chemo infusion       CARBOplatin (PARAPLATIN) 175 mg in sodium chloride 0.9% 267.5 mL chemo infusion       Antiemetics       aprepitant (CINVANTI) injection 130 mg       LORazepam (ATIVAN) injection 0.5 mg       palonosetron 0.25mg/dexAMETHasone 12mg in NS IVPB 0.25 mg 50 mL       Immunotherapy       pembrolizumab (KEYTRUDA) 200 mg in sodium chloride 0.9% SolP 108 mL infusion  10/12/2023       Pre-Medications       diphenhydrAMINE (BENADRYL) 50 mg in NS 50 mL IVPB       famotidine (PF) injection 20 mg       Chemotherapy       PACLitaxeL (TAXOL) 80 mg/m2 = 126 mg in sodium chloride 0.9% 250 mL chemo infusion       CARBOplatin (PARAPLATIN) 200 mg in sodium chloride 0.9% 270 mL chemo infusion       Antiemetics       LORazepam (ATIVAN) injection 0.5 mg       palonosetron 0.25mg/dexAMETHasone 12mg  in NS IVPB 0.25 mg 50 mL

## 2023-09-21 ENCOUNTER — INFUSION (OUTPATIENT)
Dept: INFUSION THERAPY | Facility: HOSPITAL | Age: 43
End: 2023-09-21
Attending: INTERNAL MEDICINE
Payer: COMMERCIAL

## 2023-09-21 ENCOUNTER — PATIENT MESSAGE (OUTPATIENT)
Dept: ADMINISTRATIVE | Facility: OTHER | Age: 43
End: 2023-09-21
Payer: COMMERCIAL

## 2023-09-21 VITALS
HEART RATE: 93 BPM | DIASTOLIC BLOOD PRESSURE: 65 MMHG | HEIGHT: 63 IN | WEIGHT: 115.31 LBS | TEMPERATURE: 98 F | RESPIRATION RATE: 16 BRPM | BODY MASS INDEX: 20.43 KG/M2 | SYSTOLIC BLOOD PRESSURE: 104 MMHG

## 2023-09-21 DIAGNOSIS — C50.911 INVASIVE DUCTAL CARCINOMA OF BREAST, FEMALE, RIGHT: Primary | ICD-10-CM

## 2023-09-21 PROCEDURE — 96375 TX/PRO/DX INJ NEW DRUG ADDON: CPT | Mod: PN

## 2023-09-21 PROCEDURE — 63600175 PHARM REV CODE 636 W HCPCS: Mod: PN | Performed by: INTERNAL MEDICINE

## 2023-09-21 PROCEDURE — 96417 CHEMO IV INFUS EACH ADDL SEQ: CPT | Mod: PN

## 2023-09-21 PROCEDURE — 96367 TX/PROPH/DG ADDL SEQ IV INF: CPT | Mod: PN

## 2023-09-21 PROCEDURE — 25000003 PHARM REV CODE 250: Mod: PN | Performed by: INTERNAL MEDICINE

## 2023-09-21 PROCEDURE — A4216 STERILE WATER/SALINE, 10 ML: HCPCS | Mod: PN | Performed by: INTERNAL MEDICINE

## 2023-09-21 PROCEDURE — 96413 CHEMO IV INFUSION 1 HR: CPT | Mod: PN

## 2023-09-21 RX ORDER — SODIUM CHLORIDE 0.9 % (FLUSH) 0.9 %
10 SYRINGE (ML) INJECTION
Status: DISCONTINUED | OUTPATIENT
Start: 2023-09-21 | End: 2023-09-21 | Stop reason: HOSPADM

## 2023-09-21 RX ORDER — LORAZEPAM 2 MG/ML
0.5 INJECTION INTRAMUSCULAR
Status: DISCONTINUED | OUTPATIENT
Start: 2023-09-21 | End: 2023-09-21 | Stop reason: HOSPADM

## 2023-09-21 RX ORDER — FAMOTIDINE 10 MG/ML
20 INJECTION INTRAVENOUS
Status: COMPLETED | OUTPATIENT
Start: 2023-09-21 | End: 2023-09-21

## 2023-09-21 RX ADMIN — DIPHENHYDRAMINE HYDROCHLORIDE 50 MG: 50 INJECTION INTRAMUSCULAR; INTRAVENOUS at 10:09

## 2023-09-21 RX ADMIN — CARBOPLATIN 175 MG: 10 INJECTION INTRAVENOUS at 11:09

## 2023-09-21 RX ADMIN — DEXAMETHASONE SODIUM PHOSPHATE 0.25 MG: 10 INJECTION, SOLUTION INTRAMUSCULAR; INTRAVENOUS at 10:09

## 2023-09-21 RX ADMIN — FAMOTIDINE 20 MG: 10 INJECTION INTRAVENOUS at 10:09

## 2023-09-21 RX ADMIN — SODIUM CHLORIDE: 9 INJECTION, SOLUTION INTRAVENOUS at 09:09

## 2023-09-21 RX ADMIN — LORAZEPAM 0.5 MG: 2 INJECTION INTRAMUSCULAR; INTRAVENOUS at 09:09

## 2023-09-21 RX ADMIN — Medication 10 ML: at 03:09

## 2023-09-21 RX ADMIN — PACLITAXEL 126 MG: 6 INJECTION, SOLUTION, CONCENTRATE INTRAVENOUS at 10:09

## 2023-09-21 NOTE — PLAN OF CARE
Pt tolerated weekly Taxol/Carbo infusions well.  No s/s of infusion reaction noted.  Dignicaps done as directed per company with a 2 hr post cool.  Cryotherapy done most of the time during Taxol to hands and feel.  Pt unable to tolerated the ice on her hands and feet the entire time.  Instructed to call MD with any problems

## 2023-09-22 ENCOUNTER — PATIENT MESSAGE (OUTPATIENT)
Dept: ADMINISTRATIVE | Facility: OTHER | Age: 43
End: 2023-09-22
Payer: COMMERCIAL

## 2023-09-23 ENCOUNTER — PATIENT MESSAGE (OUTPATIENT)
Dept: ADMINISTRATIVE | Facility: OTHER | Age: 43
End: 2023-09-23
Payer: COMMERCIAL

## 2023-09-24 ENCOUNTER — PATIENT MESSAGE (OUTPATIENT)
Dept: ADMINISTRATIVE | Facility: OTHER | Age: 43
End: 2023-09-24
Payer: COMMERCIAL

## 2023-09-25 ENCOUNTER — PATIENT MESSAGE (OUTPATIENT)
Dept: ADMINISTRATIVE | Facility: OTHER | Age: 43
End: 2023-09-25
Payer: COMMERCIAL

## 2023-09-25 ENCOUNTER — PATIENT MESSAGE (OUTPATIENT)
Dept: HEMATOLOGY/ONCOLOGY | Facility: CLINIC | Age: 43
End: 2023-09-25
Payer: COMMERCIAL

## 2023-09-26 ENCOUNTER — PATIENT MESSAGE (OUTPATIENT)
Dept: ADMINISTRATIVE | Facility: OTHER | Age: 43
End: 2023-09-26
Payer: COMMERCIAL

## 2023-09-26 ENCOUNTER — LAB VISIT (OUTPATIENT)
Dept: LAB | Facility: HOSPITAL | Age: 43
End: 2023-09-26
Attending: INTERNAL MEDICINE
Payer: COMMERCIAL

## 2023-09-26 DIAGNOSIS — D64.81 ANTINEOPLASTIC CHEMOTHERAPY INDUCED ANEMIA: ICD-10-CM

## 2023-09-26 DIAGNOSIS — T45.1X5A ANTINEOPLASTIC CHEMOTHERAPY INDUCED ANEMIA: ICD-10-CM

## 2023-09-26 DIAGNOSIS — C50.911 INVASIVE DUCTAL CARCINOMA OF BREAST, FEMALE, RIGHT: ICD-10-CM

## 2023-09-26 LAB
ALBUMIN SERPL BCP-MCNC: 4.1 G/DL (ref 3.5–5.2)
ALP SERPL-CCNC: 44 U/L (ref 55–135)
ALT SERPL W/O P-5'-P-CCNC: 26 U/L (ref 10–44)
ANION GAP SERPL CALC-SCNC: 11 MMOL/L (ref 8–16)
AST SERPL-CCNC: 34 U/L (ref 10–40)
B-HCG UR QL: NEGATIVE
BASOPHILS # BLD AUTO: 0.07 K/UL (ref 0–0.2)
BASOPHILS NFR BLD: 1.1 % (ref 0–1.9)
BILIRUB SERPL-MCNC: 0.3 MG/DL (ref 0.1–1)
BUN SERPL-MCNC: 12 MG/DL (ref 6–20)
CALCIUM SERPL-MCNC: 9 MG/DL (ref 8.7–10.5)
CHLORIDE SERPL-SCNC: 104 MMOL/L (ref 95–110)
CO2 SERPL-SCNC: 25 MMOL/L (ref 23–29)
CREAT SERPL-MCNC: 0.6 MG/DL (ref 0.5–1.4)
DIFFERENTIAL METHOD: ABNORMAL
EOSINOPHIL # BLD AUTO: 0 K/UL (ref 0–0.5)
EOSINOPHIL NFR BLD: 0.5 % (ref 0–8)
ERYTHROCYTE [DISTWIDTH] IN BLOOD BY AUTOMATED COUNT: 14.2 % (ref 11.5–14.5)
EST. GFR  (NO RACE VARIABLE): >60 ML/MIN/1.73 M^2
FERRITIN SERPL-MCNC: 122 NG/ML (ref 20–300)
GLUCOSE SERPL-MCNC: 84 MG/DL (ref 70–110)
HCT VFR BLD AUTO: 35.6 % (ref 37–48.5)
HGB BLD-MCNC: 11.6 G/DL (ref 12–16)
IMM GRANULOCYTES # BLD AUTO: 0.02 K/UL (ref 0–0.04)
IMM GRANULOCYTES NFR BLD AUTO: 0.3 % (ref 0–0.5)
IRON SERPL-MCNC: 138 UG/DL (ref 30–160)
LYMPHOCYTES # BLD AUTO: 3 K/UL (ref 1–4.8)
LYMPHOCYTES NFR BLD: 46.3 % (ref 18–48)
MCH RBC QN AUTO: 30.9 PG (ref 27–31)
MCHC RBC AUTO-ENTMCNC: 32.6 G/DL (ref 32–36)
MCV RBC AUTO: 95 FL (ref 82–98)
MONOCYTES # BLD AUTO: 0.4 K/UL (ref 0.3–1)
MONOCYTES NFR BLD: 6.8 % (ref 4–15)
NEUTROPHILS # BLD AUTO: 2.9 K/UL (ref 1.8–7.7)
NEUTROPHILS NFR BLD: 45 % (ref 38–73)
NRBC BLD-RTO: 0 /100 WBC
PLATELET # BLD AUTO: 194 K/UL (ref 150–450)
PMV BLD AUTO: 8.8 FL (ref 9.2–12.9)
POTASSIUM SERPL-SCNC: 3.9 MMOL/L (ref 3.5–5.1)
PROT SERPL-MCNC: 7.2 G/DL (ref 6–8.4)
RBC # BLD AUTO: 3.75 M/UL (ref 4–5.4)
SATURATED IRON: 41 % (ref 20–50)
SODIUM SERPL-SCNC: 140 MMOL/L (ref 136–145)
TOTAL IRON BINDING CAPACITY: 334 UG/DL (ref 250–450)
TRANSFERRIN SERPL-MCNC: 226 MG/DL (ref 200–375)
TSH SERPL DL<=0.005 MIU/L-ACNC: 1.05 UIU/ML (ref 0.4–4)
WBC # BLD AUTO: 6.37 K/UL (ref 3.9–12.7)

## 2023-09-26 PROCEDURE — 81025 URINE PREGNANCY TEST: CPT | Mod: PN | Performed by: INTERNAL MEDICINE

## 2023-09-26 PROCEDURE — 82728 ASSAY OF FERRITIN: CPT

## 2023-09-26 PROCEDURE — 84443 ASSAY THYROID STIM HORMONE: CPT | Performed by: INTERNAL MEDICINE

## 2023-09-26 PROCEDURE — 36415 COLL VENOUS BLD VENIPUNCTURE: CPT | Mod: PN | Performed by: INTERNAL MEDICINE

## 2023-09-26 PROCEDURE — 84466 ASSAY OF TRANSFERRIN: CPT

## 2023-09-26 PROCEDURE — 85025 COMPLETE CBC W/AUTO DIFF WBC: CPT | Mod: PN | Performed by: INTERNAL MEDICINE

## 2023-09-26 PROCEDURE — 83540 ASSAY OF IRON: CPT

## 2023-09-26 PROCEDURE — 80053 COMPREHEN METABOLIC PANEL: CPT | Mod: PN | Performed by: INTERNAL MEDICINE

## 2023-09-27 ENCOUNTER — PATIENT MESSAGE (OUTPATIENT)
Dept: ADMINISTRATIVE | Facility: OTHER | Age: 43
End: 2023-09-27
Payer: COMMERCIAL

## 2023-09-27 ENCOUNTER — OFFICE VISIT (OUTPATIENT)
Dept: GASTROENTEROLOGY | Facility: CLINIC | Age: 43
End: 2023-09-27
Payer: COMMERCIAL

## 2023-09-27 ENCOUNTER — OFFICE VISIT (OUTPATIENT)
Dept: HEMATOLOGY/ONCOLOGY | Facility: CLINIC | Age: 43
End: 2023-09-27
Payer: COMMERCIAL

## 2023-09-27 VITALS — HEIGHT: 63 IN | BODY MASS INDEX: 20.94 KG/M2 | WEIGHT: 118.19 LBS

## 2023-09-27 VITALS
DIASTOLIC BLOOD PRESSURE: 78 MMHG | HEIGHT: 63 IN | WEIGHT: 118.19 LBS | SYSTOLIC BLOOD PRESSURE: 115 MMHG | HEART RATE: 105 BPM | OXYGEN SATURATION: 100 % | BODY MASS INDEX: 20.94 KG/M2 | RESPIRATION RATE: 18 BRPM | TEMPERATURE: 98 F

## 2023-09-27 DIAGNOSIS — R19.4 CHANGE IN BOWEL HABITS: ICD-10-CM

## 2023-09-27 DIAGNOSIS — R14.0 ABDOMINAL BLOATING: ICD-10-CM

## 2023-09-27 DIAGNOSIS — Z87.19 HISTORY OF HEMORRHOIDS: ICD-10-CM

## 2023-09-27 DIAGNOSIS — C50.911 INVASIVE DUCTAL CARCINOMA OF BREAST, FEMALE, RIGHT: ICD-10-CM

## 2023-09-27 DIAGNOSIS — R11.0 NAUSEA: ICD-10-CM

## 2023-09-27 DIAGNOSIS — Z15.09 BRCA1 GENE MUTATION POSITIVE: ICD-10-CM

## 2023-09-27 DIAGNOSIS — K76.9 LIVER LESION: ICD-10-CM

## 2023-09-27 DIAGNOSIS — C50.511 MALIGNANT NEOPLASM OF LOWER-OUTER QUADRANT OF RIGHT BREAST OF FEMALE, ESTROGEN RECEPTOR NEGATIVE: Primary | ICD-10-CM

## 2023-09-27 DIAGNOSIS — R10.9 ABDOMINAL CRAMPING: ICD-10-CM

## 2023-09-27 DIAGNOSIS — Z79.899 IMMUNOCOMPROMISED STATE DUE TO DRUG THERAPY: ICD-10-CM

## 2023-09-27 DIAGNOSIS — K59.00 CONSTIPATION, UNSPECIFIED CONSTIPATION TYPE: Primary | ICD-10-CM

## 2023-09-27 DIAGNOSIS — D84.821 IMMUNOCOMPROMISED STATE DUE TO DRUG THERAPY: ICD-10-CM

## 2023-09-27 DIAGNOSIS — Z15.01 BRCA1 GENE MUTATION POSITIVE: ICD-10-CM

## 2023-09-27 DIAGNOSIS — Z17.1 MALIGNANT NEOPLASM OF LOWER-OUTER QUADRANT OF RIGHT BREAST OF FEMALE, ESTROGEN RECEPTOR NEGATIVE: Primary | ICD-10-CM

## 2023-09-27 DIAGNOSIS — Z87.898 HISTORY OF DIARRHEA: ICD-10-CM

## 2023-09-27 PROCEDURE — 1160F RVW MEDS BY RX/DR IN RCRD: CPT | Mod: CPTII,S$GLB,,

## 2023-09-27 PROCEDURE — 99213 OFFICE O/P EST LOW 20 MIN: CPT | Mod: S$GLB,,, | Performed by: NURSE PRACTITIONER

## 2023-09-27 PROCEDURE — 1160F PR REVIEW ALL MEDS BY PRESCRIBER/CLIN PHARMACIST DOCUMENTED: ICD-10-PCS | Mod: CPTII,S$GLB,, | Performed by: NURSE PRACTITIONER

## 2023-09-27 PROCEDURE — 99999 PR PBB SHADOW E&M-EST. PATIENT-LVL IV: ICD-10-PCS | Mod: PBBFAC,,, | Performed by: NURSE PRACTITIONER

## 2023-09-27 PROCEDURE — 99204 OFFICE O/P NEW MOD 45 MIN: CPT | Mod: S$GLB,,,

## 2023-09-27 PROCEDURE — 3008F PR BODY MASS INDEX (BMI) DOCUMENTED: ICD-10-PCS | Mod: CPTII,S$GLB,,

## 2023-09-27 PROCEDURE — 1160F RVW MEDS BY RX/DR IN RCRD: CPT | Mod: CPTII,S$GLB,, | Performed by: NURSE PRACTITIONER

## 2023-09-27 PROCEDURE — 3008F BODY MASS INDEX DOCD: CPT | Mod: CPTII,S$GLB,,

## 2023-09-27 PROCEDURE — 3008F BODY MASS INDEX DOCD: CPT | Mod: CPTII,S$GLB,, | Performed by: NURSE PRACTITIONER

## 2023-09-27 PROCEDURE — 1159F MED LIST DOCD IN RCRD: CPT | Mod: CPTII,S$GLB,,

## 2023-09-27 PROCEDURE — 99999 PR PBB SHADOW E&M-EST. PATIENT-LVL V: ICD-10-PCS | Mod: PBBFAC,,,

## 2023-09-27 PROCEDURE — 1159F MED LIST DOCD IN RCRD: CPT | Mod: CPTII,S$GLB,, | Performed by: NURSE PRACTITIONER

## 2023-09-27 PROCEDURE — 99999 PR PBB SHADOW E&M-EST. PATIENT-LVL V: CPT | Mod: PBBFAC,,,

## 2023-09-27 PROCEDURE — 99999 PR PBB SHADOW E&M-EST. PATIENT-LVL IV: CPT | Mod: PBBFAC,,, | Performed by: NURSE PRACTITIONER

## 2023-09-27 PROCEDURE — 3074F SYST BP LT 130 MM HG: CPT | Mod: CPTII,S$GLB,, | Performed by: NURSE PRACTITIONER

## 2023-09-27 PROCEDURE — 3078F DIAST BP <80 MM HG: CPT | Mod: CPTII,S$GLB,, | Performed by: NURSE PRACTITIONER

## 2023-09-27 PROCEDURE — 99213 PR OFFICE/OUTPT VISIT, EST, LEVL III, 20-29 MIN: ICD-10-PCS | Mod: S$GLB,,, | Performed by: NURSE PRACTITIONER

## 2023-09-27 PROCEDURE — 1159F PR MEDICATION LIST DOCUMENTED IN MEDICAL RECORD: ICD-10-PCS | Mod: CPTII,S$GLB,, | Performed by: NURSE PRACTITIONER

## 2023-09-27 PROCEDURE — 3008F PR BODY MASS INDEX (BMI) DOCUMENTED: ICD-10-PCS | Mod: CPTII,S$GLB,, | Performed by: NURSE PRACTITIONER

## 2023-09-27 PROCEDURE — 3074F PR MOST RECENT SYSTOLIC BLOOD PRESSURE < 130 MM HG: ICD-10-PCS | Mod: CPTII,S$GLB,, | Performed by: NURSE PRACTITIONER

## 2023-09-27 PROCEDURE — 1160F PR REVIEW ALL MEDS BY PRESCRIBER/CLIN PHARMACIST DOCUMENTED: ICD-10-PCS | Mod: CPTII,S$GLB,,

## 2023-09-27 PROCEDURE — 3078F PR MOST RECENT DIASTOLIC BLOOD PRESSURE < 80 MM HG: ICD-10-PCS | Mod: CPTII,S$GLB,, | Performed by: NURSE PRACTITIONER

## 2023-09-27 PROCEDURE — 1159F PR MEDICATION LIST DOCUMENTED IN MEDICAL RECORD: ICD-10-PCS | Mod: CPTII,S$GLB,,

## 2023-09-27 PROCEDURE — 99204 PR OFFICE/OUTPT VISIT, NEW, LEVL IV, 45-59 MIN: ICD-10-PCS | Mod: S$GLB,,,

## 2023-09-27 NOTE — PROGRESS NOTES
Subjective:       Patient ID: Arabella Catalan is a 43 y.o. female Body mass index is 20.93 kg/m².    Chief Complaint: Nausea and Constipation    This patient is new to me.  Referring Provider: Dr. Monica Min for BRCA1 gene mutation positive & invasive ductal carcinoma of breast, female, right.     GI Problem  The primary symptoms include fatigue, abdominal pain, nausea (intermittent nausea associated with chemo currently receiving for breast cancer; in the past she was taking Zofran p.r.n., which caused constipation but improved nausea; not currently taking Zofran or Phenergan) and diarrhea (resolved; 1 episode of diarrhea that occurred after eating red beans and rice; stool was rated 7 on [place] scale when diarrhea was present). Primary symptoms do not include fever, weight loss, vomiting, melena, hematemesis, jaundice, hematochezia or dysuria.   Onset: occurred Saturday when she was having fluctuations in bowel habits. The abdominal pain has been resolved since its onset. The abdominal pain is located in the LLQ, suprapubic region and RLQ. The abdominal pain does not radiate. The severity of the abdominal pain is 0/10 (described as a lower abdominal cramping; triggers:  Red beans and rice). The abdominal pain is relieved by bowel movements.   The illness is also significant for bloating (occurs when constipated) and constipation (improved since starting Dulcolax stool softener p.r.n.; currently having 1 BM every other day rated stool 4 on [place] scale; stool was rated 1 when constipation was present). The illness does not include chills, dysphagia or odynophagia. Associated symptoms comments: Patient currently receiving chemotherapy with Paclitaxel and carboplatin  and Keytruda every 3 weeks for breast cancer to the right breast. Since starting chemotherapy & taking medications including Zofran she has noticed a fluctuation in bowel habits. BRCA 1 gene mutation positive. Significant associated  medical issues include hemorrhoids. Associated medical issues do not include inflammatory bowel disease, GERD, gallstones, liver disease, alcohol abuse, PUD, gastric bypass, bowel resection, irritable bowel syndrome or diverticulitis. Associated medical issues comments: Patient denies family history of colon cancer, but does report her mother may have had a colon polyp (unsure).     Review of Systems   Constitutional:  Positive for fatigue. Negative for activity change, appetite change, chills, diaphoresis, fever, unexpected weight change and weight loss.   HENT:  Negative for sore throat and trouble swallowing.    Respiratory:  Negative for cough, choking and shortness of breath.    Cardiovascular:  Negative for chest pain.   Gastrointestinal:  Positive for abdominal pain, bloating (occurs when constipated), constipation (improved since starting Dulcolax stool softener p.r.n.; currently having 1 BM every other day rated stool 4 on [place] scale; stool was rated 1 when constipation was present), diarrhea (resolved; 1 episode of diarrhea that occurred after eating red beans and rice; stool was rated 7 on [place] scale when diarrhea was present) and nausea (intermittent nausea associated with chemo currently receiving for breast cancer; in the past she was taking Zofran p.r.n., which caused constipation but improved nausea; not currently taking Zofran or Phenergan). Negative for abdominal distention, anal bleeding, blood in stool, dysphagia, hematemesis, hematochezia, jaundice, melena, rectal pain and vomiting.   Genitourinary:  Negative for dysuria.       Patient's last menstrual period was 09/05/2023 (exact date).  Past Medical History:   Diagnosis Date    Abnormal Pap smear of cervix     HPV (human papilloma virus) infection     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     Past Surgical History:   Procedure Laterality Date    BREAST BIOPSY Right 2015    benign    BREAST BIOPSY Right 07/26/2023    INSERTION OF  TUNNELED CENTRAL VENOUS CATHETER (CVC) WITH SUBCUTANEOUS PORT N/A 08/15/2023    Procedure: [id number];  Surgeon: Skyler Aldrich MD;  Location: [facility] OR;  Service: General;  Laterality: N/A;    OOPHORECTOMY Right 2009    stage 1a dysgerminoma    VAGINAL DELIVERY  12/13/2016     Family History   Problem Relation Age of Onset    Breast cancer Mother 38    Cervical cancer Mother     Colon cancer Neg Hx     Colon polyps Neg Hx     Esophageal cancer Neg Hx     Stomach cancer Neg Hx     Inflammatory bowel disease Neg Hx      Social History     Tobacco Use    Smoking status: Former     Types: Cigarettes   Substance Use Topics    Alcohol use: Yes     Comment: occasionally    Drug use: No     Wt Readings from Last 10 Encounters:   09/27/23 53.6 kg (118 lb 2.7 oz)   09/21/23 52.3 kg (115 lb 4.8 oz)   09/20/23 52.3 kg (115 lb 4.8 oz)   09/14/23 52.6 kg (115 lb 15.4 oz)   09/13/23 52.3 kg (115 lb 4.8 oz)   09/11/23 52.6 kg (115 lb 15.4 oz)   09/07/23 53.8 kg (118 lb 9.7 oz)   09/06/23 53.8 kg (118 lb 9.7 oz)   08/31/23 54 kg (119 lb 0.8 oz)   08/31/23 54 kg (119 lb 0.8 oz)     Lab Results   Component Value Date    WBC 6.37 09/26/2023    HGB 11.6 (L) 09/26/2023    HCT 35.6 (L) 09/26/2023    MCV 95 09/26/2023     09/26/2023     CMP  Sodium   Date Value Ref Range Status   09/26/2023 140 136 - 145 mmol/L Final     Potassium   Date Value Ref Range Status   09/26/2023 3.9 3.5 - 5.1 mmol/L Final     Chloride   Date Value Ref Range Status   09/26/2023 104 95 - 110 mmol/L Final     CO2   Date Value Ref Range Status   09/26/2023 25 23 - 29 mmol/L Final     Glucose   Date Value Ref Range Status   09/26/2023 84 70 - 110 mg/dL Final     BUN   Date Value Ref Range Status   09/26/2023 12 6 - 20 mg/dL Final     Creatinine   Date Value Ref Range Status   09/26/2023 0.6 0.5 - 1.4 mg/dL Final     Calcium   Date Value Ref Range Status   09/26/2023 9.0 8.7 - 10.5 mg/dL Final     Total Protein   Date Value Ref Range Status    09/26/2023 7.2 6.0 - 8.4 g/dL Final     Albumin   Date Value Ref Range Status   09/26/2023 4.1 3.5 - 5.2 g/dL Final     Total Bilirubin   Date Value Ref Range Status   09/26/2023 0.3 0.1 - 1.0 mg/dL Final     Comment:     For infants and newborns, interpretation of results should be based  on gestational age, weight and in agreement with clinical  observations.    Premature Infant recommended reference ranges:  Up to 24 hours.............<8.0 mg/dL  Up to 48 hours............<12.0 mg/dL  3-5 days..................<15.0 mg/dL  6-29 days.................<15.0 mg/dL       Alkaline Phosphatase   Date Value Ref Range Status   09/26/2023 44 (L) 55 - 135 U/L Final     AST   Date Value Ref Range Status   09/26/2023 34 10 - 40 U/L Final     ALT   Date Value Ref Range Status   09/26/2023 26 10 - 44 U/L Final     Anion Gap   Date Value Ref Range Status   09/26/2023 11 8 - 16 mmol/L Final     eGFR if    Date Value Ref Range Status   01/04/2010 >60 >60 mL/min Final     Comment:     Estimated glomerular filtration rate (eGFR) is normalized to an  average body surface area of 1.73 square meters.  The calculation  used to obtain the eGFR is the adjusted MDRD equation, which factors  patient sex, age, race, and creatinine result.  Since race is unknown  in our information system, the eGFR values for -American  and Non--American patients are given for each creatinine  result.       eGFR if non    Date Value Ref Range Status   01/04/2010 >60 >60 mL/min Final     Lab Results   Component Value Date    TSH 1.052 09/26/2023     Reviewed prior medical records including radiology report of  MRI  abdomen 08/18/2023 & endoscopy history (see surgical history).    Objective:      Physical Exam  Vitals and nursing note reviewed.   Constitutional:       General: She is not in acute distress.     Appearance: Normal appearance. She is normal weight. She is not ill-appearing.   HENT:      Mouth/Throat:       Lips: Pink. No lesions.   Cardiovascular:      Rate and Rhythm: Normal rate.      Pulses: Normal pulses.      Heart sounds: Normal heart sounds.   Pulmonary:      Effort: Pulmonary effort is normal. No respiratory distress.      Breath sounds: Normal breath sounds.   Abdominal:      General: Abdomen is flat. Bowel sounds are normal. There is no distension or abdominal bruit. There are no signs of injury.      Palpations: Abdomen is soft. There is no shifting dullness, fluid wave, hepatomegaly, splenomegaly or mass.      Tenderness: There is no abdominal tenderness. There is no guarding or rebound. Negative signs include Martini's sign, Rovsing's sign and McBurney's sign.   Skin:     General: Skin is warm and dry.      Coloration: Skin is not jaundiced or pale.   Neurological:      Mental Status: She is alert and oriented to person, place, and time.   Psychiatric:         Attention and Perception: Attention normal.         Mood and Affect: Mood normal.         Speech: Speech normal.         Behavior: Behavior normal.         Assessment:       1. Constipation, unspecified constipation type    2. History of diarrhea    3. Change in bowel habits    4. Nausea    5. History of abdominal cramping    6. Abdominal bloating    7. History of hemorrhoids    8. Liver lesion    9. BRCA1 gene mutation positive    10. Invasive ductal carcinoma of breast, female, right        Plan:       Constipation, unspecified constipation type  - schedule Colonoscopy, discussed procedure with the patient, including risks and benefits, patient verbalized understanding  - Recommend daily exercise as tolerated, adequate water intake (six 8-oz glasses of water daily), and high fiber diet. OTC fiber supplements are recommended if diet does not reach daily fiber goal (20-30 grams daily), such as Metamucil, Citrucel, or FiberCon (take as directed, separate from other oral medications by >2 hours).  -Recommend taking an OTC stool softener such as  Colace as directed to avoid hard stools and straining with bowel movements PRN  -Recommend trying OTC MiraLax once daily (17g PO) as directed  - If no improvement with above recommendations, try intermittently dosed Dulcolax OTC as directed (every 3-4  days) PRN to facilitate bowel movements  -If still no improvement with these measures, call/follow-up    History of diarrhea  - resolved  - occurred once after eating red beans and rice  - if diarrhea returns will order stool studies  - recommend avoid known triggers    Change in bowel habits  - schedule Colonoscopy, discussed procedure with the patient, including risks and benefits, patient verbalized understanding    Nausea  - schedule EGD, discussed procedure with patient, including risks and benefits, patient verbalized understanding  -associated with chemo  - zofran improves nausea, but caused constipation    History of abdominal cramping  - resolved after BM habits improved  - schedule Colonoscopy, discussed procedure with the patient, including risks and benefits, patient verbalized understanding  - consider abdominal imaging if pain returns    Abdominal bloating  - schedule EGD, discussed procedure with patient, including risks and benefits, patient verbalized understanding  - schedule Colonoscopy, discussed procedure with the patient, including risks and benefits, patient verbalized understanding  - recommend OTC simethicone as directed, such as Phazyme or Gas-x  - recommend low gas diet: Reduce or eliminate these foods from your diet: Broccoli, Cauliflower, [place] sprouts, Cabbage, Cooked dried beans, Carbonated beverages (sparkling water, soda, beer, champagne)  Other Causes Of Excess Gas Include:   1) EATING TOO FAST or TALKING WHILE YOU CHEW may cause you to swallow air. This increases the amount of gas in the stomach and may worsen your symptoms.  --> Chew each mouthful completely before swallowing. Take your time.  2) OVEREATING may increase the  feeling of being bloated and cause more gas.  --> When you are full, stop eating.  3) CONSTIPATION can increase the amount of normal intestinal gas.  --> Avoid constipation by increasing the amount of fiber in your diet by including whole cereal grains, fresh vegetables (except those in the above list) and fresh fruits. High-fiber foods absorb water and carry it out of the body. When increasing the amount of fiber in your diet, you also need to increase the amount of water that you drink. You should drink at least eight 8-ounce glasses of water (two quarts) per day.  - testing for H. Pylori typically performed during EGD     History of hemorrhoids  - schedule Colonoscopy, discussed procedure with the patient, including risks and benefits, patient verbalized understanding  - avoid constipation and straining with bowel movements; try using an OTC stool softener as directed and increase fiber in diet (20-30 grams daily)/OTC fiber supplement such as metamucil (take as directed)  - recommend SITZ baths  - possible referral to colorectal surgery if symptoms persist    Liver lesion   -     Ambulatory referral/consult to Hepatology; Future; Expected date: 10/04/2023    BRCA1 gene mutation positive & Invasive ductal carcinoma of breast, female, right  -  follow-up with Hematology Oncology for continued evaluation and management    Follow up in about 4 weeks (around 10/25/2023), or if symptoms worsen or fail to improve.      If no improvement in symptoms or symptoms worsen, call/follow-up at clinic or go to ER.        45 minutes of total time spent on the encounter, which includes face to face time and non-face to face time preparing to see the patient (eg, review of tests), Obtaining and/or reviewing separately obtained history, Documenting clinical information in the electronic or other health record, Independently interpreting results (not separately reported) and communicating results to the patient/family/caregiver, or  Care coordination (not separately reported).     A dictation software program was used for this note. Please expect some simple typographical  errors in this note.

## 2023-09-27 NOTE — PROGRESS NOTES
PATIENT: Arabella Catalan  MRN: [id number]  DATE: 2023      Diagnosis:   1. Malignant neoplasm of lower-outer quadrant of right breast of female, estrogen receptor negative    2. 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 Catalan is a 43 y.o. female who returns for follow up of triple negative breast cancer     Triple Negative Right Sided Breast Cancer   Date of Original Diagnosis: 23  Original Stage: [id number] Stage 2B grade 3 Triple neg   Current Sites of Disease: right breast   Current Goals of Therapy: curative   Current Therapy: Keynote 522 Neoadjuvant chemotherapy     Oncologic History/History of Present Illness:   Per Dr. Rivas Note:   As previously documented she started screening mammograms for a few years after her ovarian cancer , in her late s. Routine pelvic exam revealed an ovarian cancer, s/p right oophorectomy for dysgerminoma.    Resumed routine mammograms at 42, had a normal screening mammogram in May 2023 but felt a lump in the right breast in July. Further imaging showed 2 lesions, both > 2 cm , both biopsied to be G3 IDC, ER/MS and Her 2 jose eduardo negative.   Menarche at age 14 year old. . Age at first live birth 31. Did  breast feed 2 year and 6 months . LMP  . OCP-yes 20 years ago  Menopause at none. HRT-none      Genetics completed- + BRCA1     Family history cancer:  Mother breast cancer at 38 and cervical cancer  Smoker Pk/yr quit date , smoked around 0.5 pack a day     Today 2023  She is feeling well. Reports dealing with constipation & diarrhea back & forth..  No rash.  No nausea this past week. Appetite is good.  Hydrating well.  Reports that she saw GI due to +BRCA1; they will want to do an UGI & Colonoscopy.  Will wait until after chemo.  Denies HA, CP, cough, SOB, abd pain, diarrhea, constipation, N/V, dysuria, hematuria, swelling, new lumps or bumps. No fevers, chills.      Oncology History   Breast cancer of lower-outer quadrant of right female breast   8/7/2023 Initial Diagnosis    Breast cancer of lower-outer quadrant of right female breast     8/7/2023 Cancer Staged    Staging form: Breast, AJCC 8th Edition  - Clinical stage from 8/7/2023: Stage IIB (cT2, cN0, cM0, G3, ER-, UT-, HER2-)     Invasive ductal carcinoma of breast, female, right   8/7/2023 Initial Diagnosis    Invasive ductal carcinoma of breast, female, right     8/24/2023 -  Chemotherapy    Treatment Summary   Plan Name: OP PEMBROLIZUMAB WITH WEEKLY PACLITAXEL CARBOPLATIN (AUC 1.5) FOLLOWED BY PEMBROLIZUMAB DOXORUBICIN CYCLOPHOSPHAMIDE FOLLOWED BY PEMBROLIZUMAB 200MG Q3W  Treatment Goal: Curative  Status: Active  Start Date: 8/24/2023  End Date: 7/25/2024 (Planned)  Provider: Corina Mcgovern MD  Chemotherapy: DOXOrubicin chemo injection 94 mg, 60 mg/m2, Intravenous, Clinic/HOD 1 time, 0 of 4 cycles  CARBOplatin (PARAPLATIN) 175 mg in sodium chloride 0.9% 302.5 mL chemo infusion, 175 mg, Intravenous, Clinic/HOD 1 time, 2 of 4 cycles  Administration: 175 mg (8/24/2023), 175 mg (8/31/2023), 200 mg (9/14/2023), 175 mg (9/7/2023), 175 mg (9/21/2023)  cycloPHOSphamide 600 mg/m2 = 940 mg in sodium chloride 0.9% 254.7 mL chemo infusion, 600 mg/m2, Intravenous, Clinic/HOD 1 time, 0 of 4 cycles  PACLitaxeL (TAXOL) 80 mg/m2 = 126 mg in sodium chloride 0.9% 250 mL chemo infusion, 80 mg/m2 = 126 mg, Intravenous, Clinic/HOD 1 time, 2 of 4 cycles  Administration: 126 mg (8/24/2023), 126 mg (8/31/2023), 126 mg (9/14/2023), 126 mg (9/7/2023), 126 mg (9/21/2023)        Past Medical History:   Past Medical History:   Diagnosis Date    Abnormal Pap smear of cervix     HPV (human papilloma virus) infection     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       Past Surgical HIstory:   Past Surgical History:   Procedure Laterality Date    BREAST BIOPSY Right 2015    benign    BREAST BIOPSY Right 07/26/2023    INSERTION OF TUNNELED  CENTRAL VENOUS CATHETER (CVC) WITH SUBCUTANEOUS PORT N/A 08/15/2023    Procedure: [id number];  Surgeon: Skyler Aldrich MD;  Location: [facility] OR;  Service: General;  Laterality: N/A;    OOPHORECTOMY Right 2009    stage 1a dysgerminoma    VAGINAL DELIVERY  12/13/2016       Family History:   Family History   Problem Relation Age of Onset    Breast cancer Mother 38    Cervical cancer Mother     Colon cancer Neg Hx     Colon polyps Neg Hx     Esophageal cancer Neg Hx     Stomach cancer Neg Hx     Inflammatory bowel disease Neg Hx        Social History:  reports that she has quit smoking. Her smoking use included cigarettes. She does not have any smokeless tobacco history on file. She reports current alcohol use. She reports that she does not use drugs.    Allergies:  Review of patient's allergies indicates:   Allergen Reactions    Codeine Anaphylaxis       Medications:  Current Outpatient Medications   Medication Sig Dispense Refill    dexAMETHasone (DECADRON) 4 MG Tab Take 1 tab as needed twice a day for nausea that is persistent despite zofran. Take morning and afternoon, no later than 3pm. (Patient not taking: Reported on 9/27/2023) 40 tablet 3    hydrocortisone (PROCTOCORT) 1 % crpe Apply to affected area two times a day 28.4 g 1    LIDOcaine-prilocaine (EMLA) cream Apply topically as needed (apply 30 to 60 minutes prior to chemo). 30 g 2    OLANZapine (ZYPREXA) 5 MG tablet Take 1 tablet (5 mg total) by mouth every evening. Take as directed on days 1-4, 8-10, and 15-17 of your chemotherapy cycles 1-4. Take on days 1, 2, and 3 of cycles 5-8. (Patient not taking: Reported on 9/27/2023) 10 tablet 11    ondansetron (ZOFRAN) 8 MG tablet Take 1 tablet (8 mg total) by mouth every 8 (eight) hours as needed for Nausea. 60 tablet 2    ondansetron (ZOFRAN-ODT) 8 MG TbDL Take 1 tablet (8 mg total) by mouth every 6 (six) hours as needed (nausea). (Patient not taking: Reported on 9/14/2023) 60 tablet 3    promethazine  "(PHENERGAN) 12.5 MG Tab (Take 1-2 tabs every 6 hours as needed for nausea persistent despite zofran) (Patient not taking: Reported on 9/27/2023) 40 tablet 3     No current facility-administered medications for this visit.     Facility-Administered Medications Ordered in Other Visits   Medication Dose Route Frequency Provider Last Rate Last Admin    HYDROmorphone injection 0.5 mg  0.5 mg Intravenous Q5 Min PRN Rosales Sousa MD        ondansetron injection 4 mg  4 mg Intravenous Daily PRN Rosales Sousa MD        prochlorperazine injection Soln 10 mg  10 mg Intravenous Q30 Min PRN Rosales Sousa MD           Review of Systems   Constitutional:  Negative for appetite change, chills, fatigue, fever and unexpected weight change.   HENT:  Negative for mouth sores and trouble swallowing.    Eyes:  Negative for visual disturbance.   Respiratory:  Negative for cough and shortness of breath.    Cardiovascular:  Negative for chest pain and leg swelling.   Gastrointestinal:  Negative for abdominal pain, constipation, diarrhea and nausea.   Genitourinary:  Negative for dysuria and frequency.   Musculoskeletal:  Negative for arthralgias and back pain.   Skin:  Negative for rash.   Neurological:  Negative for headaches.   Hematological:  Negative for adenopathy.   Psychiatric/Behavioral:  The patient is not nervous/anxious.        Objective:      Vitals:   Weight:  Gain of 3 pounds in 1 week  Vitals:    09/27/23 1313   BP: 115/78   BP Location: Left arm   Patient Position: Sitting   BP Method: Medium (Automatic)   Pulse: 105   Resp: 18   Temp: 97.8 °F (36.6 °C)   TempSrc: Temporal   SpO2: 100%   Weight: 53.6 kg (118 lb 2.7 oz)   Height: 5' 3" (1.6 m)     BMI: There is no height or weight on file to calculate BMI.    Physical Exam  Vitals reviewed.   Constitutional:       General: She is not in acute distress.     Appearance: She is not diaphoretic.   HENT:      Head: Normocephalic and atraumatic.      " Mouth/Throat:      Mouth: Mucous membranes are moist.      Pharynx: No oropharyngeal exudate.   Eyes:      General: No scleral icterus.  Cardiovascular:      Rate and Rhythm: Normal rate and regular rhythm.      Heart sounds: Normal heart sounds. No murmur heard.  Pulmonary:      Effort: Pulmonary effort is normal. No respiratory distress.      Breath sounds: No wheezing.   Abdominal:      General: There is no distension.   Musculoskeletal:      Cervical back: No tenderness.      Right lower leg: No edema.      Left lower leg: No edema.   Lymphadenopathy:      Cervical: No cervical adenopathy.   Skin:     General: Skin is warm.      Coloration: Skin is not jaundiced.      Findings: No rash.   Neurological:      Mental Status: She is alert and oriented to person, place, and time.   Psychiatric:         Mood and Affect: Mood normal.         Behavior: Behavior normal.         Laboratory Data:  Lab Results   Component Value Date    WBC 6.37 09/26/2023    HGB 11.6 (L) 09/26/2023    HCT 35.6 (L) 09/26/2023    MCV 95 09/26/2023     09/26/2023      ANC = 2.9    CMP  Sodium   Date Value Ref Range Status   09/26/2023 140 136 - 145 mmol/L Final     Potassium   Date Value Ref Range Status   09/26/2023 3.9 3.5 - 5.1 mmol/L Final     Chloride   Date Value Ref Range Status   09/26/2023 104 95 - 110 mmol/L Final     CO2   Date Value Ref Range Status   09/26/2023 25 23 - 29 mmol/L Final     Glucose   Date Value Ref Range Status   09/26/2023 84 70 - 110 mg/dL Final     BUN   Date Value Ref Range Status   09/26/2023 12 6 - 20 mg/dL Final     Creatinine   Date Value Ref Range Status   09/26/2023 0.6 0.5 - 1.4 mg/dL Final     Calcium   Date Value Ref Range Status   09/26/2023 9.0 8.7 - 10.5 mg/dL Final     Total Protein   Date Value Ref Range Status   09/26/2023 7.2 6.0 - 8.4 g/dL Final     Albumin   Date Value Ref Range Status   09/26/2023 4.1 3.5 - 5.2 g/dL Final     Total Bilirubin   Date Value Ref Range Status   09/26/2023  0.3 0.1 - 1.0 mg/dL Final     Comment:     For infants and newborns, interpretation of results should be based  on gestational age, weight and in agreement with clinical  observations.    Premature Infant recommended reference ranges:  Up to 24 hours.............<8.0 mg/dL  Up to 48 hours............<12.0 mg/dL  3-5 days..................<15.0 mg/dL  6-29 days.................<15.0 mg/dL       Alkaline Phosphatase   Date Value Ref Range Status   09/26/2023 44 (L) 55 - 135 U/L Final     AST   Date Value Ref Range Status   09/26/2023 34 10 - 40 U/L Final     ALT   Date Value Ref Range Status   09/26/2023 26 10 - 44 U/L Final     Anion Gap   Date Value Ref Range Status   09/26/2023 11 8 - 16 mmol/L Final     eGFR   Date Value Ref Range Status   09/26/2023 >60.0 >60 mL/min/1.73 m^2 Final     Lab Results   Component Value Date    IRON 138 09/26/2023    TRANSFERRIN 226 09/26/2023    TIBC 334 09/26/2023    FESATURATED 41 09/26/2023      Lab Results   Component Value Date    FERRITIN 122 09/26/2023     UPT:  Negative  Imaging:   Assessment:       1. Malignant neoplasm of lower-outer quadrant of right breast of female, estrogen receptor negative    2. Immunocompromised state due to drug therapy           Plan:   Triple Negative Right Sided Breast Cancer   Date of Original Diagnosis: 7/26/23  Original Stage: [id number] Stage 2B grade 3 Triple neg   Current Sites of Disease: right breast   Current Goals of Therapy: curative   Current Therapy: Keynote 522 Neoadjuvant chemotherapy  BRCA positive- to meet with genetics   -She has also been enrolled in the Protocol [id number], Disparities in Results of Immune Checkpoint Inhibitor Treatment (DIRECT): A Prospective Cohort Study of Cancer Survivors Treated with anti-PD-L1 Immunotherapy in a Community Oncology Setting.   -Clinically is having a great response neoadjuvant chemotherapy, prior palpable masses are no longer palpable  -Proceed with C2D15, holding Pembro with C2   -Follow up in  one week with labs prior to appointment as scheduled.    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  -Patient at risk for infection   -No current signs/symptoms of infection  -Continue to monitor closely while on therapy     IO induced rash  -grade 1 involving 30% the body after C1D1, no sloughing or breakdown, no pupules or pustules, +puritis  -RX for hydrocortisone cream, counseled about emollients  -Resolved     IO induced hepatitis  -grade 2, improving without steroids but still elevated  -holding pembro for cycle 2  -Completed steroids, LFTs are back to baseline   -Monitor      CINV  -Taking Promethazine, ondansetron PRN   -Managed, continue to monitor      anxiety about health  -referral sent to onc psych; patient declines   -Monitor     Chemotherapy induced anemia   -Stable with Hgb 11.6 g/dL today   -Patient states she recalls being told she has thalassemia as child, will check Hb electrophoresis with labs next week  -Will check Ferritin, Iron/TIBC with labs next week as well    -Monitor     Patient queried and all questions were answered.   Assessment/Plan reviewed and approved by Dr. Sexton.    20 minutes were spent in coordination of patient's care, record review and counseling.  Route Chart for Scheduling    Med Onc Chart Routing      Follow up with physician    Follow up with SAUL . F/u in 1 week with Laine as scheduled on 10/04 with labs for infusion 10/05 C3D1   Infusion scheduling note   Proceed with C2D15 tomorrow   Injection scheduling note    Labs    Imaging    Pharmacy appointment    Other referrals                  Treatment Plan Information   OP PEMBROLIZUMAB WITH WEEKLY PACLITAXEL CARBOPLATIN (AUC 1.5) FOLLOWED BY PEMBROLIZUMAB DOXORUBICIN CYCLOPHOSPHAMIDE FOLLOWED BY PEMBROLIZUMAB 200MG Q3W   Corina Mcgovern MD   Upcoming Treatment Dates - OP PEMBROLIZUMAB WITH WEEKLY PACLITAXEL CARBOPLATIN (AUC 1.5) FOLLOWED BY PEMBROLIZUMAB DOXORUBICIN CYCLOPHOSPHAMIDE FOLLOWED BY PEMBROLIZUMAB 200MG  Q3W    9/28/2023       Pre-Medications       diphenhydrAMINE (BENADRYL) 50 mg in NS 50 mL IVPB       famotidine (PF) injection 20 mg       Chemotherapy       PACLitaxeL (TAXOL) 80 mg/m2 = 126 mg in sodium chloride 0.9% 250 mL chemo infusion       CARBOplatin (PARAPLATIN) 200 mg in sodium chloride 0.9% 270 mL chemo infusion       Antiemetics       LORazepam (ATIVAN) injection 0.5 mg       palonosetron 0.25mg/dexAMETHasone 12mg in NS IVPB 0.25 mg 50 mL  10/5/2023       Pre-Medications       diphenhydrAMINE (BENADRYL) 50 mg in NS 50 mL IVPB       famotidine (PF) injection 20 mg       Chemotherapy       PACLitaxeL (TAXOL) 80 mg/m2 = 126 mg in sodium chloride 0.9% 250 mL chemo infusion       CARBOplatin (PARAPLATIN) 175 mg in sodium chloride 0.9% 267.5 mL chemo infusion       Antiemetics       aprepitant (CINVANTI) injection 130 mg       LORazepam (ATIVAN) injection 0.5 mg       palonosetron 0.25mg/dexAMETHasone 12mg in NS IVPB 0.25 mg 50 mL       Immunotherapy       pembrolizumab (KEYTRUDA) 200 mg in sodium chloride 0.9% SolP 108 mL infusion  10/12/2023       Pre-Medications       diphenhydrAMINE (BENADRYL) 50 mg in NS 50 mL IVPB       famotidine (PF) injection 20 mg       Chemotherapy       PACLitaxeL (TAXOL) 80 mg/m2 = 126 mg in sodium chloride 0.9% 250 mL chemo infusion       CARBOplatin (PARAPLATIN) 200 mg in sodium chloride 0.9% 270 mL chemo infusion       Antiemetics       LORazepam (ATIVAN) injection 0.5 mg       palonosetron 0.25mg/dexAMETHasone 12mg in NS IVPB 0.25 mg 50 mL  10/19/2023       Pre-Medications       diphenhydrAMINE (BENADRYL) 50 mg in NS 50 mL IVPB       famotidine (PF) injection 20 mg       Chemotherapy       PACLitaxeL (TAXOL) 80 mg/m2 = 126 mg in sodium chloride 0.9% 250 mL chemo infusion       CARBOplatin (PARAPLATIN) 200 mg in sodium chloride 0.9% 270 mL chemo infusion       Antiemetics       LORazepam (ATIVAN) injection 0.5 mg       palonosetron 0.25mg/dexAMETHasone 12mg in NS IVPB 0.25 mg  50 mL

## 2023-09-28 ENCOUNTER — INFUSION (OUTPATIENT)
Dept: INFUSION THERAPY | Facility: HOSPITAL | Age: 43
End: 2023-09-28
Attending: INTERNAL MEDICINE
Payer: COMMERCIAL

## 2023-09-28 VITALS
HEIGHT: 63 IN | WEIGHT: 118.19 LBS | BODY MASS INDEX: 20.94 KG/M2 | HEART RATE: 88 BPM | RESPIRATION RATE: 16 BRPM | DIASTOLIC BLOOD PRESSURE: 70 MMHG | TEMPERATURE: 97 F | SYSTOLIC BLOOD PRESSURE: 105 MMHG

## 2023-09-28 DIAGNOSIS — C50.911 INVASIVE DUCTAL CARCINOMA OF BREAST, FEMALE, RIGHT: Primary | ICD-10-CM

## 2023-09-28 PROCEDURE — 96367 TX/PROPH/DG ADDL SEQ IV INF: CPT | Mod: PN

## 2023-09-28 PROCEDURE — 96375 TX/PRO/DX INJ NEW DRUG ADDON: CPT

## 2023-09-28 PROCEDURE — 96417 CHEMO IV INFUS EACH ADDL SEQ: CPT | Mod: PN

## 2023-09-28 PROCEDURE — 25000003 PHARM REV CODE 250: Mod: PN | Performed by: INTERNAL MEDICINE

## 2023-09-28 PROCEDURE — A4216 STERILE WATER/SALINE, 10 ML: HCPCS | Mod: PN | Performed by: INTERNAL MEDICINE

## 2023-09-28 PROCEDURE — 96413 CHEMO IV INFUSION 1 HR: CPT | Mod: PN

## 2023-09-28 PROCEDURE — 63600175 PHARM REV CODE 636 W HCPCS: Mod: PN | Performed by: INTERNAL MEDICINE

## 2023-09-28 RX ORDER — LORAZEPAM 2 MG/ML
0.5 INJECTION INTRAMUSCULAR
Status: DISCONTINUED | OUTPATIENT
Start: 2023-09-28 | End: 2023-09-28 | Stop reason: HOSPADM

## 2023-09-28 RX ORDER — SODIUM CHLORIDE 0.9 % (FLUSH) 0.9 %
10 SYRINGE (ML) INJECTION
Status: DISCONTINUED | OUTPATIENT
Start: 2023-09-28 | End: 2023-09-28 | Stop reason: HOSPADM

## 2023-09-28 RX ORDER — FAMOTIDINE 10 MG/ML
20 INJECTION INTRAVENOUS
Status: COMPLETED | OUTPATIENT
Start: 2023-09-28 | End: 2023-09-28

## 2023-09-28 RX ADMIN — PALONOSETRON 0.25 MG: 0.05 INJECTION, SOLUTION INTRAVENOUS at 09:09

## 2023-09-28 RX ADMIN — DIPHENHYDRAMINE HYDROCHLORIDE 50 MG: 50 INJECTION, SOLUTION INTRAMUSCULAR; INTRAVENOUS at 10:09

## 2023-09-28 RX ADMIN — Medication 10 ML: at 02:09

## 2023-09-28 RX ADMIN — CARBOPLATIN 200 MG: 10 INJECTION, SOLUTION INTRAVENOUS at 11:09

## 2023-09-28 RX ADMIN — SODIUM CHLORIDE: 9 INJECTION, SOLUTION INTRAVENOUS at 09:09

## 2023-09-28 RX ADMIN — FAMOTIDINE 20 MG: 10 INJECTION INTRAVENOUS at 10:09

## 2023-09-28 RX ADMIN — PACLITAXEL 126 MG: 6 INJECTION, SOLUTION, CONCENTRATE INTRAVENOUS at 10:09

## 2023-09-28 RX ADMIN — LORAZEPAM 0.5 MG: 2 INJECTION INTRAMUSCULAR; INTRAVENOUS at 09:09

## 2023-09-28 NOTE — PLAN OF CARE
Pt tolerated Taxol/Carbo infusion well.  Dignicap done as directed per .  No s/s of infusion reaction.  Instructed to call MD with any problems

## 2023-09-30 ENCOUNTER — PATIENT MESSAGE (OUTPATIENT)
Dept: ADMINISTRATIVE | Facility: OTHER | Age: 43
End: 2023-09-30
Payer: COMMERCIAL

## 2023-10-01 ENCOUNTER — PATIENT MESSAGE (OUTPATIENT)
Dept: ADMINISTRATIVE | Facility: OTHER | Age: 43
End: 2023-10-01
Payer: COMMERCIAL

## 2023-10-02 ENCOUNTER — TELEPHONE (OUTPATIENT)
Dept: HEMATOLOGY/ONCOLOGY | Facility: CLINIC | Age: 43
End: 2023-10-02
Payer: COMMERCIAL

## 2023-10-03 ENCOUNTER — PATIENT MESSAGE (OUTPATIENT)
Dept: ADMINISTRATIVE | Facility: OTHER | Age: 43
End: 2023-10-03
Payer: COMMERCIAL

## 2023-10-03 ENCOUNTER — TELEPHONE (OUTPATIENT)
Dept: GASTROENTEROLOGY | Facility: CLINIC | Age: 43
End: 2023-10-03
Payer: COMMERCIAL

## 2023-10-03 NOTE — TELEPHONE ENCOUNTER
----- Message from Toyin Kelly sent at 10/3/2023  8:45 AM CDT -----      Who Called: pt    Would the patient rather a call back or a response via [facility]? Call back    Best Call Back Number: [phone]      Additional Information: Need to cancel procedure that is scheduled for November 28, 2023      Please call Back to advise. Thanks!

## 2023-10-03 NOTE — PROGRESS NOTES
PATIENT: Arabella Catalan  MRN: [id number]  DATE: 10/4/2023      Diagnosis:   1. Malignant neoplasm of lower-outer quadrant of right breast of female, estrogen receptor negative    2. 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 Drug-induced hepatitis    4. CINV (chemotherapy-induced nausea and vomiting)    5. Anxiety about health    6. Antineoplastic chemotherapy induced anemia        Chief Complaint: Malignant neoplasm of lower-outer quadrant of right breast  (Chemo clear/)    Subjective:   HPI: Ms. Catalan is a 43 y.o. female who returns for follow up of triple negative breast cancer     Triple Negative Right Sided Breast Cancer   Date of Original Diagnosis: 23  Original Stage: [id number] Stage 2B grade 3 Triple neg   Current Sites of Disease: right breast   Current Goals of Therapy: curative   Current Therapy: Keynote 522 Neoadjuvant chemotherapy     Oncologic History/History of Present Illness:   Per Dr. Rivas Note:   As previously documented she started screening mammograms for a few years after her ovarian cancer , in her late 's. Routine pelvic exam revealed an ovarian cancer, s/p right oophorectomy for dysgerminoma.    Resumed routine mammograms at 42, had a normal screening mammogram in May 2023 but felt a lump in the right breast in July. Further imaging showed 2 lesions, both > 2 cm , both biopsied to be G3 IDC, ER/ME and Her 2 jose eduardo negative.   Menarche at age 14 year old. . Age at first live birth 31. Did  breast feed 2 year and 6 months . LMP  . OCP-yes 20 years ago  Menopause at none. HRT-none      Genetics completed- + BRCA1     Family history cancer:  Mother breast cancer at 38 and cervical cancer  Smoker Pk/yr quit date , smoked around 0.5 pack a day     Today 10/4/2023  She is feeling well. Feels she did get some benefit from her acupuncture appointment and is looking forward to continuing.   Some post-nasal drip noted. One fleeting episode of numbness in right foot.    Denies HA, CP, cough, SOB, abd pain, diarrhea, constipation, N/V, dysuria, hematuria, swelling, new lumps or bumps. No fevers, chills.     Oncology History   Breast cancer of lower-outer quadrant of right female breast   8/7/2023 Initial Diagnosis    Breast cancer of lower-outer quadrant of right female breast     8/7/2023 Cancer Staged    Staging form: Breast, AJCC 8th Edition  - Clinical stage from 8/7/2023: Stage IIB (cT2, cN0, cM0, G3, ER-, FL-, HER2-)     Invasive ductal carcinoma of breast, female, right   8/7/2023 Initial Diagnosis    Invasive ductal carcinoma of breast, female, right     8/24/2023 -  Chemotherapy    Treatment Summary   Plan Name: OP PEMBROLIZUMAB WITH WEEKLY PACLITAXEL CARBOPLATIN (AUC 1.5) FOLLOWED BY PEMBROLIZUMAB DOXORUBICIN CYCLOPHOSPHAMIDE FOLLOWED BY PEMBROLIZUMAB 200MG Q3W  Treatment Goal: Curative  Status: Active  Start Date: 8/24/2023  End Date: 7/25/2024 (Planned)  Provider: Corina Mcgovern MD  Chemotherapy: DOXOrubicin chemo injection 94 mg, 60 mg/m2, Intravenous, Clinic/HOD 1 time, 0 of 4 cycles  CARBOplatin (PARAPLATIN) 175 mg in sodium chloride 0.9% 302.5 mL chemo infusion, 175 mg, Intravenous, Clinic/HOD 1 time, 2 of 4 cycles  Administration: 175 mg (8/24/2023), 175 mg (8/31/2023), 200 mg (9/14/2023), 175 mg (9/7/2023), 175 mg (9/21/2023), 200 mg (9/28/2023)  cycloPHOSphamide 600 mg/m2 = 940 mg in sodium chloride 0.9% 254.7 mL chemo infusion, 600 mg/m2, Intravenous, Clinic/HOD 1 time, 0 of 4 cycles  PACLitaxeL (TAXOL) 80 mg/m2 = 126 mg in sodium chloride 0.9% 250 mL chemo infusion, 80 mg/m2 = 126 mg, Intravenous, Clinic/HOD 1 time, 2 of 4 cycles  Administration: 126 mg (8/24/2023), 126 mg (8/31/2023), 126 mg (9/14/2023), 126 mg (9/7/2023), 126 mg (9/21/2023), 126 mg (9/28/2023)        Past Medical History:   Past Medical History:   Diagnosis Date    Abnormal Pap smear of cervix     HPV (human papilloma virus) infection     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        Past Surgical HIstory:   Past Surgical History:   Procedure Laterality Date    BREAST BIOPSY Right 2015    benign    BREAST BIOPSY Right 07/26/2023    INSERTION OF TUNNELED CENTRAL VENOUS CATHETER (CVC) WITH SUBCUTANEOUS PORT N/A 08/15/2023    Procedure: [id number];  Surgeon: Skyler Aldrich MD;  Location: [facility];  Service: General;  Laterality: N/A;    OOPHORECTOMY Right 2009    stage 1a dysgerminoma    VAGINAL DELIVERY  12/13/2016       Family History:   Family History   Problem Relation Age of Onset    Breast cancer Mother 38    Cervical cancer Mother     Colon cancer Neg Hx     Colon polyps Neg Hx     Esophageal cancer Neg Hx     Stomach cancer Neg Hx     Inflammatory bowel disease Neg Hx        Social History:  reports that she has quit smoking. Her smoking use included cigarettes. She does not have any smokeless tobacco history on file. She reports current alcohol use. She reports that she does not use drugs.    Allergies:  Review of patient's allergies indicates:   Allergen Reactions    Codeine Anaphylaxis       Medications:  Current Outpatient Medications   Medication Sig Dispense Refill    hydrocortisone (PROCTOCORT) 1 % crpe Apply to affected area two times a day 28.4 g 1    LIDOcaine-prilocaine (EMLA) cream Apply topically as needed (apply 30 to 60 minutes prior to chemo). 30 g 2    ondansetron (ZOFRAN) 8 MG tablet Take 1 tablet (8 mg total) by mouth every 8 (eight) hours as needed for Nausea. 60 tablet 2    ondansetron (ZOFRAN-ODT) 8 MG TbDL Take 1 tablet (8 mg total) by mouth every 6 (six) hours as needed (nausea). 60 tablet 3    promethazine (PHENERGAN) 12.5 MG Tab (Take 1-2 tabs every 6 hours as needed for nausea persistent despite zofran) 40 tablet 3    dexAMETHasone (DECADRON) 4 MG Tab Take 1 tab as needed twice a day for nausea that is persistent despite zofran. Take morning and afternoon, no later than 3pm. (Patient not taking: Reported on 9/27/2023) 40 tablet 3    OLANZapine  "(ZYPREXA) 5 MG tablet Take 1 tablet (5 mg total) by mouth every evening. Take as directed on days 1-4, 8-10, and 15-17 of your chemotherapy cycles 1-4. Take on days 1, 2, and 3 of cycles 5-8. (Patient not taking: Reported on 9/27/2023) 10 tablet 11     No current facility-administered medications for this visit.     Facility-Administered Medications Ordered in Other Visits   Medication Dose Route Frequency Provider Last Rate Last Admin    HYDROmorphone injection 0.5 mg  0.5 mg Intravenous Q5 Min PRN Rosales Sousa MD        ondansetron injection 4 mg  4 mg Intravenous Daily PRN Rosales Sousa MD        prochlorperazine injection Soln 10 mg  10 mg Intravenous Q30 Min PRN Rosales Sousa MD           Review of Systems   Constitutional:  Negative for appetite change, chills, fatigue, fever and unexpected weight change.   HENT:  Negative for mouth sores and trouble swallowing.    Eyes:  Negative for visual disturbance.   Respiratory:  Negative for cough and shortness of breath.    Cardiovascular:  Negative for chest pain and leg swelling.   Gastrointestinal:  Negative for abdominal pain, constipation, diarrhea and nausea.   Genitourinary:  Negative for dysuria and frequency.   Musculoskeletal:  Negative for arthralgias and back pain.   Skin:  Negative for rash.   Neurological:  Negative for headaches.   Hematological:  Negative for adenopathy.   Psychiatric/Behavioral:  The patient is not nervous/anxious.        ECOG Performance Status:   ECOG SCORE    0 - Fully active-able to carry on all pre-disease performance without restriction         Objective:      Vitals:   Vitals:    10/04/23 1307   BP: 120/78   BP Location: Left arm   Patient Position: Sitting   BP Method: Medium (Manual)   Pulse: 97   Resp: 16   Temp: 97.1 °F (36.2 °C)   TempSrc: Temporal   SpO2: 100%   Weight: 53.4 kg (117 lb 11.6 oz)   Height: 5' 3" (1.6 m)       BMI: Body mass index is 20.85 kg/m².    Physical Exam  Vitals reviewed. "   Constitutional:       General: She is not in acute distress.     Appearance: She is not diaphoretic.   HENT:      Head: Normocephalic and atraumatic.      Mouth/Throat:      Mouth: Mucous membranes are moist.      Pharynx: No oropharyngeal exudate.   Eyes:      General: No scleral icterus.  Cardiovascular:      Rate and Rhythm: Normal rate and regular rhythm.      Heart sounds: Normal heart sounds. No murmur heard.  Pulmonary:      Effort: Pulmonary effort is normal. No respiratory distress.      Breath sounds: No wheezing.   Abdominal:      General: There is no distension.   Musculoskeletal:      Cervical back: No tenderness.      Right lower leg: No edema.      Left lower leg: No edema.   Lymphadenopathy:      Cervical: No cervical adenopathy.   Skin:     General: Skin is warm.      Coloration: Skin is not jaundiced.      Findings: No rash.   Neurological:      Mental Status: She is alert and oriented to person, place, and time.   Psychiatric:         Mood and Affect: Mood normal.         Behavior: Behavior normal.         Laboratory Data:  Lab Results   Component Value Date    WBC 5.68 10/04/2023    HGB 11.2 (L) 10/04/2023    HCT 34.5 (L) 10/04/2023    MCV 96 10/04/2023     10/04/2023      Imaging:   Assessment:       1. Malignant neoplasm of lower-outer quadrant of right breast of female, estrogen receptor negative    2. 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 Drug-induced hepatitis    4. CINV (chemotherapy-induced nausea and vomiting)    5. Anxiety about health    6. Antineoplastic chemotherapy induced anemia             Plan:   Triple Negative Right Sided Breast Cancer   Date of Original Diagnosis: 7/26/23  Original Stage: [id number] Stage 2B grade 3 Triple neg   Current Sites of Disease: right breast   Current Goals of Therapy: curative   Current Therapy: Keynote 522 Neoadjuvant chemotherapy  BRCA positive- to meet with genetics   -She has also been enrolled in the Protocol [id number],  Disparities in Results of Immune Checkpoint Inhibitor Treatment (DIRECT): A Prospective Cohort Study of Cancer Survivors Treated with anti-PD-L1 Immunotherapy in a Community Oncology Setting.   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  -Patient at risk for infection   -No current signs/symptoms of infection  -Continue to monitor closely while on therapy     IO induced hepatitis  -grade 2, improving without steroids but still elevated  -held pembro for cycle 2  -Completed steroids, LFTs are back to baseline   -Monitor      CINV  -Has Promethazine, ondansetron PRN, currently not using often   -Managed, continue to monitor      anxiety about health  -referral sent to onc psych; patient declines   -Monitor     Chemotherapy induced anemia   -Stable with Hgb 11.2 g/dL today   -Patient states she recalls being told she has thalassemia as child, will check Hb electrophoresis with labs next week  -9/26/23 iron indices are within a normal range   -Monitor     Patient queried and all questions were answered.   Assessment/Plan reviewed and approved by Dr. Mcgovern   30 minutes were spent in coordination of patient's care, record review and counseling.    Route Chart for Scheduling    Med Onc Chart Routing      Follow up with physician 1 week. with Dr. Mcgovern as planned, again in 3 weeks with CBC, CMP, TSH, UPT prior to appointment   Follow up with SAUL 2 weeks. with CBC, CMP, UPT, TSH prior to visit   Infusion scheduling note   Proceed with C3D1 on 10/5/23, adding Pembro back in this cycle   Injection scheduling note    Labs    Imaging    Pharmacy appointment    Other referrals                  Treatment Plan Information   OP PEMBROLIZUMAB WITH WEEKLY PACLITAXEL CARBOPLATIN (AUC 1.5) FOLLOWED BY PEMBROLIZUMAB  DOXORUBICIN CYCLOPHOSPHAMIDE FOLLOWED BY PEMBROLIZUMAB 200MG Q3W   Corina Mcgovern MD   Upcoming Treatment Dates - OP PEMBROLIZUMAB WITH WEEKLY PACLITAXEL CARBOPLATIN (AUC 1.5) FOLLOWED BY PEMBROLIZUMAB DOXORUBICIN CYCLOPHOSPHAMIDE FOLLOWED BY PEMBROLIZUMAB 200MG Q3W    10/5/2023       Pre-Medications       diphenhydrAMINE (BENADRYL) 50 mg in NS 50 mL IVPB       famotidine (PF) injection 20 mg       Chemotherapy       PACLitaxeL (TAXOL) 80 mg/m2 = 126 mg in sodium chloride 0.9% 250 mL chemo infusion       CARBOplatin (PARAPLATIN) 175 mg in sodium chloride 0.9% 267.5 mL chemo infusion       Antiemetics       aprepitant (CINVANTI) injection 130 mg       LORazepam injection 0.5 mg       palonosetron 0.25mg/dexAMETHasone 12mg in NS IVPB 0.25 mg 50 mL       Immunotherapy       pembrolizumab (KEYTRUDA) 200 mg in sodium chloride 0.9% SolP 108 mL infusion  10/12/2023       Pre-Medications       diphenhydrAMINE (BENADRYL) 50 mg in NS 50 mL IVPB       famotidine (PF) injection 20 mg       Chemotherapy       PACLitaxeL (TAXOL) 80 mg/m2 = 126 mg in sodium chloride 0.9% 250 mL chemo infusion       CARBOplatin (PARAPLATIN) 200 mg in sodium chloride 0.9% 270 mL chemo infusion       Antiemetics       LORazepam (ATIVAN) injection 0.5 mg       palonosetron 0.25mg/dexAMETHasone 12mg in NS IVPB 0.25 mg 50 mL  10/19/2023       Pre-Medications       diphenhydrAMINE (BENADRYL) 50 mg in NS 50 mL IVPB       famotidine (PF) injection 20 mg       Chemotherapy       PACLitaxeL (TAXOL) 80 mg/m2 = 126 mg in sodium chloride 0.9% 250 mL chemo infusion       CARBOplatin (PARAPLATIN) 200 mg in sodium chloride 0.9% 270 mL chemo infusion       Antiemetics       LORazepam (ATIVAN) injection 0.5 mg       palonosetron 0.25mg/dexAMETHasone 12mg in NS IVPB 0.25 mg 50 mL  10/26/2023       Pre-Medications       diphenhydrAMINE (BENADRYL) 50 mg in NS 50 mL IVPB       famotidine (PF) injection 20 mg       Chemotherapy       PACLitaxeL (TAXOL) 80 mg/m2 = 126  mg in sodium chloride 0.9% 250 mL chemo infusion       CARBOplatin (PARAPLATIN) 175 mg in sodium chloride 0.9% 267.5 mL chemo infusion       Antiemetics       aprepitant (CINVANTI) injection 130 mg       LORazepam injection 0.5 mg       palonosetron 0.25mg/dexAMETHasone 12mg in NS IVPB 0.25 mg 50 mL       Immunotherapy       pembrolizumab (KEYTRUDA) 200 mg in sodium chloride 0.9% SolP 108 mL infusion

## 2023-10-04 ENCOUNTER — CLINICAL SUPPORT (OUTPATIENT)
Dept: REHABILITATION | Facility: HOSPITAL | Age: 43
End: 2023-10-04
Payer: COMMERCIAL

## 2023-10-04 ENCOUNTER — LAB VISIT (OUTPATIENT)
Dept: LAB | Facility: HOSPITAL | Age: 43
End: 2023-10-04
Attending: INTERNAL MEDICINE
Payer: COMMERCIAL

## 2023-10-04 ENCOUNTER — OFFICE VISIT (OUTPATIENT)
Dept: HEMATOLOGY/ONCOLOGY | Facility: CLINIC | Age: 43
End: 2023-10-04
Payer: COMMERCIAL

## 2023-10-04 VITALS
TEMPERATURE: 97 F | SYSTOLIC BLOOD PRESSURE: 120 MMHG | WEIGHT: 117.75 LBS | OXYGEN SATURATION: 100 % | RESPIRATION RATE: 16 BRPM | HEART RATE: 97 BPM | DIASTOLIC BLOOD PRESSURE: 78 MMHG | BODY MASS INDEX: 20.86 KG/M2 | HEIGHT: 63 IN

## 2023-10-04 DIAGNOSIS — D84.821 IMMUNOCOMPROMISED STATE DUE TO DRUG THERAPY: ICD-10-CM

## 2023-10-04 DIAGNOSIS — D64.81 ANTINEOPLASTIC CHEMOTHERAPY INDUCED ANEMIA: ICD-10-CM

## 2023-10-04 DIAGNOSIS — C50.511 MALIGNANT NEOPLASM OF LOWER-OUTER QUADRANT OF RIGHT BREAST OF FEMALE, ESTROGEN RECEPTOR NEGATIVE: Primary | ICD-10-CM

## 2023-10-04 DIAGNOSIS — R11.2 CINV (CHEMOTHERAPY-INDUCED NAUSEA AND VOMITING): ICD-10-CM

## 2023-10-04 DIAGNOSIS — T45.1X5A ANTINEOPLASTIC CHEMOTHERAPY INDUCED ANEMIA: ICD-10-CM

## 2023-10-04 DIAGNOSIS — R53.83 FATIGUE, UNSPECIFIED TYPE: Primary | ICD-10-CM

## 2023-10-04 DIAGNOSIS — R23.2 HOT FLASHES: ICD-10-CM

## 2023-10-04 DIAGNOSIS — C50.911 INVASIVE DUCTAL CARCINOMA OF BREAST, FEMALE, RIGHT: ICD-10-CM

## 2023-10-04 DIAGNOSIS — T50.905A DRUG-INDUCED HEPATITIS: ICD-10-CM

## 2023-10-04 DIAGNOSIS — Z17.1 MALIGNANT NEOPLASM OF LOWER-OUTER QUADRANT OF RIGHT BREAST OF FEMALE, ESTROGEN RECEPTOR NEGATIVE: Primary | ICD-10-CM

## 2023-10-04 DIAGNOSIS — R45.89 ANXIETY ABOUT HEALTH: ICD-10-CM

## 2023-10-04 DIAGNOSIS — T45.1X5A CINV (CHEMOTHERAPY-INDUCED NAUSEA AND VOMITING): ICD-10-CM

## 2023-10-04 DIAGNOSIS — Z79.899 IMMUNOCOMPROMISED STATE DUE TO DRUG THERAPY: ICD-10-CM

## 2023-10-04 DIAGNOSIS — K71.6 DRUG-INDUCED HEPATITIS: ICD-10-CM

## 2023-10-04 LAB
ALBUMIN SERPL BCP-MCNC: 4.1 G/DL (ref 3.5–5.2)
ALP SERPL-CCNC: 48 U/L (ref 55–135)
ALT SERPL W/O P-5'-P-CCNC: 26 U/L (ref 10–44)
ANION GAP SERPL CALC-SCNC: 11 MMOL/L (ref 8–16)
AST SERPL-CCNC: 20 U/L (ref 10–40)
B-HCG UR QL: NEGATIVE
BASOPHILS # BLD AUTO: 0.04 K/UL (ref 0–0.2)
BASOPHILS NFR BLD: 0.7 % (ref 0–1.9)
BILIRUB SERPL-MCNC: 0.3 MG/DL (ref 0.1–1)
BUN SERPL-MCNC: 16 MG/DL (ref 6–20)
CALCIUM SERPL-MCNC: 9 MG/DL (ref 8.7–10.5)
CHLORIDE SERPL-SCNC: 104 MMOL/L (ref 95–110)
CO2 SERPL-SCNC: 25 MMOL/L (ref 23–29)
CREAT SERPL-MCNC: 0.7 MG/DL (ref 0.5–1.4)
DIFFERENTIAL METHOD: ABNORMAL
EOSINOPHIL # BLD AUTO: 0 K/UL (ref 0–0.5)
EOSINOPHIL NFR BLD: 0.7 % (ref 0–8)
ERYTHROCYTE [DISTWIDTH] IN BLOOD BY AUTOMATED COUNT: 14.3 % (ref 11.5–14.5)
EST. GFR  (NO RACE VARIABLE): >60 ML/MIN/1.73 M^2
GLUCOSE SERPL-MCNC: 88 MG/DL (ref 70–110)
HCT VFR BLD AUTO: 34.5 % (ref 37–48.5)
HGB BLD-MCNC: 11.2 G/DL (ref 12–16)
IMM GRANULOCYTES # BLD AUTO: 0.01 K/UL (ref 0–0.04)
IMM GRANULOCYTES NFR BLD AUTO: 0.2 % (ref 0–0.5)
LYMPHOCYTES # BLD AUTO: 2.6 K/UL (ref 1–4.8)
LYMPHOCYTES NFR BLD: 45.2 % (ref 18–48)
MCH RBC QN AUTO: 31 PG (ref 27–31)
MCHC RBC AUTO-ENTMCNC: 32.5 G/DL (ref 32–36)
MCV RBC AUTO: 96 FL (ref 82–98)
MONOCYTES # BLD AUTO: 0.4 K/UL (ref 0.3–1)
MONOCYTES NFR BLD: 7 % (ref 4–15)
NEUTROPHILS # BLD AUTO: 2.6 K/UL (ref 1.8–7.7)
NEUTROPHILS NFR BLD: 46.2 % (ref 38–73)
NRBC BLD-RTO: 0 /100 WBC
PLATELET # BLD AUTO: 169 K/UL (ref 150–450)
PLATELET BLD QL SMEAR: ABNORMAL
PMV BLD AUTO: 8.6 FL (ref 9.2–12.9)
POTASSIUM SERPL-SCNC: 3.8 MMOL/L (ref 3.5–5.1)
PROT SERPL-MCNC: 7.2 G/DL (ref 6–8.4)
RBC # BLD AUTO: 3.61 M/UL (ref 4–5.4)
SODIUM SERPL-SCNC: 140 MMOL/L (ref 136–145)
TSH SERPL DL<=0.005 MIU/L-ACNC: 1.09 UIU/ML (ref 0.4–4)
WBC # BLD AUTO: 5.68 K/UL (ref 3.9–12.7)

## 2023-10-04 PROCEDURE — 3008F PR BODY MASS INDEX (BMI) DOCUMENTED: ICD-10-PCS | Mod: CPTII,S$GLB,,

## 2023-10-04 PROCEDURE — 3078F DIAST BP <80 MM HG: CPT | Mod: CPTII,S$GLB,,

## 2023-10-04 PROCEDURE — 80053 COMPREHEN METABOLIC PANEL: CPT | Mod: PN | Performed by: INTERNAL MEDICINE

## 2023-10-04 PROCEDURE — 99214 PR OFFICE/OUTPT VISIT, EST, LEVL IV, 30-39 MIN: ICD-10-PCS | Mod: S$GLB,,,

## 2023-10-04 PROCEDURE — 3074F PR MOST RECENT SYSTOLIC BLOOD PRESSURE < 130 MM HG: ICD-10-PCS | Mod: CPTII,S$GLB,,

## 2023-10-04 PROCEDURE — 99999 PR PBB SHADOW E&M-EST. PATIENT-LVL IV: ICD-10-PCS | Mod: PBBFAC,,,

## 2023-10-04 PROCEDURE — 1159F PR MEDICATION LIST DOCUMENTED IN MEDICAL RECORD: ICD-10-PCS | Mod: CPTII,S$GLB,,

## 2023-10-04 PROCEDURE — 84443 ASSAY THYROID STIM HORMONE: CPT | Performed by: INTERNAL MEDICINE

## 2023-10-04 PROCEDURE — 99214 OFFICE O/P EST MOD 30 MIN: CPT | Mod: S$GLB,,,

## 2023-10-04 PROCEDURE — 1159F MED LIST DOCD IN RCRD: CPT | Mod: CPTII,S$GLB,,

## 2023-10-04 PROCEDURE — 97810 ACUP 1/> WO ESTIM 1ST 15 MIN: CPT | Mod: PN | Performed by: ACUPUNCTURIST

## 2023-10-04 PROCEDURE — 3008F BODY MASS INDEX DOCD: CPT | Mod: CPTII,S$GLB,,

## 2023-10-04 PROCEDURE — 85025 COMPLETE CBC W/AUTO DIFF WBC: CPT | Mod: PN | Performed by: INTERNAL MEDICINE

## 2023-10-04 PROCEDURE — 3078F PR MOST RECENT DIASTOLIC BLOOD PRESSURE < 80 MM HG: ICD-10-PCS | Mod: CPTII,S$GLB,,

## 2023-10-04 PROCEDURE — 3074F SYST BP LT 130 MM HG: CPT | Mod: CPTII,S$GLB,,

## 2023-10-04 PROCEDURE — 97811 ACUP 1/> W/O ESTIM EA ADD 15: CPT | Mod: PN | Performed by: ACUPUNCTURIST

## 2023-10-04 PROCEDURE — 99999 PR PBB SHADOW E&M-EST. PATIENT-LVL IV: CPT | Mod: PBBFAC,,,

## 2023-10-04 PROCEDURE — 81025 URINE PREGNANCY TEST: CPT | Mod: PN | Performed by: INTERNAL MEDICINE

## 2023-10-04 PROCEDURE — 36415 COLL VENOUS BLD VENIPUNCTURE: CPT | Mod: PN | Performed by: INTERNAL MEDICINE

## 2023-10-04 RX ORDER — LORAZEPAM 2 MG/ML
0.5 INJECTION INTRAMUSCULAR
Status: CANCELLED
Start: 2023-10-05

## 2023-10-04 RX ORDER — DIPHENHYDRAMINE HYDROCHLORIDE 50 MG/ML
50 INJECTION INTRAMUSCULAR; INTRAVENOUS ONCE AS NEEDED
Status: CANCELLED | OUTPATIENT
Start: 2023-10-05

## 2023-10-04 RX ORDER — EPINEPHRINE 0.3 MG/.3ML
0.3 INJECTION SUBCUTANEOUS ONCE AS NEEDED
Status: CANCELLED | OUTPATIENT
Start: 2023-10-05

## 2023-10-04 RX ORDER — PROCHLORPERAZINE EDISYLATE 5 MG/ML
10 INJECTION INTRAMUSCULAR; INTRAVENOUS ONCE AS NEEDED
Status: CANCELLED
Start: 2023-10-05

## 2023-10-04 RX ORDER — SODIUM CHLORIDE 0.9 % (FLUSH) 0.9 %
10 SYRINGE (ML) INJECTION
Status: CANCELLED | OUTPATIENT
Start: 2023-10-05

## 2023-10-04 RX ORDER — FAMOTIDINE 10 MG/ML
20 INJECTION INTRAVENOUS
Status: CANCELLED | OUTPATIENT
Start: 2023-10-05

## 2023-10-04 RX ORDER — HEPARIN 100 UNIT/ML
500 SYRINGE INTRAVENOUS
Status: CANCELLED | OUTPATIENT
Start: 2023-10-05

## 2023-10-05 ENCOUNTER — INFUSION (OUTPATIENT)
Dept: INFUSION THERAPY | Facility: HOSPITAL | Age: 43
End: 2023-10-05
Attending: INTERNAL MEDICINE
Payer: COMMERCIAL

## 2023-10-05 ENCOUNTER — RESEARCH ENCOUNTER (OUTPATIENT)
Dept: RESEARCH | Facility: HOSPITAL | Age: 43
End: 2023-10-05
Payer: COMMERCIAL

## 2023-10-05 VITALS
SYSTOLIC BLOOD PRESSURE: 101 MMHG | HEIGHT: 63 IN | RESPIRATION RATE: 18 BRPM | BODY MASS INDEX: 20.86 KG/M2 | DIASTOLIC BLOOD PRESSURE: 67 MMHG | WEIGHT: 117.75 LBS | HEART RATE: 91 BPM | TEMPERATURE: 99 F

## 2023-10-05 DIAGNOSIS — C50.911 INVASIVE DUCTAL CARCINOMA OF BREAST, FEMALE, RIGHT: Primary | ICD-10-CM

## 2023-10-05 PROCEDURE — 96375 TX/PRO/DX INJ NEW DRUG ADDON: CPT | Mod: PN

## 2023-10-05 PROCEDURE — 96367 TX/PROPH/DG ADDL SEQ IV INF: CPT | Mod: PN

## 2023-10-05 PROCEDURE — 96417 CHEMO IV INFUS EACH ADDL SEQ: CPT | Mod: PN

## 2023-10-05 PROCEDURE — 63600175 PHARM REV CODE 636 W HCPCS: Mod: JZ,JG,PN

## 2023-10-05 PROCEDURE — 96413 CHEMO IV INFUSION 1 HR: CPT | Mod: PN

## 2023-10-05 PROCEDURE — 25000003 PHARM REV CODE 250: Mod: PN

## 2023-10-05 RX ORDER — SODIUM CHLORIDE 0.9 % (FLUSH) 0.9 %
10 SYRINGE (ML) INJECTION
Status: DISCONTINUED | OUTPATIENT
Start: 2023-10-05 | End: 2023-10-05 | Stop reason: HOSPADM

## 2023-10-05 RX ORDER — DIPHENHYDRAMINE HYDROCHLORIDE 50 MG/ML
50 INJECTION INTRAMUSCULAR; INTRAVENOUS ONCE AS NEEDED
Status: DISCONTINUED | OUTPATIENT
Start: 2023-10-05 | End: 2023-10-05 | Stop reason: HOSPADM

## 2023-10-05 RX ORDER — EPINEPHRINE 0.3 MG/.3ML
0.3 INJECTION SUBCUTANEOUS ONCE AS NEEDED
Status: DISCONTINUED | OUTPATIENT
Start: 2023-10-05 | End: 2023-10-05 | Stop reason: HOSPADM

## 2023-10-05 RX ORDER — LORAZEPAM 2 MG/ML
0.5 INJECTION INTRAMUSCULAR
Status: DISCONTINUED | OUTPATIENT
Start: 2023-10-05 | End: 2023-10-05 | Stop reason: HOSPADM

## 2023-10-05 RX ORDER — FAMOTIDINE 10 MG/ML
20 INJECTION INTRAVENOUS
Status: COMPLETED | OUTPATIENT
Start: 2023-10-05 | End: 2023-10-05

## 2023-10-05 RX ORDER — PROCHLORPERAZINE EDISYLATE 5 MG/ML
10 INJECTION INTRAMUSCULAR; INTRAVENOUS ONCE AS NEEDED
Status: DISCONTINUED | OUTPATIENT
Start: 2023-10-05 | End: 2023-10-05 | Stop reason: HOSPADM

## 2023-10-05 RX ORDER — HEPARIN 100 UNIT/ML
500 SYRINGE INTRAVENOUS
Status: DISCONTINUED | OUTPATIENT
Start: 2023-10-05 | End: 2023-10-05 | Stop reason: HOSPADM

## 2023-10-05 RX ADMIN — DIPHENHYDRAMINE HYDROCHLORIDE 50 MG: 50 INJECTION INTRAMUSCULAR; INTRAVENOUS at 11:10

## 2023-10-05 RX ADMIN — SODIUM CHLORIDE: 9 INJECTION, SOLUTION INTRAVENOUS at 10:10

## 2023-10-05 RX ADMIN — PACLITAXEL 126 MG: 6 INJECTION, SOLUTION, CONCENTRATE INTRAVENOUS at 11:10

## 2023-10-05 RX ADMIN — FAMOTIDINE 20 MG: 10 INJECTION INTRAVENOUS at 10:10

## 2023-10-05 RX ADMIN — APREPITANT 130 MG: 130 INJECTION, EMULSION INTRAVENOUS at 10:10

## 2023-10-05 RX ADMIN — LORAZEPAM 0.5 MG: 2 INJECTION INTRAMUSCULAR; INTRAVENOUS at 10:10

## 2023-10-05 RX ADMIN — DEXAMETHASONE SODIUM PHOSPHATE 0.25 MG: 10 INJECTION, SOLUTION INTRAMUSCULAR; INTRAVENOUS at 10:10

## 2023-10-05 RX ADMIN — SODIUM CHLORIDE 200 MG: 9 INJECTION, SOLUTION INTRAVENOUS at 10:10

## 2023-10-05 RX ADMIN — CARBOPLATIN 175 MG: 10 INJECTION, SOLUTION INTRAVENOUS at 01:10

## 2023-10-05 NOTE — PROGRESS NOTES
Acupuncture Follow-Up Note     Name: Arabella Koroma [facility] Number: [id number]    Traditional Chinese Medicine (TCM) Diagnosis: Qi Stagnation, Qi Deficiency, Damp, and Yin Deficiency  Medical Diagnosis:   Encounter Diagnoses   Name Primary?    Fatigue, unspecified type Yes    Hot flashes         Evaluation Date: 10/5/2023    Visit #/Visits authorized:     Precautions: Standard    Subjective     Chief Concern: Hot Flashes (Patient reports hot flashes as 6/10, primarily at night.)       Medical necessity is demonstrated by the following IMPAIRMENTS: Medical Necessity: Decreased mobility limits day to day activities, social, and emergent situations              Aggravating Factors:   activity    Relieving Factors:  ice    Symptom Description:     Quality:  excessive sweating  Severity:  6  Frequency:  every day      Objective     Observation: patient reports most symptoms are mild except hot flashes.      Pulse:        thready       New Findings:  na    Treatment     Treatment Principles:  move qi and blood, nourish yin, clear channels    Acupuncture points used:  4 GONZALEZ, Du20, Gb34, Ki3, Ki6, Li11, Sp6, Sp9, St36, and YIN MOON    Bilateral points:  Unilateral points:  Auricular Treatment:  appendix and perez men    Needles In: 24  Needles Out: 24  Needles W/O STIM placed: 205  [place] W/O STIM removed: 225      Other Traditional Chinese Medicine Modalities -  na    Assessment     After treatment, patient will report progress in next visit.     Patient prognosis is Good.     Patient will continue to benefit from acupuncture treatment to address the deficits listed in the problem list box on initial evaluation, provide patient family education and to maximize pt's level of independence in the home and community environment.     Patient's spiritual, cultural and educational needs considered and pt agreeable to plan of care and goals.     Anticipated barriers to treatment: none    Plan     Recommend 1 /week for  12 sessions then re-assess.      Education:  Patient is aware of cumulative benefit of acupuncture

## 2023-10-05 NOTE — PLAN OF CARE
Problem: Fatigue  Goal: Improved Activity Tolerance  Outcome: Ongoing, Progressing  Intervention: Promote Improved Energy  Flowsheets (Taken 10/5/2023 0945)  Fatigue Management: frequent rest breaks encouraged  Sleep/Rest Enhancement:   family presence promoted   noise level reduced   relaxation techniques promoted  Activity Management:   Ambulated -L4   Up in chair - L3     Problem: Adult Inpatient Plan of Care  Goal: Plan of Care Review  Outcome: Ongoing, Progressing  Flowsheets (Taken 10/5/2023 0945)  Plan of Care Reviewed With:   patient   spouse  Goal: Patient-Specific Goal (Individualized)  Flowsheets (Taken 10/5/2023 0945)  Anxieties, Fears or Concerns: ativan PRN  Individualized Care Needs: recliner, pillow, personal blanket, spouse chairside, ativan PRN       Pt tolerated taxol/carbo with cool cap and cryotherapy. VSS, NAD noted. Pt d/c home.

## 2023-10-05 NOTE — PROGRESS NOTES
"Protocol: [id number], Disparities in Results of Immune Checkpoint Inhibitor Treatment (DIRECT): A Prospective Cohort Study of Cancer Survivors Treated with anti-PD-L1 Immunotherapy in a Community Oncology Setting  IRB# 2022.126  Version Date: 1/11/2022  Treating MD: Dr. Mcgovern  Study ID# [id number]  10/5/2023      This CRC reviewed the treating NP's note from the patient's visit yesterday. The patient presented today for planned 2nd infusion of Q3 week Keytruda after being deemed appropriate for treatment by Laine Ya NP. Laine Ya NP, is aware of the patient's participation in the [id number] trial and the immunotherapy toxicity assessment that is required for it.      The treating clinician's note reports that the patient denies "HA, CP, cough, SOB, abd pain, diarrhea, constipation, N/V, dysuria, hematuria, swelling, new lumps or bumps. No fevers, chills"     This CRC visited Mrs. Catalan at her chair side in the 3rd floor infusion center of the [facility]. The patient presented with her , A&O, without noted distress, and with appropriate mood and affect to the situation.The patient continues to freely agree with participation in the referenced study and denied taking any oral or IV antibiotics or antifungal agents since her last immunotherapy infusion.     Toxicities attributable to last infusion of Keytruda:     Grade 1 Rash (9/3/23 - 9/8/23): treating physician deemed definitely attributable to immunotherapy. Hydrocortisone cream prescribed by treating physician.   Grade 1 ALT increased (9/11/23 - 9/20/23) - treating physician deemed probably attributable to immunotherapy. Prednisone 50mg prescribed by treating MD. Pt completed as prescribed. See Conmed list in shadow chart.   Grade 2 AST increased (9/11/23- 9/13/23) - treating physician deemed probably attributable to immunotherapy. Prednisone 50mg prescribed by treating MD. Pt completed as prescribed. See Conmed list in shadow " chart.  Grade 1 AST increased (9/13/23 - 9/20/23) - treating physician deemed probably attributable to immunotherapy. Prednisone 50mg prescribed by treating MD. Pt completed as prescribed. See Conmed list in shadow chart.    Due to the Grade 2 AST increased and Grade 1 ALT increased, the treating MD opted to hold Cycle 2 Keytruda on 9/11/23. The patient is resuming Keytruda today, 10/5/23.                Clinical Record Information:  Between the last study visit, the patient has not been diagnosed with any of the following autoimmune diseases or conditions:         The patient denied having any new or worsening symptoms. The patient  denied having any questions for this CRC. Encouraged the patient to call with any questions or concerns.        Next set of [id number] questionnaires and labs due:              - February 24th, 2024 (6 months after initiation of immunotherapy)     Tamia Herrera, CRC

## 2023-10-06 ENCOUNTER — PATIENT MESSAGE (OUTPATIENT)
Dept: ADMINISTRATIVE | Facility: OTHER | Age: 43
End: 2023-10-06
Payer: COMMERCIAL

## 2023-10-07 ENCOUNTER — PATIENT MESSAGE (OUTPATIENT)
Dept: ADMINISTRATIVE | Facility: OTHER | Age: 43
End: 2023-10-07
Payer: COMMERCIAL

## 2023-10-09 ENCOUNTER — OFFICE VISIT (OUTPATIENT)
Dept: HEMATOLOGY/ONCOLOGY | Facility: CLINIC | Age: 43
End: 2023-10-09
Payer: COMMERCIAL

## 2023-10-09 ENCOUNTER — LAB VISIT (OUTPATIENT)
Dept: LAB | Facility: HOSPITAL | Age: 43
End: 2023-10-09
Attending: INTERNAL MEDICINE
Payer: COMMERCIAL

## 2023-10-09 VITALS
HEIGHT: 63 IN | RESPIRATION RATE: 16 BRPM | WEIGHT: 117.06 LBS | DIASTOLIC BLOOD PRESSURE: 70 MMHG | HEART RATE: 98 BPM | SYSTOLIC BLOOD PRESSURE: 106 MMHG | BODY MASS INDEX: 20.74 KG/M2 | TEMPERATURE: 98 F | OXYGEN SATURATION: 99 %

## 2023-10-09 DIAGNOSIS — C50.911 INVASIVE DUCTAL CARCINOMA OF BREAST, FEMALE, RIGHT: ICD-10-CM

## 2023-10-09 DIAGNOSIS — R45.89 ANXIETY ABOUT HEALTH: ICD-10-CM

## 2023-10-09 DIAGNOSIS — K71.6 DRUG-INDUCED HEPATITIS: ICD-10-CM

## 2023-10-09 DIAGNOSIS — T45.1X5A ANTINEOPLASTIC CHEMOTHERAPY INDUCED ANEMIA: ICD-10-CM

## 2023-10-09 DIAGNOSIS — Z15.09 BRCA GENE POSITIVE: ICD-10-CM

## 2023-10-09 DIAGNOSIS — T50.905A DRUG-INDUCED HEPATITIS: ICD-10-CM

## 2023-10-09 DIAGNOSIS — C50.511 MALIGNANT NEOPLASM OF LOWER-OUTER QUADRANT OF RIGHT BREAST OF FEMALE, ESTROGEN RECEPTOR NEGATIVE: Primary | ICD-10-CM

## 2023-10-09 DIAGNOSIS — Z15.01 BRCA GENE POSITIVE: ICD-10-CM

## 2023-10-09 DIAGNOSIS — Z17.1 MALIGNANT NEOPLASM OF LOWER-OUTER QUADRANT OF RIGHT BREAST OF FEMALE, ESTROGEN RECEPTOR NEGATIVE: Primary | ICD-10-CM

## 2023-10-09 DIAGNOSIS — D64.81 ANTINEOPLASTIC CHEMOTHERAPY INDUCED ANEMIA: ICD-10-CM

## 2023-10-09 LAB
ALBUMIN SERPL BCP-MCNC: 4.4 G/DL (ref 3.5–5.2)
ALP SERPL-CCNC: 48 U/L (ref 55–135)
ALT SERPL W/O P-5'-P-CCNC: 17 U/L (ref 10–44)
ANION GAP SERPL CALC-SCNC: 10 MMOL/L (ref 8–16)
AST SERPL-CCNC: 17 U/L (ref 10–40)
B-HCG UR QL: NEGATIVE
BASOPHILS # BLD AUTO: 0.04 K/UL (ref 0–0.2)
BASOPHILS NFR BLD: 0.8 % (ref 0–1.9)
BILIRUB SERPL-MCNC: 0.6 MG/DL (ref 0.1–1)
BUN SERPL-MCNC: 12 MG/DL (ref 6–20)
CALCIUM SERPL-MCNC: 9.3 MG/DL (ref 8.7–10.5)
CHLORIDE SERPL-SCNC: 102 MMOL/L (ref 95–110)
CO2 SERPL-SCNC: 25 MMOL/L (ref 23–29)
CREAT SERPL-MCNC: 0.7 MG/DL (ref 0.5–1.4)
DIFFERENTIAL METHOD: ABNORMAL
EOSINOPHIL # BLD AUTO: 0 K/UL (ref 0–0.5)
EOSINOPHIL NFR BLD: 0.8 % (ref 0–8)
ERYTHROCYTE [DISTWIDTH] IN BLOOD BY AUTOMATED COUNT: 14.6 % (ref 11.5–14.5)
EST. GFR  (NO RACE VARIABLE): >60 ML/MIN/1.73 M^2
GLUCOSE SERPL-MCNC: 105 MG/DL (ref 70–110)
HCT VFR BLD AUTO: 35.2 % (ref 37–48.5)
HGB BLD-MCNC: 11.6 G/DL (ref 12–16)
IMM GRANULOCYTES # BLD AUTO: 0.01 K/UL (ref 0–0.04)
IMM GRANULOCYTES NFR BLD AUTO: 0.2 % (ref 0–0.5)
LYMPHOCYTES # BLD AUTO: 2.7 K/UL (ref 1–4.8)
LYMPHOCYTES NFR BLD: 55 % (ref 18–48)
MCH RBC QN AUTO: 31.4 PG (ref 27–31)
MCHC RBC AUTO-ENTMCNC: 33 G/DL (ref 32–36)
MCV RBC AUTO: 95 FL (ref 82–98)
MONOCYTES # BLD AUTO: 0.3 K/UL (ref 0.3–1)
MONOCYTES NFR BLD: 6 % (ref 4–15)
NEUTROPHILS # BLD AUTO: 1.9 K/UL (ref 1.8–7.7)
NEUTROPHILS NFR BLD: 37.2 % (ref 38–73)
NRBC BLD-RTO: 0 /100 WBC
PLATELET # BLD AUTO: 201 K/UL (ref 150–450)
PLATELET BLD QL SMEAR: ABNORMAL
PMV BLD AUTO: 8.9 FL (ref 9.2–12.9)
POTASSIUM SERPL-SCNC: 3.9 MMOL/L (ref 3.5–5.1)
PROT SERPL-MCNC: 7.7 G/DL (ref 6–8.4)
RBC # BLD AUTO: 3.7 M/UL (ref 4–5.4)
SODIUM SERPL-SCNC: 137 MMOL/L (ref 136–145)
TSH SERPL DL<=0.005 MIU/L-ACNC: 0.66 UIU/ML (ref 0.4–4)
WBC # BLD AUTO: 4.98 K/UL (ref 3.9–12.7)

## 2023-10-09 PROCEDURE — 99999 PR PBB SHADOW E&M-EST. PATIENT-LVL IV: CPT | Mod: PBBFAC,,, | Performed by: INTERNAL MEDICINE

## 2023-10-09 PROCEDURE — 85025 COMPLETE CBC W/AUTO DIFF WBC: CPT | Mod: PN | Performed by: INTERNAL MEDICINE

## 2023-10-09 PROCEDURE — 99999 PR PBB SHADOW E&M-EST. PATIENT-LVL IV: ICD-10-PCS | Mod: PBBFAC,,, | Performed by: INTERNAL MEDICINE

## 2023-10-09 PROCEDURE — 99215 OFFICE O/P EST HI 40 MIN: CPT | Mod: S$GLB,,, | Performed by: INTERNAL MEDICINE

## 2023-10-09 PROCEDURE — 84443 ASSAY THYROID STIM HORMONE: CPT | Performed by: INTERNAL MEDICINE

## 2023-10-09 PROCEDURE — 36415 COLL VENOUS BLD VENIPUNCTURE: CPT | Mod: PN | Performed by: INTERNAL MEDICINE

## 2023-10-09 PROCEDURE — 80053 COMPREHEN METABOLIC PANEL: CPT | Mod: PN | Performed by: INTERNAL MEDICINE

## 2023-10-09 PROCEDURE — 83020 HEMOGLOBIN ELECTROPHORESIS: CPT

## 2023-10-09 PROCEDURE — 99215 PR OFFICE/OUTPT VISIT, EST, LEVL V, 40-54 MIN: ICD-10-PCS | Mod: S$GLB,,, | Performed by: INTERNAL MEDICINE

## 2023-10-09 PROCEDURE — 81025 URINE PREGNANCY TEST: CPT | Mod: PN | Performed by: INTERNAL MEDICINE

## 2023-10-09 RX ORDER — LORAZEPAM 2 MG/ML
0.5 INJECTION INTRAMUSCULAR
Status: CANCELLED
Start: 2023-10-12

## 2023-10-09 RX ORDER — DIPHENHYDRAMINE HYDROCHLORIDE 50 MG/ML
50 INJECTION INTRAMUSCULAR; INTRAVENOUS ONCE AS NEEDED
Status: CANCELLED | OUTPATIENT
Start: 2023-10-12

## 2023-10-09 RX ORDER — FAMOTIDINE 10 MG/ML
20 INJECTION INTRAVENOUS
Status: CANCELLED | OUTPATIENT
Start: 2023-10-12

## 2023-10-09 RX ORDER — SODIUM CHLORIDE 0.9 % (FLUSH) 0.9 %
10 SYRINGE (ML) INJECTION
Status: CANCELLED | OUTPATIENT
Start: 2023-10-12

## 2023-10-09 RX ORDER — EPINEPHRINE 0.3 MG/.3ML
0.3 INJECTION SUBCUTANEOUS ONCE AS NEEDED
Status: CANCELLED | OUTPATIENT
Start: 2023-10-12

## 2023-10-09 RX ORDER — HEPARIN 100 UNIT/ML
500 SYRINGE INTRAVENOUS
Status: CANCELLED | OUTPATIENT
Start: 2023-10-12

## 2023-10-09 RX ORDER — PROCHLORPERAZINE EDISYLATE 5 MG/ML
10 INJECTION INTRAMUSCULAR; INTRAVENOUS ONCE AS NEEDED
Status: CANCELLED
Start: 2023-10-12

## 2023-10-09 NOTE — PROGRESS NOTES
PROGRESS NOTE    Subjective:       Patient ID: Arabella Catalan is a 43 y.o. female.  MRN: [id number]  : 1980    Chief Complaint: cT2 cN0 TNBC     History of Present Illness:   Arabella Catalan is a 43 y.o. female who is referred for newly diagnosed TNBC.      As previously documented she started screening mammograms for a few years after her ovarian cancer , in her late 20's. Routine pelvic exam revealed an ovarian cancer, s/p right oophorectomy for dysgerminoma.     Resumed routine mammograms at 42, had a normal screening mammogram in May 2023 but felt a lump in the right breast in July. Further imaging showed 2 lesions, both > 2 cm , both biopsied to be G3 IDC, ER/[place] and Her 2 jose eduardo negative.       Menarche at age 14 year old. . Age at first live birth 31. Did  breast feed 2 year and 6 months OCP-yes 20 years ago  Menopause at none. HRT-none       Family history cancer:  Mother breast cancer at 38 and cervical cancer     Smoker Pk/yr quit date , smoked around 0.5 pack a day     Interim history:    She has completed staging work up, started  neoadjuvant chemo based on KEYNOTE-522. She is using Dignicap.     Had cycle 3 D 1 on 10/5. Keytruda was added back on after being  held for cycle 2.   Has noticed more fatigue and joint pains, hips, knees and ankles.   LMP was August. Denies nausea, vomiting, diarrhea or neuropathy.   Breast mass is no longer palpable.       Oncology History:  5/3/23  Impression:  Bilateral  There is no mammographic evidence of malignancy.     BI-RADS Category:   Overall: 2 - Benign    23  US  Impression:  Right  Mass: Right breast 1.5 cm x 1 cm x 1.6 cm mass at the 8 o'clock position. Assessment: 4 - Suspicious finding. Biopsy and possible aspiration are recommended.   Mass: Right breast 2 cm x 1.1 cm x 1.6 cm mass at the 7 o'clock position. Assessment: 4 - Suspicious finding. Biopsy is recommended.       BI-RADS Category:   Overall: 4 - Suspicious    8/2/23  MRI  Impression:  Right  Mass: Right breast 2.7 cm x 2.2 cm x 2 cm mass at the 8 o'clock position. Assessment: 6 - Known biopsy, proven malignancy.   Mass: Right breast 2 cm x 1.6 cm x 1.6 cm mass at the 7 o'clock position. Assessment: 6 - Known biopsy, proven malignancy.      Left  There is no MR evidence of malignancy in the left breast.     BI-RADS Category:   Overall: 6 - Known Biopsy-Proven Malignancy  7/31/23:  DIAGNOSIS:   07/28/2023 JL:tml     1. RIGHT BREAST AT 8:00 7 CM FROM NIPPLE CORE NEEDLE BIOPSIES:   - INVASIVE DUCT CARCINOMA, HIGH-GRADE (3,2,3).     2. RIGHT BREAST AT 7:00 3 CM FROM NIPPLE CORE NEEDLE BIOPSIES:   - IN SITU AND INVASIVE DUCT CARCINOMA, HIGH-GRADE (3,3,2).     RIGHT BREAST: INVASIVE DUCTAL CARCINOMA   GRADE: HIGH     ER: NEGATIVE, OK: NEGATIVE, HER2**: NEGATIVE       8/7/23  CT chest abd pelvis  Impression:     1. Known right breast malignancy, better evaluated on comparison breast MRI exam.  2. Mild asymmetrically prominent, but not pathologically enlarged right axillary lymph nodes, measuring up to 6 mm in short axis dimension.  No mediastinal or hilar lymphadenopathy.  3. Mild hepatomegaly with multiple small hypoattenuating hepatic lesions, the largest measuring 0.9 cm in the right hepatic lobe, which are too small to definitively characterize.  Metastases not excluded.  Consider further evaluation with PET-CT and/or contrast enhanced liver protocol MRI.  4. Solid, noncalcified 2 mm pulmonary nodule in the right lower lobe, nonspecific. Attention on follow-up imaging recommended.     8/18/23  Liver MRI      Impression:     Small circumscribed lesions in the liver are most compatible with hemangiomas.  No suspicious liver lesions.    8/10/123  Bone scan   Impression:     No evidence of osseous metastatic disease.     History:  Past Medical History:   Diagnosis Date    Abnormal Pap smear of cervix     HPV (human papilloma  virus) infection     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      Past Surgical History:   Procedure Laterality Date    BREAST BIOPSY Right 2015    benign    BREAST BIOPSY Right 07/26/2023    INSERTION OF TUNNELED CENTRAL VENOUS CATHETER (CVC) WITH SUBCUTANEOUS PORT N/A 08/15/2023    Procedure: [id number];  Surgeon: Skyler Aldrich MD;  Location: [facility] OR;  Service: General;  Laterality: N/A;    OOPHORECTOMY Right 2009    stage 1a dysgerminoma    VAGINAL DELIVERY  12/13/2016     Family History   Problem Relation Age of Onset    Breast cancer Mother 38    Cervical cancer Mother     Colon cancer Neg Hx     Colon polyps Neg Hx     Esophageal cancer Neg Hx     Stomach cancer Neg Hx     Inflammatory bowel disease Neg Hx       Social History     Tobacco Use    Smoking status: Former     Types: Cigarettes    Smokeless tobacco: Not on file   Substance and Sexual Activity    Alcohol use: Yes     Comment: occasionally    Drug use: No    Sexual activity: Not Currently     Partners: Male     Birth control/protection: None        ROS:   Review of Systems   Constitutional:  Positive for malaise/fatigue. Negative for fever and weight loss.   HENT:  Negative for congestion, hearing loss, nosebleeds and sore throat.    Eyes:  Negative for double vision and photophobia.   Respiratory:  Negative for cough, hemoptysis, sputum production, shortness of breath and wheezing.    Cardiovascular:  Negative for chest pain, palpitations, orthopnea and leg swelling.   Gastrointestinal:  Negative for abdominal pain, blood in stool, constipation, diarrhea, heartburn, nausea and vomiting.   Genitourinary:  Negative for dysuria, frequency, hematuria and urgency.   Musculoskeletal:  Positive for joint pain. Negative for back pain and myalgias.   Skin:  Negative for itching and rash.   Neurological:  Negative for dizziness, tingling, seizures, weakness and headaches.   Endo/Heme/Allergies:  Negative for polydipsia. Does not  "bruise/bleed easily.   Psychiatric/Behavioral:  Negative for depression and memory loss. The patient is nervous/anxious and has insomnia (during steroid days).         Objective:     Vitals:    10/09/23 1148   BP: 106/70   Pulse: 98   Resp: 16   Temp: 97.6 °F (36.4 °C)   TempSrc: Temporal   SpO2: 99%   Weight: 53.1 kg (117 lb 1 oz)   Height: 5' 3" (1.6 m)   PainSc:   6   PainLoc: Generalized       Physical Examination:   Physical Exam  Vitals and nursing note reviewed.   Constitutional:       General: She is not in acute distress.     Appearance: She is not diaphoretic.   HENT:      Head: Normocephalic.      Mouth/Throat:      Pharynx: No oropharyngeal exudate.   Eyes:      General: No scleral icterus.     Conjunctiva/sclera: Conjunctivae normal.   Neck:      Thyroid: No thyromegaly.   Cardiovascular:      Rate and Rhythm: Normal rate and regular rhythm.      Heart sounds: Normal heart sounds. No murmur heard.  Pulmonary:      Effort: Pulmonary effort is normal. No respiratory distress.      Breath sounds: No stridor. No wheezing or rales.   Chest:      Chest wall: No tenderness.   Abdominal:      General: Bowel sounds are normal. There is no distension.      Palpations: Abdomen is soft. There is no mass.      Tenderness: There is no abdominal tenderness. There is no rebound.   Musculoskeletal:         General: No tenderness or deformity. Normal range of motion.      Cervical back: Neck supple.   Lymphadenopathy:      Cervical: No cervical adenopathy.   Skin:     General: Skin is warm and dry.      Findings: No erythema or rash.   Neurological:      Mental Status: She is alert and oriented to person, place, and time.      Cranial Nerves: No cranial nerve deficit.      Coordination: Coordination normal.      Gait: Gait is intact.   Psychiatric:         Mood and Affect: Affect normal.         Cognition and Memory: Memory normal.         Judgment: Judgment normal.        Diagnostic Tests:  Significant Imaging: I have " reviewed and interpreted all pertinent imaging results/findings.    Laboratory Data:  All pertinent labs have been reviewed.    Labs:   Lab Results   Component Value Date    WBC 4.98 10/09/2023    HGB 11.6 (L) 10/09/2023    HCT 35.2 (L) 10/09/2023    MCV 95 10/09/2023     10/09/2023       Assessment/Plan:   Invasive ductal carcinoma of breast, female, right  cT2 cN0 G3 IDC, TNBC    We discussed the more agressive nature and multifocal nature of her TNBC. Clinically node negative. Based on size, she is a candidate for neoadjuvant chemo immunotherapy based on Keynote-522 to prevent systemic spread and to monitor response to therapy and that 64% of the patients on trial had a pCR.     She is agreeable,echo is normal, no evidence for distant metastatic disease noted. Cleared to receive C3 D8 this week, will monitor weekly.     Offer supportive care with IO and onc psych.     After completing neoadjuvant therapy, she would be referred back for surgery.     She has also been enrolled in the Protocol [id number], Disparities in Results of Immune Checkpoint Inhibitor Treatment (DIRECT): A Prospective Cohort Study of Cancer Survivors Treated with anti-PD-L1 Immunotherapy in a Community Oncology Setting.     Transaminitis   Grade 2, held Keytruda for cycle 2, treated with a short course of steroids, now LFTs have normalized and she received Keytruda for cycle 3.  History of ovarian cancer  BRCA positive   Plan for b/l mastectomy. Follow up with Gyn onc.     Joint pains  Continue integrative oncology follow up,exercise and NSAIDs as needed.       MDM includes  :    - Acute or chronic illness or injury that poses a threat to life or bodily function  - Independent review and explanation of 3+ results from unique tests  - Discussion of management and ordering 3+ unique tests  - Extensive discussion of treatment and management  - Prescription drug management  - Drug therapy requiring intensive monitoring for toxicity       ECOG SCORE    0 - Fully active-able to carry on all pre-disease performance without restriction           Discussion:   No follow-ups on file.    Plan was discussed with the patient at length, and she verbalized understanding. Arabella was given an opportunity to ask questions that were answered to her satisfaction, and she was advised to call in the interval if any problems or questions arise.    Electronically signed by Corina Mcgovern MD        Route Chart for Scheduling    Med Onc Chart Routing      Follow up with physician . 10/30 1 pm overbook   Follow up with SA[facility]    Infusion scheduling note    Injection scheduling note    Labs CBC and CMP   Scheduling:  Preferred lab:  Lab interval:     Imaging    Pharmacy appointment    Other referrals                  Treatment Plan Information   OP PEMBROLIZUMAB WITH WEEKLY PACLITAXEL CARBOPLATIN (AUC 1.5) FOLLOWED BY PEMBROLIZUMAB DOXORUBICIN CYCLOPHOSPHAMIDE FOLLOWED BY PEMBROLIZUMAB 200MG Q3W   Corina Mcgovern MD   Upcoming Treatment Dates - OP PEMBROLIZUMAB WITH WEEKLY PACLITAXEL CARBOPLATIN (AUC 1.5) FOLLOWED BY PEMBROLIZUMAB DOXORUBICIN CYCLOPHOSPHAMIDE FOLLOWED BY PEMBROLIZUMAB 200MG Q3W    10/12/2023       Pre-Medications       diphenhydrAMINE (BENADRYL) 50 mg in NS 50 mL IVPB       famotidine (PF) injection 20 mg       Chemotherapy       PACLitaxeL (TAXOL) 80 mg/m2 = 126 mg in sodium chloride 0.9% 250 mL chemo infusion       CARBOplatin (PARAPLATIN) 175 mg in sodium chloride 0.9% 267.5 mL chemo infusion       Antiemetics       LORazepam injection 0.5 mg       palonosetron 0.25mg/dexAMETHasone 12mg in NS IVPB 0.25 mg 50 mL  10/19/2023       Pre-Medications       diphenhydrAMINE (BENADRYL) 50 mg in NS 50 mL IVPB       famotidine (PF) injection 20 mg       Chemotherapy       PACLitaxeL (TAXOL) 80 mg/m2 = 126 mg in sodium chloride 0.9% 250 mL chemo infusion       CARBOplatin (PARAPLATIN) 175 mg in sodium chloride 0.9% 267.5 mL chemo infusion       Antiemetics        LORazepam injection 0.5 mg       palonosetron 0.25mg/dexAMETHasone 12mg in NS IVPB 0.25 mg 50 mL  10/26/2023       Pre-Medications       diphenhydrAMINE (BENADRYL) 50 mg in NS 50 mL IVPB       famotidine (PF) injection 20 mg       Chemotherapy       PACLitaxeL (TAXOL) 80 mg/m2 = 126 mg in sodium chloride 0.9% 250 mL chemo infusion       CARBOplatin (PARAPLATIN) 175 mg in sodium chloride 0.9% 267.5 mL chemo infusion       Antiemetics       aprepitant (CINVANTI) injection 130 mg       LORazepam injection 0.5 mg       palonosetron 0.25mg/dexAMETHasone 12mg in NS IVPB 0.25 mg 50 mL       Immunotherapy       pembrolizumab (KEYTRUDA) 200 mg in sodium chloride 0.9% SolP 108 mL infusion  11/2/2023       Pre-Medications       diphenhydrAMINE (BENADRYL) 50 mg in NS 50 mL IVPB       famotidine (PF) injection 20 mg       Chemotherapy       PACLitaxeL (TAXOL) 80 mg/m2 = 126 mg in sodium chloride 0.9% 250 mL chemo infusion       CARBOplatin (PARAPLATIN) 175 mg in sodium chloride 0.9% 267.5 mL chemo infusion       Antiemetics       LORazepam injection 0.5 mg       palonosetron 0.25mg/dexAMETHasone 12mg in NS IVPB 0.25 mg 50 mL    Answers submitted by the patient for this visit:  Review of Systems Questionnaire (Submitted on 10/9/2023)  appetite change : No  unexpected weight change: No  mouth sores: No  visual disturbance: No  adenopathy: No

## 2023-10-10 ENCOUNTER — TELEPHONE (OUTPATIENT)
Dept: INFUSION THERAPY | Facility: HOSPITAL | Age: 43
End: 2023-10-10
Payer: COMMERCIAL

## 2023-10-10 ENCOUNTER — PATIENT MESSAGE (OUTPATIENT)
Dept: ADMINISTRATIVE | Facility: OTHER | Age: 43
End: 2023-10-10
Payer: COMMERCIAL

## 2023-10-10 ENCOUNTER — TELEPHONE (OUTPATIENT)
Dept: HEMATOLOGY/ONCOLOGY | Facility: CLINIC | Age: 43
End: 2023-10-10
Payer: COMMERCIAL

## 2023-10-10 NOTE — TELEPHONE ENCOUNTER
Returned pt phone call, was able to adjust lab and NP appt to earlier in the week, but infusion unable to move inf appt. Unable to move appts on 10/26 due to no available appts.  Pt verbalized understanding.    ----- Message from Eugenia Headley sent at 10/10/2023  8:08 AM CDT -----  Type: Need Medical Advice   Who Called: Patient  Best callback number: [phone]  Additional Information: Patient called to ask if her appointment and labs to be the day before her infusion for the 10/19 and 10/26 appointments   Please call to further assist, Thanks.

## 2023-10-10 NOTE — TELEPHONE ENCOUNTER
----- Message from Eugenia Headley sent at 10/10/2023  8:12 AM CDT -----  Type: Need Medical Advice   Who Called: Patient  Best callback number: [phone]  Additional Information: Patient would like her infusion moved to 9:30am on 10/19 and 10/26 she called to move her Dr appointment and labs to the day before her infusion.   Please call to further assist, Thanks.

## 2023-10-11 ENCOUNTER — CLINICAL SUPPORT (OUTPATIENT)
Dept: REHABILITATION | Facility: HOSPITAL | Age: 43
End: 2023-10-11
Payer: COMMERCIAL

## 2023-10-11 ENCOUNTER — PATIENT MESSAGE (OUTPATIENT)
Dept: ADMINISTRATIVE | Facility: OTHER | Age: 43
End: 2023-10-11
Payer: COMMERCIAL

## 2023-10-11 DIAGNOSIS — G89.3 CHRONIC PAIN AFTER CANCER TREATMENT: Primary | ICD-10-CM

## 2023-10-11 LAB
HGB A2 MFR BLD HPLC: 2.9 % (ref 2.2–3.2)
HGB FRACT BLD ELPH-IMP: NORMAL
HGB FRACT BLD ELPH-IMP: NORMAL

## 2023-10-11 PROCEDURE — 97813 ACUP 1/> W/ESTIM 1ST 15 MIN: CPT | Mod: PN | Performed by: ACUPUNCTURIST

## 2023-10-11 PROCEDURE — 97814 ACUP 1/> W/ESTIM EA ADDL 15: CPT | Mod: PN | Performed by: ACUPUNCTURIST

## 2023-10-11 NOTE — PROGRESS NOTES
Acupuncture Follow-Up Note     Name: Arabella Koroma [facility] Number: [id number]    Traditional Chinese Medicine (TCM) Diagnosis: Qi Stagnation, Qi Deficiency, and Damp  Medical Diagnosis:   Encounter Diagnosis   Name Primary?    Chronic pain after cancer treatment Yes        Evaluation Date: 10/11/2023    Visit #/Visits authorized:     Precautions: Standard    Subjective     Chief Concern: Low-back Pain (Patient reports low back pain as 4/10 with radicular pain into the hips.) and Joint Pain (Patient also reports joint pain in the hips, knees and ankles as 3/10)       Medical necessity is demonstrated by the following IMPAIRMENTS: Medical Necessity: Decreased mobility limits day to day activities, social, and emergent situations              Aggravating Factors:  movement     Relieving Factors:  rest    Symptom Description:     Quality:  Aching and Dull  Severity:  3  Frequency:  every day      Objective     Observation: Patient has joint pain on a regular basis which may be exacerbated by weather.      Pulse:        wiry       New Findings:  na    Treatment     Treatment Principles:  move qi and blood, resolve bi, drain dampness    Acupuncture points used:  4 GONZALEZ, Du20, and Gb34    Bilateral points: dubi, ki 3-6, UB 59-60, 23-26.   Unilateral points:  Auricular Treatment:  perez men    Needles In: 25  Needles Out: 25  Needles W/ STIM placed: 935  Needles W/ STIM removed: 955      Other Traditional Chinese Medicine Modalities -  na    Assessment     After treatment, patient felt less pain, more relaxed     Patient prognosis is Good.     Patient will continue to benefit from acupuncture treatment to address the deficits listed in the problem list box on initial evaluation, provide patient family education and to maximize pt's level of independence in the home and community environment.     Patient's spiritual, cultural and educational needs considered and pt agreeable to plan of care and goals.      Anticipated barriers to treatment: none    Plan     Recommend 1 /week for 12 sessions then re-assess.      Education:  Patient is aware of cumulative benefit of acupuncture

## 2023-10-12 ENCOUNTER — OFFICE VISIT (OUTPATIENT)
Dept: HEMATOLOGY/ONCOLOGY | Facility: CLINIC | Age: 43
End: 2023-10-12
Payer: COMMERCIAL

## 2023-10-12 ENCOUNTER — INFUSION (OUTPATIENT)
Dept: INFUSION THERAPY | Facility: HOSPITAL | Age: 43
End: 2023-10-12
Attending: INTERNAL MEDICINE
Payer: COMMERCIAL

## 2023-10-12 ENCOUNTER — DOCUMENTATION ONLY (OUTPATIENT)
Dept: INFUSION THERAPY | Facility: HOSPITAL | Age: 43
End: 2023-10-12

## 2023-10-12 VITALS
DIASTOLIC BLOOD PRESSURE: 69 MMHG | BODY MASS INDEX: 20.94 KG/M2 | HEART RATE: 77 BPM | HEIGHT: 63 IN | RESPIRATION RATE: 16 BRPM | SYSTOLIC BLOOD PRESSURE: 105 MMHG | TEMPERATURE: 98 F | WEIGHT: 118.19 LBS | OXYGEN SATURATION: 99 %

## 2023-10-12 DIAGNOSIS — R53.83 CHEMOTHERAPY-INDUCED FATIGUE: ICD-10-CM

## 2023-10-12 DIAGNOSIS — T45.1X5A CHEMOTHERAPY-INDUCED FATIGUE: ICD-10-CM

## 2023-10-12 DIAGNOSIS — Z17.1 MALIGNANT NEOPLASM OF LOWER-OUTER QUADRANT OF RIGHT BREAST OF FEMALE, ESTROGEN RECEPTOR NEGATIVE: Primary | ICD-10-CM

## 2023-10-12 DIAGNOSIS — C50.911 INVASIVE DUCTAL CARCINOMA OF BREAST, FEMALE, RIGHT: Primary | ICD-10-CM

## 2023-10-12 DIAGNOSIS — G47.00 INSOMNIA, UNSPECIFIED TYPE: ICD-10-CM

## 2023-10-12 DIAGNOSIS — C50.511 MALIGNANT NEOPLASM OF LOWER-OUTER QUADRANT OF RIGHT BREAST OF FEMALE, ESTROGEN RECEPTOR NEGATIVE: Primary | ICD-10-CM

## 2023-10-12 DIAGNOSIS — M25.50 ARTHRALGIA, UNSPECIFIED JOINT: ICD-10-CM

## 2023-10-12 PROCEDURE — 99215 OFFICE O/P EST HI 40 MIN: CPT | Mod: S$GLB,,, | Performed by: NURSE PRACTITIONER

## 2023-10-12 PROCEDURE — 96413 CHEMO IV INFUSION 1 HR: CPT | Mod: PN

## 2023-10-12 PROCEDURE — 96375 TX/PRO/DX INJ NEW DRUG ADDON: CPT | Mod: PN

## 2023-10-12 PROCEDURE — 96417 CHEMO IV INFUS EACH ADDL SEQ: CPT | Mod: PN

## 2023-10-12 PROCEDURE — 25000003 PHARM REV CODE 250: Mod: PN | Performed by: INTERNAL MEDICINE

## 2023-10-12 PROCEDURE — 99215 PR OFFICE/OUTPT VISIT, EST, LEVL V, 40-54 MIN: ICD-10-PCS | Mod: S$GLB,,, | Performed by: NURSE PRACTITIONER

## 2023-10-12 PROCEDURE — 99999 PR PBB SHADOW E&M-EST. PATIENT-LVL III: CPT | Mod: PBBFAC,,, | Performed by: NURSE PRACTITIONER

## 2023-10-12 PROCEDURE — 63600175 PHARM REV CODE 636 W HCPCS: Mod: PN | Performed by: INTERNAL MEDICINE

## 2023-10-12 PROCEDURE — 99999 PR PBB SHADOW E&M-EST. PATIENT-LVL III: ICD-10-PCS | Mod: PBBFAC,,, | Performed by: NURSE PRACTITIONER

## 2023-10-12 PROCEDURE — 96367 TX/PROPH/DG ADDL SEQ IV INF: CPT | Mod: PN

## 2023-10-12 RX ORDER — DIPHENHYDRAMINE HYDROCHLORIDE 50 MG/ML
50 INJECTION INTRAMUSCULAR; INTRAVENOUS ONCE AS NEEDED
Status: DISCONTINUED | OUTPATIENT
Start: 2023-10-12 | End: 2023-10-12 | Stop reason: HOSPADM

## 2023-10-12 RX ORDER — PROCHLORPERAZINE EDISYLATE 5 MG/ML
10 INJECTION INTRAMUSCULAR; INTRAVENOUS ONCE AS NEEDED
Status: DISCONTINUED | OUTPATIENT
Start: 2023-10-12 | End: 2023-10-12 | Stop reason: HOSPADM

## 2023-10-12 RX ORDER — FAMOTIDINE 10 MG/ML
20 INJECTION INTRAVENOUS
Status: COMPLETED | OUTPATIENT
Start: 2023-10-12 | End: 2023-10-12

## 2023-10-12 RX ORDER — EPINEPHRINE 0.3 MG/.3ML
0.3 INJECTION SUBCUTANEOUS ONCE AS NEEDED
Status: DISCONTINUED | OUTPATIENT
Start: 2023-10-12 | End: 2023-10-12 | Stop reason: HOSPADM

## 2023-10-12 RX ORDER — SODIUM CHLORIDE 0.9 % (FLUSH) 0.9 %
10 SYRINGE (ML) INJECTION
Status: DISCONTINUED | OUTPATIENT
Start: 2023-10-12 | End: 2023-10-12 | Stop reason: HOSPADM

## 2023-10-12 RX ORDER — PALONOSETRON 0.05 MG/ML
0.25 INJECTION, SOLUTION INTRAVENOUS ONCE
Status: COMPLETED | OUTPATIENT
Start: 2023-10-12 | End: 2023-10-12

## 2023-10-12 RX ORDER — LORAZEPAM 2 MG/ML
0.5 INJECTION INTRAMUSCULAR
Status: DISCONTINUED | OUTPATIENT
Start: 2023-10-12 | End: 2023-10-12 | Stop reason: HOSPADM

## 2023-10-12 RX ORDER — DEXAMETHASONE SODIUM PHOSPHATE 4 MG/ML
8 INJECTION, SOLUTION INTRA-ARTICULAR; INTRALESIONAL; INTRAMUSCULAR; INTRAVENOUS; SOFT TISSUE
Status: COMPLETED | OUTPATIENT
Start: 2023-10-12 | End: 2023-10-12

## 2023-10-12 RX ADMIN — CARBOPLATIN 175 MG: 10 INJECTION INTRAVENOUS at 11:10

## 2023-10-12 RX ADMIN — SODIUM CHLORIDE: 9 INJECTION, SOLUTION INTRAVENOUS at 10:10

## 2023-10-12 RX ADMIN — FAMOTIDINE 20 MG: 10 INJECTION INTRAVENOUS at 10:10

## 2023-10-12 RX ADMIN — DEXAMETHASONE SODIUM PHOSPHATE 8 MG: 4 INJECTION INTRA-ARTICULAR; INTRALESIONAL; INTRAMUSCULAR; INTRAVENOUS; SOFT TISSUE at 10:10

## 2023-10-12 RX ADMIN — LORAZEPAM 0.5 MG: 2 INJECTION INTRAMUSCULAR; INTRAVENOUS at 10:10

## 2023-10-12 RX ADMIN — PALONOSETRON 0.25 MG: 0.05 INJECTION, SOLUTION INTRAVENOUS at 10:10

## 2023-10-12 RX ADMIN — PACLITAXEL 126 MG: 6 INJECTION, SOLUTION, CONCENTRATE INTRAVENOUS at 10:10

## 2023-10-12 RX ADMIN — DIPHENHYDRAMINE HYDROCHLORIDE 50 MG: 50 INJECTION, SOLUTION INTRAMUSCULAR; INTRAVENOUS at 10:10

## 2023-10-12 NOTE — PLAN OF CARE
Problem: Fatigue  Goal: Improved Activity Tolerance  Outcome: Ongoing, Progressing  Intervention: Promote Improved Energy  Flowsheets (Taken 10/12/2023 1505)  Fatigue Management:   paced activity encouraged   fatigue-related activity identified   frequent rest breaks encouraged   activity assistance provided  Sleep/Rest Enhancement:   therapeutic touch utilized   relaxation techniques promoted   family presence promoted   noise level reduced   natural light exposure provided   consistent schedule promoted   awakenings minimized  Activity Management:   Ambulated -L4   Up in chair - L3   Ambulated to bathroom - L4     Problem: Adult Inpatient Plan of Care  Goal: Plan of Care Review  Outcome: Ongoing, Progressing  Flowsheets (Taken 10/12/2023 1505)  Plan of Care Reviewed With:   patient   spouse  Goal: Patient-Specific Goal (Individualized)  Outcome: Ongoing, Progressing  Flowsheets (Taken 10/12/2023 1505)  Anxieties, Fears or Concerns: none voiced  Individualized Care Needs: recliner, pillow, dignicap, education, conversation, cryotherapy (home kit), home electric blanket, spouse at chairside, blanket  Goal: Optimal Comfort and Wellbeing  Outcome: Ongoing, Progressing  Intervention: Provide Person-Centered Care  Flowsheets (Taken 10/12/2023 1505)  Trust Relationship/Rapport:   thoughts/feelings acknowledged   empathic listening provided   reassurance provided   emotional support provided   questions encouraged   choices provided   care explained   questions answered     Problem: Fall Injury Risk  Goal: Absence of Fall and Fall-Related Injury  Outcome: Ongoing, Progressing  Intervention: Promote Injury-Free Environment  Flowsheets (Taken 10/12/2023 1505)  Safety Promotion/Fall Prevention:   instructed to call staff for mobility   supervised activity   room near unit station   medications reviewed   Fall Risk reviewed with patient/family   lighting adjusted   in recliner, wheels locked   family to remain at bedside    high risk medications identified

## 2023-10-12 NOTE — PROGRESS NOTES
Arabella Koroma Teraotekenyetta  43 y.o. is here to seek an integrative approach to discuss side effects related to breast cancer treatment.     HPI  She had a routine mammogram in May which was clear. A month later in June she felt a lump and began having pain.   She has a history of ovarian cancer with the right ovary removed when she was 29 years old. She saw Dr. Aldrich yesterday and is awaiting port placement. She has a CT scan today. She states she is sleeping well and does not have fatigue. She has stress and anxiety due to the diagnosis, but has been busy with the kids and her mother which has kept her mind occupied. The kids went back to school today and she has more time to think and became tearful. Their children know she has cancer and will get medicine weekly, but they have not addressed the possible side effects that may come with treatment.   She has a good appetite and eats healthy and low or healthy carbs. She started beach body and then took a few weeks off due to doctor's appointments.   She is  and has 3 sons. She has a good support system. She helps her mother who has early onset Alzheimer's. She works as needed from home.     Today's Visit  Sonya is here today getting chemo with her  at the chairside. She reports fatigue is the bigest side effect.   She does not sleep well the night of chemo and 2 nights after. She has a hard staying asleep and is generally sleeping around 5 hours on the night of chemo and the 2 days after and then 7-8 on the other nights.  She continues to have a good appetite and is eating healthy. She has had 2 acupuncture sessions for joint pain to back, hips, knees, and ankles. She takes tylenol which helps slightly with pain.  Her steroid was decreased today by Dr. Mcgovern and she is hoping this helps her sleep better. She reports her stress and anxiety are low. Her  states she is doing well and her stress comes from regular day to day life with three boys.        Pillars Assessment    Sleep  How many hours of sleep per night? 7-8 hours  Do you have trouble falling asleep, staying asleep or waking up earlier than you need to? no  Do you have daytime fatigue? no  Do you need medication for sleep? no  Do you use any supplements or other interventions for sleep? Benadyl and melatonin    Resilience  Rate your current level of stress- low    Nutrition   Food allergies or sensitivities: no  Do you adhere to a particular type of diet? no  Do you have any concerns with your eating habits? no    Exercise  How would you describe your physical activity level? Low/moderate    Past Medical History  Past Medical History:   Diagnosis Date    Abnormal Pap smear of cervix     HPV (human papilloma virus) infection     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      Past Surgical History   Past Surgical History:   Procedure Laterality Date    BREAST BIOPSY Right 2015    benign    BREAST BIOPSY Right 07/26/2023    INSERTION OF TUNNELED CENTRAL VENOUS CATHETER (CVC) WITH SUBCUTANEOUS PORT N/A 08/15/2023    Procedure: [id number];  Surgeon: Skyler Aldrich MD;  Location: [facility];  Service: General;  Laterality: N/A;    OOPHORECTOMY Right 2009    stage 1a dysgerminoma    VAGINAL DELIVERY  12/13/2016      Family History   Family History   Problem Relation Age of Onset    Breast cancer Mother 38    Cervical cancer Mother     Colon cancer Neg Hx     Colon polyps Neg Hx     Esophageal cancer Neg Hx     Stomach cancer Neg Hx     Inflammatory bowel disease Neg Hx       Allergies  Review of patient's allergies indicates:   Allergen Reactions    Codeine Anaphylaxis      Current Medications:    Current Outpatient Medications:     dexAMETHasone (DECADRON) 4 MG Tab, Take 1 tab as needed twice a day for nausea that is persistent despite zofran. Take morning and afternoon, no later than 3pm. (Patient not taking: Reported on 9/27/2023), Disp: 40 tablet, Rfl: 3    hydrocortisone (PROCTOCORT) 1  % crpe, Apply to affected area two times a day (Patient not taking: Reported on 10/9/2023), Disp: 28.4 g, Rfl: 1    LIDOcaine-prilocaine (EMLA) cream, Apply topically as needed (apply 30 to 60 minutes prior to chemo)., Disp: 30 g, Rfl: 2    OLANZapine (ZYPREXA) 5 MG tablet, Take 1 tablet (5 mg total) by mouth every evening. Take as directed on days 1-4, 8-10, and 15-17 of your chemotherapy cycles 1-4. Take on days 1, 2, and 3 of cycles 5-8. (Patient not taking: Reported on 9/27/2023), Disp: 10 tablet, Rfl: 11    ondansetron (ZOFRAN) 8 MG tablet, Take 1 tablet (8 mg total) by mouth every 8 (eight) hours as needed for Nausea. (Patient not taking: Reported on 10/12/2023), Disp: 60 tablet, Rfl: 2    ondansetron (ZOFRAN-ODT) 8 MG TbDL, Take 1 tablet (8 mg total) by mouth every 6 (six) hours as needed (nausea). (Patient not taking: Reported on 10/12/2023), Disp: 60 tablet, Rfl: 3    promethazine (PHENERGAN) 12.5 MG Tab, (Take 1-2 tabs every 6 hours as needed for nausea persistent despite zofran) (Patient not taking: Reported on 10/9/2023), Disp: 40 tablet, Rfl: 3  No current facility-administered medications for this visit.    Facility-Administered Medications Ordered in Other Visits:     CARBOplatin (PARAPLATIN) 175 mg in sodium chloride 0.9% 302.5 mL chemo infusion, 175 mg, Intravenous, 1 time in Clinic/HOD, Corina Mcgovern MD    diphenhydrAMINE injection 50 mg, 50 mg, Intravenous, Once PRN, Corina Mcgovern MD    EPINEPHrine (EPIPEN) 0.3 mg/0.3 mL pen injection 0.3 mg, 0.3 mg, Intramuscular, Once PRN, Corina Mcgovern MD    hydrocortisone sodium succinate injection 100 mg, 100 mg, Intravenous, Once PRN, Corina Mcgovern MD    HYDROmorphone injection 0.5 mg, 0.5 mg, Intravenous, Q5 Min PRN, Rosales Sousa MD    LORazepam injection 0.5 mg, 0.5 mg, Intravenous, On Call Procedure, Corina Mcgovern MD, 0.5 mg at 10/12/23 1001    ondansetron injection 4 mg, 4 mg, Intravenous, Daily PRN, Rosales Sousa MD     prochlorperazine injection Soln 10 mg, 10 mg, Intravenous, Q30 Min PRN, Rosales Sousa MD    prochlorperazine injection Soln 10 mg, 10 mg, Intravenous, Once PRN, Corina Mcgovern MD    sodium chloride 0.9% flush 10 mL, 10 mL, Intravenous, PRN, Corina Mcgovern MD     Review of Systems  Review of Systems   Constitutional:  Positive for malaise/fatigue.   HENT: Negative.     Eyes: Negative.    Respiratory: Negative.     Cardiovascular: Negative.    Gastrointestinal: Negative.    Genitourinary: Negative.    Musculoskeletal:  Positive for joint pain.   Skin: Negative.    Neurological: Negative.    Endo/Heme/Allergies: Negative.    Psychiatric/Behavioral:  The patient has insomnia.       Physical Exam      /69  RR 16  Temp 97.9  O2 99%  HR 77      Physical Exam  Vitals reviewed.   Constitutional:       Appearance: Normal appearance.   Neurological:      Mental Status: She is alert.   Psychiatric:         Mood and Affect: Mood normal.         Behavior: Behavior normal.        ASSESSMENT :  1. Malignant neoplasm of lower-outer quadrant of right breast of female, estrogen receptor negative    2. Chemotherapy-induced fatigue    3. Insomnia, unspecified type    4. Arthralgia, unspecified joint       PLAN:  Reviewed all information discussed at today's visit and all questions were answered.    Counseled on healthy lifestyle and behavior modifications: Counseled on the benefits of exercise during treatment   Counseled on sleep hygiene---Recommended insight timer, begin nighttime routine an hour before bed--no tv/screens, encouraged relaxation techniques and meditations including those found on insight timer.   See Nutrition during treatment as needed   I discussed and recommended the following support services:  [employer] and Yoga I suggested [employer] and/or Yoga as these practices reduce stress, increases flexibility and muscle strength, improves balance and promotes serenity in the power of movement to help fight  disease and boost your immune system.   Music and relaxation therapy and Meditation can help reduce stress, pain, depression, and prepare the mind and body for sleep.     Follow up with [facility] in 4-5 months.     I spent a total of 42 minutes on the day of the visit.This includes face to face time and non-face to face time preparing to see the patient (eg, review of tests), obtaining and/or reviewing separately obtained history, documenting clinical information in the electronic or other health record, independently interpreting results and communicating results to the patient/family/caregiver, or care coordinator.

## 2023-10-12 NOTE — PROGRESS NOTES
Oncology Nutrition       Weight Check   Chart reviewed. Weight check completed on pt.     CBW:118#  Weight at initiation of tx:119#    Wt Readings from Last 10 Encounters:   10/12/23 53.6 kg (118 lb 2.7 oz)   10/09/23 53.1 kg (117 lb 1 oz)   10/05/23 53.4 kg (117 lb 11.6 oz)   10/04/23 53.4 kg (117 lb 11.6 oz)   09/28/23 53.6 kg (118 lb 2.7 oz)   09/27/23 53.6 kg (118 lb 2.7 oz)   09/27/23 53.6 kg (118 lb 2.7 oz)   09/21/23 52.3 kg (115 lb 4.8 oz)   09/20/23 52.3 kg (115 lb 4.8 oz)   09/14/23 52.6 kg (115 lb 15.4 oz)          RD plan of care: Weight is currently 118#. No significant change at this time. Per nursing nutrition risk report, pt denies any nutritional concerns or challenges at this time. RD to continue to monitor; no nutritional interventions are needed at this time.     Orquidea Vu, MS, RD, LDN  10/12/2023  3:45 PM

## 2023-10-12 NOTE — PLAN OF CARE
Pt arrived to clinic for C3D8 treatment with Taxol and Carboplatin infusions and tolerated well with no changes throughout therapy. Dignicap used per pt request and tolerated well with home electric blanket used and spouse at chairside. Cryotherapy performed per pt request to bilateral hands/feet and tolerated well. Pt aware of side effects and f/u appts and discharged to home in NAD with  and is aware of instructions for Dignicap supplies and hair maintenance.

## 2023-10-13 ENCOUNTER — PATIENT MESSAGE (OUTPATIENT)
Dept: ADMINISTRATIVE | Facility: OTHER | Age: 43
End: 2023-10-13
Payer: COMMERCIAL

## 2023-10-13 PROBLEM — T45.1X5A CHEMOTHERAPY-INDUCED FATIGUE: Status: ACTIVE | Noted: 2023-10-13

## 2023-10-13 PROBLEM — R53.83 CHEMOTHERAPY-INDUCED FATIGUE: Status: ACTIVE | Noted: 2023-10-13

## 2023-10-13 PROBLEM — G47.00 INSOMNIA: Status: ACTIVE | Noted: 2023-10-13

## 2023-10-13 PROBLEM — M25.50 ARTHRALGIA: Status: ACTIVE | Noted: 2023-10-13

## 2023-10-14 ENCOUNTER — PATIENT MESSAGE (OUTPATIENT)
Dept: ADMINISTRATIVE | Facility: OTHER | Age: 43
End: 2023-10-14
Payer: COMMERCIAL

## 2023-10-15 ENCOUNTER — PATIENT MESSAGE (OUTPATIENT)
Dept: ADMINISTRATIVE | Facility: OTHER | Age: 43
End: 2023-10-15
Payer: COMMERCIAL

## 2023-10-16 ENCOUNTER — LAB VISIT (OUTPATIENT)
Dept: LAB | Facility: HOSPITAL | Age: 43
End: 2023-10-16
Attending: NURSE PRACTITIONER
Payer: COMMERCIAL

## 2023-10-16 ENCOUNTER — OFFICE VISIT (OUTPATIENT)
Dept: HEMATOLOGY/ONCOLOGY | Facility: CLINIC | Age: 43
End: 2023-10-16
Payer: COMMERCIAL

## 2023-10-16 VITALS
BODY MASS INDEX: 20.79 KG/M2 | WEIGHT: 117.31 LBS | DIASTOLIC BLOOD PRESSURE: 68 MMHG | TEMPERATURE: 98 F | HEART RATE: 97 BPM | RESPIRATION RATE: 16 BRPM | HEIGHT: 63 IN | SYSTOLIC BLOOD PRESSURE: 106 MMHG | OXYGEN SATURATION: 100 %

## 2023-10-16 DIAGNOSIS — C50.511 MALIGNANT NEOPLASM OF LOWER-OUTER QUADRANT OF RIGHT BREAST OF FEMALE, ESTROGEN RECEPTOR NEGATIVE: Primary | ICD-10-CM

## 2023-10-16 DIAGNOSIS — C50.911 INVASIVE DUCTAL CARCINOMA OF BREAST, FEMALE, RIGHT: ICD-10-CM

## 2023-10-16 DIAGNOSIS — Z17.1 MALIGNANT NEOPLASM OF LOWER-OUTER QUADRANT OF RIGHT BREAST OF FEMALE, ESTROGEN RECEPTOR NEGATIVE: Primary | ICD-10-CM

## 2023-10-16 LAB
ALBUMIN SERPL BCP-MCNC: 4.2 G/DL (ref 3.5–5.2)
ALP SERPL-CCNC: 41 U/L (ref 55–135)
ALT SERPL W/O P-5'-P-CCNC: 25 U/L (ref 10–44)
ANION GAP SERPL CALC-SCNC: 11 MMOL/L (ref 8–16)
AST SERPL-CCNC: 29 U/L (ref 10–40)
B-HCG UR QL: NEGATIVE
BASOPHILS # BLD AUTO: 0.01 K/UL (ref 0–0.2)
BASOPHILS NFR BLD: 0.2 % (ref 0–1.9)
BILIRUB SERPL-MCNC: 0.3 MG/DL (ref 0.1–1)
BUN SERPL-MCNC: 13 MG/DL (ref 6–20)
CALCIUM SERPL-MCNC: 9.4 MG/DL (ref 8.7–10.5)
CHLORIDE SERPL-SCNC: 102 MMOL/L (ref 95–110)
CO2 SERPL-SCNC: 27 MMOL/L (ref 23–29)
CREAT SERPL-MCNC: 0.7 MG/DL (ref 0.5–1.4)
DIFFERENTIAL METHOD: ABNORMAL
EOSINOPHIL # BLD AUTO: 0 K/UL (ref 0–0.5)
EOSINOPHIL NFR BLD: 0.7 % (ref 0–8)
ERYTHROCYTE [DISTWIDTH] IN BLOOD BY AUTOMATED COUNT: 15.2 % (ref 11.5–14.5)
EST. GFR  (NO RACE VARIABLE): >60 ML/MIN/1.73 M^2
GLUCOSE SERPL-MCNC: 109 MG/DL (ref 70–110)
HCT VFR BLD AUTO: 34.5 % (ref 37–48.5)
HGB BLD-MCNC: 11.4 G/DL (ref 12–16)
IMM GRANULOCYTES # BLD AUTO: 0.01 K/UL (ref 0–0.04)
IMM GRANULOCYTES NFR BLD AUTO: 0.2 % (ref 0–0.5)
LYMPHOCYTES # BLD AUTO: 2.3 K/UL (ref 1–4.8)
LYMPHOCYTES NFR BLD: 52.8 % (ref 18–48)
MCH RBC QN AUTO: 31.3 PG (ref 27–31)
MCHC RBC AUTO-ENTMCNC: 33 G/DL (ref 32–36)
MCV RBC AUTO: 95 FL (ref 82–98)
MONOCYTES # BLD AUTO: 0.2 K/UL (ref 0.3–1)
MONOCYTES NFR BLD: 5.6 % (ref 4–15)
NEUTROPHILS # BLD AUTO: 1.8 K/UL (ref 1.8–7.7)
NEUTROPHILS NFR BLD: 40.5 % (ref 38–73)
NRBC BLD-RTO: 0 /100 WBC
PLATELET # BLD AUTO: 228 K/UL (ref 150–450)
PMV BLD AUTO: 8.5 FL (ref 9.2–12.9)
POTASSIUM SERPL-SCNC: 4 MMOL/L (ref 3.5–5.1)
PROT SERPL-MCNC: 7.4 G/DL (ref 6–8.4)
RBC # BLD AUTO: 3.64 M/UL (ref 4–5.4)
SODIUM SERPL-SCNC: 140 MMOL/L (ref 136–145)
TSH SERPL DL<=0.005 MIU/L-ACNC: 0.69 UIU/ML (ref 0.4–4)
WBC # BLD AUTO: 4.32 K/UL (ref 3.9–12.7)

## 2023-10-16 PROCEDURE — 99213 OFFICE O/P EST LOW 20 MIN: CPT | Mod: S$GLB,,, | Performed by: NURSE PRACTITIONER

## 2023-10-16 PROCEDURE — 99999 PR PBB SHADOW E&M-EST. PATIENT-LVL IV: CPT | Mod: PBBFAC,,, | Performed by: NURSE PRACTITIONER

## 2023-10-16 PROCEDURE — 85025 COMPLETE CBC W/AUTO DIFF WBC: CPT | Mod: PN | Performed by: INTERNAL MEDICINE

## 2023-10-16 PROCEDURE — 80053 COMPREHEN METABOLIC PANEL: CPT | Mod: PN | Performed by: INTERNAL MEDICINE

## 2023-10-16 PROCEDURE — 81025 URINE PREGNANCY TEST: CPT | Mod: PN | Performed by: INTERNAL MEDICINE

## 2023-10-16 PROCEDURE — 99999 PR PBB SHADOW E&M-EST. PATIENT-LVL IV: ICD-10-PCS | Mod: PBBFAC,,, | Performed by: NURSE PRACTITIONER

## 2023-10-16 PROCEDURE — 99213 PR OFFICE/OUTPT VISIT, EST, LEVL III, 20-29 MIN: ICD-10-PCS | Mod: S$GLB,,, | Performed by: NURSE PRACTITIONER

## 2023-10-16 PROCEDURE — 36415 COLL VENOUS BLD VENIPUNCTURE: CPT | Mod: PN | Performed by: INTERNAL MEDICINE

## 2023-10-16 PROCEDURE — 84443 ASSAY THYROID STIM HORMONE: CPT | Performed by: INTERNAL MEDICINE

## 2023-10-16 RX ORDER — DIPHENHYDRAMINE HYDROCHLORIDE 50 MG/ML
50 INJECTION INTRAMUSCULAR; INTRAVENOUS ONCE AS NEEDED
Status: CANCELLED | OUTPATIENT
Start: 2023-10-19

## 2023-10-16 RX ORDER — DEXAMETHASONE SODIUM PHOSPHATE 4 MG/ML
8 INJECTION, SOLUTION INTRA-ARTICULAR; INTRALESIONAL; INTRAMUSCULAR; INTRAVENOUS; SOFT TISSUE
Status: CANCELLED
Start: 2023-10-19

## 2023-10-16 RX ORDER — PROCHLORPERAZINE EDISYLATE 5 MG/ML
10 INJECTION INTRAMUSCULAR; INTRAVENOUS ONCE AS NEEDED
Status: CANCELLED
Start: 2023-10-19

## 2023-10-16 RX ORDER — HEPARIN 100 UNIT/ML
500 SYRINGE INTRAVENOUS
Status: CANCELLED | OUTPATIENT
Start: 2023-10-19

## 2023-10-16 RX ORDER — FAMOTIDINE 10 MG/ML
20 INJECTION INTRAVENOUS
Status: CANCELLED | OUTPATIENT
Start: 2023-10-19

## 2023-10-16 RX ORDER — EPINEPHRINE 0.3 MG/.3ML
0.3 INJECTION SUBCUTANEOUS ONCE AS NEEDED
Status: CANCELLED | OUTPATIENT
Start: 2023-10-19

## 2023-10-16 RX ORDER — SODIUM CHLORIDE 0.9 % (FLUSH) 0.9 %
10 SYRINGE (ML) INJECTION
Status: CANCELLED | OUTPATIENT
Start: 2023-10-19

## 2023-10-16 RX ORDER — LORAZEPAM 2 MG/ML
0.5 INJECTION INTRAMUSCULAR
Status: CANCELLED
Start: 2023-10-19

## 2023-10-16 RX ORDER — PALONOSETRON 0.05 MG/ML
0.25 INJECTION, SOLUTION INTRAVENOUS ONCE
Status: CANCELLED
Start: 2023-10-19 | End: 2023-10-19

## 2023-10-16 NOTE — PROGRESS NOTES
PATIENT: Arabella Catalan  MRN: [id number]  DATE: 10/16/2023      Diagnosis:   1. Malignant neoplasm of lower-outer quadrant of right breast of female, estrogen receptor negative        Chief Complaint: Malignant neoplasm of lower-outer quadrant of right breast  (1 WK FOLLOW UP)    Subjective:   HPI: Ms. Catalan is a 43 y.o. female who returns to the clinic today for follow up of triple negative breast cancer & clearance for C3D15 of treatment.    Triple Negative Right Sided Breast Cancer   Date of Original Diagnosis: 23  Original Stage: [id number] Stage 2B grade 3 Triple neg   Current Sites of Disease: right breast   Current Goals of Therapy: curative   Current Therapy: Keynote 522 Neoadjuvant chemotherapy     Oncologic History/History of Present Illness:   Per Dr. Rivas Note:   As previously documented she started screening mammograms for a few years after her ovarian cancer , in her late 's. Routine pelvic exam revealed an ovarian cancer, s/p right oophorectomy for dysgerminoma.   Resumed routine mammograms at 42, had a normal screening mammogram in May 2023 but felt a lump in the right breast in July. Further imaging showed 2 lesions, both > 2 cm , both biopsied to be G3 IDC, ER/MN and Her 2 jose eduardo negative.   Menarche at age 14 year old. . Age at first live birth 31. Did  breast feed 2 year and 6 months . LMP  . OCP-yes 20 years ago  Menopause at none. HRT-none      Genetics completed- + BRCA1     Family history cancer:  Mother breast cancer at 38 and cervical cancer  Smoker Pk/yr quit date , smoked around 0.5 pack a day     Today 10/16/2023  She is feeling well. Feels she did get some benefit from her acupuncture for joint pain..   Increased fatigue noted when receiving Keytruda.  Feels well overall.  No problems with cryoablation or digincap.  Denies HA, CP, cough, SOB, abd pain, diarrhea, constipation, N/V, dysuria, hematuria, swelling, new lumps or bumps. No fevers, chills.      Oncology History   Breast cancer of lower-outer quadrant of right female breast   8/7/2023 Initial Diagnosis    Breast cancer of lower-outer quadrant of right female breast     8/7/2023 Cancer Staged    Staging form: Breast, AJCC 8th Edition  - Clinical stage from 8/7/2023: Stage IIB (cT2, cN0, cM0, G3, ER-, OR-, HER2-)     Invasive ductal carcinoma of breast, female, right   8/7/2023 Initial Diagnosis    Invasive ductal carcinoma of breast, female, right     8/24/2023 -  Chemotherapy    Treatment Summary   Plan Name: OP PEMBROLIZUMAB WITH WEEKLY PACLITAXEL CARBOPLATIN (AUC 1.5) FOLLOWED BY PEMBROLIZUMAB DOXORUBICIN CYCLOPHOSPHAMIDE FOLLOWED BY PEMBROLIZUMAB 200MG Q3W  Treatment Goal: Curative  Status: Active  Start Date: 8/24/2023  End Date: 7/25/2024 (Planned)  Provider: Corina Mcgovern MD  Chemotherapy: DOXOrubicin chemo injection 94 mg, 60 mg/m2, Intravenous, Clinic/HOD 1 time, 0 of 4 cycles  CARBOplatin (PARAPLATIN) 175 mg in sodium chloride 0.9% 302.5 mL chemo infusion, 175 mg, Intravenous, Clinic/HOD 1 time, 3 of 4 cycles  Administration: 175 mg (8/24/2023), 175 mg (8/31/2023), 200 mg (9/14/2023), 175 mg (9/7/2023), 175 mg (9/21/2023), 175 mg (10/5/2023), 200 mg (9/28/2023)  cycloPHOSphamide 600 mg/m2 = 940 mg in sodium chloride 0.9% 254.7 mL chemo infusion, 600 mg/m2, Intravenous, Clinic/HOD 1 time, 0 of 4 cycles  PACLitaxeL (TAXOL) 80 mg/m2 = 126 mg in sodium chloride 0.9% 250 mL chemo infusion, 80 mg/m2 = 126 mg, Intravenous, Clinic/HOD 1 time, 3 of 4 cycles  Administration: 126 mg (8/24/2023), 126 mg (8/31/2023), 126 mg (9/14/2023), 126 mg (9/7/2023), 126 mg (9/21/2023), 126 mg (10/5/2023), 126 mg (9/28/2023), 126 mg (10/12/2023)        Past Medical History:   Past Medical History:   Diagnosis Date    Abnormal Pap smear of cervix     HPV (human papilloma virus) infection     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       Past Surgical HIstory:   Past Surgical History:   Procedure Laterality  Date    BREAST BIOPSY Right 2015    benign    BREAST BIOPSY Right 07/26/2023    INSERTION OF TUNNELED CENTRAL VENOUS CATHETER (CVC) WITH SUBCUTANEOUS PORT N/A 08/15/2023    Procedure: [id number];  Surgeon: Skyler Aldrich MD;  Location: [facility] OR;  Service: General;  Laterality: N/A;    OOPHORECTOMY Right 2009    stage 1a dysgerminoma    VAGINAL DELIVERY  12/13/2016       Family History:   Family History   Problem Relation Age of Onset    Breast cancer Mother 38    Cervical cancer Mother     Colon cancer Neg Hx     Colon polyps Neg Hx     Esophageal cancer Neg Hx     Stomach cancer Neg Hx     Inflammatory bowel disease Neg Hx        Social History:  reports that she has quit smoking. Her smoking use included cigarettes. She does not have any smokeless tobacco history on file. She reports current alcohol use. She reports that she does not use drugs.    Allergies:  Review of patient's allergies indicates:   Allergen Reactions    Codeine Anaphylaxis       Medications:  Current Outpatient Medications   Medication Sig Dispense Refill    LIDOcaine-prilocaine (EMLA) cream Apply topically as needed (apply 30 to 60 minutes prior to chemo). 30 g 2    ondansetron (ZOFRAN) 8 MG tablet Take 1 tablet (8 mg total) by mouth every 8 (eight) hours as needed for Nausea. 60 tablet 2    ondansetron (ZOFRAN-ODT) 8 MG TbDL Take 1 tablet (8 mg total) by mouth every 6 (six) hours as needed (nausea). 60 tablet 3    promethazine (PHENERGAN) 12.5 MG Tab (Take 1-2 tabs every 6 hours as needed for nausea persistent despite zofran) 40 tablet 3    dexAMETHasone (DECADRON) 4 MG Tab Take 1 tab as needed twice a day for nausea that is persistent despite zofran. Take morning and afternoon, no later than 3pm. (Patient not taking: Reported on 9/27/2023) 40 tablet 3    hydrocortisone (PROCTOCORT) 1 % crpe Apply to affected area two times a day (Patient not taking: Reported on 10/9/2023) 28.4 g 1    OLANZapine (ZYPREXA) 5 MG  "tablet Take 1 tablet (5 mg total) by mouth every evening. Take as directed on days 1-4, 8-10, and 15-17 of your chemotherapy cycles 1-4. Take on days 1, 2, and 3 of cycles 5-8. (Patient not taking: Reported on 9/27/2023) 10 tablet 11     No current facility-administered medications for this visit.     Facility-Administered Medications Ordered in Other Visits   Medication Dose Route Frequency Provider Last Rate Last Admin    HYDROmorphone injection 0.5 mg  0.5 mg Intravenous Q5 Min PRN Rosales Sousa MD        ondansetron injection 4 mg  4 mg Intravenous Daily PRN Rosales Sousa MD        prochlorperazine injection Soln 10 mg  10 mg Intravenous Q30 Min PRN Rsoales Sousa MD           Review of Systems   Constitutional:  Negative for appetite change, chills, fatigue, fever and unexpected weight change.   HENT:  Negative for mouth sores and trouble swallowing.    Eyes:  Negative for visual disturbance.   Respiratory:  Negative for cough and shortness of breath.    Cardiovascular:  Negative for chest pain and leg swelling.   Gastrointestinal:  Negative for abdominal pain, constipation, diarrhea and nausea.   Genitourinary:  Negative for dysuria and frequency.   Musculoskeletal:  Negative for arthralgias and back pain.   Skin:  Negative for rash.   Neurological:  Negative for headaches.   Hematological:  Negative for adenopathy.   Psychiatric/Behavioral:  The patient is not nervous/anxious.        ECOG Performance Status: 0    Objective:      Vitals: Weight:  stable  Vitals:    10/16/23 1131   BP: 106/68   BP Location: Right arm   Patient Position: Sitting   BP Method: Medium (Manual)   Pulse: 97   Resp: 16   Temp: 97.5 °F (36.4 °C)   TempSrc: Temporal   SpO2: 100%   Weight: 53.2 kg (117 lb 4.6 oz)   Height: 5' 3" (1.6 m)       BMI: Body mass index is 20.78 kg/m².    Physical Exam  Vitals reviewed.   Constitutional:       General: She is not in acute distress.     Appearance: She is not diaphoretic. "   HENT:      Head: Normocephalic and atraumatic.      Mouth/Throat:      Mouth: Mucous membranes are moist.      Pharynx: No oropharyngeal exudate.   Eyes:      General: No scleral icterus.  Cardiovascular:      Rate and Rhythm: Normal rate and regular rhythm.      Heart sounds: Normal heart sounds. No murmur heard.  Pulmonary:      Effort: Pulmonary effort is normal. No respiratory distress.      Breath sounds: No wheezing.   Abdominal:      General: There is no distension.   Musculoskeletal:      Cervical back: No tenderness.      Right lower leg: No edema.      Left lower leg: No edema.   Lymphadenopathy:      Cervical: No cervical adenopathy.   Skin:     General: Skin is warm.      Coloration: Skin is not jaundiced.      Findings: No rash.   Neurological:      Mental Status: She is alert and oriented to person, place, and time.   Psychiatric:         Mood and Affect: Mood normal.         Behavior: Behavior normal.       Laboratory Data:  Lab Results   Component Value Date    WBC 4.32 10/16/2023    HGB 11.4 (L) 10/16/2023    HCT 34.5 (L) 10/16/2023    MCV 95 10/16/2023     10/16/2023      Imaging:   Assessment:       1. Malignant neoplasm of lower-outer quadrant of right breast of female, estrogen receptor negative             Plan:   Triple Negative Right Sided Breast Cancer   Date of Original Diagnosis: 7/26/23  Original Stage: [id number] Stage 2B grade 3 Triple neg   Current Sites of Disease: right breast   Current Goals of Therapy: curative   Current Therapy: Keynote 522 Neoadjuvant chemotherapy  BRCA positive- to meet with genetics   -She has also been enrolled in the Protocol [id number], Disparities in Results of Immune Checkpoint Inhibitor Treatment (DIRECT): A Prospective Cohort Study of Cancer Survivors Treated with anti-PD-L1 Immunotherapy in a Community Oncology Setting.   -Clinically is having a great response neoadjuvant chemotherapy, prior palpable masses are no longer palpable  -Proceed with  C3D15 on Thursday 10/19.  -Follow up on 10/26 with labs prior for evaluation prior to C4D1.    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  -Patient at risk for infection   -No current signs/symptoms of infection  -Continue to monitor closely while on therapy     IO induced hepatitis  -grade 2, improving without steroids but still elevated  -held pembro for cycle 2  -Completed steroids, LFTs are back to baseline   -Monitor      CINV  -Has Promethazine, ondansetron PRN, currently not using often   -Managed, continue to monitor      anxiety about health  -referral sent to onc psych; patient declines   -Monitor     Chemotherapy induced anemia   -Stable with Hgb 11.4 g/dL today   -Patient states she recalls being told she has thalassemia as child, will check Hb electrophoresis with labs next week  -9/26/23 iron indices are within a normal range   -Monitor     Patient queried and all questions were answered.   Assessment/Plan reviewed and approved by Dr. Mcgovern   20 minutes were spent in coordination of patient's care, record review and counseling.    Route Chart for Scheduling    Med Onc Chart Routing      Follow up with physician    Follow up with SAUL . F/u scheduled with me on 10/26 with labs prior for C4D1   Infusion scheduling note   Proceed with C3D15 on Thursday, 10/19   Injection scheduling note    Labs    Imaging    Pharmacy appointment    Other referrals              Treatment Plan Information   OP PEMBROLIZUMAB WITH WEEKLY PACLITAXEL CARBOPLATIN (AUC 1.5) FOLLOWED BY PEMBROLIZUMAB DOXORUBICIN CYCLOPHOSPHAMIDE FOLLOWED BY PEMBROLIZUMAB 200MG Q3W   Corina Mcgovern MD   Upcoming Treatment Dates - OP PEMBROLIZUMAB WITH WEEKLY PACLITAXEL CARBOPLATIN (AUC 1.5) FOLLOWED BY PEMBROLIZUMAB DOXORUBICIN CYCLOPHOSPHAMIDE FOLLOWED BY PEMBROLIZUMAB 200MG Q3W    10/19/2023       Pre-Medications       diphenhydrAMINE (BENADRYL) 50 mg in NS 50 mL IVPB       famotidine (PF) injection 20 mg       Chemotherapy       PACLitaxeL  (TAXOL) 80 mg/m2 = 126 mg in sodium chloride 0.9% 250 mL chemo infusion       CARBOplatin (PARAPLATIN) 175 mg in sodium chloride 0.9% 267.5 mL chemo infusion       Antiemetics       LORazepam injection 0.5 mg       dexAMETHasone injection 8 mg       palonosetron injection 0.25 mg  10/26/2023       Pre-Medications       diphenhydrAMINE (BENADRYL) 50 mg in NS 50 mL IVPB       famotidine (PF) injection 20 mg       Chemotherapy       PACLitaxeL (TAXOL) 80 mg/m2 = 126 mg in sodium chloride 0.9% 250 mL chemo infusion       CARBOplatin (PARAPLATIN) 175 mg in sodium chloride 0.9% 267.5 mL chemo infusion       Antiemetics       aprepitant (CINVANTI) injection 130 mg       LORazepam injection 0.5 mg       dexAMETHasone injection 8 mg       palonosetron injection 0.25 mg       Immunotherapy       pembrolizumab (KEYTRUDA) 200 mg in sodium chloride 0.9% SolP 108 mL infusion  11/2/2023       Pre-Medications       diphenhydrAMINE (BENADRYL) 50 mg in NS 50 mL IVPB       famotidine (PF) injection 20 mg       Chemotherapy       PACLitaxeL (TAXOL) 80 mg/m2 = 126 mg in sodium chloride 0.9% 250 mL chemo infusion       CARBOplatin (PARAPLATIN) 175 mg in sodium chloride 0.9% 267.5 mL chemo infusion       Antiemetics       LORazepam injection 0.5 mg       dexAMETHasone injection 8 mg       palonosetron injection 0.25 mg  11/9/2023       Pre-Medications       diphenhydrAMINE (BENADRYL) 50 mg in NS 50 mL IVPB       famotidine (PF) injection 20 mg       Chemotherapy       PACLitaxeL (TAXOL) 80 mg/m2 = 126 mg in sodium chloride 0.9% 250 mL chemo infusion       CARBOplatin (PARAPLATIN) 175 mg in sodium chloride 0.9% 267.5 mL chemo infusion       Antiemetics       LORazepam injection 0.5 mg       dexAMETHasone injection 8 mg       palonosetron injection 0.25 mg

## 2023-10-17 ENCOUNTER — PATIENT MESSAGE (OUTPATIENT)
Dept: ADMINISTRATIVE | Facility: OTHER | Age: 43
End: 2023-10-17
Payer: COMMERCIAL

## 2023-10-18 ENCOUNTER — OFFICE VISIT (OUTPATIENT)
Dept: GYNECOLOGIC ONCOLOGY | Facility: CLINIC | Age: 43
End: 2023-10-18
Payer: COMMERCIAL

## 2023-10-18 ENCOUNTER — CLINICAL SUPPORT (OUTPATIENT)
Dept: REHABILITATION | Facility: HOSPITAL | Age: 43
End: 2023-10-18
Payer: COMMERCIAL

## 2023-10-18 ENCOUNTER — TELEPHONE (OUTPATIENT)
Dept: HEMATOLOGY/ONCOLOGY | Facility: CLINIC | Age: 43
End: 2023-10-18
Payer: COMMERCIAL

## 2023-10-18 VITALS
BODY MASS INDEX: 21.21 KG/M2 | TEMPERATURE: 99 F | DIASTOLIC BLOOD PRESSURE: 63 MMHG | OXYGEN SATURATION: 99 % | RESPIRATION RATE: 16 BRPM | HEIGHT: 63 IN | SYSTOLIC BLOOD PRESSURE: 118 MMHG | HEART RATE: 99 BPM | WEIGHT: 119.69 LBS

## 2023-10-18 DIAGNOSIS — R10.2 CHRONIC PELVIC PAIN IN FEMALE: ICD-10-CM

## 2023-10-18 DIAGNOSIS — G89.29 CHRONIC PELVIC PAIN IN FEMALE: ICD-10-CM

## 2023-10-18 DIAGNOSIS — M54.50 CHRONIC MIDLINE LOW BACK PAIN, UNSPECIFIED WHETHER SCIATICA PRESENT: Primary | ICD-10-CM

## 2023-10-18 DIAGNOSIS — G89.29 CHRONIC MIDLINE LOW BACK PAIN, UNSPECIFIED WHETHER SCIATICA PRESENT: Primary | ICD-10-CM

## 2023-10-18 DIAGNOSIS — Z15.09 BRCA1 GENE MUTATION POSITIVE: ICD-10-CM

## 2023-10-18 DIAGNOSIS — Z15.01 BRCA1 GENE MUTATION POSITIVE: ICD-10-CM

## 2023-10-18 DIAGNOSIS — C50.911 INVASIVE DUCTAL CARCINOMA OF BREAST, FEMALE, RIGHT: ICD-10-CM

## 2023-10-18 PROCEDURE — 99204 OFFICE O/P NEW MOD 45 MIN: CPT | Mod: S$GLB,,, | Performed by: OBSTETRICS & GYNECOLOGY

## 2023-10-18 PROCEDURE — 97814 ACUP 1/> W/ESTIM EA ADDL 15: CPT | Mod: PN | Performed by: ACUPUNCTURIST

## 2023-10-18 PROCEDURE — 99204 PR OFFICE/OUTPT VISIT, NEW, LEVL IV, 45-59 MIN: ICD-10-PCS | Mod: S$GLB,,, | Performed by: OBSTETRICS & GYNECOLOGY

## 2023-10-18 PROCEDURE — 99999 PR PBB SHADOW E&M-EST. PATIENT-LVL IV: CPT | Mod: PBBFAC,,, | Performed by: OBSTETRICS & GYNECOLOGY

## 2023-10-18 PROCEDURE — 99999 PR PBB SHADOW E&M-EST. PATIENT-LVL IV: ICD-10-PCS | Mod: PBBFAC,,, | Performed by: OBSTETRICS & GYNECOLOGY

## 2023-10-18 PROCEDURE — 97813 ACUP 1/> W/ESTIM 1ST 15 MIN: CPT | Mod: PN | Performed by: ACUPUNCTURIST

## 2023-10-18 NOTE — NURSING
Met with patient in clinic today. Reviewed plan of care.  Scheduled  for next week with her other lab work.   Will call her to schedule U/S. Encouraged to call with any questions or concerns.  She thanked me and verbalized understanding.

## 2023-10-18 NOTE — PROGRESS NOTES
Acupuncture Follow-Up Note     Name: Arabella Koroma [facility] Number: [id number]    Traditional Chinese Medicine (TCM) Diagnosis: Qi Stagnation, Blood Stasis, Qi Deficiency, Blood Deficiency, and Damp  Medical Diagnosis:   Encounter Diagnoses   Name Primary?    Chronic midline low back pain, unspecified whether sciatica present Yes    Chronic pelvic pain in female         Evaluation Date: 10/18/2023    Visit #/Visits authorized:     Precautions: Standard    Subjective     Chief Concern: Low-back Pain (Patient reports low back pain as 4/10), Knee Pain (She reports having on pain this week since the last treatment), and Hip Pain (Hip pain remains a 4/10 today but is an improvement from the last treatment. )       Medical necessity is demonstrated by the following IMPAIRMENTS: Medical Necessity: Cancer related pain and Decreased mobility limits day to day activities, social, and emergent situations              Aggravating Factors:  movement     Relieving Factors:  rest    Symptom Description:     Quality:  Aching, Dull, and Throbbing  Severity:  4  Frequency:  every day      Objective     Observation: Patient reports this pain began when she started treatment and has continued since then.  Today she has pain in the ASIS, PSIS and SI regions bilaterally.       Pulse:        thready       New Findings:  na    Treatment     Treatment Principles:  move qi and blood, relieve bi and clear dampness    Acupuncture points used:  4 GONZALEZ, Du20, Gb34, and Li11    Bilateral points: BL 23-26   Unilateral points:  Auricular Treatment:  perez men    Needles In: 19  Needles Out: 19  Needles W/ STIM placed: 1135  Needles W/ STIM removed: 1155      Other Traditional Chinese Medicine Modalities -  na    Assessment     After treatment, patient felt less pelvic pain     Patient prognosis is Good.     Patient will continue to benefit from acupuncture treatment to address the deficits listed in the problem list box on initial  evaluation, provide patient family education and to maximize pt's level of independence in the home and community environment.     Patient's spiritual, cultural and educational needs considered and pt agreeable to plan of care and goals.     Anticipated barriers to treatment: none    Plan     Recommend 1 /week for 12 sessions then re-assess.      Education:  Patient is aware of cumulative benefit of acupuncture

## 2023-10-18 NOTE — PROGRESS NOTES
Subjective:      Chief Complaint: TNBC, BRCA1 mutation     Patient ID: Arabella Catalan is a 43 y.o. female  referred by Dr. Min because of a recently diagnosed BRCA1 mutation.  She has a history of being diagnosed with an ovarian dysgerminoma in  for which she underwent a unilateral salpingo-oophorectomy only.  More recently, she was diagnosed with right breast cancer, St IIB TNBC and currently is on chemotherapy and pembrolizumab.  Following completion of her planned chemoimmunotherapy, she will undergo bilateral mastectomies.  She states that she has annual gyn exams and Pap smears with Dr. Denisha Mcleod and all of her recent Paps have been normal.  We discussed the option for risk reducing surgery with a significant reduction in the risk of developing ovarian cancer.        Past Medical History:   Diagnosis Date    Abnormal Pap smear of cervix     HPV (human papilloma virus) infection     Ovarian cancer     stage 1a dysgerminoma      Past Surgical History:   Procedure Laterality Date    BREAST BIOPSY Right     benign    BREAST BIOPSY Right 2023    INSERTION OF TUNNELED CENTRAL VENOUS CATHETER (CVC) WITH SUBCUTANEOUS PORT N/A 08/15/2023    Procedure: [id number];  Surgeon: Skyler Aldrich MD;  Location: [facility] OR;  Service: General;  Laterality: N/A;    OOPHORECTOMY Right     stage 1a dysgerminoma    VAGINAL DELIVERY  2016      Family History   Problem Relation Age of Onset    Breast cancer Mother 38    Cervical cancer Mother     Colon cancer Neg Hx     Colon polyps Neg Hx     Esophageal cancer Neg Hx     Stomach cancer Neg Hx     Inflammatory bowel disease Neg Hx     Sister also BRCA1 positive    Social History     Tobacco Use    Smoking status: Former     Types: Cigarettes   Substance Use Topics    Alcohol use: Yes     Comment: occasionally    Drug use: No              Objective:        1. General: Well appearing, no apparent distress, alert and  oriented.  2. Lymph: Neck symmetric without cervical or supraclavicular adenopathy or mass.  3. Heart:  Deferred  4. Lungs: Normal respiratory rate, no accessory muscle use.  5. Psych: Normal affect.  6. Abdomen:  Deferred  7. Skin: Warm, dry, no rashes or lesions.   8. Extremities: Bilateral lower extremities without edema or tenderness.                    Assessment/Plan       A long discussion was held with the patient and many questions were answered.  She is encouraged to contact [facility] to set up a telephone appointment with a genetic counselor or an inperson appointment in the cancer center.  We discussed the pros and cons of ovarian cancer surveillance with Q 6 month pelvic sono and  and she is agreeable and will reconsider the option for risk-reducing surgery after completing her planned chemotherapy and surgery.        BRCA1 gene mutation positive  -     Ambulatory referral/consult to Gynecologic Oncology  -     US Pelvis Comp with Transvag NON-OB (xpd; Future; Expected date: 10/18/2023  -     ; Future; Expected date: 10/18/2023    Invasive ductal carcinoma of breast, female, right  -     Ambulatory referral/consult to Gynecologic Oncology

## 2023-10-19 ENCOUNTER — INFUSION (OUTPATIENT)
Dept: INFUSION THERAPY | Facility: HOSPITAL | Age: 43
End: 2023-10-19
Attending: INTERNAL MEDICINE
Payer: COMMERCIAL

## 2023-10-19 ENCOUNTER — PATIENT MESSAGE (OUTPATIENT)
Dept: ADMINISTRATIVE | Facility: OTHER | Age: 43
End: 2023-10-19
Payer: COMMERCIAL

## 2023-10-19 ENCOUNTER — TELEPHONE (OUTPATIENT)
Dept: PSYCHIATRY | Facility: CLINIC | Age: 43
End: 2023-10-19
Payer: COMMERCIAL

## 2023-10-19 VITALS
HEIGHT: 63 IN | RESPIRATION RATE: 16 BRPM | DIASTOLIC BLOOD PRESSURE: 74 MMHG | BODY MASS INDEX: 21.36 KG/M2 | HEART RATE: 88 BPM | OXYGEN SATURATION: 99 % | WEIGHT: 120.56 LBS | TEMPERATURE: 98 F | SYSTOLIC BLOOD PRESSURE: 108 MMHG

## 2023-10-19 DIAGNOSIS — C50.911 INVASIVE DUCTAL CARCINOMA OF BREAST, FEMALE, RIGHT: Primary | ICD-10-CM

## 2023-10-19 PROCEDURE — 63600175 PHARM REV CODE 636 W HCPCS: Mod: PN | Performed by: NURSE PRACTITIONER

## 2023-10-19 PROCEDURE — A4216 STERILE WATER/SALINE, 10 ML: HCPCS | Mod: PN | Performed by: NURSE PRACTITIONER

## 2023-10-19 PROCEDURE — 25000003 PHARM REV CODE 250: Mod: PN | Performed by: NURSE PRACTITIONER

## 2023-10-19 PROCEDURE — 96413 CHEMO IV INFUSION 1 HR: CPT | Mod: PN

## 2023-10-19 PROCEDURE — 96367 TX/PROPH/DG ADDL SEQ IV INF: CPT | Mod: PN

## 2023-10-19 PROCEDURE — 96375 TX/PRO/DX INJ NEW DRUG ADDON: CPT | Mod: PN

## 2023-10-19 PROCEDURE — 96417 CHEMO IV INFUS EACH ADDL SEQ: CPT | Mod: PN

## 2023-10-19 RX ORDER — DEXAMETHASONE SODIUM PHOSPHATE 4 MG/ML
8 INJECTION, SOLUTION INTRA-ARTICULAR; INTRALESIONAL; INTRAMUSCULAR; INTRAVENOUS; SOFT TISSUE
Status: COMPLETED | OUTPATIENT
Start: 2023-10-19 | End: 2023-10-19

## 2023-10-19 RX ORDER — LORAZEPAM 2 MG/ML
0.5 INJECTION INTRAMUSCULAR
Status: DISCONTINUED | OUTPATIENT
Start: 2023-10-19 | End: 2023-10-19 | Stop reason: HOSPADM

## 2023-10-19 RX ORDER — SODIUM CHLORIDE 0.9 % (FLUSH) 0.9 %
10 SYRINGE (ML) INJECTION
Status: DISCONTINUED | OUTPATIENT
Start: 2023-10-19 | End: 2023-10-19 | Stop reason: HOSPADM

## 2023-10-19 RX ORDER — PALONOSETRON 0.05 MG/ML
0.25 INJECTION, SOLUTION INTRAVENOUS ONCE
Status: COMPLETED | OUTPATIENT
Start: 2023-10-19 | End: 2023-10-19

## 2023-10-19 RX ORDER — DIPHENHYDRAMINE HYDROCHLORIDE 50 MG/ML
50 INJECTION INTRAMUSCULAR; INTRAVENOUS ONCE AS NEEDED
Status: DISCONTINUED | OUTPATIENT
Start: 2023-10-19 | End: 2023-10-19 | Stop reason: HOSPADM

## 2023-10-19 RX ORDER — EPINEPHRINE 0.3 MG/.3ML
0.3 INJECTION SUBCUTANEOUS ONCE AS NEEDED
Status: DISCONTINUED | OUTPATIENT
Start: 2023-10-19 | End: 2023-10-19 | Stop reason: HOSPADM

## 2023-10-19 RX ORDER — FAMOTIDINE 10 MG/ML
20 INJECTION INTRAVENOUS
Status: COMPLETED | OUTPATIENT
Start: 2023-10-19 | End: 2023-10-19

## 2023-10-19 RX ORDER — PROCHLORPERAZINE EDISYLATE 5 MG/ML
10 INJECTION INTRAMUSCULAR; INTRAVENOUS ONCE AS NEEDED
Status: DISCONTINUED | OUTPATIENT
Start: 2023-10-19 | End: 2023-10-19 | Stop reason: HOSPADM

## 2023-10-19 RX ADMIN — DIPHENHYDRAMINE HYDROCHLORIDE 50 MG: 50 INJECTION, SOLUTION INTRAMUSCULAR; INTRAVENOUS at 10:10

## 2023-10-19 RX ADMIN — CARBOPLATIN 175 MG: 10 INJECTION, SOLUTION INTRAVENOUS at 12:10

## 2023-10-19 RX ADMIN — LORAZEPAM 0.5 MG: 2 INJECTION INTRAMUSCULAR; INTRAVENOUS at 10:10

## 2023-10-19 RX ADMIN — FAMOTIDINE 20 MG: 10 INJECTION INTRAVENOUS at 11:10

## 2023-10-19 RX ADMIN — PACLITAXEL 126 MG: 6 INJECTION, SOLUTION, CONCENTRATE INTRAVENOUS at 11:10

## 2023-10-19 RX ADMIN — SODIUM CHLORIDE: 9 INJECTION, SOLUTION INTRAVENOUS at 10:10

## 2023-10-19 RX ADMIN — Medication 10 ML: at 03:10

## 2023-10-19 RX ADMIN — DEXAMETHASONE SODIUM PHOSPHATE 8 MG: 4 INJECTION INTRA-ARTICULAR; INTRALESIONAL; INTRAMUSCULAR; INTRAVENOUS; SOFT TISSUE at 11:10

## 2023-10-19 RX ADMIN — PALONOSETRON 0.25 MG: 0.05 INJECTION, SOLUTION INTRAVENOUS at 11:10

## 2023-10-19 NOTE — TELEPHONE ENCOUNTER
Called patient on 10-19 and left voice mail to schedule a psychology appointment.Spoken with patient on 10-9 and 10-4 patient agreed to call us back to schedule appointment. Cancelled referral because couldn't get patient to schedule appointment.

## 2023-10-19 NOTE — PLAN OF CARE
Problem: Fatigue  Goal: Improved Activity Tolerance  Outcome: Ongoing, Progressing  Intervention: Promote Improved Energy  Flowsheets (Taken 10/19/2023 1516)  Fatigue Management:   activity assistance provided   paced activity encouraged   frequent rest breaks encouraged   fatigue-related activity identified  Sleep/Rest Enhancement:   therapeutic touch utilized   room darkened   relaxation techniques promoted   noise level reduced   family presence promoted   consistent schedule promoted   natural light exposure provided   awakenings minimized  Activity Management:   Ambulated -L4   Up in chair - L3   Ambulated to bathroom - L4     Problem: Adult Inpatient Plan of Care  Goal: Plan of Care Review  Outcome: Ongoing, Progressing  Flowsheets (Taken 10/19/2023 1516)  Plan of Care Reviewed With:   patient   spouse  Goal: Patient-Specific Goal (Individualized)  Outcome: Ongoing, Progressing  Flowsheets (Taken 10/19/2023 1516)  Anxieties, Fears or Concerns: none voiced  Individualized Care Needs: recliner, pillow, dignicap, education, conversation, cryotherapy (home kit), home electric blanket, spouse at chairside, blanket, pillow  Goal: Optimal Comfort and Wellbeing  Outcome: Ongoing, Progressing  Intervention: Provide Person-Centered Care  Flowsheets (Taken 10/19/2023 1516)  Trust Relationship/Rapport:   questions answered   questions encouraged   reassurance provided   thoughts/feelings acknowledged   empathic listening provided   emotional support provided   choices provided   care explained     Problem: Fall Injury Risk  Goal: Absence of Fall and Fall-Related Injury  Outcome: Ongoing, Progressing  Intervention: Promote Injury-Free Environment  Flowsheets (Taken 10/19/2023 1516)  Safety Promotion/Fall Prevention:   instructed to call staff for mobility   room near unit station   supervised activity   medications reviewed   Fall Risk reviewed with patient/family   high risk medications identified   in recliner, wheels  locked   lighting adjusted   family to remain at bedside

## 2023-10-19 NOTE — PLAN OF CARE
Pt arrived to clinic for C3D15 treatment with Taxol and Carboplatin infusions and tolerated well with no changes throughout therapy. Dignicap used per pt request and tolerated well with home electric blanket used and spouse at chairside. Cryotherapy performed per pt request to bilateral hands/feet and tolerated well. Pt aware of side effects and f/u appts and discharged to home in NAD with  and is aware of instructions for Dignicap supplies and hair maintenance.

## 2023-10-20 ENCOUNTER — HOSPITAL ENCOUNTER (OUTPATIENT)
Dept: RADIOLOGY | Facility: HOSPITAL | Age: 43
Discharge: HOME | End: 2023-10-20
Attending: OBSTETRICS & GYNECOLOGY
Payer: COMMERCIAL

## 2023-10-20 DIAGNOSIS — Z15.09 BRCA1 GENE MUTATION POSITIVE: ICD-10-CM

## 2023-10-20 DIAGNOSIS — Z15.01 BRCA1 GENE MUTATION POSITIVE: ICD-10-CM

## 2023-10-20 PROCEDURE — 76830 TRANSVAGINAL US NON-OB: CPT | Mod: 26,,, | Performed by: RADIOLOGY

## 2023-10-20 PROCEDURE — 76830 TRANSVAGINAL US NON-OB: CPT | Mod: TC,PO

## 2023-10-20 PROCEDURE — 76856 US PELVIS COMP WITH TRANSVAG NON-OB (XPD): ICD-10-PCS | Mod: 26,,, | Performed by: RADIOLOGY

## 2023-10-20 PROCEDURE — 76856 US EXAM PELVIC COMPLETE: CPT | Mod: 26,,, | Performed by: RADIOLOGY

## 2023-10-20 PROCEDURE — 76830 US PELVIS COMP WITH TRANSVAG NON-OB (XPD): ICD-10-PCS | Mod: 26,,, | Performed by: RADIOLOGY

## 2023-10-21 ENCOUNTER — PATIENT MESSAGE (OUTPATIENT)
Dept: ADMINISTRATIVE | Facility: OTHER | Age: 43
End: 2023-10-21
Payer: COMMERCIAL

## 2023-10-22 ENCOUNTER — PATIENT MESSAGE (OUTPATIENT)
Dept: ADMINISTRATIVE | Facility: OTHER | Age: 43
End: 2023-10-22
Payer: COMMERCIAL

## 2023-10-23 ENCOUNTER — PATIENT MESSAGE (OUTPATIENT)
Dept: ADMINISTRATIVE | Facility: OTHER | Age: 43
End: 2023-10-23
Payer: COMMERCIAL

## 2023-10-24 ENCOUNTER — PATIENT MESSAGE (OUTPATIENT)
Dept: ADMINISTRATIVE | Facility: OTHER | Age: 43
End: 2023-10-24
Payer: COMMERCIAL

## 2023-10-25 ENCOUNTER — CLINICAL SUPPORT (OUTPATIENT)
Dept: REHABILITATION | Facility: HOSPITAL | Age: 43
End: 2023-10-25
Payer: COMMERCIAL

## 2023-10-25 ENCOUNTER — PATIENT MESSAGE (OUTPATIENT)
Dept: ADMINISTRATIVE | Facility: OTHER | Age: 43
End: 2023-10-25
Payer: COMMERCIAL

## 2023-10-25 DIAGNOSIS — R45.89 ANXIETY ABOUT HEALTH: Primary | ICD-10-CM

## 2023-10-25 PROCEDURE — 97811 ACUP 1/> W/O ESTIM EA ADD 15: CPT | Mod: PN | Performed by: ACUPUNCTURIST

## 2023-10-25 PROCEDURE — 97810 ACUP 1/> WO ESTIM 1ST 15 MIN: CPT | Mod: PN | Performed by: ACUPUNCTURIST

## 2023-10-25 NOTE — PROGRESS NOTES
"PATIENT: Arabella Catalan  MRN: [id number]  DATE: 10/25/2023    Diagnosis:   1. Malignant neoplasm of lower-outer quadrant of right breast of female, estrogen receptor negative    2. BRCA gene positive    3. 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 Catalan is a 43 y.o. female who returns to the clinic today with her  for follow up of triple negative breast cancer & clearance for C4D1 of treatment.    Triple Negative Right Sided Breast Cancer   Date of Original Diagnosis: 23  Original Stage: [id number] Stage 2B grade 3 Triple neg   Current Sites of Disease: right breast   Current Goals of Therapy: curative   Current Therapy: Keynote 522 Neoadjuvant chemotherapy     Oncologic History/History of Present Illness:   Per Dr. Rivas Note:   As previously documented she started screening mammograms for a few years after her ovarian cancer , in her late 20's. Routine pelvic exam revealed an ovarian cancer, s/p right oophorectomy for dysgerminoma.   Resumed routine mammograms at 42, had a normal screening mammogram in May 2023 but felt a lump in the right breast in July. Further imaging showed 2 lesions, both > 2 cm , both biopsied to be G3 IDC, ER/[place] and Her 2 jose eduardo negative.   Menarche at age 14 year old. . Age at first live birth 31. Did  breast feed 2 year and 6 months . LMP  . OCP-yes 20 years ago  Menopause at none. HRT-none      Genetics completed- + BRCA1     Family history cancer:  Mother breast cancer at 38 and cervical cancer  Smoker Pk/yr quit date , smoked around 0.5 pack a day     Today 10/26/2023  She is feeling well. C/o of soreness on tongue when eating "salty" foods. Discussed using baking soda/salt rinse.  Increased fatigue noted when receiving Keytruda.  To note:  Fatigue started 3 weeks after 1st dose prior to rash.  Feels well overall.  No fatigue today. No problems with " cryoablation or digincap.  Denies HA, CP, cough, SOB, abd pain, diarrhea, constipation, N/V, dysuria, hematuria, swelling, new lumps or bumps. No fevers, chills.     Oncology History   Breast cancer of lower-outer quadrant of right female breast   8/7/2023 Initial Diagnosis    Breast cancer of lower-outer quadrant of right female breast     8/7/2023 Cancer Staged    Staging form: Breast, AJCC 8th Edition  - Clinical stage from 8/7/2023: Stage IIB (cT2, cN0, cM0, G3, ER-, CA-, HER2-)     Invasive ductal carcinoma of breast, female, right   8/7/2023 Initial Diagnosis    Invasive ductal carcinoma of breast, female, right     8/24/2023 -  Chemotherapy    Treatment Summary   Plan Name: OP PEMBROLIZUMAB WITH WEEKLY PACLITAXEL CARBOPLATIN (AUC 1.5) FOLLOWED BY PEMBROLIZUMAB DOXORUBICIN CYCLOPHOSPHAMIDE FOLLOWED BY PEMBROLIZUMAB 200MG Q3W  Treatment Goal: Curative  Status: Active  Start Date: 8/24/2023  End Date: 7/25/2024 (Planned)  Provider: Corina Mcgovern MD  Chemotherapy: DOXOrubicin chemo injection 94 mg, 60 mg/m2, Intravenous, Clinic/HOD 1 time, 0 of 4 cycles  CARBOplatin (PARAPLATIN) 175 mg in sodium chloride 0.9% 302.5 mL chemo infusion, 175 mg, Intravenous, Clinic/HOD 1 time, 3 of 4 cycles  Administration: 175 mg (8/24/2023), 175 mg (8/31/2023), 200 mg (9/14/2023), 175 mg (9/7/2023), 175 mg (9/21/2023), 175 mg (10/5/2023), 200 mg (9/28/2023), 175 mg (10/12/2023), 175 mg (10/19/2023)  cycloPHOSphamide 600 mg/m2 = 940 mg in sodium chloride 0.9% 254.7 mL chemo infusion, 600 mg/m2, Intravenous, Clinic/HOD 1 time, 0 of 4 cycles  PACLitaxeL (TAXOL) 80 mg/m2 = 126 mg in sodium chloride 0.9% 250 mL chemo infusion, 80 mg/m2 = 126 mg, Intravenous, Clinic/HOD 1 time, 3 of 4 cycles  Administration: 126 mg (8/24/2023), 126 mg (8/31/2023), 126 mg (9/14/2023), 126 mg (9/7/2023), 126 mg (9/21/2023), 126 mg (10/5/2023), 126 mg (9/28/2023), 126 mg (10/12/2023), 126 mg (10/19/2023)        Past Medical History:   Past Medical  History:   Diagnosis Date    Abnormal Pap smear of cervix     BRCA1 positive     Breast cancer 07/26/2023    right    Breast cancer 07/26/2023    right    HPV (human papilloma virus) infection     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       Past Surgical HIstory:   Past Surgical History:   Procedure Laterality Date    BREAST BIOPSY Right 2015    benign    BREAST BIOPSY Right 07/26/2023    BREAST BIOPSY Right 07/26/2023    BREAST BIOPSY Right 08/16/2023    benign LN    INSERTION OF TUNNELED CENTRAL VENOUS CATHETER (CVC) WITH SUBCUTANEOUS PORT N/A 08/15/2023    Procedure: [id number];  Surgeon: Skyler Aldrich MD;  Location: [facility] OR;  Service: General;  Laterality: N/A;    OOPHORECTOMY Right 2009    stage 1a dysgerminoma    VAGINAL DELIVERY  12/13/2016       Family History:   Family History   Problem Relation Age of Onset    Breast cancer Mother 38    Cervical cancer Mother     BRCA 1/2 Sister     Colon cancer Neg Hx     Colon polyps Neg Hx     Esophageal cancer Neg Hx     Stomach cancer Neg Hx     Inflammatory bowel disease Neg Hx        Social History:  reports that she has quit smoking. Her smoking use included cigarettes. She does not have any smokeless tobacco history on file. She reports current alcohol use. She reports that she does not use drugs.    Allergies:  Review of patient's allergies indicates:   Allergen Reactions    Codeine Anaphylaxis       Medications:  Current Outpatient Medications   Medication Sig Dispense Refill    dexAMETHasone (DECADRON) 4 MG Tab Take 1 tab as needed twice a day for nausea that is persistent despite zofran. Take morning and afternoon, no later than 3pm. (Patient not taking: Reported on 9/27/2023) 40 tablet 3    hydrocortisone (PROCTOCORT) 1 % crpe Apply to affected area two times a day (Patient not taking: Reported on 10/9/2023) 28.4 g 1    LIDOcaine-prilocaine (EMLA) cream Apply topically as needed (apply 30 to 60 minutes prior to chemo). 30 g 2     OLANZapine (ZYPREXA) 5 MG tablet Take 1 tablet (5 mg total) by mouth every evening. Take as directed on days 1-4, 8-10, and 15-17 of your chemotherapy cycles 1-4. Take on days 1, 2, and 3 of cycles 5-8. (Patient not taking: Reported on 9/27/2023) 10 tablet 11    ondansetron (ZOFRAN) 8 MG tablet Take 1 tablet (8 mg total) by mouth every 8 (eight) hours as needed for Nausea. (Patient not taking: Reported on 10/18/2023) 60 tablet 2    ondansetron (ZOFRAN-ODT) 8 MG TbDL Take 1 tablet (8 mg total) by mouth every 6 (six) hours as needed (nausea). (Patient not taking: Reported on 10/18/2023) 60 tablet 3    promethazine (PHENERGAN) 12.5 MG Tab (Take 1-2 tabs every 6 hours as needed for nausea persistent despite zofran) (Patient not taking: Reported on 10/19/2023) 40 tablet 3     No current facility-administered medications for this visit.     Facility-Administered Medications Ordered in Other Visits   Medication Dose Route Frequency Provider Last Rate Last Admin    HYDROmorphone injection 0.5 mg  0.5 mg Intravenous Q5 Min PRN Rosales Sousa MD        ondansetron injection 4 mg  4 mg Intravenous Daily PRN Rosales Sousa MD        prochlorperazine injection Soln 10 mg  10 mg Intravenous Q30 Min PRN Rosales Sousa MD           Review of Systems   Constitutional:  Negative for appetite change, chills, fatigue, fever and unexpected weight change.   HENT:  Negative for mouth sores and trouble swallowing.    Eyes:  Negative for visual disturbance.   Respiratory:  Negative for cough and shortness of breath.    Cardiovascular:  Negative for chest pain and leg swelling.   Gastrointestinal:  Negative for abdominal pain, constipation, diarrhea and nausea.   Genitourinary:  Negative for dysuria and frequency.   Musculoskeletal:  Negative for arthralgias and back pain.   Skin:  Negative for rash.   Neurological:  Negative for headaches.   Hematological:  Negative for adenopathy.   Psychiatric/Behavioral:  The patient is  "not nervous/anxious.        ECOG Performance Status: 0    Objective:      Vitals: Weight:  Gain of 2 pounds in 1 week  Vitals:    10/26/23 1044   BP: 109/78   BP Location: Left arm   Patient Position: Sitting   BP Method: Small (Automatic)   Pulse: 88   Temp: 97.5 °F (36.4 °C)   TempSrc: Skin   SpO2: 99%   Weight: 54.1 kg (119 lb 4.3 oz)   Height: 5' 3" (1.6 m)       BMI: Body mass index is 21.13 kg/m².    Physical Exam  Vitals reviewed.   Constitutional:       General: She is not in acute distress.     Appearance: She is not diaphoretic.   HENT:      Head: Normocephalic and atraumatic.      Mouth/Throat:      Mouth: Mucous membranes are moist.      Pharynx: No oropharyngeal exudate.   Eyes:      General: No scleral icterus.  Cardiovascular:      Rate and Rhythm: Normal rate and regular rhythm.      Heart sounds: Normal heart sounds. No murmur heard.  Pulmonary:      Effort: Pulmonary effort is normal. No respiratory distress.      Breath sounds: Rales (bilateral) present. No wheezing.   Abdominal:      General: There is no distension.   Musculoskeletal:      Cervical back: No tenderness.      Right lower leg: No edema.      Left lower leg: No edema.   Lymphadenopathy:      Cervical: No cervical adenopathy.   Skin:     General: Skin is warm.      Coloration: Skin is not jaundiced.      Findings: No rash.   Neurological:      Mental Status: She is alert and oriented to person, place, and time.   Psychiatric:         Mood and Affect: Mood normal.         Behavior: Behavior normal.         Laboratory Data:  Lab Results   Component Value Date    WBC 5.62 10/26/2023    HGB 12.0 10/26/2023    HCT 36.8 (L) 10/26/2023    MCV 98 10/26/2023     10/26/2023    ANC = 2.7  CMP  Sodium   Date Value Ref Range Status   10/26/2023 138 136 - 145 mmol/L Final     Potassium   Date Value Ref Range Status   10/26/2023 3.7 3.5 - 5.1 mmol/L Final     Chloride   Date Value Ref Range Status   10/26/2023 104 95 - 110 mmol/L Final "     CO2   Date Value Ref Range Status   10/26/2023 26 23 - 29 mmol/L Final     Glucose   Date Value Ref Range Status   10/26/2023 98 70 - 110 mg/dL Final     BUN   Date Value Ref Range Status   10/26/2023 18 6 - 20 mg/dL Final     Creatinine   Date Value Ref Range Status   10/26/2023 0.7 0.5 - 1.4 mg/dL Final     Calcium   Date Value Ref Range Status   10/26/2023 9.4 8.7 - 10.5 mg/dL Final     Total Protein   Date Value Ref Range Status   10/26/2023 7.7 6.0 - 8.4 g/dL Final     Albumin   Date Value Ref Range Status   10/26/2023 4.3 3.5 - 5.2 g/dL Final     Total Bilirubin   Date Value Ref Range Status   10/26/2023 0.2 0.1 - 1.0 mg/dL Final     Comment:     For infants and newborns, interpretation of results should be based  on gestational age, weight and in agreement with clinical  observations.    Premature Infant recommended reference ranges:  Up to 24 hours.............<8.0 mg/dL  Up to 48 hours............<12.0 mg/dL  3-5 days..................<15.0 mg/dL  6-29 days.................<15.0 mg/dL       Alkaline Phosphatase   Date Value Ref Range Status   10/26/2023 50 (L) 55 - 135 U/L Final     AST   Date Value Ref Range Status   10/26/2023 15 10 - 40 U/L Final     ALT   Date Value Ref Range Status   10/26/2023 14 10 - 44 U/L Final     Anion Gap   Date Value Ref Range Status   10/26/2023 8 8 - 16 mmol/L Final     eGFR   Date Value Ref Range Status   10/26/2023 >60.0 >60 mL/min/1.73 m^2 Final     UPT:  negative  Thyroid &  PENDING   Imaging:   Assessment:       1. Malignant neoplasm of lower-outer quadrant of right breast of female, estrogen receptor negative    2. BRCA gene positive    3. 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 Chemotherapy-induced fatigue         Plan:   Triple Negative Right Sided Breast Cancer   Date of Original Diagnosis: 7/26/23  Original Stage: [id number] Stage 2B grade 3 Triple neg   Current Sites of Disease: right breast   Current Goals of Therapy: curative   Current Therapy:  Keynote 522 Neoadjuvant chemotherapy  BRCA positive- to meet with genetics   -She has also been enrolled in the Protocol [id number], Disparities in Results of Immune Checkpoint Inhibitor Treatment (DIRECT): A Prospective Cohort Study of Cancer Survivors Treated with anti-PD-L1 Immunotherapy in a Community Oncology Setting.   -Clinically is having a great response neoadjuvant chemotherapy, prior palpable masses are no longer palpable  -Proceed with C4D1 today; f/u on 10/30 with Dr. Mcgovern as scheduled with labs prior.       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  -Patient at risk for infection   -No current signs/symptoms of infection  -Continue to monitor closely while on therapy     IO induced hepatitis  -grade 2, improving without steroids but still elevated  -held pembro for cycle 2  -Completed steroids, LFTs are back to baseline   -Monitor      CINV  -Has Promethazine, ondansetron PRN, currently not using often   -Managed, continue to monitor      anxiety about health  -referral sent to onc psych; patient declines   -Monitor     Chemotherapy induced anemia   -Stable with Hgb 12.0 g/dL today   -Patient states she recalls being told she has thalassemia as child, will check Hb electrophoresis with labs next week  -9/26/23 iron indices are within a normal range   -Monitor     Patient queried and all questions were answered.   Assessment/Plan reviewed and approved by Dr. Mcgovern   20 minutes were spent in coordination of patient's care, record review and counseling.    Route Chart for Scheduling    Med Onc Chart Routing      Follow up with physician . F/u as scheduled with Dr. Mcgovern on 10/30 with labs prior   Follow up with SA[facility]    Infusion scheduling note   Proceed with C4D1 today   Injection scheduling note    Labs    Imaging    Pharmacy appointment    Other referrals                Treatment Plan Information   OP PEMBROLIZUMAB WITH WEEKLY PACLITAXEL CARBOPLATIN (AUC 1.5) FOLLOWED BY PEMBROLIZUMAB DOXORUBICIN  CYCLOPHOSPHAMIDE FOLLOWED BY PEMBROLIZUMAB 200MG Q3W   Corina Mcgovern MD   Upcoming Treatment Dates - OP PEMBROLIZUMAB WITH WEEKLY PACLITAXEL CARBOPLATIN (AUC 1.5) FOLLOWED BY PEMBROLIZUMAB DOXORUBICIN CYCLOPHOSPHAMIDE FOLLOWED BY PEMBROLIZUMAB 200MG Q3W    10/26/2023       Pre-Medications       diphenhydrAMINE (BENADRYL) 50 mg in NS 50 mL IVPB       famotidine (PF) injection 20 mg       Chemotherapy       PACLitaxeL (TAXOL) 80 mg/m2 = 126 mg in sodium chloride 0.9% 250 mL chemo infusion       CARBOplatin (PARAPLATIN) 175 mg in sodium chloride 0.9% 267.5 mL chemo infusion       Antiemetics       aprepitant (CINVANTI) injection 130 mg       LORazepam injection 0.5 mg       dexAMETHasone injection 8 mg       palonosetron injection 0.25 mg       Immunotherapy       pembrolizumab (KEYTRUDA) 200 mg in sodium chloride 0.9% SolP 108 mL infusion  11/2/2023       Pre-Medications       diphenhydrAMINE (BENADRYL) 50 mg in NS 50 mL IVPB       famotidine (PF) injection 20 mg       Chemotherapy       PACLitaxeL (TAXOL) 80 mg/m2 = 126 mg in sodium chloride 0.9% 250 mL chemo infusion       CARBOplatin (PARAPLATIN) 175 mg in sodium chloride 0.9% 267.5 mL chemo infusion       Antiemetics       dexAMETHasone injection 8 mg       palonosetron injection 0.25 mg       LORazepam injection 0.5 mg  11/9/2023       Pre-Medications       diphenhydrAMINE (BENADRYL) 50 mg in NS 50 mL IVPB       famotidine (PF) injection 20 mg       Chemotherapy       PACLitaxeL (TAXOL) 80 mg/m2 = 126 mg in sodium chloride 0.9% 250 mL chemo infusion       CARBOplatin (PARAPLATIN) 175 mg in sodium chloride 0.9% 267.5 mL chemo infusion       Antiemetics       dexAMETHasone injection 8 mg       palonosetron injection 0.25 mg       LORazepam injection 0.5 mg  11/16/2023       Chemotherapy       DOXOrubicin chemo injection 94 mg       cycloPHOSphamide 600 mg/m2 = 940 mg in sodium chloride 0.9% 254.7 mL chemo infusion       Antiemetics       aprepitant (CINVANTI)  injection 130 mg       palonosetron 0.25mg/dexAMETHasone 12mg in NS IVPB 0.25 mg 50 mL       LORazepam (ATIVAN) injection 0.5 mg       aprepitant (CINVANTI) injection 130 mg       dexAMETHasone injection 8 mg       palonosetron injection 0.25 mg       Immunotherapy       pembrolizumab (KEYTRUDA) 200 mg in sodium chloride 0.9% SolP 108 mL infusion

## 2023-10-26 ENCOUNTER — PATIENT MESSAGE (OUTPATIENT)
Dept: SURGERY | Facility: CLINIC | Age: 43
End: 2023-10-26
Payer: COMMERCIAL

## 2023-10-26 ENCOUNTER — RESEARCH ENCOUNTER (OUTPATIENT)
Dept: RESEARCH | Facility: HOSPITAL | Age: 43
End: 2023-10-26
Payer: COMMERCIAL

## 2023-10-26 ENCOUNTER — INFUSION (OUTPATIENT)
Dept: INFUSION THERAPY | Facility: HOSPITAL | Age: 43
End: 2023-10-26
Attending: INTERNAL MEDICINE
Payer: COMMERCIAL

## 2023-10-26 ENCOUNTER — TELEPHONE (OUTPATIENT)
Dept: SURGERY | Facility: CLINIC | Age: 43
End: 2023-10-26
Payer: COMMERCIAL

## 2023-10-26 ENCOUNTER — OFFICE VISIT (OUTPATIENT)
Dept: HEMATOLOGY/ONCOLOGY | Facility: CLINIC | Age: 43
End: 2023-10-26
Payer: COMMERCIAL

## 2023-10-26 ENCOUNTER — LAB VISIT (OUTPATIENT)
Dept: LAB | Facility: HOSPITAL | Age: 43
End: 2023-10-26
Attending: NURSE PRACTITIONER
Payer: COMMERCIAL

## 2023-10-26 VITALS
OXYGEN SATURATION: 99 % | DIASTOLIC BLOOD PRESSURE: 71 MMHG | SYSTOLIC BLOOD PRESSURE: 111 MMHG | HEIGHT: 63 IN | BODY MASS INDEX: 21.13 KG/M2 | RESPIRATION RATE: 16 BRPM | HEART RATE: 91 BPM | WEIGHT: 119.25 LBS | TEMPERATURE: 98 F

## 2023-10-26 VITALS
WEIGHT: 119.25 LBS | HEIGHT: 63 IN | HEART RATE: 88 BPM | DIASTOLIC BLOOD PRESSURE: 78 MMHG | BODY MASS INDEX: 21.13 KG/M2 | SYSTOLIC BLOOD PRESSURE: 109 MMHG | TEMPERATURE: 98 F | OXYGEN SATURATION: 99 %

## 2023-10-26 DIAGNOSIS — C50.511 MALIGNANT NEOPLASM OF LOWER-OUTER QUADRANT OF RIGHT BREAST OF FEMALE, ESTROGEN RECEPTOR NEGATIVE: ICD-10-CM

## 2023-10-26 DIAGNOSIS — Z15.09 BRCA1 GENE MUTATION POSITIVE: ICD-10-CM

## 2023-10-26 DIAGNOSIS — T45.1X5A CHEMOTHERAPY-INDUCED FATIGUE: ICD-10-CM

## 2023-10-26 DIAGNOSIS — C50.511 MALIGNANT NEOPLASM OF LOWER-OUTER QUADRANT OF RIGHT BREAST OF FEMALE, ESTROGEN RECEPTOR NEGATIVE: Primary | ICD-10-CM

## 2023-10-26 DIAGNOSIS — Z17.1 MALIGNANT NEOPLASM OF LOWER-OUTER QUADRANT OF RIGHT BREAST OF FEMALE, ESTROGEN RECEPTOR NEGATIVE: Primary | ICD-10-CM

## 2023-10-26 DIAGNOSIS — Z17.1 MALIGNANT NEOPLASM OF LOWER-OUTER QUADRANT OF RIGHT BREAST OF FEMALE, ESTROGEN RECEPTOR NEGATIVE: ICD-10-CM

## 2023-10-26 DIAGNOSIS — Z15.09 BRCA GENE POSITIVE: ICD-10-CM

## 2023-10-26 DIAGNOSIS — Z15.01 BRCA GENE POSITIVE: ICD-10-CM

## 2023-10-26 DIAGNOSIS — D84.821 IMMUNOCOMPROMISED STATE DUE TO DRUG THERAPY: ICD-10-CM

## 2023-10-26 DIAGNOSIS — C50.911 INVASIVE DUCTAL CARCINOMA OF BREAST, FEMALE, RIGHT: Primary | ICD-10-CM

## 2023-10-26 DIAGNOSIS — R53.83 CHEMOTHERAPY-INDUCED FATIGUE: ICD-10-CM

## 2023-10-26 DIAGNOSIS — Z79.899 IMMUNOCOMPROMISED STATE DUE TO DRUG THERAPY: ICD-10-CM

## 2023-10-26 DIAGNOSIS — C50.911 INVASIVE DUCTAL CARCINOMA OF BREAST, FEMALE, RIGHT: ICD-10-CM

## 2023-10-26 DIAGNOSIS — Z15.01 BRCA1 GENE MUTATION POSITIVE: ICD-10-CM

## 2023-10-26 LAB
ALBUMIN SERPL BCP-MCNC: 4.3 G/DL (ref 3.5–5.2)
ALP SERPL-CCNC: 50 U/L (ref 55–135)
ALT SERPL W/O P-5'-P-CCNC: 14 U/L (ref 10–44)
ANION GAP SERPL CALC-SCNC: 8 MMOL/L (ref 8–16)
AST SERPL-CCNC: 15 U/L (ref 10–40)
B-HCG UR QL: NEGATIVE
BASOPHILS # BLD AUTO: 0.02 K/UL (ref 0–0.2)
BASOPHILS NFR BLD: 0.4 % (ref 0–1.9)
BILIRUB SERPL-MCNC: 0.2 MG/DL (ref 0.1–1)
BUN SERPL-MCNC: 18 MG/DL (ref 6–20)
CALCIUM SERPL-MCNC: 9.4 MG/DL (ref 8.7–10.5)
CANCER AG125 SERPL-ACNC: 13 U/ML (ref 0–30)
CHLORIDE SERPL-SCNC: 104 MMOL/L (ref 95–110)
CO2 SERPL-SCNC: 26 MMOL/L (ref 23–29)
CREAT SERPL-MCNC: 0.7 MG/DL (ref 0.5–1.4)
DIFFERENTIAL METHOD: ABNORMAL
EOSINOPHIL # BLD AUTO: 0.1 K/UL (ref 0–0.5)
EOSINOPHIL NFR BLD: 0.9 % (ref 0–8)
ERYTHROCYTE [DISTWIDTH] IN BLOOD BY AUTOMATED COUNT: 16 % (ref 11.5–14.5)
EST. GFR  (NO RACE VARIABLE): >60 ML/MIN/1.73 M^2
GLUCOSE SERPL-MCNC: 98 MG/DL (ref 70–110)
HCT VFR BLD AUTO: 36.8 % (ref 37–48.5)
HGB BLD-MCNC: 12 G/DL (ref 12–16)
IMM GRANULOCYTES # BLD AUTO: 0.03 K/UL (ref 0–0.04)
IMM GRANULOCYTES NFR BLD AUTO: 0.5 % (ref 0–0.5)
LYMPHOCYTES # BLD AUTO: 2.4 K/UL (ref 1–4.8)
LYMPHOCYTES NFR BLD: 41.8 % (ref 18–48)
MCH RBC QN AUTO: 32 PG (ref 27–31)
MCHC RBC AUTO-ENTMCNC: 32.6 G/DL (ref 32–36)
MCV RBC AUTO: 98 FL (ref 82–98)
MONOCYTES # BLD AUTO: 0.4 K/UL (ref 0.3–1)
MONOCYTES NFR BLD: 7.8 % (ref 4–15)
NEUTROPHILS # BLD AUTO: 2.7 K/UL (ref 1.8–7.7)
NEUTROPHILS NFR BLD: 48.6 % (ref 38–73)
NRBC BLD-RTO: 0 /100 WBC
PLATELET # BLD AUTO: 301 K/UL (ref 150–450)
PMV BLD AUTO: 8.5 FL (ref 9.2–12.9)
POTASSIUM SERPL-SCNC: 3.7 MMOL/L (ref 3.5–5.1)
PROT SERPL-MCNC: 7.7 G/DL (ref 6–8.4)
RBC # BLD AUTO: 3.75 M/UL (ref 4–5.4)
SODIUM SERPL-SCNC: 138 MMOL/L (ref 136–145)
TSH SERPL DL<=0.005 MIU/L-ACNC: 1.05 UIU/ML (ref 0.4–4)
WBC # BLD AUTO: 5.62 K/UL (ref 3.9–12.7)

## 2023-10-26 PROCEDURE — 96375 TX/PRO/DX INJ NEW DRUG ADDON: CPT | Mod: PN

## 2023-10-26 PROCEDURE — 80053 COMPREHEN METABOLIC PANEL: CPT | Mod: PN | Performed by: INTERNAL MEDICINE

## 2023-10-26 PROCEDURE — 99999 PR PBB SHADOW E&M-EST. PATIENT-LVL IV: CPT | Mod: PBBFAC,,, | Performed by: NURSE PRACTITIONER

## 2023-10-26 PROCEDURE — 96417 CHEMO IV INFUS EACH ADDL SEQ: CPT | Mod: PN

## 2023-10-26 PROCEDURE — 99213 OFFICE O/P EST LOW 20 MIN: CPT | Mod: S$GLB,,, | Performed by: NURSE PRACTITIONER

## 2023-10-26 PROCEDURE — 99999 PR PBB SHADOW E&M-EST. PATIENT-LVL IV: ICD-10-PCS | Mod: PBBFAC,,, | Performed by: NURSE PRACTITIONER

## 2023-10-26 PROCEDURE — 36415 COLL VENOUS BLD VENIPUNCTURE: CPT | Mod: PN | Performed by: INTERNAL MEDICINE

## 2023-10-26 PROCEDURE — 84443 ASSAY THYROID STIM HORMONE: CPT | Performed by: INTERNAL MEDICINE

## 2023-10-26 PROCEDURE — 96413 CHEMO IV INFUSION 1 HR: CPT | Mod: PN

## 2023-10-26 PROCEDURE — 85025 COMPLETE CBC W/AUTO DIFF WBC: CPT | Mod: PN | Performed by: INTERNAL MEDICINE

## 2023-10-26 PROCEDURE — 63600175 PHARM REV CODE 636 W HCPCS: Mod: JZ,JG,PN | Performed by: NURSE PRACTITIONER

## 2023-10-26 PROCEDURE — 81025 URINE PREGNANCY TEST: CPT | Mod: PN | Performed by: INTERNAL MEDICINE

## 2023-10-26 PROCEDURE — 99213 PR OFFICE/OUTPT VISIT, EST, LEVL III, 20-29 MIN: ICD-10-PCS | Mod: S$GLB,,, | Performed by: NURSE PRACTITIONER

## 2023-10-26 PROCEDURE — 96367 TX/PROPH/DG ADDL SEQ IV INF: CPT | Mod: PN

## 2023-10-26 PROCEDURE — 86304 IMMUNOASSAY TUMOR CA 125: CPT | Performed by: OBSTETRICS & GYNECOLOGY

## 2023-10-26 PROCEDURE — A4216 STERILE WATER/SALINE, 10 ML: HCPCS | Mod: PN | Performed by: NURSE PRACTITIONER

## 2023-10-26 PROCEDURE — 25000003 PHARM REV CODE 250: Mod: PN | Performed by: NURSE PRACTITIONER

## 2023-10-26 RX ORDER — LORAZEPAM 2 MG/ML
0.5 INJECTION INTRAMUSCULAR
Status: DISCONTINUED | OUTPATIENT
Start: 2023-10-26 | End: 2023-10-26 | Stop reason: HOSPADM

## 2023-10-26 RX ORDER — PALONOSETRON 0.05 MG/ML
0.25 INJECTION, SOLUTION INTRAVENOUS ONCE
Status: CANCELLED
Start: 2023-10-26 | End: 2023-10-26

## 2023-10-26 RX ORDER — FAMOTIDINE 10 MG/ML
20 INJECTION INTRAVENOUS
Status: CANCELLED | OUTPATIENT
Start: 2023-10-26

## 2023-10-26 RX ORDER — SODIUM CHLORIDE 0.9 % (FLUSH) 0.9 %
10 SYRINGE (ML) INJECTION
Status: CANCELLED | OUTPATIENT
Start: 2023-10-26

## 2023-10-26 RX ORDER — EPINEPHRINE 0.3 MG/.3ML
0.3 INJECTION SUBCUTANEOUS ONCE AS NEEDED
Status: CANCELLED | OUTPATIENT
Start: 2023-10-26

## 2023-10-26 RX ORDER — DIPHENHYDRAMINE HYDROCHLORIDE 50 MG/ML
50 INJECTION INTRAMUSCULAR; INTRAVENOUS ONCE AS NEEDED
Status: CANCELLED | OUTPATIENT
Start: 2023-10-26

## 2023-10-26 RX ORDER — FAMOTIDINE 10 MG/ML
20 INJECTION INTRAVENOUS
Status: COMPLETED | OUTPATIENT
Start: 2023-10-26 | End: 2023-10-26

## 2023-10-26 RX ORDER — PALONOSETRON 0.05 MG/ML
0.25 INJECTION, SOLUTION INTRAVENOUS ONCE
Status: COMPLETED | OUTPATIENT
Start: 2023-10-26 | End: 2023-10-26

## 2023-10-26 RX ORDER — DIPHENHYDRAMINE HYDROCHLORIDE 50 MG/ML
50 INJECTION INTRAMUSCULAR; INTRAVENOUS ONCE AS NEEDED
Status: DISCONTINUED | OUTPATIENT
Start: 2023-10-26 | End: 2023-10-26 | Stop reason: HOSPADM

## 2023-10-26 RX ORDER — PROCHLORPERAZINE EDISYLATE 5 MG/ML
10 INJECTION INTRAMUSCULAR; INTRAVENOUS ONCE AS NEEDED
Status: DISCONTINUED | OUTPATIENT
Start: 2023-10-26 | End: 2023-10-26 | Stop reason: HOSPADM

## 2023-10-26 RX ORDER — LORAZEPAM 2 MG/ML
0.5 INJECTION INTRAMUSCULAR
Status: CANCELLED
Start: 2023-10-26

## 2023-10-26 RX ORDER — SODIUM CHLORIDE 0.9 % (FLUSH) 0.9 %
10 SYRINGE (ML) INJECTION
Status: DISCONTINUED | OUTPATIENT
Start: 2023-10-26 | End: 2023-10-26 | Stop reason: HOSPADM

## 2023-10-26 RX ORDER — DEXAMETHASONE SODIUM PHOSPHATE 4 MG/ML
8 INJECTION, SOLUTION INTRA-ARTICULAR; INTRALESIONAL; INTRAMUSCULAR; INTRAVENOUS; SOFT TISSUE
Status: CANCELLED
Start: 2023-10-26

## 2023-10-26 RX ORDER — DEXAMETHASONE SODIUM PHOSPHATE 4 MG/ML
8 INJECTION, SOLUTION INTRA-ARTICULAR; INTRALESIONAL; INTRAMUSCULAR; INTRAVENOUS; SOFT TISSUE
Status: COMPLETED | OUTPATIENT
Start: 2023-10-26 | End: 2023-10-26

## 2023-10-26 RX ORDER — EPINEPHRINE 0.3 MG/.3ML
0.3 INJECTION SUBCUTANEOUS ONCE AS NEEDED
Status: DISCONTINUED | OUTPATIENT
Start: 2023-10-26 | End: 2023-10-26 | Stop reason: HOSPADM

## 2023-10-26 RX ORDER — HEPARIN 100 UNIT/ML
500 SYRINGE INTRAVENOUS
Status: CANCELLED | OUTPATIENT
Start: 2023-10-26

## 2023-10-26 RX ORDER — PROCHLORPERAZINE EDISYLATE 5 MG/ML
10 INJECTION INTRAMUSCULAR; INTRAVENOUS ONCE AS NEEDED
Status: CANCELLED
Start: 2023-10-26

## 2023-10-26 RX ADMIN — APREPITANT 130 MG: 130 INJECTION, EMULSION INTRAVENOUS at 12:10

## 2023-10-26 RX ADMIN — SODIUM CHLORIDE: 9 INJECTION, SOLUTION INTRAVENOUS at 11:10

## 2023-10-26 RX ADMIN — PALONOSETRON 0.25 MG: 0.05 INJECTION, SOLUTION INTRAVENOUS at 12:10

## 2023-10-26 RX ADMIN — DEXAMETHASONE SODIUM PHOSPHATE 8 MG: 4 INJECTION INTRA-ARTICULAR; INTRALESIONAL; INTRAMUSCULAR; INTRAVENOUS; SOFT TISSUE at 12:10

## 2023-10-26 RX ADMIN — FAMOTIDINE 20 MG: 10 INJECTION INTRAVENOUS at 01:10

## 2023-10-26 RX ADMIN — SODIUM CHLORIDE 200 MG: 9 INJECTION, SOLUTION INTRAVENOUS at 11:10

## 2023-10-26 RX ADMIN — LORAZEPAM 0.5 MG: 2 INJECTION INTRAMUSCULAR; INTRAVENOUS at 12:10

## 2023-10-26 RX ADMIN — CARBOPLATIN 175 MG: 10 INJECTION, SOLUTION INTRAVENOUS at 02:10

## 2023-10-26 RX ADMIN — PACLITAXEL 126 MG: 6 INJECTION, SOLUTION, CONCENTRATE INTRAVENOUS at 01:10

## 2023-10-26 RX ADMIN — Medication 10 ML: at 05:10

## 2023-10-26 RX ADMIN — DIPHENHYDRAMINE HYDROCHLORIDE 50 MG: 50 INJECTION INTRAMUSCULAR; INTRAVENOUS at 12:10

## 2023-10-26 NOTE — PLAN OF CARE
Problem: Fatigue  Goal: Improved Activity Tolerance  Outcome: Ongoing, Progressing  Intervention: Promote Improved Energy  Flowsheets (Taken 10/26/2023 1710)  Fatigue Management:   fatigue-related activity identified   paced activity encouraged   activity assistance provided   frequent rest breaks encouraged  Sleep/Rest Enhancement:   therapeutic touch utilized   relaxation techniques promoted   noise level reduced   family presence promoted   consistent schedule promoted   natural light exposure provided   awakenings minimized  Activity Management:   Ambulated -L4   Up in chair - L3   Ambulated to bathroom - L4     Problem: Adult Inpatient Plan of Care  Goal: Plan of Care Review  Outcome: Ongoing, Progressing  Flowsheets (Taken 10/26/2023 1710)  Plan of Care Reviewed With:   patient   spouse  Goal: Patient-Specific Goal (Individualized)  Outcome: Ongoing, Progressing  Flowsheets (Taken 10/26/2023 1710)  Anxieties, Fears or Concerns: new treatment approaching  Individualized Care Needs: recliner, pillow, dignicap, education, conversation, cryotherapy (home kit), home electric blanket, snacks, spouse at chairside, blanket, pillow, sleep  Goal: Optimal Comfort and Wellbeing  Outcome: Ongoing, Progressing  Intervention: Provide Person-Centered Care  Flowsheets (Taken 10/26/2023 1710)  Trust Relationship/Rapport:   care explained   choices provided   questions encouraged   questions answered   reassurance provided   emotional support provided   empathic listening provided   thoughts/feelings acknowledged     Problem: Fall Injury Risk  Goal: Absence of Fall and Fall-Related Injury  Outcome: Ongoing, Progressing  Intervention: Promote Injury-Free Environment  Flowsheets (Taken 10/26/2023 1710)  Safety Promotion/Fall Prevention:   instructed to call staff for mobility   supervised activity   room near unit station   medications reviewed   Fall Risk reviewed with patient/family   high risk medications identified   in  recliner, wheels locked   lighting adjusted   family to remain at bedside   chair alarm set

## 2023-10-26 NOTE — PROGRESS NOTES
Acupuncture Follow-Up Note     Name: Arabella Koroma [facility] Number: [id number]    Traditional Chinese Medicine (TCM) Diagnosis: Qi Stagnation and Qi Deficiency  Medical Diagnosis:   Encounter Diagnosis   Name Primary?    Anxiety about health Yes        Evaluation Date: 10/26/2023    Visit #/Visits authorized:     Precautions: Standard    Subjective     Chief Concern: Anxiety (Patient reports 3/10 anxiety today.)       Medical necessity is demonstrated by the following IMPAIRMENTS: Medical Necessity: Decreased quality of life              Aggravating Factors:  stress     Relieving Factors:  nothing    Symptom Description:     Quality:  na  Severity:  na  Frequency:  every day      Objective     Observation: Patient has severe anxiety about the process of CA treatments, her prognosis and the joint pain she feels which keeps her awake at night.      Pulse:        thready       New Findings: patient reports joint pain which keeps her awake at night.    Treatment     Treatment Principles:  lift qi, move qi and blood, calm perez.    Acupuncture points used:  4 GONZALEZ, Du20, Gb34, Sp6, Sp9, St36, and YIN MOON    Bilateral points:  Unilateral points:  Auricular Treatment:  perez men    Needles In: 16  Needles Out: 16  Needles W/O STIM placed: 1105  [place] W/O STIM removed: 1125      Other Traditional Chinese Medicine Modalities -  infrared heat    Assessment     After treatment, patient felt less anxious and plans to continue     Patient prognosis is Good.     Patient will continue to benefit from acupuncture treatment to address the deficits listed in the problem list box on initial evaluation, provide patient family education and to maximize pt's level of independence in the home and community environment.     Patient's spiritual, cultural and educational needs considered and pt agreeable to plan of care and goals.     Anticipated barriers to treatment: none    Plan     Recommend 1 /week for 12 sessions then  re-assess.      Education:  Patient is aware of cumulative benefit of acupuncture

## 2023-10-26 NOTE — PLAN OF CARE
Pt arrived to clinic for C4D1 Keytruda, Taxol and Carboplatin and tolerated well with no changes throughout therapy. Dignicap used per pt request and tolerated well with home electric blanket used and spouse at chairside. Cryotherapy performed per pt request to bilateral hands/feet with home supplies and tolerated well. Pt aware of side effects and f/u appts and discharged to home in NAD with  and has aware of instructions for Dignicap supplies and hair maintenance.

## 2023-10-26 NOTE — PROGRESS NOTES
"Protocol: [id number], Disparities in Results of Immune Checkpoint Inhibitor Treatment (DIRECT): A Prospective Cohort Study of Cancer Survivors Treated with anti-PD-L1 Immunotherapy in a Community Oncology Setting  IRB# 2022.126  Version Date: 1/11/2022  Treating MD: Dr. Mcgovern  Study ID# [id number]  10/26/2023      The patient presented today for planned appointment with Edgar Riojas NP, and 4th infusion of Q 3 week Keytruda and was deemed appropriate for treatment. The patient presented with her , A&O, without noted distress, and with appropriate mood and affect to the situation.The patient continues to freely agree with participation in the referenced study and denied taking any oral or IV antibiotics, antifungals agents, or steroids since her last infusion. Edgar Riojas NP, is aware of the patient's participation in the [id number] trial and the immunotherapy toxicity assessment that is required for it.     The treating clinician's note reports that the patient denies any HA, CP, cough, SOB, abd pain, diarrhea, constipation, N/V, dysuria, hematuria, swelling, new lumps or bumps, fevers, or chills.     The patient endorses some pain in the tongue that she describes as "inflamed taste buds". The patient denies any mouth ulcers. The treating clinician recommended baking soda/salt rinse to treat. The patient has also endorses fatigue that is improving. The patient denied having any other new or worsening symptoms.      Toxicities attributable to last infusion of Keytruda :  Grade 1 Fatigue (9/14/2023 - ongoing): treating clinician deems probably attributable to immunotherapy - the patient notes mild fatigue starting 3 weeks after her 1st Keytruda infusion. Fatigue comes and goes. The patient reports fatigue improving in recent weeks. The treating clinician does not deem CS, no new orders given.                     Clinical Record Information:  Between the last study visit, the patient has not been diagnosed with any " of the following autoimmune diseases or conditions:          The patient  denied having any questions for this CRC. Encouraged the patient to call with any questions or concerns.    Next set of [id number] questionnaires and labs due:              - February 24th, 2024 +/- 1 month (6 months after initiation of immunotherapy)     Tamia Herrera, CRC

## 2023-10-26 NOTE — NURSING
Called patient to let her know that we will be scheduling her follow up U/S and appointment with Dr Min, patient was in agreement and stated she will check the appointments on her MyChart

## 2023-10-30 ENCOUNTER — PATIENT MESSAGE (OUTPATIENT)
Dept: ADMINISTRATIVE | Facility: OTHER | Age: 43
End: 2023-10-30
Payer: COMMERCIAL

## 2023-10-30 ENCOUNTER — OFFICE VISIT (OUTPATIENT)
Dept: HEMATOLOGY/ONCOLOGY | Facility: CLINIC | Age: 43
End: 2023-10-30
Payer: COMMERCIAL

## 2023-10-30 ENCOUNTER — LAB VISIT (OUTPATIENT)
Dept: LAB | Facility: HOSPITAL | Age: 43
End: 2023-10-30
Attending: INTERNAL MEDICINE
Payer: COMMERCIAL

## 2023-10-30 VITALS
OXYGEN SATURATION: 100 % | RESPIRATION RATE: 16 BRPM | BODY MASS INDEX: 21.36 KG/M2 | DIASTOLIC BLOOD PRESSURE: 74 MMHG | HEART RATE: 90 BPM | WEIGHT: 120.56 LBS | TEMPERATURE: 98 F | HEIGHT: 63 IN | SYSTOLIC BLOOD PRESSURE: 114 MMHG

## 2023-10-30 DIAGNOSIS — R53.83 CHEMOTHERAPY-INDUCED FATIGUE: ICD-10-CM

## 2023-10-30 DIAGNOSIS — C50.511 MALIGNANT NEOPLASM OF LOWER-OUTER QUADRANT OF RIGHT BREAST OF FEMALE, ESTROGEN RECEPTOR NEGATIVE: ICD-10-CM

## 2023-10-30 DIAGNOSIS — C50.511 MALIGNANT NEOPLASM OF LOWER-OUTER QUADRANT OF RIGHT BREAST OF FEMALE, ESTROGEN RECEPTOR NEGATIVE: Primary | ICD-10-CM

## 2023-10-30 DIAGNOSIS — D84.821 IMMUNOCOMPROMISED STATE DUE TO DRUG THERAPY: ICD-10-CM

## 2023-10-30 DIAGNOSIS — M25.50 ARTHRALGIA, UNSPECIFIED JOINT: ICD-10-CM

## 2023-10-30 DIAGNOSIS — Z17.1 MALIGNANT NEOPLASM OF LOWER-OUTER QUADRANT OF RIGHT BREAST OF FEMALE, ESTROGEN RECEPTOR NEGATIVE: ICD-10-CM

## 2023-10-30 DIAGNOSIS — Z79.899 IMMUNOCOMPROMISED STATE DUE TO DRUG THERAPY: ICD-10-CM

## 2023-10-30 DIAGNOSIS — Z15.09 BRCA GENE POSITIVE: ICD-10-CM

## 2023-10-30 DIAGNOSIS — Z15.01 BRCA GENE POSITIVE: ICD-10-CM

## 2023-10-30 DIAGNOSIS — C50.911 INVASIVE DUCTAL CARCINOMA OF BREAST, FEMALE, RIGHT: ICD-10-CM

## 2023-10-30 DIAGNOSIS — T45.1X5A CHEMOTHERAPY-INDUCED FATIGUE: ICD-10-CM

## 2023-10-30 DIAGNOSIS — Z17.1 MALIGNANT NEOPLASM OF LOWER-OUTER QUADRANT OF RIGHT BREAST OF FEMALE, ESTROGEN RECEPTOR NEGATIVE: Primary | ICD-10-CM

## 2023-10-30 LAB
ALBUMIN SERPL BCP-MCNC: 4.2 G/DL (ref 3.5–5.2)
ALP SERPL-CCNC: 43 U/L (ref 55–135)
ALT SERPL W/O P-5'-P-CCNC: 12 U/L (ref 10–44)
ANION GAP SERPL CALC-SCNC: 10 MMOL/L (ref 8–16)
AST SERPL-CCNC: 14 U/L (ref 10–40)
B-HCG UR QL: NEGATIVE
BASOPHILS # BLD AUTO: 0.02 K/UL (ref 0–0.2)
BASOPHILS NFR BLD: 0.4 % (ref 0–1.9)
BILIRUB SERPL-MCNC: 0.4 MG/DL (ref 0.1–1)
BUN SERPL-MCNC: 17 MG/DL (ref 6–20)
CALCIUM SERPL-MCNC: 9.1 MG/DL (ref 8.7–10.5)
CHLORIDE SERPL-SCNC: 103 MMOL/L (ref 95–110)
CO2 SERPL-SCNC: 26 MMOL/L (ref 23–29)
CREAT SERPL-MCNC: 0.8 MG/DL (ref 0.5–1.4)
DIFFERENTIAL METHOD: ABNORMAL
EOSINOPHIL # BLD AUTO: 0 K/UL (ref 0–0.5)
EOSINOPHIL NFR BLD: 0.6 % (ref 0–8)
ERYTHROCYTE [DISTWIDTH] IN BLOOD BY AUTOMATED COUNT: 15.8 % (ref 11.5–14.5)
EST. GFR  (NO RACE VARIABLE): >60 ML/MIN/1.73 M^2
GLUCOSE SERPL-MCNC: 107 MG/DL (ref 70–110)
HCT VFR BLD AUTO: 35 % (ref 37–48.5)
HGB BLD-MCNC: 11.4 G/DL (ref 12–16)
IMM GRANULOCYTES # BLD AUTO: 0.01 K/UL (ref 0–0.04)
IMM GRANULOCYTES NFR BLD AUTO: 0.2 % (ref 0–0.5)
LYMPHOCYTES # BLD AUTO: 2.3 K/UL (ref 1–4.8)
LYMPHOCYTES NFR BLD: 44.7 % (ref 18–48)
MCH RBC QN AUTO: 32 PG (ref 27–31)
MCHC RBC AUTO-ENTMCNC: 32.6 G/DL (ref 32–36)
MCV RBC AUTO: 98 FL (ref 82–98)
MONOCYTES # BLD AUTO: 0.3 K/UL (ref 0.3–1)
MONOCYTES NFR BLD: 6.1 % (ref 4–15)
NEUTROPHILS # BLD AUTO: 2.5 K/UL (ref 1.8–7.7)
NEUTROPHILS NFR BLD: 48 % (ref 38–73)
NRBC BLD-RTO: 0 /100 WBC
PLATELET # BLD AUTO: 282 K/UL (ref 150–450)
PLATELET BLD QL SMEAR: ABNORMAL
PMV BLD AUTO: 8.5 FL (ref 9.2–12.9)
POTASSIUM SERPL-SCNC: 4 MMOL/L (ref 3.5–5.1)
PROT SERPL-MCNC: 7.3 G/DL (ref 6–8.4)
RBC # BLD AUTO: 3.56 M/UL (ref 4–5.4)
SODIUM SERPL-SCNC: 139 MMOL/L (ref 136–145)
WBC # BLD AUTO: 5.1 K/UL (ref 3.9–12.7)

## 2023-10-30 PROCEDURE — 3008F BODY MASS INDEX DOCD: CPT | Mod: CPTII,S$GLB,, | Performed by: INTERNAL MEDICINE

## 2023-10-30 PROCEDURE — 36415 COLL VENOUS BLD VENIPUNCTURE: CPT | Mod: PN | Performed by: INTERNAL MEDICINE

## 2023-10-30 PROCEDURE — 3074F PR MOST RECENT SYSTOLIC BLOOD PRESSURE < 130 MM HG: ICD-10-PCS | Mod: CPTII,S$GLB,, | Performed by: INTERNAL MEDICINE

## 2023-10-30 PROCEDURE — 3008F PR BODY MASS INDEX (BMI) DOCUMENTED: ICD-10-PCS | Mod: CPTII,S$GLB,, | Performed by: INTERNAL MEDICINE

## 2023-10-30 PROCEDURE — 99999 PR PBB SHADOW E&M-EST. PATIENT-LVL IV: ICD-10-PCS | Mod: PBBFAC,,, | Performed by: INTERNAL MEDICINE

## 2023-10-30 PROCEDURE — 99999 PR PBB SHADOW E&M-EST. PATIENT-LVL IV: CPT | Mod: PBBFAC,,, | Performed by: INTERNAL MEDICINE

## 2023-10-30 PROCEDURE — 80053 COMPREHEN METABOLIC PANEL: CPT | Mod: PN | Performed by: INTERNAL MEDICINE

## 2023-10-30 PROCEDURE — 3078F PR MOST RECENT DIASTOLIC BLOOD PRESSURE < 80 MM HG: ICD-10-PCS | Mod: CPTII,S$GLB,, | Performed by: INTERNAL MEDICINE

## 2023-10-30 PROCEDURE — 99215 PR OFFICE/OUTPT VISIT, EST, LEVL V, 40-54 MIN: ICD-10-PCS | Mod: S$GLB,,, | Performed by: INTERNAL MEDICINE

## 2023-10-30 PROCEDURE — 3078F DIAST BP <80 MM HG: CPT | Mod: CPTII,S$GLB,, | Performed by: INTERNAL MEDICINE

## 2023-10-30 PROCEDURE — 1159F PR MEDICATION LIST DOCUMENTED IN MEDICAL RECORD: ICD-10-PCS | Mod: CPTII,S$GLB,, | Performed by: INTERNAL MEDICINE

## 2023-10-30 PROCEDURE — 3074F SYST BP LT 130 MM HG: CPT | Mod: CPTII,S$GLB,, | Performed by: INTERNAL MEDICINE

## 2023-10-30 PROCEDURE — 81025 URINE PREGNANCY TEST: CPT | Mod: PN | Performed by: INTERNAL MEDICINE

## 2023-10-30 PROCEDURE — 85025 COMPLETE CBC W/AUTO DIFF WBC: CPT | Mod: PN | Performed by: INTERNAL MEDICINE

## 2023-10-30 PROCEDURE — 99215 OFFICE O/P EST HI 40 MIN: CPT | Mod: S$GLB,,, | Performed by: INTERNAL MEDICINE

## 2023-10-30 PROCEDURE — 1159F MED LIST DOCD IN RCRD: CPT | Mod: CPTII,S$GLB,, | Performed by: INTERNAL MEDICINE

## 2023-10-30 NOTE — PROGRESS NOTES
PROGRESS NOTE    Subjective:       Patient ID: Arabella Catalan is a 43 y.o. female.  MRN: [id number]  : 1980    Chief Complaint: cT2 cN0 TNBC     History of Present Illness:   Arabella Catalan is a 43 y.o. female who is referred for newly diagnosed TNBC.      As previously documented she started screening mammograms for a few years after her ovarian cancer , in her late 20's. Routine pelvic exam revealed an ovarian cancer, s/p right oophorectomy for dysgerminoma.     Resumed routine mammograms at 42, had a normal screening mammogram in May 2023 but felt a lump in the right breast in July. Further imaging showed 2 lesions, both > 2 cm , both biopsied to be G3 IDC, ER/CT and Her 2 jose eduardo negative.       Menarche at age 14 year old. . Age at first live birth 31. Did  breast feed 2 year and 6 months OCP-yes 20 years ago  Menopause at none. HRT-none       Family history cancer:  Mother breast cancer at 38 and cervical cancer     Smoker Pk/yr quit date , smoked around 0.5 pack a day     Interim history:    On neoadjuvant chemo based on KEYNOTE-522. She is using Dignicap.     Had cycle 4 D 1 on 10/26. Keytruda was added back on after being  held for cycle 2.   Fatigue is improving, stable joint pains.   No rash.   LMP was August. Denies nausea, vomiting, diarrhea or neuropathy.   Breast mass is no longer palpable. Has US that shows responsive disease.       Oncology History:  5/3/23  Impression:  Bilateral  There is no mammographic evidence of malignancy.     BI-RADS Category:   Overall: 2 - Benign    23  US  Impression:  Right  Mass: Right breast 1.5 cm x 1 cm x 1.6 cm mass at the 8 o'clock position. Assessment: 4 - Suspicious finding. Biopsy and possible aspiration are recommended.   Mass: Right breast 2 cm x 1.1 cm x 1.6 cm mass at the 7 o'clock position. Assessment: 4 - Suspicious finding. Biopsy is recommended.      BI-RADS Category:    Overall: 4 - Suspicious    8/2/23  MRI  Impression:  Right  Mass: Right breast 2.7 cm x 2.2 cm x 2 cm mass at the 8 o'clock position. Assessment: 6 - Known biopsy, proven malignancy.   Mass: Right breast 2 cm x 1.6 cm x 1.6 cm mass at the 7 o'clock position. Assessment: 6 - Known biopsy, proven malignancy.      Left  There is no MR evidence of malignancy in the left breast.     BI-RADS Category:   Overall: 6 - Known Biopsy-Proven Malignancy  7/31/23:  DIAGNOSIS:   07/28/2023 JL:tml     1. RIGHT BREAST AT 8:00 7 CM FROM NIPPLE CORE NEEDLE BIOPSIES:   - INVASIVE DUCT CARCINOMA, HIGH-GRADE (3,2,3).     2. RIGHT BREAST AT 7:00 3 CM FROM NIPPLE CORE NEEDLE BIOPSIES:   - IN SITU AND INVASIVE DUCT CARCINOMA, HIGH-GRADE (3,3,2).     RIGHT BREAST: INVASIVE DUCTAL CARCINOMA   GRADE: HIGH     ER: NEGATIVE, IA: NEGATIVE, HER2**: NEGATIVE       8/7/23  CT chest abd pelvis  Impression:     1. Known right breast malignancy, better evaluated on comparison breast MRI exam.  2. Mild asymmetrically prominent, but not pathologically enlarged right axillary lymph nodes, measuring up to 6 mm in short axis dimension.  No mediastinal or hilar lymphadenopathy.  3. Mild hepatomegaly with multiple small hypoattenuating hepatic lesions, the largest measuring 0.9 cm in the right hepatic lobe, which are too small to definitively characterize.  Metastases not excluded.  Consider further evaluation with PET-CT and/or contrast enhanced liver protocol MRI.  4. Solid, noncalcified 2 mm pulmonary nodule in the right lower lobe, nonspecific. Attention on follow-up imaging recommended.     8/18/23  Liver MRI      Impression:     Small circumscribed lesions in the liver are most compatible with hemangiomas.  No suspicious liver lesions.    8/10/123  Bone scan   Impression:     No evidence of osseous metastatic disease.     10/24/23:  US right breast   Impression:     Interval decrease in size of both right breast masses compatible with favorable  response to neoadjuvant chemotherapy    US transvaginal  Impression:     Left ovarian 3 mm echogenic lesion, possibly a small dermoid.  History:  Past Medical History:   Diagnosis Date    Abnormal Pap smear of cervix     BRCA1 positive     Breast cancer 07/26/2023    right    Breast cancer 07/26/2023    right    HPV (human papilloma virus) infection     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      Past Surgical History:   Procedure Laterality Date    BREAST BIOPSY Right 2015    benign    BREAST BIOPSY Right 07/26/2023    BREAST BIOPSY Right 07/26/2023    BREAST BIOPSY Right 08/16/2023    benign LN    INSERTION OF TUNNELED CENTRAL VENOUS CATHETER (CVC) WITH SUBCUTANEOUS PORT N/A 08/15/2023    Procedure: [id number];  Surgeon: Skyler Aldrich MD;  Location: [facility] OR;  Service: General;  Laterality: N/A;    OOPHORECTOMY Right 2009    stage 1a dysgerminoma    VAGINAL DELIVERY  12/13/2016     Family History   Problem Relation Age of Onset    Breast cancer Mother 38    Cervical cancer Mother     BRCA 1/2 Sister     Colon cancer Neg Hx     Colon polyps Neg Hx     Esophageal cancer Neg Hx     Stomach cancer Neg Hx     Inflammatory bowel disease Neg Hx       Social History     Tobacco Use    Smoking status: Former     Types: Cigarettes    Smokeless tobacco: Not on file   Substance and Sexual Activity    Alcohol use: Yes     Comment: occasionally    Drug use: No    Sexual activity: Not Currently     Partners: Male     Birth control/protection: None        ROS:   Review of Systems   Constitutional:  Positive for malaise/fatigue. Negative for fever and weight loss.   HENT:  Negative for congestion, hearing loss, nosebleeds and sore throat.    Eyes:  Negative for double vision and photophobia.   Respiratory:  Negative for cough, hemoptysis, sputum production, shortness of breath and wheezing.    Cardiovascular:  Negative for chest pain, palpitations, orthopnea and leg swelling.   Gastrointestinal:  Negative for  "abdominal pain, blood in stool, constipation, diarrhea, heartburn, nausea and vomiting.   Genitourinary:  Negative for dysuria, frequency, hematuria and urgency.   Musculoskeletal:  Positive for joint pain. Negative for back pain and myalgias.   Skin:  Negative for itching and rash.   Neurological:  Negative for dizziness, tingling, seizures, weakness and headaches.   Endo/Heme/Allergies:  Negative for polydipsia. Does not bruise/bleed easily.   Psychiatric/Behavioral:  Negative for depression and memory loss. The patient is nervous/anxious and has insomnia (during steroid days).         Objective:     Vitals:    10/30/23 1243   BP: 114/74   Pulse: 90   Resp: 16   Temp: 97.7 °F (36.5 °C)   TempSrc: Temporal   SpO2: 100%   Weight: 54.7 kg (120 lb 9.5 oz)   Height: 5' 3" (1.6 m)   PainSc: 0-No pain         Physical Examination:   Physical Exam  Vitals and nursing note reviewed.   Constitutional:       General: She is not in acute distress.     Appearance: She is not diaphoretic.   HENT:      Head: Normocephalic.      Mouth/Throat:      Pharynx: No oropharyngeal exudate.   Eyes:      General: No scleral icterus.     Conjunctiva/sclera: Conjunctivae normal.   Neck:      Thyroid: No thyromegaly.   Cardiovascular:      Rate and Rhythm: Normal rate and regular rhythm.      Heart sounds: Normal heart sounds. No murmur heard.  Pulmonary:      Effort: Pulmonary effort is normal. No respiratory distress.      Breath sounds: No stridor. No wheezing or rales.   Chest:      Chest wall: No tenderness.   Abdominal:      General: Bowel sounds are normal. There is no distension.      Palpations: Abdomen is soft. There is no mass.      Tenderness: There is no abdominal tenderness. There is no rebound.   Musculoskeletal:         General: No tenderness or deformity. Normal range of motion.      Cervical back: Neck supple.   Lymphadenopathy:      Cervical: No cervical adenopathy.   Skin:     General: Skin is warm and dry.      Findings: " No erythema or rash.   Neurological:      Mental Status: She is alert and oriented to person, place, and time.      Cranial Nerves: No cranial nerve deficit.      Coordination: Coordination normal.      Gait: Gait is intact.   Psychiatric:         Mood and Affect: Affect normal.         Cognition and Memory: Memory normal.         Judgment: Judgment normal.        Diagnostic Tests:  Significant Imaging: I have reviewed and interpreted all pertinent imaging results/findings.    Laboratory Data:  All pertinent labs have been reviewed.    Labs:   Lab Results   Component Value Date    WBC 5.10 10/30/2023    HGB 11.4 (L) 10/30/2023    HCT 35.0 (L) 10/30/2023    MCV 98 10/30/2023     10/30/2023       Assessment/Plan:   Invasive ductal carcinoma of breast, female, right  cT2 cN0 G3 IDC, TNBC    We discussed the more agressive nature and multifocal nature of her TNBC. Clinically node negative. Based on size, she is a candidate for neoadjuvant chemo immunotherapy based on Keynote-522 to prevent systemic spread and to monitor response to therapy and that 64% of the patients on trial had a pCR.     She is agreeable,echo is normal, no evidence for distant metastatic disease noted. Cleared to receive C4 D8 this week, will monitor weekly.     Offer supportive care with IO and onc psych.     After completing neoadjuvant therapy, she would be referred back for surgery.     She has also been enrolled in the Protocol [id number], Disparities in Results of Immune Checkpoint Inhibitor Treatment (DIRECT): A Prospective Cohort Study of Cancer Survivors Treated with anti-PD-L1 Immunotherapy in a Community Oncology Setting.     We also discussed [id number] trial regarding adjuvant Pembrolizumab if she has a complete response. She requested more information and will think about it.     Transaminitis   Grade 2, held Keytruda for cycle 2, treated with a short course of steroids, now LFTs have normalized and she received Keytruda for cycle 3  and 4.     History of ovarian cancer  BRCA positive   Plan for b/l mastectomy. Follow up with Gyn onc, GI and dermatology.   Referred to genetics clinic.     Joint pains  Continue integrative oncology follow up,exercise and NSAIDs as needed.       MDM includes  :    - Acute or chronic illness or injury that poses a threat to life or bodily function  - Independent review and explanation of 3+ results from unique tests  - Discussion of management and ordering 3+ unique tests  - Extensive discussion of treatment and management  - Prescription drug management  - Drug therapy requiring intensive monitoring for toxicity      ECOG SCORE               Discussion:   No follow-ups on file.    Plan was discussed with the patient at length, and she verbalized understanding. Arabella was given an opportunity to ask questions that were answered to her satisfaction, and she was advised to call in the interval if any problems or questions arise.    Electronically signed by Corina Mcgovern MD        Route Chart for Scheduling    Med Onc Chart Routing      Follow up with physician . Refer to genetics.  see me 12/4 1 pm, there is no meeting   Follow up with SAUL    Infusion scheduling note    Injection scheduling note    Labs CMP, CBC and TSH   Scheduling:  Preferred lab:  Lab interval:     Imaging ECHO   before visit   Pharmacy appointment    Other referrals                  Treatment Plan Information   OP PEMBROLIZUMAB WITH WEEKLY PACLITAXEL CARBOPLATIN (AUC 1.5) FOLLOWED BY PEMBROLIZUMAB DOXORUBICIN CYCLOPHOSPHAMIDE FOLLOWED BY PEMBROLIZUMAB 200MG Q3W   Corina Mcgovern MD   Upcoming Treatment Dates - OP PEMBROLIZUMAB WITH WEEKLY PACLITAXEL CARBOPLATIN (AUC 1.5) FOLLOWED BY PEMBROLIZUMAB DOXORUBICIN CYCLOPHOSPHAMIDE FOLLOWED BY PEMBROLIZUMAB 200MG Q3W    11/2/2023       Pre-Medications       diphenhydrAMINE (BENADRYL) 50 mg in NS 50 mL IVPB       famotidine (PF) injection 20 mg       Chemotherapy       PACLitaxeL (TAXOL) 80 mg/m2 =  126 mg in sodium chloride 0.9% 250 mL chemo infusion       CARBOplatin (PARAPLATIN) 175 mg in sodium chloride 0.9% 267.5 mL chemo infusion       Antiemetics       dexAMETHasone injection 8 mg       palonosetron injection 0.25 mg       LORazepam injection 0.5 mg  11/9/2023       Pre-Medications       diphenhydrAMINE (BENADRYL) 50 mg in NS 50 mL IVPB       famotidine (PF) injection 20 mg       Chemotherapy       PACLitaxeL (TAXOL) 80 mg/m2 = 126 mg in sodium chloride 0.9% 250 mL chemo infusion       CARBOplatin (PARAPLATIN) 175 mg in sodium chloride 0.9% 267.5 mL chemo infusion       Antiemetics       dexAMETHasone injection 8 mg       palonosetron injection 0.25 mg       LORazepam injection 0.5 mg  11/16/2023       Chemotherapy       DOXOrubicin chemo injection 94 mg       cycloPHOSphamide 600 mg/m2 = 940 mg in sodium chloride 0.9% 254.7 mL chemo infusion       Antiemetics       aprepitant (CINVANTI) injection 130 mg       palonosetron 0.25mg/dexAMETHasone 12mg in NS IVPB 0.25 mg 50 mL       LORazepam (ATIVAN) injection 0.5 mg       aprepitant (CINVANTI) injection 130 mg       dexAMETHasone injection 8 mg       palonosetron injection 0.25 mg       Immunotherapy       pembrolizumab (KEYTRUDA) 200 mg in sodium chloride 0.9% SolP 108 mL infusion  12/7/2023       Chemotherapy       DOXOrubicin chemo injection 94 mg       cycloPHOSphamide 600 mg/m2 = 940 mg in sodium chloride 0.9% 254.7 mL chemo infusion       Antiemetics       aprepitant (CINVANTI) injection 130 mg       palonosetron 0.25mg/dexAMETHasone 12mg in NS IVPB 0.25 mg 50 mL       LORazepam (ATIVAN) injection 0.5 mg       aprepitant (CINVANTI) injection 130 mg       dexAMETHasone injection 8 mg       palonosetron injection 0.25 mg       Immunotherapy       pembrolizumab (KEYTRUDA) 200 mg in sodium chloride 0.9% SolP 108 mL infusion        Answers submitted by the patient for this visit:  Review of Systems Questionnaire (Submitted on 10/27/2023)  appetite change  : No  unexpected weight change: No  mouth sores: Yes  visual disturbance: No  adenopathy: No

## 2023-10-31 ENCOUNTER — PATIENT MESSAGE (OUTPATIENT)
Dept: ADMINISTRATIVE | Facility: OTHER | Age: 43
End: 2023-10-31
Payer: COMMERCIAL

## 2023-10-31 ENCOUNTER — PATIENT MESSAGE (OUTPATIENT)
Dept: HEMATOLOGY/ONCOLOGY | Facility: CLINIC | Age: 43
End: 2023-10-31
Payer: COMMERCIAL

## 2023-11-01 ENCOUNTER — CLINICAL SUPPORT (OUTPATIENT)
Dept: REHABILITATION | Facility: HOSPITAL | Age: 43
End: 2023-11-01
Payer: COMMERCIAL

## 2023-11-01 DIAGNOSIS — Z17.1 MALIGNANT NEOPLASM OF LOWER-OUTER QUADRANT OF RIGHT BREAST OF FEMALE, ESTROGEN RECEPTOR NEGATIVE: ICD-10-CM

## 2023-11-01 DIAGNOSIS — R45.89 ANXIETY ABOUT HEALTH: Primary | ICD-10-CM

## 2023-11-01 DIAGNOSIS — C50.511 MALIGNANT NEOPLASM OF LOWER-OUTER QUADRANT OF RIGHT BREAST OF FEMALE, ESTROGEN RECEPTOR NEGATIVE: ICD-10-CM

## 2023-11-01 PROCEDURE — 97810 ACUP 1/> WO ESTIM 1ST 15 MIN: CPT | Mod: PN | Performed by: ACUPUNCTURIST

## 2023-11-01 PROCEDURE — 97811 ACUP 1/> W/O ESTIM EA ADD 15: CPT | Mod: PN | Performed by: ACUPUNCTURIST

## 2023-11-01 RX ORDER — DEXAMETHASONE SODIUM PHOSPHATE 4 MG/ML
8 INJECTION, SOLUTION INTRA-ARTICULAR; INTRALESIONAL; INTRAMUSCULAR; INTRAVENOUS; SOFT TISSUE
Status: CANCELLED
Start: 2023-11-09

## 2023-11-01 RX ORDER — DIPHENHYDRAMINE HYDROCHLORIDE 50 MG/ML
50 INJECTION INTRAMUSCULAR; INTRAVENOUS ONCE AS NEEDED
Status: CANCELLED | OUTPATIENT
Start: 2023-11-09

## 2023-11-01 RX ORDER — HEPARIN 100 UNIT/ML
500 SYRINGE INTRAVENOUS
Status: CANCELLED | OUTPATIENT
Start: 2023-11-09

## 2023-11-01 RX ORDER — PROCHLORPERAZINE EDISYLATE 5 MG/ML
10 INJECTION INTRAMUSCULAR; INTRAVENOUS ONCE AS NEEDED
Status: CANCELLED
Start: 2023-11-09

## 2023-11-01 RX ORDER — EPINEPHRINE 0.3 MG/.3ML
0.3 INJECTION SUBCUTANEOUS ONCE AS NEEDED
Status: CANCELLED | OUTPATIENT
Start: 2023-11-02

## 2023-11-01 RX ORDER — EPINEPHRINE 0.3 MG/.3ML
0.3 INJECTION SUBCUTANEOUS ONCE AS NEEDED
Status: CANCELLED | OUTPATIENT
Start: 2023-11-09

## 2023-11-01 RX ORDER — DEXAMETHASONE SODIUM PHOSPHATE 4 MG/ML
8 INJECTION, SOLUTION INTRA-ARTICULAR; INTRALESIONAL; INTRAMUSCULAR; INTRAVENOUS; SOFT TISSUE
Status: CANCELLED
Start: 2023-11-02

## 2023-11-01 RX ORDER — PROCHLORPERAZINE EDISYLATE 5 MG/ML
10 INJECTION INTRAMUSCULAR; INTRAVENOUS ONCE AS NEEDED
Status: CANCELLED
Start: 2023-11-02

## 2023-11-01 RX ORDER — PALONOSETRON 0.05 MG/ML
0.25 INJECTION, SOLUTION INTRAVENOUS ONCE
Status: CANCELLED
Start: 2023-11-09 | End: 2023-11-09

## 2023-11-01 RX ORDER — FAMOTIDINE 10 MG/ML
20 INJECTION INTRAVENOUS
Status: CANCELLED | OUTPATIENT
Start: 2023-11-09

## 2023-11-01 RX ORDER — LORAZEPAM 2 MG/ML
0.5 INJECTION INTRAMUSCULAR
Status: CANCELLED
Start: 2023-11-09

## 2023-11-01 RX ORDER — SODIUM CHLORIDE 0.9 % (FLUSH) 0.9 %
10 SYRINGE (ML) INJECTION
Status: CANCELLED | OUTPATIENT
Start: 2023-11-02

## 2023-11-01 RX ORDER — FAMOTIDINE 10 MG/ML
20 INJECTION INTRAVENOUS
Status: CANCELLED | OUTPATIENT
Start: 2023-11-02

## 2023-11-01 RX ORDER — LORAZEPAM 2 MG/ML
0.5 INJECTION INTRAMUSCULAR
Status: CANCELLED
Start: 2023-11-02

## 2023-11-01 RX ORDER — PALONOSETRON 0.05 MG/ML
0.25 INJECTION, SOLUTION INTRAVENOUS ONCE
Status: CANCELLED
Start: 2023-11-02 | End: 2023-11-02

## 2023-11-01 RX ORDER — SODIUM CHLORIDE 0.9 % (FLUSH) 0.9 %
10 SYRINGE (ML) INJECTION
Status: CANCELLED | OUTPATIENT
Start: 2023-11-09

## 2023-11-01 RX ORDER — DIPHENHYDRAMINE HYDROCHLORIDE 50 MG/ML
50 INJECTION INTRAMUSCULAR; INTRAVENOUS ONCE AS NEEDED
Status: CANCELLED | OUTPATIENT
Start: 2023-11-02

## 2023-11-01 RX ORDER — HEPARIN 100 UNIT/ML
500 SYRINGE INTRAVENOUS
Status: CANCELLED | OUTPATIENT
Start: 2023-11-02

## 2023-11-02 ENCOUNTER — INFUSION (OUTPATIENT)
Dept: INFUSION THERAPY | Facility: HOSPITAL | Age: 43
End: 2023-11-02
Attending: INTERNAL MEDICINE
Payer: COMMERCIAL

## 2023-11-02 ENCOUNTER — RESEARCH ENCOUNTER (OUTPATIENT)
Dept: RESEARCH | Facility: HOSPITAL | Age: 43
End: 2023-11-02
Payer: COMMERCIAL

## 2023-11-02 VITALS
DIASTOLIC BLOOD PRESSURE: 75 MMHG | BODY MASS INDEX: 21.36 KG/M2 | WEIGHT: 120.56 LBS | TEMPERATURE: 98 F | RESPIRATION RATE: 18 BRPM | HEIGHT: 63 IN | HEART RATE: 86 BPM | SYSTOLIC BLOOD PRESSURE: 109 MMHG

## 2023-11-02 DIAGNOSIS — C50.911 INVASIVE DUCTAL CARCINOMA OF BREAST, FEMALE, RIGHT: Primary | ICD-10-CM

## 2023-11-02 PROCEDURE — 25000003 PHARM REV CODE 250: Mod: PN | Performed by: INTERNAL MEDICINE

## 2023-11-02 PROCEDURE — 96375 TX/PRO/DX INJ NEW DRUG ADDON: CPT | Mod: PN

## 2023-11-02 PROCEDURE — 96367 TX/PROPH/DG ADDL SEQ IV INF: CPT | Mod: PN

## 2023-11-02 PROCEDURE — 63600175 PHARM REV CODE 636 W HCPCS: Mod: PN | Performed by: INTERNAL MEDICINE

## 2023-11-02 PROCEDURE — 96413 CHEMO IV INFUSION 1 HR: CPT | Mod: PN

## 2023-11-02 PROCEDURE — 96417 CHEMO IV INFUS EACH ADDL SEQ: CPT | Mod: PN

## 2023-11-02 RX ORDER — EPINEPHRINE 0.3 MG/.3ML
0.3 INJECTION SUBCUTANEOUS ONCE AS NEEDED
Status: DISCONTINUED | OUTPATIENT
Start: 2023-11-02 | End: 2023-11-02 | Stop reason: HOSPADM

## 2023-11-02 RX ORDER — FAMOTIDINE 10 MG/ML
20 INJECTION INTRAVENOUS
Status: COMPLETED | OUTPATIENT
Start: 2023-11-02 | End: 2023-11-02

## 2023-11-02 RX ORDER — DIPHENHYDRAMINE HYDROCHLORIDE 50 MG/ML
50 INJECTION INTRAMUSCULAR; INTRAVENOUS ONCE AS NEEDED
Status: DISCONTINUED | OUTPATIENT
Start: 2023-11-02 | End: 2023-11-02 | Stop reason: HOSPADM

## 2023-11-02 RX ORDER — DEXAMETHASONE SODIUM PHOSPHATE 4 MG/ML
8 INJECTION, SOLUTION INTRA-ARTICULAR; INTRALESIONAL; INTRAMUSCULAR; INTRAVENOUS; SOFT TISSUE
Status: COMPLETED | OUTPATIENT
Start: 2023-11-02 | End: 2023-11-02

## 2023-11-02 RX ORDER — HEPARIN 100 UNIT/ML
500 SYRINGE INTRAVENOUS
Status: DISCONTINUED | OUTPATIENT
Start: 2023-11-02 | End: 2023-11-02 | Stop reason: HOSPADM

## 2023-11-02 RX ORDER — PROCHLORPERAZINE EDISYLATE 5 MG/ML
10 INJECTION INTRAMUSCULAR; INTRAVENOUS ONCE AS NEEDED
Status: DISCONTINUED | OUTPATIENT
Start: 2023-11-02 | End: 2023-11-02 | Stop reason: HOSPADM

## 2023-11-02 RX ORDER — LORAZEPAM 2 MG/ML
0.5 INJECTION INTRAMUSCULAR
Status: DISCONTINUED | OUTPATIENT
Start: 2023-11-02 | End: 2023-11-02 | Stop reason: HOSPADM

## 2023-11-02 RX ORDER — PALONOSETRON 0.05 MG/ML
0.25 INJECTION, SOLUTION INTRAVENOUS ONCE
Status: COMPLETED | OUTPATIENT
Start: 2023-11-02 | End: 2023-11-02

## 2023-11-02 RX ORDER — SODIUM CHLORIDE 0.9 % (FLUSH) 0.9 %
10 SYRINGE (ML) INJECTION
Status: DISCONTINUED | OUTPATIENT
Start: 2023-11-02 | End: 2023-11-02 | Stop reason: HOSPADM

## 2023-11-02 RX ADMIN — PALONOSETRON 0.25 MG: 0.05 INJECTION, SOLUTION INTRAVENOUS at 10:11

## 2023-11-02 RX ADMIN — LORAZEPAM 0.5 MG: 2 INJECTION INTRAMUSCULAR; INTRAVENOUS at 10:11

## 2023-11-02 RX ADMIN — FAMOTIDINE 20 MG: 10 INJECTION INTRAVENOUS at 10:11

## 2023-11-02 RX ADMIN — DIPHENHYDRAMINE HYDROCHLORIDE 50 MG: 50 INJECTION, SOLUTION INTRAMUSCULAR; INTRAVENOUS at 10:11

## 2023-11-02 RX ADMIN — SODIUM CHLORIDE: 9 INJECTION, SOLUTION INTRAVENOUS at 10:11

## 2023-11-02 RX ADMIN — PACLITAXEL 126 MG: 6 INJECTION, SOLUTION, CONCENTRATE INTRAVENOUS at 10:11

## 2023-11-02 RX ADMIN — CARBOPLATIN 160 MG: 10 INJECTION, SOLUTION INTRAVENOUS at 11:11

## 2023-11-02 RX ADMIN — DEXAMETHASONE SODIUM PHOSPHATE 8 MG: 4 INJECTION INTRA-ARTICULAR; INTRALESIONAL; INTRAMUSCULAR; INTRAVENOUS; SOFT TISSUE at 10:11

## 2023-11-02 NOTE — PLAN OF CARE
Problem: Adult Inpatient Plan of Care  Goal: Plan of Care Review  Outcome: Ongoing, Progressing  Flowsheets (Taken 11/2/2023 0945)  Plan of Care Reviewed With: patient  Goal: Patient-Specific Goal (Individualized)  Outcome: Ongoing, Progressing  Flowsheets (Taken 11/2/2023 0945)  Anxieties, Fears or Concerns: cryotherapy helping with neuropathy  Individualized Care Needs: blanket, cool cap, pillow, spouse chairside     Problem: Fatigue  Goal: Improved Activity Tolerance  Outcome: Ongoing, Progressing  Intervention: Promote Improved Energy  Flowsheets (Taken 11/2/2023 0945)  Fatigue Management: paced activity encouraged  Sleep/Rest Enhancement:   natural light exposure provided   noise level reduced   family presence promoted   relaxation techniques promoted  Activity Management:   Ambulated -L4   Up in chair - L3    Pt tolerated taxol/ carbo and post cooling cool cap. VSS, pt voiced no new complaints or concerns at this time. NAD noted. Pt d/c home.

## 2023-11-02 NOTE — PROGRESS NOTES
Protocol: [id number]  IRB#: [id number]  Title: OptimICE-PCR: De-Escalation of Therapy in Early-Stage TNBC Patients Who Achieve pCR After Neoadjuvant Chemotherapy with Checkpoint Inhibitor Therapy  PI: Dr. Khan    Briefly met with the patient and her  in the [address] [place] [address] to provide her with a consent form for the [id number] trial due to the patient's expressed interest. The patient is aware that eligibility for the trial cannot be determined until her surgical resection is completed. Explained to the patient that the current trial she is enrolled in, [id number], may not allow for participation in another research study. Explained to the patient that the [facility] will be contacted to confirm whether this is the case or not. The patient and her  expressed understanding. The patient understands that her choice to participate in any clinical trial is completely voluntary and she can withdraw at anytime without consequence.  Both the patient and her  denied having any question regarding either study at the moment and were encouraged to call this CRC or TETE Jerry, with any questions or concerns.    Tamia Herrera, CRC

## 2023-11-03 ENCOUNTER — CLINICAL SUPPORT (OUTPATIENT)
Dept: CARDIOLOGY | Facility: HOSPITAL | Age: 43
End: 2023-11-03
Attending: INTERNAL MEDICINE
Payer: COMMERCIAL

## 2023-11-03 VITALS — WEIGHT: 120 LBS | BODY MASS INDEX: 21.26 KG/M2 | HEIGHT: 63 IN

## 2023-11-03 DIAGNOSIS — Z17.1 MALIGNANT NEOPLASM OF LOWER-OUTER QUADRANT OF RIGHT BREAST OF FEMALE, ESTROGEN RECEPTOR NEGATIVE: ICD-10-CM

## 2023-11-03 DIAGNOSIS — C50.511 MALIGNANT NEOPLASM OF LOWER-OUTER QUADRANT OF RIGHT BREAST OF FEMALE, ESTROGEN RECEPTOR NEGATIVE: ICD-10-CM

## 2023-11-03 LAB
ASCENDING AORTA: 2.94 CM
AV INDEX (PROSTH): 0.81
AV MEAN GRADIENT: 3 MMHG
AV PEAK GRADIENT: 5 MMHG
AV VALVE AREA BY VELOCITY RATIO: 2.8 CM²
AV VALVE AREA: 2.63 CM²
AV VELOCITY RATIO: 0.86
BSA FOR ECHO PROCEDURE: 1.56 M2
CV ECHO LV RWT: 0.37 CM
DOP CALC AO PEAK VEL: 1.13 M/S
DOP CALC AO VTI: 24.3 CM
DOP CALC LVOT AREA: 3.3 CM2
DOP CALC LVOT DIAMETER: 2.04 CM
DOP CALC LVOT PEAK VEL: 0.97 M/S
DOP CALC LVOT STROKE VOLUME: 64.03 CM3
DOP CALCLVOT PEAK VEL VTI: 19.6 CM
E WAVE DECELERATION TIME: 156.8 MSEC
E/A RATIO: 1.28
E/E' RATIO: 5.41 M/S
ECHO LV POSTERIOR WALL: 0.8 CM (ref 0.6–1.1)
FRACTIONAL SHORTENING: 28 % (ref 28–44)
GLOBAL LONGITUIDAL STRAIN: -19.2 %
INTERVENTRICULAR SEPTUM: 0.51 CM (ref 0.6–1.1)
LEFT ATRIUM SIZE: 2.99 CM
LEFT ATRIUM VOLUME INDEX MOD: 30.6 ML/M2
LEFT ATRIUM VOLUME MOD: 47.74 CM3
LEFT INTERNAL DIMENSION IN SYSTOLE: 3.15 CM (ref 2.1–4)
LEFT VENTRICLE DIASTOLIC VOLUME INDEX: 55.01 ML/M2
LEFT VENTRICLE DIASTOLIC VOLUME: 85.82 ML
LEFT VENTRICLE MASS INDEX: 53 G/M2
LEFT VENTRICLE SYSTOLIC VOLUME INDEX: 25.2 ML/M2
LEFT VENTRICLE SYSTOLIC VOLUME: 39.36 ML
LEFT VENTRICULAR INTERNAL DIMENSION IN DIASTOLE: 4.36 CM (ref 3.5–6)
LEFT VENTRICULAR MASS: 83.3 G
LV LATERAL E/E' RATIO: 5.62 M/S
LV SEPTAL E/E' RATIO: 5.21 M/S
LVOT MG: 2.18 MMHG
LVOT MV: 0.7 CM/S
MV PEAK A VEL: 0.57 M/S
MV PEAK E VEL: 0.73 M/S
PISA TR MAX VEL: 2.12 M/S
PULM VEIN S/D RATIO: 1.2
PV PEAK D VEL: 0.51 M/S
PV PEAK S VEL: 0.61 M/S
RA PRESSURE ESTIMATED: 8 MMHG
RA VOL SYS: 37.8 ML
RIGHT ATRIAL AREA: 14.9 CM2
RIGHT VENTRICULAR END-DIASTOLIC DIMENSION: 4.26 CM
RIGHT VENTRICULAR LENGTH IN DIASTOLE (APICAL 4-CHAMBER VIEW): 7.33 CM
RV MID DIAMA: 2.43 CM
RV TB RVSP: 10 MMHG
RV TISSUE DOPPLER FREE WALL SYSTOLIC VELOCITY 1 (APICAL 4 CHAMBER VIEW): 15.43 CM/S
SINUS: 2.98 CM
STJ: 2.89 CM
TDI LATERAL: 0.13 M/S
TDI SEPTAL: 0.14 M/S
TDI: 0.14 M/S
TR MAX PG: 18 MMHG
TRICUSPID ANNULAR PLANE SYSTOLIC EXCURSION: 2.51 CM
TV REST PULMONARY ARTERY PRESSURE: 26 MMHG
Z-SCORE OF LEFT VENTRICULAR DIMENSION IN END DIASTOLE: -0.31
Z-SCORE OF LEFT VENTRICULAR DIMENSION IN END SYSTOLE: 0.95

## 2023-11-03 PROCEDURE — 93306 TTE W/DOPPLER COMPLETE: CPT | Mod: 26,,, | Performed by: INTERNAL MEDICINE

## 2023-11-03 PROCEDURE — 93306 TTE W/DOPPLER COMPLETE: CPT | Mod: PO

## 2023-11-03 PROCEDURE — 93306 ECHO (CUPID ONLY): ICD-10-PCS | Mod: 26,,, | Performed by: INTERNAL MEDICINE

## 2023-11-03 NOTE — PROGRESS NOTES
Acupuncture Follow-Up Note     Name: Arabella Koroma [facility] Number: [id number]    Traditional Chinese Medicine (TCM) Diagnosis: Qi Deficiency, Blood Deficiency, and Valladares disturbance  Medical Diagnosis:   1. Anxiety about health    2. Malignant neoplasm of lower-outer quadrant of right breast of female, estrogen receptor negative         Evaluation Date: 11/3/2023    Visit #/Visits authorized:     Precautions: Standard    Subjective     Chief Concern: No chief complaint on file.       Medical necessity is demonstrated by the following IMPAIRMENTS: Medical Necessity: Decreased mobility limits day to day activities, social, and emergent situations              Aggravating Factors:  stress     Relieving Factors:  rest    Symptom Description:     Quality:  Variable  Severity:  4  Frequency:  every day      Objective     Observation: Patient anxious about low back pain because of cancer diagnosis      Pulse:        wiry       New Findings:  na    Treatment     Treatment Principles:  move qi and blood, calm valladares, relieve bi    Acupuncture points used:  4 GONZALEZ, Du20, Gb34, Pc6, Sp6, Sp9, St36, and YIN MOON    Bilateral points: Sp 4  Unilateral points:  Auricular Treatment:  valladares men    Needles In: 20  Needles Out: 20  Needles W/O STIM placed: 105  [place] W/O STIM removed: 125      Other Traditional Chinese Medicine Modalities -  na    Assessment     After treatment, patient felt less anxious     Patient prognosis is Good.     Patient will continue to benefit from acupuncture treatment to address the deficits listed in the problem list box on initial evaluation, provide patient family education and to maximize pt's level of independence in the home and community environment.     Patient's spiritual, cultural and educational needs considered and pt agreeable to plan of care and goals.     Anticipated barriers to treatment: none    Plan     Recommend 1 /week for 12 sessions then re-assess.      Education:  Patient  is aware of cumulative benefit of acupuncture

## 2023-11-06 ENCOUNTER — OFFICE VISIT (OUTPATIENT)
Dept: HEMATOLOGY/ONCOLOGY | Facility: CLINIC | Age: 43
End: 2023-11-06
Payer: COMMERCIAL

## 2023-11-06 ENCOUNTER — PATIENT MESSAGE (OUTPATIENT)
Dept: ADMINISTRATIVE | Facility: OTHER | Age: 43
End: 2023-11-06
Payer: COMMERCIAL

## 2023-11-06 ENCOUNTER — LAB VISIT (OUTPATIENT)
Dept: LAB | Facility: HOSPITAL | Age: 43
End: 2023-11-06
Payer: COMMERCIAL

## 2023-11-06 VITALS
SYSTOLIC BLOOD PRESSURE: 100 MMHG | DIASTOLIC BLOOD PRESSURE: 69 MMHG | RESPIRATION RATE: 18 BRPM | HEIGHT: 63 IN | HEART RATE: 80 BPM | WEIGHT: 120.56 LBS | OXYGEN SATURATION: 99 % | BODY MASS INDEX: 21.36 KG/M2 | TEMPERATURE: 98 F

## 2023-11-06 DIAGNOSIS — Z17.1 MALIGNANT NEOPLASM OF LOWER-OUTER QUADRANT OF RIGHT BREAST OF FEMALE, ESTROGEN RECEPTOR NEGATIVE: Primary | ICD-10-CM

## 2023-11-06 DIAGNOSIS — D84.821 IMMUNOCOMPROMISED STATE DUE TO DRUG THERAPY: ICD-10-CM

## 2023-11-06 DIAGNOSIS — Z79.899 IMMUNOCOMPROMISED STATE DUE TO DRUG THERAPY: ICD-10-CM

## 2023-11-06 DIAGNOSIS — T45.1X5A CINV (CHEMOTHERAPY-INDUCED NAUSEA AND VOMITING): ICD-10-CM

## 2023-11-06 DIAGNOSIS — C50.911 INVASIVE DUCTAL CARCINOMA OF BREAST, FEMALE, RIGHT: ICD-10-CM

## 2023-11-06 DIAGNOSIS — Z15.01 BRCA GENE POSITIVE: ICD-10-CM

## 2023-11-06 DIAGNOSIS — C50.511 MALIGNANT NEOPLASM OF LOWER-OUTER QUADRANT OF RIGHT BREAST OF FEMALE, ESTROGEN RECEPTOR NEGATIVE: Primary | ICD-10-CM

## 2023-11-06 DIAGNOSIS — Z15.09 BRCA GENE POSITIVE: ICD-10-CM

## 2023-11-06 DIAGNOSIS — R11.2 CINV (CHEMOTHERAPY-INDUCED NAUSEA AND VOMITING): ICD-10-CM

## 2023-11-06 LAB
ALBUMIN SERPL BCP-MCNC: 4.4 G/DL (ref 3.5–5.2)
ALP SERPL-CCNC: 43 U/L (ref 55–135)
ALT SERPL W/O P-5'-P-CCNC: 10 U/L (ref 10–44)
ANION GAP SERPL CALC-SCNC: 11 MMOL/L (ref 8–16)
AST SERPL-CCNC: 18 U/L (ref 10–40)
B-HCG UR QL: NEGATIVE
BASOPHILS # BLD AUTO: 0.02 K/UL (ref 0–0.2)
BASOPHILS NFR BLD: 0.4 % (ref 0–1.9)
BILIRUB SERPL-MCNC: 0.4 MG/DL (ref 0.1–1)
BUN SERPL-MCNC: 13 MG/DL (ref 6–20)
CALCIUM SERPL-MCNC: 9.4 MG/DL (ref 8.7–10.5)
CHLORIDE SERPL-SCNC: 103 MMOL/L (ref 95–110)
CO2 SERPL-SCNC: 25 MMOL/L (ref 23–29)
CREAT SERPL-MCNC: 0.6 MG/DL (ref 0.5–1.4)
DIFFERENTIAL METHOD: ABNORMAL
EOSINOPHIL # BLD AUTO: 0 K/UL (ref 0–0.5)
EOSINOPHIL NFR BLD: 0.4 % (ref 0–8)
ERYTHROCYTE [DISTWIDTH] IN BLOOD BY AUTOMATED COUNT: 15.9 % (ref 11.5–14.5)
EST. GFR  (NO RACE VARIABLE): >60 ML/MIN/1.73 M^2
GLUCOSE SERPL-MCNC: 103 MG/DL (ref 70–110)
HCT VFR BLD AUTO: 36.5 % (ref 37–48.5)
HGB BLD-MCNC: 12 G/DL (ref 12–16)
IMM GRANULOCYTES # BLD AUTO: 0.01 K/UL (ref 0–0.04)
IMM GRANULOCYTES NFR BLD AUTO: 0.2 % (ref 0–0.5)
LYMPHOCYTES # BLD AUTO: 1.9 K/UL (ref 1–4.8)
LYMPHOCYTES NFR BLD: 36.8 % (ref 18–48)
MCH RBC QN AUTO: 32.4 PG (ref 27–31)
MCHC RBC AUTO-ENTMCNC: 32.9 G/DL (ref 32–36)
MCV RBC AUTO: 99 FL (ref 82–98)
MONOCYTES # BLD AUTO: 0.4 K/UL (ref 0.3–1)
MONOCYTES NFR BLD: 7.2 % (ref 4–15)
NEUTROPHILS # BLD AUTO: 2.8 K/UL (ref 1.8–7.7)
NEUTROPHILS NFR BLD: 55 % (ref 38–73)
NRBC BLD-RTO: 0 /100 WBC
PLATELET # BLD AUTO: 236 K/UL (ref 150–450)
PMV BLD AUTO: 8.5 FL (ref 9.2–12.9)
POTASSIUM SERPL-SCNC: 4.1 MMOL/L (ref 3.5–5.1)
PROT SERPL-MCNC: 7.8 G/DL (ref 6–8.4)
RBC # BLD AUTO: 3.7 M/UL (ref 4–5.4)
SODIUM SERPL-SCNC: 139 MMOL/L (ref 136–145)
TSH SERPL DL<=0.005 MIU/L-ACNC: 0.72 UIU/ML (ref 0.4–4)
WBC # BLD AUTO: 5.13 K/UL (ref 3.9–12.7)

## 2023-11-06 PROCEDURE — 3008F PR BODY MASS INDEX (BMI) DOCUMENTED: ICD-10-PCS | Mod: CPTII,S$GLB,, | Performed by: INTERNAL MEDICINE

## 2023-11-06 PROCEDURE — 80053 COMPREHEN METABOLIC PANEL: CPT | Mod: PN | Performed by: INTERNAL MEDICINE

## 2023-11-06 PROCEDURE — 3078F PR MOST RECENT DIASTOLIC BLOOD PRESSURE < 80 MM HG: ICD-10-PCS | Mod: CPTII,S$GLB,, | Performed by: INTERNAL MEDICINE

## 2023-11-06 PROCEDURE — 3078F DIAST BP <80 MM HG: CPT | Mod: CPTII,S$GLB,, | Performed by: INTERNAL MEDICINE

## 2023-11-06 PROCEDURE — 3074F PR MOST RECENT SYSTOLIC BLOOD PRESSURE < 130 MM HG: ICD-10-PCS | Mod: CPTII,S$GLB,, | Performed by: INTERNAL MEDICINE

## 2023-11-06 PROCEDURE — 1159F PR MEDICATION LIST DOCUMENTED IN MEDICAL RECORD: ICD-10-PCS | Mod: CPTII,S$GLB,, | Performed by: INTERNAL MEDICINE

## 2023-11-06 PROCEDURE — 84443 ASSAY THYROID STIM HORMONE: CPT | Performed by: INTERNAL MEDICINE

## 2023-11-06 PROCEDURE — 1159F MED LIST DOCD IN RCRD: CPT | Mod: CPTII,S$GLB,, | Performed by: INTERNAL MEDICINE

## 2023-11-06 PROCEDURE — 85025 COMPLETE CBC W/AUTO DIFF WBC: CPT | Mod: PN | Performed by: INTERNAL MEDICINE

## 2023-11-06 PROCEDURE — 99215 PR OFFICE/OUTPT VISIT, EST, LEVL V, 40-54 MIN: ICD-10-PCS | Mod: S$GLB,,, | Performed by: INTERNAL MEDICINE

## 2023-11-06 PROCEDURE — 36415 COLL VENOUS BLD VENIPUNCTURE: CPT | Mod: PN | Performed by: INTERNAL MEDICINE

## 2023-11-06 PROCEDURE — 3008F BODY MASS INDEX DOCD: CPT | Mod: CPTII,S$GLB,, | Performed by: INTERNAL MEDICINE

## 2023-11-06 PROCEDURE — 81025 URINE PREGNANCY TEST: CPT | Mod: PN | Performed by: INTERNAL MEDICINE

## 2023-11-06 PROCEDURE — 99215 OFFICE O/P EST HI 40 MIN: CPT | Mod: S$GLB,,, | Performed by: INTERNAL MEDICINE

## 2023-11-06 PROCEDURE — 99999 PR PBB SHADOW E&M-EST. PATIENT-LVL III: ICD-10-PCS | Mod: PBBFAC,,, | Performed by: INTERNAL MEDICINE

## 2023-11-06 PROCEDURE — 99999 PR PBB SHADOW E&M-EST. PATIENT-LVL III: CPT | Mod: PBBFAC,,, | Performed by: INTERNAL MEDICINE

## 2023-11-06 PROCEDURE — 3074F SYST BP LT 130 MM HG: CPT | Mod: CPTII,S$GLB,, | Performed by: INTERNAL MEDICINE

## 2023-11-06 NOTE — PROGRESS NOTES
PROGRESS NOTE    Subjective:       Patient ID: Arabella Catalan is a 43 y.o. female.  MRN: [id number]  : 1980    Chief Complaint: cT2 cN0 TNBC     History of Present Illness:   Arabella Catalan is a 43 y.o. female who is referred for newly diagnosed TNBC.      As previously documented she started screening mammograms for a few years after her ovarian cancer , in her late 20's. Routine pelvic exam revealed an ovarian cancer, s/p right oophorectomy for dysgerminoma.     Resumed routine mammograms at 42, had a normal screening mammogram in May 2023 but felt a lump in the right breast in July. Further imaging showed 2 lesions, both > 2 cm , both biopsied to be G3 IDC, ER/GA and Her 2 jose eduardo negative.       Menarche at age 14 year old. . Age at first live birth 31. Did  breast feed 2 year and 6 months OCP-yes 20 years ago  Menopause at none. HRT-none     Family history cancer:  Mother breast cancer at 38 and cervical cancer     Smoker Pk/yr quit date , smoked around 0.5 pack a day     Interim history:    On neoadjuvant chemo based on KEYNOTE-522. She is using Dignicap.     Had cycle 4 D 8 on . Keytruda was added back on after being  held for cycle 2.   Fatigue is improving, stable joint pains.     No rash.   LMP was August. Denies nausea, vomiting, diarrhea or neuropathy.   Breast mass is no longer palpable. Has US that shows responsive disease.       Oncology History:  5/3/23  Impression:  Bilateral  There is no mammographic evidence of malignancy.     BI-RADS Category:   Overall: 2 - Benign    23  US  Impression:  Right  Mass: Right breast 1.5 cm x 1 cm x 1.6 cm mass at the 8 o'clock position. Assessment: 4 - Suspicious finding. Biopsy and possible aspiration are recommended.   Mass: Right breast 2 cm x 1.1 cm x 1.6 cm mass at the 7 o'clock position. Assessment: 4 - Suspicious finding. Biopsy is recommended.      BI-RADS Category:    Overall: 4 - Suspicious    8/2/23  MRI  Impression:  Right  Mass: Right breast 2.7 cm x 2.2 cm x 2 cm mass at the 8 o'clock position. Assessment: 6 - Known biopsy, proven malignancy.   Mass: Right breast 2 cm x 1.6 cm x 1.6 cm mass at the 7 o'clock position. Assessment: 6 - Known biopsy, proven malignancy.      Left  There is no MR evidence of malignancy in the left breast.     BI-RADS Category:   Overall: 6 - Known Biopsy-Proven Malignancy  7/31/23:  DIAGNOSIS:   07/28/2023 JL:tml     1. RIGHT BREAST AT 8:00 7 CM FROM NIPPLE CORE NEEDLE BIOPSIES:   - INVASIVE DUCT CARCINOMA, HIGH-GRADE (3,2,3).     2. RIGHT BREAST AT 7:00 3 CM FROM NIPPLE CORE NEEDLE BIOPSIES:   - IN SITU AND INVASIVE DUCT CARCINOMA, HIGH-GRADE (3,3,2).     RIGHT BREAST: INVASIVE DUCTAL CARCINOMA   GRADE: HIGH     ER: NEGATIVE, CA: NEGATIVE, HER2**: NEGATIVE       8/7/23  CT chest abd pelvis  Impression:     1. Known right breast malignancy, better evaluated on comparison breast MRI exam.  2. Mild asymmetrically prominent, but not pathologically enlarged right axillary lymph nodes, measuring up to 6 mm in short axis dimension.  No mediastinal or hilar lymphadenopathy.  3. Mild hepatomegaly with multiple small hypoattenuating hepatic lesions, the largest measuring 0.9 cm in the right hepatic lobe, which are too small to definitively characterize.  Metastases not excluded.  Consider further evaluation with PET-CT and/or contrast enhanced liver protocol MRI.  4. Solid, noncalcified 2 mm pulmonary nodule in the right lower lobe, nonspecific. Attention on follow-up imaging recommended.     8/18/23  Liver MRI      Impression:     Small circumscribed lesions in the liver are most compatible with hemangiomas.  No suspicious liver lesions.    8/10/123  Bone scan   Impression:     No evidence of osseous metastatic disease.     10/24/23:  US right breast   Impression:     Interval decrease in size of both right breast masses compatible with favorable  response to neoadjuvant chemotherapy    US transvaginal  Impression:     Left ovarian 3 mm echogenic lesion, possibly a small dermoid.  History:  Past Medical History:   Diagnosis Date    Abnormal Pap smear of cervix     BRCA1 positive     Breast cancer 07/26/2023    right    Breast cancer 07/26/2023    right    HPV (human papilloma virus) infection     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      Past Surgical History:   Procedure Laterality Date    BREAST BIOPSY Right 2015    benign    BREAST BIOPSY Right 07/26/2023    BREAST BIOPSY Right 07/26/2023    BREAST BIOPSY Right 08/16/2023    benign LN    INSERTION OF TUNNELED CENTRAL VENOUS CATHETER (CVC) WITH SUBCUTANEOUS PORT N/A 08/15/2023    Procedure: [id number];  Surgeon: Skyler Aldrich MD;  Location: [facility] OR;  Service: General;  Laterality: N/A;    OOPHORECTOMY Right 2009    stage 1a dysgerminoma    VAGINAL DELIVERY  12/13/2016     Family History   Problem Relation Age of Onset    Breast cancer Mother 38    Cervical cancer Mother     BRCA 1/2 Sister     Colon cancer Neg Hx     Colon polyps Neg Hx     Esophageal cancer Neg Hx     Stomach cancer Neg Hx     Inflammatory bowel disease Neg Hx       Social History     Tobacco Use    Smoking status: Former     Types: Cigarettes    Smokeless tobacco: Not on file   Substance and Sexual Activity    Alcohol use: Yes     Comment: occasionally    Drug use: No    Sexual activity: Not Currently     Partners: Male     Birth control/protection: None        ROS:   Review of Systems   Constitutional:  Positive for malaise/fatigue. Negative for fever and weight loss.   HENT:  Negative for congestion, hearing loss, nosebleeds and sore throat.    Eyes:  Negative for double vision and photophobia.   Respiratory:  Negative for cough, hemoptysis, sputum production, shortness of breath and wheezing.    Cardiovascular:  Negative for chest pain, palpitations, orthopnea and leg swelling.   Gastrointestinal:  Negative for  "abdominal pain, blood in stool, constipation, diarrhea, heartburn, nausea and vomiting.   Genitourinary:  Negative for dysuria, frequency, hematuria and urgency.   Musculoskeletal:  Positive for joint pain. Negative for back pain and myalgias.   Skin:  Negative for itching and rash.   Neurological:  Negative for dizziness, tingling, seizures, weakness and headaches.   Endo/Heme/Allergies:  Negative for polydipsia. Does not bruise/bleed easily.   Psychiatric/Behavioral:  Negative for depression and memory loss. The patient is nervous/anxious and has insomnia (during steroid days).         Objective:     Vitals:    11/06/23 0957   Height: 5' 3" (1.6 m)         Physical Examination:   Physical Exam  Vitals and nursing note reviewed.   Constitutional:       General: She is not in acute distress.     Appearance: She is not diaphoretic.   HENT:      Head: Normocephalic.      Mouth/Throat:      Pharynx: No oropharyngeal exudate.   Eyes:      General: No scleral icterus.     Conjunctiva/sclera: Conjunctivae normal.   Neck:      Thyroid: No thyromegaly.   Cardiovascular:      Rate and Rhythm: Normal rate and regular rhythm.      Heart sounds: Normal heart sounds. No murmur heard.  Pulmonary:      Effort: Pulmonary effort is normal. No respiratory distress.      Breath sounds: No stridor. No wheezing or rales.   Chest:      Chest wall: No tenderness.   Abdominal:      General: Bowel sounds are normal. There is no distension.      Palpations: Abdomen is soft. There is no mass.      Tenderness: There is no abdominal tenderness. There is no rebound.   Musculoskeletal:         General: No tenderness or deformity. Normal range of motion.      Cervical back: Neck supple.   Lymphadenopathy:      Cervical: No cervical adenopathy.   Skin:     General: Skin is warm and dry.      Findings: No erythema or rash.   Neurological:      Mental Status: She is alert and oriented to person, place, and time.      Cranial Nerves: No cranial nerve " deficit.      Coordination: Coordination normal.      Gait: Gait is intact.   Psychiatric:         Mood and Affect: Affect normal.         Cognition and Memory: Memory normal.         Judgment: Judgment normal.      Diagnostic Tests:  Significant Imaging: I have reviewed and interpreted all pertinent imaging results/findings.    Laboratory Data:  All pertinent labs have been reviewed.    Labs:   Lab Results   Component Value Date    WBC 5.13 11/06/2023    HGB 12.0 11/06/2023    HCT 36.5 (L) 11/06/2023    MCV 99 (H) 11/06/2023     11/06/2023       Assessment/Plan:   Invasive ductal carcinoma of breast, female, right  cT2 cN0 G3 IDC, TNBC    We discussed the more agressive nature and multifocal nature of her TNBC. Clinically node negative. Based on size, she is a candidate for neoadjuvant chemo immunotherapy based on Keynote-522 to prevent systemic spread and to monitor response to therapy and that 64% of the patients on trial had a pCR.     She is agreeable,echo is normal, no evidence for distant metastatic disease noted. Cleared to receive C4 D15 this week, will monitor closely.     Offer supportive care with IO and onc psych.     After completing neoadjuvant therapy, she would be referred back for surgery.     She has also been enrolled in the Protocol [id number], Disparities in Results of Immune Checkpoint Inhibitor Treatment (DIRECT): A Prospective Cohort Study of Cancer Survivors Treated with anti-PD-L1 Immunotherapy in a Community Oncology Setting.     We also discussed [id number] trial regarding adjuvant Pembrolizumab if she has a complete response. She received additional information and will think about it.     Transaminitis   Grade 2, held Keytruda for cycle 2, treated with a short course of steroids, now LFTs have normalized and she received Keytruda for cycle 3 and 4.     History of ovarian cancer  BRCA positive   Plan for b/l mastectomy. Follow up with Gyn onc, GI and dermatology.   Referred to  genetics clinic.     Joint pains  Continue integrative oncology follow up,exercise and NSAIDs as needed. Improving.       MDM includes  :    - Acute or chronic illness or injury that poses a threat to life or bodily function  - Independent review and explanation of 3+ results from unique tests  - Discussion of management and ordering 3+ unique tests  - Extensive discussion of treatment and management  - Prescription drug management  - Drug therapy requiring intensive monitoring for toxicity      ECOG SCORE               Discussion:   No follow-ups on file.    Plan was discussed with the patient at length, and she verbalized understanding. Arabella was given an opportunity to ask questions that were answered to her satisfaction, and she was advised to call in the interval if any problems or questions arise.    Electronically signed by Corina Mcgovern MD        Route Chart for Scheduling    Med Onc Chart Routing      Follow up with physician . Already scheduled   Follow up with [facility]    Infusion scheduling note    Injection scheduling note    Labs    Imaging    Pharmacy appointment    Other referrals                  Treatment Plan Information   OP PEMBROLIZUMAB WITH WEEKLY PACLITAXEL CARBOPLATIN (AUC 1.5) FOLLOWED BY PEMBROLIZUMAB DOXORUBICIN CYCLOPHOSPHAMIDE FOLLOWED BY PEMBROLIZUMAB 200MG Q3W   Corina Mcgovern MD   Upcoming Treatment Dates - OP PEMBROLIZUMAB WITH WEEKLY PACLITAXEL CARBOPLATIN (AUC 1.5) FOLLOWED BY PEMBROLIZUMAB DOXORUBICIN CYCLOPHOSPHAMIDE FOLLOWED BY PEMBROLIZUMAB 200MG Q3W    11/9/2023       Pre-Medications       diphenhydrAMINE (BENADRYL) 50 mg in sodium chloride 0.9% 50 mL IVPB       famotidine (PF) injection 20 mg       Chemotherapy       PACLitaxeL (TAXOL) 80 mg/m2 = 126 mg in sodium chloride 0.9% 250 mL chemo infusion       CARBOplatin (PARAPLATIN) 160 mg in sodium chloride 0.9% 266 mL chemo infusion       Antiemetics       dexAMETHasone injection 8 mg       palonosetron injection 0.25 mg        LORazepam injection 0.5 mg  11/16/2023       Chemotherapy       DOXOrubicin chemo injection 94 mg       cycloPHOSphamide 600 mg/m2 = 940 mg in sodium chloride 0.9% 254.7 mL chemo infusion       Antiemetics       aprepitant (CINVANTI) injection 130 mg       palonosetron 0.25mg/dexAMETHasone 12mg in NS IVPB 0.25 mg 50 mL       LORazepam (ATIVAN) injection 0.5 mg       aprepitant (CINVANTI) injection 130 mg       dexAMETHasone injection 8 mg       palonosetron injection 0.25 mg       Immunotherapy       pembrolizumab (KEYTRUDA) 200 mg in sodium chloride 0.9% SolP 108 mL infusion  12/7/2023       Chemotherapy       DOXOrubicin chemo injection 94 mg       cycloPHOSphamide 600 mg/m2 = 940 mg in sodium chloride 0.9% 254.7 mL chemo infusion       Antiemetics       aprepitant (CINVANTI) injection 130 mg       palonosetron 0.25mg/dexAMETHasone 12mg in NS IVPB 0.25 mg 50 mL       LORazepam (ATIVAN) injection 0.5 mg       aprepitant (CINVANTI) injection 130 mg       dexAMETHasone injection 8 mg       palonosetron injection 0.25 mg       Immunotherapy       pembrolizumab (KEYTRUDA) 200 mg in sodium chloride 0.9% SolP 108 mL infusion  12/28/2023       Chemotherapy       DOXOrubicin chemo injection 94 mg       cycloPHOSphamide 600 mg/m2 = 940 mg in sodium chloride 0.9% 254.7 mL chemo infusion       Antiemetics       aprepitant (CINVANTI) injection 130 mg       palonosetron 0.25mg/dexAMETHasone 12mg in NS IVPB 0.25 mg 50 mL       LORazepam (ATIVAN) injection 0.5 mg       aprepitant (CINVANTI) injection 130 mg       dexAMETHasone injection 8 mg       palonosetron injection 0.25 mg       Immunotherapy       pembrolizumab (KEYTRUDA) 200 mg in sodium chloride 0.9% SolP 108 mL infusion    Answers submitted by the patient for this visit:  Review of Systems Questionnaire (Submitted on 11/5/2023)  appetite change : No  unexpected weight change: No  mouth sores: No  visual disturbance: No  adenopathy: No

## 2023-11-08 ENCOUNTER — CLINICAL SUPPORT (OUTPATIENT)
Dept: REHABILITATION | Facility: HOSPITAL | Age: 43
End: 2023-11-08
Payer: COMMERCIAL

## 2023-11-08 DIAGNOSIS — C50.511 MALIGNANT NEOPLASM OF LOWER-OUTER QUADRANT OF RIGHT BREAST OF FEMALE, ESTROGEN RECEPTOR NEGATIVE: ICD-10-CM

## 2023-11-08 DIAGNOSIS — Z17.1 MALIGNANT NEOPLASM OF LOWER-OUTER QUADRANT OF RIGHT BREAST OF FEMALE, ESTROGEN RECEPTOR NEGATIVE: ICD-10-CM

## 2023-11-08 DIAGNOSIS — R45.89 ANXIETY ABOUT HEALTH: Primary | ICD-10-CM

## 2023-11-08 PROCEDURE — 97813 ACUP 1/> W/ESTIM 1ST 15 MIN: CPT | Mod: PN | Performed by: ACUPUNCTURIST

## 2023-11-08 PROCEDURE — 97814 ACUP 1/> W/ESTIM EA ADDL 15: CPT | Mod: PN | Performed by: ACUPUNCTURIST

## 2023-11-08 NOTE — PROGRESS NOTES
Acupuncture Follow-Up Note     Name: Arabella Koroma [facility] Number: [id number]    Traditional Chinese Medicine (TCM) Diagnosis: Qi Stagnation, Blood Stasis, Qi Deficiency, Blood Deficiency, Damp, and Yin Deficiency  Medical Diagnosis:   1. Anxiety about health    2. Malignant neoplasm of lower-outer quadrant of right breast of female, estrogen receptor negative         Evaluation Date: 11/8/2023    Visit #/Visits authorized:     Precautions: none    Subjective     Chief Concern: Anxiety (Patient remains anxious about health concerns.  Most recent concern is joint pain. ), Joint Pain (Patient reports 4/10 joint pain in low back, knees, ankles and wrists.), and Headache (Patient reports frontal and muscle tension headaches since she began her chemotherapy treatments.  )       Medical necessity is demonstrated by the following IMPAIRMENTS: Medical Necessity: Decreased mobility limits day to day activities, social, and emergent situations              Aggravating Factors:  stress     Relieving Factors:  nothing    Symptom Description:     Quality:  Variable  Severity:  4  Frequency:  every day      Objective     Observation: Patient progressing as expected. Plans to continue weekly    Pulse:        katya       New Findings:  na    Treatment     Treatment Principles:  move qi and blood, relieve bi.    Acupuncture points used:  4 GONZALEZ, Du20, Gb20, Gb34, Ki3, Ki6, Li11, Sp6, Sp9, and St36    Bilateral points:  Unilateral points:  Auricular Treatment:  perez men    Needles In: 23  Needles Out: 23  Blue Diamond W/ STIM placed: 905  Blue Diamond W/ STIM removed: 925      Other Traditional Chinese Medicine Modalities -  na    Assessment     After treatment, patient felt better and plans to continue with regular treatments     Patient prognosis is Good.     Patient will continue to benefit from acupuncture treatment to address the deficits listed in the problem list box on initial evaluation, provide patient family education and  to maximize pt's level of independence in the home and community environment.     Patient's spiritual, cultural and educational needs considered and pt agreeable to plan of care and goals.     Anticipated barriers to treatment: none    Plan     Recommend 1 /week for 12 sessions then re-assess.      Education:  Patient is aware of cumulative benefit of acupuncture

## 2023-11-09 ENCOUNTER — INFUSION (OUTPATIENT)
Dept: INFUSION THERAPY | Facility: HOSPITAL | Age: 43
End: 2023-11-09
Attending: INTERNAL MEDICINE
Payer: COMMERCIAL

## 2023-11-09 VITALS
RESPIRATION RATE: 18 BRPM | SYSTOLIC BLOOD PRESSURE: 102 MMHG | TEMPERATURE: 98 F | HEART RATE: 97 BPM | WEIGHT: 120.56 LBS | HEIGHT: 63 IN | BODY MASS INDEX: 21.36 KG/M2 | DIASTOLIC BLOOD PRESSURE: 70 MMHG

## 2023-11-09 DIAGNOSIS — C50.911 INVASIVE DUCTAL CARCINOMA OF BREAST, FEMALE, RIGHT: Primary | ICD-10-CM

## 2023-11-09 PROCEDURE — A4216 STERILE WATER/SALINE, 10 ML: HCPCS | Mod: PN | Performed by: INTERNAL MEDICINE

## 2023-11-09 PROCEDURE — 96417 CHEMO IV INFUS EACH ADDL SEQ: CPT | Mod: PN

## 2023-11-09 PROCEDURE — 25000003 PHARM REV CODE 250: Mod: PN | Performed by: INTERNAL MEDICINE

## 2023-11-09 PROCEDURE — 96375 TX/PRO/DX INJ NEW DRUG ADDON: CPT | Mod: PN

## 2023-11-09 PROCEDURE — 96413 CHEMO IV INFUSION 1 HR: CPT | Mod: PN

## 2023-11-09 PROCEDURE — 96367 TX/PROPH/DG ADDL SEQ IV INF: CPT | Mod: PN

## 2023-11-09 PROCEDURE — 63600175 PHARM REV CODE 636 W HCPCS: Mod: PN | Performed by: INTERNAL MEDICINE

## 2023-11-09 RX ORDER — DEXAMETHASONE SODIUM PHOSPHATE 4 MG/ML
8 INJECTION, SOLUTION INTRA-ARTICULAR; INTRALESIONAL; INTRAMUSCULAR; INTRAVENOUS; SOFT TISSUE
Status: COMPLETED | OUTPATIENT
Start: 2023-11-09 | End: 2023-11-09

## 2023-11-09 RX ORDER — DIPHENHYDRAMINE HYDROCHLORIDE 50 MG/ML
50 INJECTION INTRAMUSCULAR; INTRAVENOUS ONCE AS NEEDED
Status: DISCONTINUED | OUTPATIENT
Start: 2023-11-09 | End: 2023-11-09 | Stop reason: HOSPADM

## 2023-11-09 RX ORDER — EPINEPHRINE 0.3 MG/.3ML
0.3 INJECTION SUBCUTANEOUS ONCE AS NEEDED
Status: DISCONTINUED | OUTPATIENT
Start: 2023-11-09 | End: 2023-11-09 | Stop reason: HOSPADM

## 2023-11-09 RX ORDER — SODIUM CHLORIDE 0.9 % (FLUSH) 0.9 %
10 SYRINGE (ML) INJECTION
Status: DISCONTINUED | OUTPATIENT
Start: 2023-11-09 | End: 2023-11-09 | Stop reason: HOSPADM

## 2023-11-09 RX ORDER — HEPARIN 100 UNIT/ML
500 SYRINGE INTRAVENOUS
Status: DISCONTINUED | OUTPATIENT
Start: 2023-11-09 | End: 2023-11-09 | Stop reason: HOSPADM

## 2023-11-09 RX ORDER — FAMOTIDINE 10 MG/ML
20 INJECTION INTRAVENOUS
Status: COMPLETED | OUTPATIENT
Start: 2023-11-09 | End: 2023-11-09

## 2023-11-09 RX ORDER — LORAZEPAM 2 MG/ML
0.5 INJECTION INTRAMUSCULAR
Status: DISCONTINUED | OUTPATIENT
Start: 2023-11-09 | End: 2023-11-09 | Stop reason: HOSPADM

## 2023-11-09 RX ORDER — PROCHLORPERAZINE EDISYLATE 5 MG/ML
10 INJECTION INTRAMUSCULAR; INTRAVENOUS ONCE AS NEEDED
Status: DISCONTINUED | OUTPATIENT
Start: 2023-11-09 | End: 2023-11-09 | Stop reason: HOSPADM

## 2023-11-09 RX ORDER — PALONOSETRON 0.05 MG/ML
0.25 INJECTION, SOLUTION INTRAVENOUS ONCE
Status: COMPLETED | OUTPATIENT
Start: 2023-11-09 | End: 2023-11-09

## 2023-11-09 RX ADMIN — DEXAMETHASONE SODIUM PHOSPHATE 8 MG: 4 INJECTION, SOLUTION INTRA-ARTICULAR; INTRALESIONAL; INTRAMUSCULAR; INTRAVENOUS; SOFT TISSUE at 10:11

## 2023-11-09 RX ADMIN — SODIUM CHLORIDE: 9 INJECTION, SOLUTION INTRAVENOUS at 09:11

## 2023-11-09 RX ADMIN — FAMOTIDINE 20 MG: 10 INJECTION INTRAVENOUS at 10:11

## 2023-11-09 RX ADMIN — PALONOSETRON 0.25 MG: 0.05 INJECTION, SOLUTION INTRAVENOUS at 10:11

## 2023-11-09 RX ADMIN — PACLITAXEL 126 MG: 6 INJECTION, SOLUTION, CONCENTRATE INTRAVENOUS at 10:11

## 2023-11-09 RX ADMIN — LORAZEPAM 0.5 MG: 2 INJECTION INTRAMUSCULAR; INTRAVENOUS at 10:11

## 2023-11-09 RX ADMIN — CARBOPLATIN 200 MG: 10 INJECTION, SOLUTION INTRAVENOUS at 11:11

## 2023-11-09 RX ADMIN — DIPHENHYDRAMINE HYDROCHLORIDE 50 MG: 50 INJECTION, SOLUTION INTRAMUSCULAR; INTRAVENOUS at 10:11

## 2023-11-09 RX ADMIN — Medication 10 ML: at 02:11

## 2023-11-09 NOTE — PLAN OF CARE
Problem: Fatigue  Goal: Improved Activity Tolerance  Outcome: Ongoing, Progressing  Intervention: Promote Improved Energy  Flowsheets (Taken 11/9/2023 1000)  Fatigue Management: paced activity encouraged  Sleep/Rest Enhancement:   noise level reduced   family presence promoted   relaxation techniques promoted  Activity Management:   Ambulated -L4   Up in chair - L3   Ambulated to bathroom - L4     Problem: Adult Inpatient Plan of Care  Goal: Plan of Care Review  Outcome: Ongoing, Progressing  Flowsheets (Taken 11/9/2023 1000)  Plan of Care Reviewed With:   patient   spouse  Goal: Patient-Specific Goal (Individualized)  Outcome: Ongoing, Progressing  Flowsheets (Taken 11/9/2023 1000)  Anxieties, Fears or Concerns: none  Individualized Care Needs: blanket, cryotherapy, cool cap, spouse chairside     Pt tolerated taxol/carbo with cooling cap. VSS, NAD noted. Pt d/c home.

## 2023-11-10 ENCOUNTER — PATIENT MESSAGE (OUTPATIENT)
Dept: ADMINISTRATIVE | Facility: OTHER | Age: 43
End: 2023-11-10
Payer: COMMERCIAL

## 2023-11-13 ENCOUNTER — LAB VISIT (OUTPATIENT)
Dept: LAB | Facility: HOSPITAL | Age: 43
End: 2023-11-13
Attending: INTERNAL MEDICINE
Payer: COMMERCIAL

## 2023-11-13 ENCOUNTER — OFFICE VISIT (OUTPATIENT)
Dept: HEMATOLOGY/ONCOLOGY | Facility: CLINIC | Age: 43
End: 2023-11-13
Payer: COMMERCIAL

## 2023-11-13 VITALS
DIASTOLIC BLOOD PRESSURE: 78 MMHG | RESPIRATION RATE: 18 BRPM | TEMPERATURE: 98 F | WEIGHT: 122.81 LBS | SYSTOLIC BLOOD PRESSURE: 116 MMHG | HEIGHT: 63 IN | HEART RATE: 80 BPM | BODY MASS INDEX: 21.76 KG/M2 | OXYGEN SATURATION: 100 %

## 2023-11-13 DIAGNOSIS — Z15.01 BRCA GENE POSITIVE: ICD-10-CM

## 2023-11-13 DIAGNOSIS — Z15.09 BRCA GENE POSITIVE: ICD-10-CM

## 2023-11-13 DIAGNOSIS — C50.511 MALIGNANT NEOPLASM OF LOWER-OUTER QUADRANT OF RIGHT BREAST OF FEMALE, ESTROGEN RECEPTOR NEGATIVE: Primary | ICD-10-CM

## 2023-11-13 DIAGNOSIS — T45.1X5A CHEMOTHERAPY-INDUCED FATIGUE: ICD-10-CM

## 2023-11-13 DIAGNOSIS — Z17.1 MALIGNANT NEOPLASM OF LOWER-OUTER QUADRANT OF RIGHT BREAST OF FEMALE, ESTROGEN RECEPTOR NEGATIVE: Primary | ICD-10-CM

## 2023-11-13 DIAGNOSIS — R53.83 CHEMOTHERAPY-INDUCED FATIGUE: ICD-10-CM

## 2023-11-13 DIAGNOSIS — C50.911 INVASIVE DUCTAL CARCINOMA OF BREAST, FEMALE, RIGHT: ICD-10-CM

## 2023-11-13 LAB
ALBUMIN SERPL BCP-MCNC: 4.4 G/DL (ref 3.5–5.2)
ALP SERPL-CCNC: 46 U/L (ref 55–135)
ALT SERPL W/O P-5'-P-CCNC: 14 U/L (ref 10–44)
ANION GAP SERPL CALC-SCNC: 10 MMOL/L (ref 8–16)
AST SERPL-CCNC: 18 U/L (ref 10–40)
B-HCG UR QL: NEGATIVE
BASOPHILS # BLD AUTO: 0.03 K/UL (ref 0–0.2)
BASOPHILS NFR BLD: 0.6 % (ref 0–1.9)
BILIRUB SERPL-MCNC: 0.5 MG/DL (ref 0.1–1)
BUN SERPL-MCNC: 11 MG/DL (ref 6–20)
CALCIUM SERPL-MCNC: 9.3 MG/DL (ref 8.7–10.5)
CHLORIDE SERPL-SCNC: 104 MMOL/L (ref 95–110)
CO2 SERPL-SCNC: 25 MMOL/L (ref 23–29)
CREAT SERPL-MCNC: 0.6 MG/DL (ref 0.5–1.4)
DIFFERENTIAL METHOD: ABNORMAL
EOSINOPHIL # BLD AUTO: 0 K/UL (ref 0–0.5)
EOSINOPHIL NFR BLD: 0.6 % (ref 0–8)
ERYTHROCYTE [DISTWIDTH] IN BLOOD BY AUTOMATED COUNT: 15.4 % (ref 11.5–14.5)
EST. GFR  (NO RACE VARIABLE): >60 ML/MIN/1.73 M^2
GLUCOSE SERPL-MCNC: 96 MG/DL (ref 70–110)
HCT VFR BLD AUTO: 37 % (ref 37–48.5)
HGB BLD-MCNC: 12.1 G/DL (ref 12–16)
IMM GRANULOCYTES # BLD AUTO: 0.01 K/UL (ref 0–0.04)
IMM GRANULOCYTES NFR BLD AUTO: 0.2 % (ref 0–0.5)
LYMPHOCYTES # BLD AUTO: 2 K/UL (ref 1–4.8)
LYMPHOCYTES NFR BLD: 41.9 % (ref 18–48)
MCH RBC QN AUTO: 32.3 PG (ref 27–31)
MCHC RBC AUTO-ENTMCNC: 32.7 G/DL (ref 32–36)
MCV RBC AUTO: 99 FL (ref 82–98)
MONOCYTES # BLD AUTO: 0.3 K/UL (ref 0.3–1)
MONOCYTES NFR BLD: 6.5 % (ref 4–15)
NEUTROPHILS # BLD AUTO: 2.4 K/UL (ref 1.8–7.7)
NEUTROPHILS NFR BLD: 50.2 % (ref 38–73)
NRBC BLD-RTO: 0 /100 WBC
PLATELET # BLD AUTO: 208 K/UL (ref 150–450)
PLATELET BLD QL SMEAR: ABNORMAL
PMV BLD AUTO: 8.6 FL (ref 9.2–12.9)
POTASSIUM SERPL-SCNC: 4.2 MMOL/L (ref 3.5–5.1)
PROT SERPL-MCNC: 7.8 G/DL (ref 6–8.4)
RBC # BLD AUTO: 3.75 M/UL (ref 4–5.4)
SODIUM SERPL-SCNC: 139 MMOL/L (ref 136–145)
TSH SERPL DL<=0.005 MIU/L-ACNC: 0.96 UIU/ML (ref 0.4–4)
WBC # BLD AUTO: 4.77 K/UL (ref 3.9–12.7)

## 2023-11-13 PROCEDURE — 3008F PR BODY MASS INDEX (BMI) DOCUMENTED: ICD-10-PCS | Mod: CPTII,S$GLB,, | Performed by: INTERNAL MEDICINE

## 2023-11-13 PROCEDURE — 3078F DIAST BP <80 MM HG: CPT | Mod: CPTII,S$GLB,, | Performed by: INTERNAL MEDICINE

## 2023-11-13 PROCEDURE — 85025 COMPLETE CBC W/AUTO DIFF WBC: CPT | Mod: PN | Performed by: INTERNAL MEDICINE

## 2023-11-13 PROCEDURE — 3008F BODY MASS INDEX DOCD: CPT | Mod: CPTII,S$GLB,, | Performed by: INTERNAL MEDICINE

## 2023-11-13 PROCEDURE — 1159F PR MEDICATION LIST DOCUMENTED IN MEDICAL RECORD: ICD-10-PCS | Mod: CPTII,S$GLB,, | Performed by: INTERNAL MEDICINE

## 2023-11-13 PROCEDURE — 36415 COLL VENOUS BLD VENIPUNCTURE: CPT | Mod: PN | Performed by: INTERNAL MEDICINE

## 2023-11-13 PROCEDURE — 1159F MED LIST DOCD IN RCRD: CPT | Mod: CPTII,S$GLB,, | Performed by: INTERNAL MEDICINE

## 2023-11-13 PROCEDURE — 81025 URINE PREGNANCY TEST: CPT | Mod: PN | Performed by: INTERNAL MEDICINE

## 2023-11-13 PROCEDURE — 3074F SYST BP LT 130 MM HG: CPT | Mod: CPTII,S$GLB,, | Performed by: INTERNAL MEDICINE

## 2023-11-13 PROCEDURE — 99999 PR PBB SHADOW E&M-EST. PATIENT-LVL IV: CPT | Mod: PBBFAC,,, | Performed by: INTERNAL MEDICINE

## 2023-11-13 PROCEDURE — 99215 PR OFFICE/OUTPT VISIT, EST, LEVL V, 40-54 MIN: ICD-10-PCS | Mod: S$GLB,,, | Performed by: INTERNAL MEDICINE

## 2023-11-13 PROCEDURE — 84443 ASSAY THYROID STIM HORMONE: CPT | Performed by: INTERNAL MEDICINE

## 2023-11-13 PROCEDURE — 99215 OFFICE O/P EST HI 40 MIN: CPT | Mod: S$GLB,,, | Performed by: INTERNAL MEDICINE

## 2023-11-13 PROCEDURE — 3078F PR MOST RECENT DIASTOLIC BLOOD PRESSURE < 80 MM HG: ICD-10-PCS | Mod: CPTII,S$GLB,, | Performed by: INTERNAL MEDICINE

## 2023-11-13 PROCEDURE — 3074F PR MOST RECENT SYSTOLIC BLOOD PRESSURE < 130 MM HG: ICD-10-PCS | Mod: CPTII,S$GLB,, | Performed by: INTERNAL MEDICINE

## 2023-11-13 PROCEDURE — 80053 COMPREHEN METABOLIC PANEL: CPT | Mod: PN | Performed by: INTERNAL MEDICINE

## 2023-11-13 PROCEDURE — 99999 PR PBB SHADOW E&M-EST. PATIENT-LVL IV: ICD-10-PCS | Mod: PBBFAC,,, | Performed by: INTERNAL MEDICINE

## 2023-11-13 RX ORDER — EPINEPHRINE 0.3 MG/.3ML
0.3 INJECTION SUBCUTANEOUS ONCE AS NEEDED
Status: CANCELLED | OUTPATIENT
Start: 2023-11-16

## 2023-11-13 RX ORDER — PROCHLORPERAZINE EDISYLATE 5 MG/ML
10 INJECTION INTRAMUSCULAR; INTRAVENOUS ONCE AS NEEDED
Status: CANCELLED
Start: 2023-11-16

## 2023-11-13 RX ORDER — LORAZEPAM 2 MG/ML
0.5 INJECTION INTRAMUSCULAR
Status: CANCELLED
Start: 2023-11-16

## 2023-11-13 RX ORDER — DOXORUBICIN HYDROCHLORIDE 2 MG/ML
60 INJECTION, SOLUTION INTRAVENOUS
Status: CANCELLED | OUTPATIENT
Start: 2023-11-16

## 2023-11-13 RX ORDER — HEPARIN 100 UNIT/ML
500 SYRINGE INTRAVENOUS
Status: CANCELLED | OUTPATIENT
Start: 2023-11-16

## 2023-11-13 RX ORDER — SODIUM CHLORIDE 0.9 % (FLUSH) 0.9 %
10 SYRINGE (ML) INJECTION
Status: CANCELLED | OUTPATIENT
Start: 2023-11-16

## 2023-11-13 RX ORDER — DEXAMETHASONE SODIUM PHOSPHATE 4 MG/ML
8 INJECTION, SOLUTION INTRA-ARTICULAR; INTRALESIONAL; INTRAMUSCULAR; INTRAVENOUS; SOFT TISSUE
Status: CANCELLED
Start: 2023-11-16

## 2023-11-13 RX ORDER — DIPHENHYDRAMINE HYDROCHLORIDE 50 MG/ML
50 INJECTION INTRAMUSCULAR; INTRAVENOUS ONCE AS NEEDED
Status: CANCELLED | OUTPATIENT
Start: 2023-11-16

## 2023-11-13 RX ORDER — PALONOSETRON 0.05 MG/ML
0.25 INJECTION, SOLUTION INTRAVENOUS ONCE
Status: CANCELLED
Start: 2023-11-16 | End: 2023-11-16

## 2023-11-13 NOTE — PROGRESS NOTES
PROGRESS NOTE    Subjective:       Patient ID: Arabella Catalan is a 43 y.o. female.  MRN: [id number]  : 1980    Chief Complaint: cT2 cN0 TNBC     History of Present Illness:   Arabella Catalan is a 43 y.o. female who is referred for TNBC.      As previously documented she started screening mammograms for a few years after her ovarian cancer , in her late 20's. Routine pelvic exam revealed an ovarian cancer, s/p right oophorectomy for dysgerminoma.     Resumed routine mammograms at 42, had a normal screening mammogram in May 2023 but felt a lump in the right breast in July. Further imaging showed 2 lesions, both > 2 cm , both biopsied to be G3 IDC, ER/MD and Her 2 jose eduardo negative.       Menarche at age 14 year old. . Age at first live birth 31. Did  breast feed 2 year and 6 months OCP-yes 20 years ago  Menopause at none. HRT-none     Family history cancer:  Mother breast cancer at 38 and cervical cancer     Smoker Pk/yr quit date , smoked around 0.5 pack a day     Interim history:    On neoadjuvant chemo based on KEYNOTE-522. She is using Dignicap.     Had cycle 4 D 15 on . Keytruda was added back on after being  held for cycle 2.   Fatigue is improving, stable joint pains.     Here for cycle 5 D 1, starting AC+ Pembro. Had an echo on 11/3, EF 65-70%.     No rash.   LMP was August. Denies nausea, vomiting, diarrhea or neuropathy.   Breast mass is no longer palpable. Has US mid cycle 4 that shows responsive disease.       Oncology History:  5/3/23  Impression:  Bilateral  There is no mammographic evidence of malignancy.     BI-RADS Category:   Overall: 2 - Benign    23  US  Impression:  Right  Mass: Right breast 1.5 cm x 1 cm x 1.6 cm mass at the 8 o'clock position. Assessment: 4 - Suspicious finding. Biopsy and possible aspiration are recommended.   Mass: Right breast 2 cm x 1.1 cm x 1.6 cm mass at the 7 o'clock position.  Assessment: 4 - Suspicious finding. Biopsy is recommended.      BI-RADS Category:   Overall: 4 - Suspicious    8/2/23  MRI  Impression:  Right  Mass: Right breast 2.7 cm x 2.2 cm x 2 cm mass at the 8 o'clock position. Assessment: 6 - Known biopsy, proven malignancy.   Mass: Right breast 2 cm x 1.6 cm x 1.6 cm mass at the 7 o'clock position. Assessment: 6 - Known biopsy, proven malignancy.      Left  There is no MR evidence of malignancy in the left breast.     BI-RADS Category:   Overall: 6 - Known Biopsy-Proven Malignancy  7/31/23:  DIAGNOSIS:   07/28/2023 JL:tml     1. RIGHT BREAST AT 8:00 7 CM FROM NIPPLE CORE NEEDLE BIOPSIES:   - INVASIVE DUCT CARCINOMA, HIGH-GRADE (3,2,3).     2. RIGHT BREAST AT 7:00 3 CM FROM NIPPLE CORE NEEDLE BIOPSIES:   - IN SITU AND INVASIVE DUCT CARCINOMA, HIGH-GRADE (3,3,2).     RIGHT BREAST: INVASIVE DUCTAL CARCINOMA   GRADE: HIGH     ER: NEGATIVE, AR: NEGATIVE, HER2**: NEGATIVE       8/7/23  CT chest abd pelvis  Impression:     1. Known right breast malignancy, better evaluated on comparison breast MRI exam.  2. Mild asymmetrically prominent, but not pathologically enlarged right axillary lymph nodes, measuring up to 6 mm in short axis dimension.  No mediastinal or hilar lymphadenopathy.  3. Mild hepatomegaly with multiple small hypoattenuating hepatic lesions, the largest measuring 0.9 cm in the right hepatic lobe, which are too small to definitively characterize.  Metastases not excluded.  Consider further evaluation with PET-CT and/or contrast enhanced liver protocol MRI.  4. Solid, noncalcified 2 mm pulmonary nodule in the right lower lobe, nonspecific. Attention on follow-up imaging recommended.     8/18/23  Liver MRI      Impression:     Small circumscribed lesions in the liver are most compatible with hemangiomas.  No suspicious liver lesions.    8/10/123  Bone scan   Impression:     No evidence of osseous metastatic disease.     10/24/23:  US right breast   Impression:      Interval decrease in size of both right breast masses compatible with favorable response to neoadjuvant chemotherapy    US transvaginal  Impression:     Left ovarian 3 mm echogenic lesion, possibly a small dermoid.  History:  Past Medical History:   Diagnosis Date    Abnormal Pap smear of cervix     BRCA1 positive     Breast cancer 07/26/2023    right    Breast cancer 07/26/2023    right    HPV (human papilloma virus) infection     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      Past Surgical History:   Procedure Laterality Date    BREAST BIOPSY Right 2015    benign    BREAST BIOPSY Right 07/26/2023    BREAST BIOPSY Right 07/26/2023    BREAST BIOPSY Right 08/16/2023    benign LN    INSERTION OF TUNNELED CENTRAL VENOUS CATHETER (CVC) WITH SUBCUTANEOUS PORT N/A 08/15/2023    Procedure: [id number];  Surgeon: Skyler Aldrich MD;  Location: [facility];  Service: General;  Laterality: N/A;    OOPHORECTOMY Right 2009    stage 1a dysgerminoma    VAGINAL DELIVERY  12/13/2016     Family History   Problem Relation Age of Onset    Breast cancer Mother 38    Cervical cancer Mother     BRCA 1/2 Sister     Colon cancer Neg Hx     Colon polyps Neg Hx     Esophageal cancer Neg Hx     Stomach cancer Neg Hx     Inflammatory bowel disease Neg Hx       Social History     Tobacco Use    Smoking status: Former     Types: Cigarettes    Smokeless tobacco: Not on file   Substance and Sexual Activity    Alcohol use: Yes     Comment: occasionally    Drug use: No    Sexual activity: Not Currently     Partners: Male     Birth control/protection: None        ROS:   Review of Systems   Constitutional:  Positive for malaise/fatigue. Negative for fever and weight loss.   HENT:  Negative for congestion, hearing loss, nosebleeds and sore throat.    Eyes:  Negative for double vision and photophobia.   Respiratory:  Negative for cough, hemoptysis, sputum production, shortness of breath and wheezing.    Cardiovascular:  Negative for chest  "pain, palpitations, orthopnea and leg swelling.   Gastrointestinal:  Negative for abdominal pain, blood in stool, constipation, diarrhea, heartburn, nausea and vomiting.   Genitourinary:  Negative for dysuria, frequency, hematuria and urgency.   Musculoskeletal:  Positive for joint pain. Negative for back pain and myalgias.   Skin:  Negative for itching and rash.   Neurological:  Negative for dizziness, tingling, seizures, weakness and headaches.   Endo/Heme/Allergies:  Negative for polydipsia. Does not bruise/bleed easily.   Psychiatric/Behavioral:  Negative for depression and memory loss. The patient is nervous/anxious and has insomnia (during steroid days).         Objective:     Vitals:    11/13/23 0943   BP: 116/78   Pulse: 80   Resp: 18   Temp: 97.6 °F (36.4 °C)   TempSrc: Temporal   SpO2: 100%   Weight: 55.7 kg (122 lb 12.7 oz)   Height: 5' 3" (1.6 m)   PainSc: 0-No pain           Physical Examination:   Physical Exam  Vitals and nursing note reviewed.   Constitutional:       General: She is not in acute distress.     Appearance: She is not diaphoretic.   HENT:      Head: Normocephalic.      Mouth/Throat:      Pharynx: No oropharyngeal exudate.   Eyes:      General: No scleral icterus.     Conjunctiva/sclera: Conjunctivae normal.   Neck:      Thyroid: No thyromegaly.   Cardiovascular:      Rate and Rhythm: Normal rate and regular rhythm.      Heart sounds: Normal heart sounds. No murmur heard.  Pulmonary:      Effort: Pulmonary effort is normal. No respiratory distress.      Breath sounds: No stridor. No wheezing or rales.   Chest:      Chest wall: No tenderness.   Abdominal:      General: Bowel sounds are normal. There is no distension.      Palpations: Abdomen is soft. There is no mass.      Tenderness: There is no abdominal tenderness. There is no rebound.   Musculoskeletal:         General: No tenderness or deformity. Normal range of motion.      Cervical back: Neck supple.   Lymphadenopathy:      " Cervical: No cervical adenopathy.   Skin:     General: Skin is warm and dry.      Findings: No erythema or rash.   Neurological:      Mental Status: She is alert and oriented to person, place, and time.      Cranial Nerves: No cranial nerve deficit.      Coordination: Coordination normal.      Gait: Gait is intact.   Psychiatric:         Mood and Affect: Affect normal.         Cognition and Memory: Memory normal.         Judgment: Judgment normal.        Diagnostic Tests:  Significant Imaging: I have reviewed and interpreted all pertinent imaging results/findings.    Laboratory Data:  All pertinent labs have been reviewed.    Labs:   Lab Results   Component Value Date    WBC 4.77 11/13/2023    HGB 12.1 11/13/2023    HCT 37.0 11/13/2023    MCV 99 (H) 11/13/2023     11/13/2023       Assessment/Plan:   Invasive ductal carcinoma of breast, female, right  cT2 cN0 G3 IDC, TNBC    We discussed the more agressive nature and multifocal nature of her TNBC. Clinically node negative. Based on size, she is a candidate for neoadjuvant chemo immunotherapy based on Keynote-522 to prevent systemic spread and to monitor response to therapy and that 64% of the patients on trial had a pCR.     She is agreeable,echo is normal, no evidence for distant metastatic disease noted. Cleared to receive C5 AC+ Pembro this week, will monitor closely. Not on GCSF support, will add if needed.     Offer supportive care with IO and onc psych.     After completing neoadjuvant therapy, she would be referred back for surgery.     She has also been enrolled in the Protocol [id number], Disparities in Results of Immune Checkpoint Inhibitor Treatment (DIRECT): A Prospective Cohort Study of Cancer Survivors Treated with anti-PD-L1 Immunotherapy in a Community Oncology Setting.     We also discussed [id number] trial regarding adjuvant Pembrolizumab if she has a complete response. She received additional information and will think about it.      Transaminitis   Grade 2, held Keytruda for cycle 2, treated with a short course of steroids, now LFTs have normalized and she received Keytruda for cycle 3 and 4. Ok to continue.     History of ovarian cancer  BRCA positive   Plan for b/l mastectomy. Follow up with Gyn onc, GI and dermatology.   Referred to genetics clinic.     Joint pains  Continue integrative oncology follow up,exercise and NSAIDs as needed. Improving.       MDM includes  :    - Acute or chronic illness or injury that poses a threat to life or bodily function  - Independent review and explanation of 3+ results from unique tests  - Discussion of management and ordering 3+ unique tests  - Extensive discussion of treatment and management  - Prescription drug management  - Drug therapy requiring intensive monitoring for toxicity      ECOG SCORE    0 - Fully active-able to carry on all pre-disease performance without restriction           Discussion:   No follow-ups on file.    Plan was discussed with the patient at length, and she verbalized understanding. Arabella was given an opportunity to ask questions that were answered to her satisfaction, and she was advised to call in the interval if any problems or questions arise.    Electronically signed by Corina Mcgovern MD        Route Chart for Scheduling    Med Onc Chart Routing      Follow up with physician . 12/4   Follow up with [facility]    Infusion scheduling note    Injection scheduling note    Labs CBC and CMP   Scheduling:  Preferred lab:  Lab interval:     Imaging    Pharmacy appointment    Other referrals                  Treatment Plan Information   OP PEMBROLIZUMAB WITH WEEKLY PACLITAXEL CARBOPLATIN (AUC 1.5) FOLLOWED BY PEMBROLIZUMAB DOXORUBICIN CYCLOPHOSPHAMIDE FOLLOWED BY PEMBROLIZUMAB 200MG Q3W   Corina Mcgovern MD   Upcoming Treatment Dates - OP PEMBROLIZUMAB WITH WEEKLY PACLITAXEL CARBOPLATIN (AUC 1.5) FOLLOWED BY PEMBROLIZUMAB DOXORUBICIN CYCLOPHOSPHAMIDE FOLLOWED BY PEMBROLIZUMAB  200MG Q3W    11/16/2023       Chemotherapy       DOXOrubicin chemo injection 94 mg       cycloPHOSphamide 600 mg/m2 = 940 mg in sodium chloride 0.9% 254.7 mL chemo infusion       Antiemetics       aprepitant (CINVANTI) injection 130 mg       palonosetron 0.25mg/dexAMETHasone 12mg in NS IVPB 0.25 mg 50 mL       LORazepam injection 0.5 mg       aprepitant (CINVANTI) injection 130 mg       dexAMETHasone injection 8 mg       palonosetron injection 0.25 mg       Immunotherapy       pembrolizumab (KEYTRUDA) 200 mg in sodium chloride 0.9% SolP 108 mL infusion  12/7/2023       Chemotherapy       DOXOrubicin chemo injection 94 mg       cycloPHOSphamide 600 mg/m2 = 940 mg in sodium chloride 0.9% 254.7 mL chemo infusion       Antiemetics       aprepitant (CINVANTI) injection 130 mg       palonosetron 0.25mg/dexAMETHasone 12mg in NS IVPB 0.25 mg 50 mL       aprepitant (CINVANTI) injection 130 mg       dexAMETHasone injection 8 mg       palonosetron injection 0.25 mg       LORazepam injection 0.5 mg       Immunotherapy       pembrolizumab (KEYTRUDA) 200 mg in sodium chloride 0.9% SolP 108 mL infusion  12/28/2023       Chemotherapy       DOXOrubicin chemo injection 94 mg       cycloPHOSphamide 600 mg/m2 = 940 mg in sodium chloride 0.9% 254.7 mL chemo infusion       Antiemetics       aprepitant (CINVANTI) injection 130 mg       palonosetron 0.25mg/dexAMETHasone 12mg in NS IVPB 0.25 mg 50 mL       aprepitant (CINVANTI) injection 130 mg       dexAMETHasone injection 8 mg       palonosetron injection 0.25 mg       LORazepam injection 0.5 mg       Immunotherapy       pembrolizumab (KEYTRUDA) 200 mg in sodium chloride 0.9% SolP 108 mL infusion  1/18/2024       Chemotherapy       DOXOrubicin chemo injection 94 mg       cycloPHOSphamide 600 mg/m2 = 940 mg in sodium chloride 0.9% 254.7 mL chemo infusion       Antiemetics       aprepitant (CINVANTI) injection 130 mg       palonosetron 0.25mg/dexAMETHasone 12mg in NS IVPB 0.25 mg 50 mL        aprepitant (CINVANTI) injection 130 mg       dexAMETHasone injection 8 mg       palonosetron injection 0.25 mg       LORazepam injection 0.5 mg       Immunotherapy       pembrolizumab (KEYTRUDA) 200 mg in sodium chloride 0.9% SolP 108 mL infusion        Answers submitted by the patient for this visit:  Review of Systems Questionnaire (Submitted on 11/11/2023)  appetite change : No  unexpected weight change: No  mouth sores: No  visual disturbance: No  adenopathy: No

## 2023-11-15 ENCOUNTER — CLINICAL SUPPORT (OUTPATIENT)
Dept: REHABILITATION | Facility: HOSPITAL | Age: 43
End: 2023-11-15
Payer: COMMERCIAL

## 2023-11-15 DIAGNOSIS — Z17.1 MALIGNANT NEOPLASM OF LOWER-OUTER QUADRANT OF RIGHT BREAST OF FEMALE, ESTROGEN RECEPTOR NEGATIVE: ICD-10-CM

## 2023-11-15 DIAGNOSIS — R45.89 ANXIETY ABOUT HEALTH: Primary | ICD-10-CM

## 2023-11-15 DIAGNOSIS — C50.511 MALIGNANT NEOPLASM OF LOWER-OUTER QUADRANT OF RIGHT BREAST OF FEMALE, ESTROGEN RECEPTOR NEGATIVE: ICD-10-CM

## 2023-11-15 PROCEDURE — 97810 ACUP 1/> WO ESTIM 1ST 15 MIN: CPT | Mod: PN | Performed by: ACUPUNCTURIST

## 2023-11-15 PROCEDURE — 97811 ACUP 1/> W/O ESTIM EA ADD 15: CPT | Mod: PN | Performed by: ACUPUNCTURIST

## 2023-11-15 NOTE — PROGRESS NOTES
Acupuncture Follow-Up Note     Name: Arabella Koroma [facility]  Clinic Number: [id number]    Traditional Chinese Medicine (TCM) Diagnosis: Qi Stagnation, Blood Stasis, Qi Deficiency, Blood Deficiency, Wind , Damp, and Yin Deficiency  Medical Diagnosis:   1. Anxiety about health    2. Malignant neoplasm of lower-outer quadrant of right breast of female, estrogen receptor negative         Evaluation Date: 11/15/2023    Visit #/Visits authorized:     Precautions: Standard    Subjective     Chief Concern: Anxiety (Anxiety over health issues 0-1/10 today)       Medical necessity is demonstrated by the following IMPAIRMENTS: Medical Necessity: Decreased mobility limits day to day activities, social, and emergent situations and Decreased quality of life              Aggravating Factors:  stress     Relieving Factors:  nothing    Symptom Description:     Quality:  Variable  Severity:  1  Frequency:  every day      Objective     Observation: Patient making progress but will undergo more potent chemotherapy tomorrow and wanted this visit to help balance her anxiety and joint pain.        Pulse:        wiry       New Findings:  na    Treatment     Treatment Principles:  move qi and blood, calm perez    Acupuncture points used:  4 GONZALEZ, Gb20, and Gb34    Bilateral points:  Unilateral points:  Auricular Treatment:  perez men    Needles In: 9  Needles Out: 9  Needles W/O STIM placed: 1005  Needles W/O STIM removed: 1025      Other Traditional Chinese Medicine Modalities -  na    Assessment     After treatment, patient felt less anxiety     Patient prognosis is Good.     Patient will continue to benefit from acupuncture treatment to address the deficits listed in the problem list box on initial evaluation, provide patient family education and to maximize pt's level of independence in the home and community environment.     Patient's spiritual, cultural and educational needs considered and pt agreeable to plan of care and goals.      Anticipated barriers to treatment: none    Plan     Recommend 1-2 /week for 12 sessions then re-assess.      Education:  Patient is aware of cumulative benefit of acupuncture

## 2023-11-16 ENCOUNTER — INFUSION (OUTPATIENT)
Dept: INFUSION THERAPY | Facility: HOSPITAL | Age: 43
End: 2023-11-16
Attending: INTERNAL MEDICINE
Payer: COMMERCIAL

## 2023-11-16 ENCOUNTER — DOCUMENTATION ONLY (OUTPATIENT)
Dept: INFUSION THERAPY | Facility: HOSPITAL | Age: 43
End: 2023-11-16
Payer: COMMERCIAL

## 2023-11-16 ENCOUNTER — RESEARCH ENCOUNTER (OUTPATIENT)
Dept: RESEARCH | Facility: HOSPITAL | Age: 43
End: 2023-11-16
Payer: COMMERCIAL

## 2023-11-16 VITALS
HEIGHT: 63 IN | SYSTOLIC BLOOD PRESSURE: 103 MMHG | WEIGHT: 122.81 LBS | HEART RATE: 101 BPM | BODY MASS INDEX: 21.76 KG/M2 | RESPIRATION RATE: 18 BRPM | TEMPERATURE: 98 F | DIASTOLIC BLOOD PRESSURE: 74 MMHG

## 2023-11-16 DIAGNOSIS — C50.911 INVASIVE DUCTAL CARCINOMA OF BREAST, FEMALE, RIGHT: Primary | ICD-10-CM

## 2023-11-16 LAB
BILIRUB UR QL STRIP: NEGATIVE
CLARITY UR: CLEAR
COLOR UR: YELLOW
GLUCOSE UR QL STRIP: NEGATIVE
HGB UR QL STRIP: NEGATIVE
KETONES UR QL STRIP: NEGATIVE
LEUKOCYTE ESTERASE UR QL STRIP: NEGATIVE
NITRITE UR QL STRIP: NEGATIVE
PH UR STRIP: 7 [PH] (ref 5–8)
PROT UR QL STRIP: NEGATIVE
SP GR UR STRIP: 1.01 (ref 1–1.03)
URN SPEC COLLECT METH UR: NORMAL

## 2023-11-16 PROCEDURE — 96375 TX/PRO/DX INJ NEW DRUG ADDON: CPT | Mod: PN

## 2023-11-16 PROCEDURE — 96367 TX/PROPH/DG ADDL SEQ IV INF: CPT | Mod: PN

## 2023-11-16 PROCEDURE — 25000003 PHARM REV CODE 250: Mod: PN | Performed by: INTERNAL MEDICINE

## 2023-11-16 PROCEDURE — 96411 CHEMO IV PUSH ADDL DRUG: CPT | Mod: PN

## 2023-11-16 PROCEDURE — A4216 STERILE WATER/SALINE, 10 ML: HCPCS | Mod: PN | Performed by: INTERNAL MEDICINE

## 2023-11-16 PROCEDURE — 63600175 PHARM REV CODE 636 W HCPCS: Mod: JZ,JG,PN | Performed by: INTERNAL MEDICINE

## 2023-11-16 PROCEDURE — 96413 CHEMO IV INFUSION 1 HR: CPT | Mod: PN

## 2023-11-16 PROCEDURE — 96417 CHEMO IV INFUS EACH ADDL SEQ: CPT | Mod: PN

## 2023-11-16 PROCEDURE — 81003 URINALYSIS AUTO W/O SCOPE: CPT | Mod: PN | Performed by: INTERNAL MEDICINE

## 2023-11-16 RX ORDER — DOXORUBICIN HYDROCHLORIDE 2 MG/ML
60 INJECTION, SOLUTION INTRAVENOUS
Status: COMPLETED | OUTPATIENT
Start: 2023-11-16 | End: 2023-11-16

## 2023-11-16 RX ORDER — PALONOSETRON 0.05 MG/ML
0.25 INJECTION, SOLUTION INTRAVENOUS ONCE
Status: DISCONTINUED | OUTPATIENT
Start: 2023-11-16 | End: 2023-11-16 | Stop reason: SDUPTHER

## 2023-11-16 RX ORDER — HEPARIN 100 UNIT/ML
500 SYRINGE INTRAVENOUS
Status: DISCONTINUED | OUTPATIENT
Start: 2023-11-16 | End: 2023-11-16 | Stop reason: HOSPADM

## 2023-11-16 RX ORDER — EPINEPHRINE 0.3 MG/.3ML
0.3 INJECTION SUBCUTANEOUS ONCE AS NEEDED
Status: DISCONTINUED | OUTPATIENT
Start: 2023-11-16 | End: 2023-11-16 | Stop reason: HOSPADM

## 2023-11-16 RX ORDER — DIPHENHYDRAMINE HYDROCHLORIDE 50 MG/ML
50 INJECTION INTRAMUSCULAR; INTRAVENOUS ONCE AS NEEDED
Status: DISCONTINUED | OUTPATIENT
Start: 2023-11-16 | End: 2023-11-16 | Stop reason: HOSPADM

## 2023-11-16 RX ORDER — PROCHLORPERAZINE EDISYLATE 5 MG/ML
10 INJECTION INTRAMUSCULAR; INTRAVENOUS ONCE AS NEEDED
Status: DISCONTINUED | OUTPATIENT
Start: 2023-11-16 | End: 2023-11-16 | Stop reason: HOSPADM

## 2023-11-16 RX ORDER — SODIUM CHLORIDE 0.9 % (FLUSH) 0.9 %
10 SYRINGE (ML) INJECTION
Status: DISCONTINUED | OUTPATIENT
Start: 2023-11-16 | End: 2023-11-16 | Stop reason: HOSPADM

## 2023-11-16 RX ORDER — LORAZEPAM 2 MG/ML
0.5 INJECTION INTRAMUSCULAR
Status: DISCONTINUED | OUTPATIENT
Start: 2023-11-16 | End: 2023-11-16 | Stop reason: HOSPADM

## 2023-11-16 RX ORDER — DEXAMETHASONE SODIUM PHOSPHATE 4 MG/ML
8 INJECTION, SOLUTION INTRA-ARTICULAR; INTRALESIONAL; INTRAMUSCULAR; INTRAVENOUS; SOFT TISSUE
Status: DISCONTINUED | OUTPATIENT
Start: 2023-11-16 | End: 2023-11-16 | Stop reason: SDUPTHER

## 2023-11-16 RX ADMIN — DOXORUBICIN HYDROCHLORIDE 94 MG: 2 INJECTION, SOLUTION INTRAVENOUS at 11:11

## 2023-11-16 RX ADMIN — APREPITANT 130 MG: 130 INJECTION, EMULSION INTRAVENOUS at 11:11

## 2023-11-16 RX ADMIN — CYCLOPHOSPHAMIDE 940 MG: 200 INJECTION, SOLUTION INTRAVENOUS at 12:11

## 2023-11-16 RX ADMIN — SODIUM CHLORIDE 200 MG: 9 INJECTION, SOLUTION INTRAVENOUS at 10:11

## 2023-11-16 RX ADMIN — SODIUM CHLORIDE: 9 INJECTION, SOLUTION INTRAVENOUS at 10:11

## 2023-11-16 RX ADMIN — Medication 10 ML: at 03:11

## 2023-11-16 RX ADMIN — LORAZEPAM 0.5 MG: 2 INJECTION INTRAMUSCULAR; INTRAVENOUS at 10:11

## 2023-11-16 RX ADMIN — PALONOSETRON 0.25 MG: 0.05 INJECTION, SOLUTION INTRAVENOUS at 11:11

## 2023-11-16 NOTE — PROGRESS NOTES
Oncology Nutrition   Chemotherapy Infusion Visit    Nutrition Follow Up   RD met with patient at chairside during infusion treatment. Pt reports continues to do well nutritionally- eating without difficulty, maintaining weight, and denies nutrition related side effects.    Pt states she has gained a few pounds but RD encouraged pt that gaining is better than losing. RD explained that she could be seen for weight loss once treatment was complete. Pt VU.    Pt with questions about how much protein she should be getting everyday. All questions answered to her satisfaction.     Wt Readings from Last 10 Encounters:   11/16/23 55.7 kg (122 lb 12.7 oz)   11/13/23 55.7 kg (122 lb 12.7 oz)   11/09/23 54.7 kg (120 lb 9.5 oz)   11/06/23 54.7 kg (120 lb 9.5 oz)   11/03/23 54.4 kg (120 lb)   11/02/23 54.7 kg (120 lb 9.5 oz)   10/30/23 54.7 kg (120 lb 9.5 oz)   10/26/23 54.1 kg (119 lb 4.3 oz)   10/26/23 54.1 kg (119 lb 4.3 oz)   10/19/23 54.7 kg (120 lb 9.5 oz)       All other nutrition questions/concerns addressed as appropriate. Will continue to follow and monitor throughout treatment PRN.     Orquidea Vu, MS, RD, LDN  11/16/2023  1:51 PM

## 2023-11-16 NOTE — PROGRESS NOTES
Protocol: [id number], Disparities in Results of Immune Checkpoint Inhibitor Treatment (DIRECT): A Prospective Cohort Study of Cancer Survivors Treated with anti-PD-L1 Immunotherapy in a Community Oncology Setting  IRB# 2022.126  Version Date: 1/11/2022  Treating MD: Dr. Mcgovern  Study ID# [id number]  11/16/2023      The patient presented at the [facility] today for her 5th infusion of Q3 week Keytruda. The patient presented with her , A&O, without noted distress, and with appropriate mood and affect to the situation.    The patient had labs drawn and an appointment with Dr. Mcgovern on 11/13/23 for Cycle 5 Keytruda clearance. The patient was deemed appropriate for treatment on 11/16/23. This CRC reviewed the treating MD's note from 11/13/23 before meeting with the patient today.      The patient continues to freely agree with participation in the referenced study and denied taking any oral or IV antibiotics, antifungals agents, or steroids since her last infusion. The patient denied having any other new or worsening symptoms.       Toxicities attributable to last infusion of Keytruda :  Grade 1 Fatigue (9/14/2023 - ongoing): treating clinician deems probably attributable to immunotherapy - the patient notes mild fatigue starting 3 weeks after her 1st Keytruda infusion. The patient reports fatigue is continuing to improve. Fatigue comes and goes. The treating clinician does not deem CS, no new orders given.                     Clinical Record Information:  Between the last study visit, the patient has not been diagnosed with any of the following autoimmune diseases or conditions:          The patient denied having any questions for this CRC. Encouraged the patient to call with any questions or concerns.     Next set of [id number] questionnaires and labs due:              - February 24th, 2024 +/- 1 month (6 months after initiation of immunotherapy)     Tamia Herrera, CRC

## 2023-11-16 NOTE — PLAN OF CARE
Problem: Fatigue  Goal: Improved Activity Tolerance  Outcome: Ongoing, Progressing  Intervention: Promote Improved Energy  Flowsheets (Taken 11/16/2023 0955)  Fatigue Management:   frequent rest breaks encouraged   paced activity encouraged  Sleep/Rest Enhancement:   family presence promoted   noise level reduced   relaxation techniques promoted  Activity Management:   Ambulated -L4   Ambulated to bathroom - L4   Up in chair - L3     Problem: Adult Inpatient Plan of Care  Goal: Plan of Care Review  Outcome: Ongoing, Progressing  Flowsheets (Taken 11/16/2023 0955)  Plan of Care Reviewed With:   patient   spouse  Goal: Patient-Specific Goal (Individualized)  Outcome: Ongoing, Progressing  Flowsheets (Taken 11/16/2023 0955)  Anxieties, Fears or Concerns: running late, first AC, reviewed digicapn post cooling, UTI symptoms  Individualized Care Needs: message sent to clinic for UTI symptoms, person warm blanket, dignicap, conversation, spouse chairside  Goal: Optimal Comfort and Wellbeing  Outcome: Ongoing, Progressing  Intervention: Provide Person-Centered Care  Flowsheets (Taken 11/16/2023 0955)  Trust Relationship/Rapport:   care explained   emotional support provided   questions answered   reassurance provided    Pt tolerated AC, +blood return throughout treatment. NAD noted. VSS, pt completed dignicap with post cool. Pt d/c home.

## 2023-11-18 ENCOUNTER — PATIENT MESSAGE (OUTPATIENT)
Dept: HEMATOLOGY/ONCOLOGY | Facility: CLINIC | Age: 43
End: 2023-11-18
Payer: COMMERCIAL

## 2023-11-20 ENCOUNTER — PATIENT MESSAGE (OUTPATIENT)
Dept: ADMINISTRATIVE | Facility: OTHER | Age: 43
End: 2023-11-20
Payer: COMMERCIAL

## 2023-11-23 ENCOUNTER — PATIENT MESSAGE (OUTPATIENT)
Dept: ADMINISTRATIVE | Facility: OTHER | Age: 43
End: 2023-11-23
Payer: COMMERCIAL

## 2023-11-24 ENCOUNTER — PATIENT MESSAGE (OUTPATIENT)
Dept: ADMINISTRATIVE | Facility: OTHER | Age: 43
End: 2023-11-24
Payer: COMMERCIAL

## 2023-11-27 ENCOUNTER — LAB VISIT (OUTPATIENT)
Dept: LAB | Facility: HOSPITAL | Age: 43
End: 2023-11-27
Attending: INTERNAL MEDICINE
Payer: COMMERCIAL

## 2023-11-27 DIAGNOSIS — R39.9 URINARY TRACT INFECTION SYMPTOMS: ICD-10-CM

## 2023-11-27 DIAGNOSIS — R39.9 URINARY TRACT INFECTION SYMPTOMS: Primary | ICD-10-CM

## 2023-11-27 LAB
BACTERIA #/AREA URNS HPF: ABNORMAL /HPF
BILIRUB UR QL STRIP: NEGATIVE
CLARITY UR: CLEAR
COLOR UR: YELLOW
GLUCOSE UR QL STRIP: NEGATIVE
HGB UR QL STRIP: NEGATIVE
KETONES UR QL STRIP: NEGATIVE
LEUKOCYTE ESTERASE UR QL STRIP: ABNORMAL
MICROSCOPIC COMMENT: ABNORMAL
NITRITE UR QL STRIP: POSITIVE
PH UR STRIP: 8.5 [PH] (ref 5–8)
PROT UR QL STRIP: NEGATIVE
SP GR UR STRIP: 1.01 (ref 1–1.03)
SQUAMOUS #/AREA URNS HPF: 3 /HPF
URN SPEC COLLECT METH UR: ABNORMAL
WBC #/AREA URNS HPF: 6 /HPF (ref 0–5)

## 2023-11-27 PROCEDURE — 81000 URINALYSIS NONAUTO W/SCOPE: CPT | Mod: PN | Performed by: INTERNAL MEDICINE

## 2023-11-29 NOTE — PROGRESS NOTES
"Cancer Genetics  Hereditary and High-Risk Clinic  Department of Hematology and Oncology  [facility]    [facility]    Date of Service:  23  Visit Provider:  Yamile Valiente, [facility] [place]    Patient ID  Name: Arabella Catalan    : 1980    MRN: [id number]      Referring Provider  Corina Mcgovern MD  [address] [place] [address]    IMPRESSION      Arabella is a 42yo female who was diagnosed with breast cancer and tested positive for a heterozygous pathogenic mutation in BRCA1 (c.3400G>T). This is diagnostic of Hereditary Breast and Ovarian Cancer (HBOC), and she was here for genetic counseling to discuss cancer risks, management recommendations, inheritance and risks for family members. She was given a family letter for relatives interested in undergoing predictive testing, along with a genetic counseling letter summarizing our discussion and support resources.    FOCUSED PERSONAL HISTORY     Chief Complaint: Genetic Evaluation (BRCA1 mutation)    History of Present Illness (HPI):  Arabella Catalan ("Arabella"), 43 y.o., assigned female sex at birth, is established with the [facility] but new to me.  She was referred by Dr. Corina Mcgovern with Medical Oncology for genetic counseling given she tested positive for a pathogenic BRCA1 mutation after her diagnosis of ER-/CA-/HER2- invasive ductal carcinoma of her right breast after biopsy on 23. A second right breast mass was biopsied and thought to be a separate primary given pathology showed in situ and invasive ductal carcinoma. Right axillary lymph node biopsy on 23 was negative for malignancy.    Arabella is currently being treated by Dr. Mcgovern and is undergoing neoadjuvant chemo and immunotherapy per [id number].     Arabella was also diagnosed with a stage I ovarian dysgerminoma in  at 28yo, for which she underwent unilateral salpingo-oophorectomy.    Focused " Social History  Tobacco Use: Medium Risk (11/16/2023)    Patient History     Smoking Tobacco Use: Former     Smokeless Tobacco Use: Unknown     Passive Exposure: Not on file     Former smoker  Total number of years smoked:  10  Average number of packs smoked:  0.5/day  = 5 pack-years    Review of Systems   Patient's Distress Score today was 4/10 (scale of 0-10 on which 10=worst).  Patient attributes this to her diagnosis and other life stressors.       FAMILY HISTORY         Arabella reported her family history of cancer as follows:    A family history of birth defects, intellectual disability, SIDS, sudden early death, multiple miscarriages and consanguinity were denied. Please refer to above pedigree for further details. A larger copy has been scanned into the Media tab.    DISCUSSION     GENETIC TEST RESULTS    Arabella had a sample submitted to [facility] by Dr. Min on 8/21/23 for Common Hereditary Cancers Panel testing. This panel includes sequencing and/or deletion/duplication testing for the following 47 genes known to be associated with hereditary breast, gastrointestinal, gynecologic, pancreatic, prostate, skin and other cancers:     APC, MARK, AXIN2, BAP1, BARD1, BMPR1A, BRCA1, BRCA2, BRIP1, CDH1, CDK4, CDKN2A, CHEK2, CTNNA1, DICER1, EPCAM, FH, GREM1, HOXB13, KIT, MBD4, MEN1, MLH1, MSH2, MSH3, MSH6, MUTYH, NF1, NTHL1, PALB2, PDGFRA, PMS2, POLD1, POLE, PTEN, RAD51C, RAD51D, SDHA, SDHB, SDHC, SDHD, SMAD4, SMARCA4, STK11, TP53, TSC1, TSC2, VHL    The results of this testing revealed a heterozygous pathogenic mutation in BRCA1. This particular mutation is described as c.3400G>T (aka 3519G>T), which is a nonsense mutation that results in a premature stop codon and loss of function. It has previously been identified in multiple individuals with breast and ovarian cancer. This is considered a positive result and is diagnostic of a hereditary cancer syndrome known as Hereditary Breast and Ovarian Cancer (HBOC)  "syndrome.     Additionally, a variant of unknown significance (VUS) was identified. A VUS is a change in a gene that may or may not be related to cancer risk. However, there is not enough information available to determine if it is disease-causing/pathogenic ("positive") or benign ("negative").     The VUS was identified in the NF1 gene, pathogenic mutations in which cause Neurofibromatosis type 1. However, it is unknown if the variant found in Arabella, labeled c.6964C>T, is related to cancer risk. It results in an amino acid substitution that may disrupt protein function, but it has not been identified in individuals with NF1. Therefore, it is not recommended to alter management for Arabella or her family at this time, based on this variant.     When the lab reclassifies this variant, an amended report will be sent to the ordering provider's office.    CANCER RISKS    We discussed that women with a BRCA1 mutation have up to an 87% lifetime risk of breast cancer, up to a 60% lifetime risk of a second breast cancer and up to a 44% lifetime risk of ovarian cancer.  There may be an increased risk of male breast, prostate, pancreatic and melanoma cancers, but exact risk estimates are not available at this time. Additionally, new data suggests that women with a BRCA1 mutation are at increased risk of serous endometrial cancer, although risk estimates are not available at this time.    Additionally, when an individual has biallelic BRCA1 mutations, this causes a condition known as Fanconi anemia, which is characterized by bone marrow failure, birth defects, short stature, abnormal skin pigmentation and increased risk of childhood cancers (especially blood cancers). If both parents have a BRCA1 mutation, there is a 25% chance with each pregnancy for the child to have Fanconi anemia.     MANAGEMENT RECOMMENDATIONS    The National Comprehensive Cancer Network (NCCN) recommends that women with a BRCA1 mutation consider " undergoing the following breast cancer screening:   Breast awareness starting at age 18  Clinical breast exams every 6-12 months, starting at age 25  Breast MRI with contrast and mammogram with consideration of tomosynthesis annually, beginning at age 25 and 30, respectively  Individualized screening after age 75  Consideration of chemoprevention after age 35  Consideration of prophylactic mastectomy to reduce breast cancer risk by more than 90%    The NCCN recommends that women with a BRCA1 mutation consider undergoing the following ovarian cancer screening:  Transvaginal ultrasound, beginning at the age of 30-35, at the clinician's discretion  CA-125 blood test, beginning at the age of 30-35, at the clinician's discretion  Consideration of prophylactic bilateral salphingo-oophorectomy between the age of 35-40 to reduce the risk of ovarian cancer by approximately 80-85% (or earlier depending on the family history)  Consideration of oral contraceptives to reduce the risk of ovarian cancer    Salpingectomy alone is not the standard of care for risk reduction, but there are currently ongoing clinical trials investigating the efficacy and safety of interval salpingectomy and delayed oophorectomy. Additionally, individuals with a BRCA1 mutation may discuss concurrent hysterectomy at the time of bilateral salpingo-oophorectomy with their providers due to the potential serous endometrial cancer risk. These individuals may also be candidates for estrogen-alone HRT.     The NCCN recommends that men with a BRCA1 mutation should undergo the following cancer screening:   Breast awareness beginning at age 35  Clinical breast exams annually, beginning at age 35  Consider annual mammogram (especially BRCA2+) beginning at age 50 (or 10 years before the youngest male breast cancer diagnosis in the family)  Consider prostate cancer screening (consisting of a PSA blood test and potentially a digital rectal exam) beginning at age  40    The NCCN recommends that men and women with a BRCA1 mutation undergo the following pancreatic cancer screening:  Annual contrast-enhanced MRI/MRCP and/or endoscopic ultrasound beginning at 50 years-old (or 10 years younger than the earliest exocrine pancreatic cancer diagnosis in the family) if there is at least one first- or second-degree affected relative     FAMILY MEMBERS AND INHERITANCE    We discussed the autosomal dominant inheritance of HBOC. Arabella's children have a 50% chance to have inherited the same BRCA1 mutation identified in her. We reviewed that BRCA1 mutations are typically passed down for generations, therefore Arabella inherited this from one of her parents.      Based on the mother's history of early-onset breast cancer, it would appear that the BRCA1 mutation likely originated there. Therefore, it is recommended that in addition to Arabella's sister who already underwent genetic testing, and eventually Arabella's sons (who are still too young), Arabella's mother and other maternal relatives consider undergoing predictive testing to determine if they inherited the familial BRCA1 mutation.     We discussed that heterozygous BRCA1 mutations are not associated with an increased risk of pediatric cancers and therefore predictive testing is not recommended until age 18.  However, individuals of childbearing age who test positive for the familial BRCA1 mutation may want to consider genetic counseling and testing for BRCA1 mutations in their partners to determine their reproductive risks of Fanconi anemia.    Arabella received comprehensive counseling regarding her positive genetic test results. Benefits and limitations of genetic testing were discussed. She was given ample opportunity to ask questions and all of her concerns were addressed. Arabella was previously provided with a copy of her genetic test results, and is encouraged to follow up with cancer genetics every ~2 years for updates.  In the meantime, she is welcome to contact us with any changes to her personal or family history, or if she has any questions or concerns.     ASSESSMENT / PLAN      Post-test genetic counseling was provided for Arabella as she tested positive for a pathogenic BRCA1 mutation (c.3400G>T).   We discussed that her current breast cancer treatment is the highest priority and that she is leaning towards bilateral mastectomy once she has completed neoadjuvant chemo-immunotherapy.    Once she has completed her breast cancer treatment, risk-reducing salpingo-oophorectomy is indicated for her, and she has already established care with [facility] (Dr. Calderon). She already underwent USO due to an ovarian dysgerminoma, but is concerned of the health risks related to early menopause. We discussed the availability of clinical trials including interval salpingectomy and delayed oophorectomy, although this is of course not currently the standard of care. I provided her with a printout for the [facility] trial: NRG- if she is interested in participating in the future. She could continue care with Dr. Calderon and would not need to change physicians to participate.  She was already referred to Dermatology, as annual skin exam is indicated for her.  Pancreatic cancer screening is not indicated for Arabella, as she has no family history of pancreatic cancer.    I also provided Arabella with a Family Letter for individuals interested in pursuing predictive testing, information about [employer] ([email or web address]), and will send her a genetic counseling letter documenting our discussion.      ICD-10-CM ICD-9-CM   1. Monoallelic mutation of BRCA1 gene  Z15.01 V84.01    Z15.09    2. Malignant neoplasm of lower-outer quadrant of right breast of female, estrogen receptor negative  C50.511 174.5    Z17.1 V86.1     Monoallelic mutation of BRCA1 gene  - Continued breast cancer treatment  - Already established care with [facility] for risk-reducing  surgical options  - Already referred to Dermatology for annual skin exam      Follow-up:  Every ~2 years for BRCA1 mutation management updates.      Approximately 60 minutes were spent face-to-face with the patient.  Approximately 120 minutes in total were spent on this encounter, which includes face-to-face time and non-face-to-face time preparing to see the patient (e.g., review of tests), obtaining and/or reviewing separately obtained history, documenting clinical information in the electronic or other health record, independently interpreting results (not separately reported) and communicating results to the patient/family/caregiver, or care coordination (not separately reported).     This assessment is based on the history and reports provided, as well as the current scientific knowledge regarding cancer genetics.         Yamile Valiente, [facility] [place], [facility]  Senior Genetic Counselor, Hereditary and High-Risk Clinic  Department of Hematology and Oncology  [facility]    [facility]        CC:  Dr. Corina Mcgovern

## 2023-11-30 ENCOUNTER — OFFICE VISIT (OUTPATIENT)
Dept: HEMATOLOGY/ONCOLOGY | Facility: CLINIC | Age: 43
End: 2023-11-30
Payer: COMMERCIAL

## 2023-11-30 DIAGNOSIS — Z17.1 MALIGNANT NEOPLASM OF LOWER-OUTER QUADRANT OF RIGHT BREAST OF FEMALE, ESTROGEN RECEPTOR NEGATIVE: ICD-10-CM

## 2023-11-30 DIAGNOSIS — Z15.09 MONOALLELIC MUTATION OF BRCA1 GENE: Primary | ICD-10-CM

## 2023-11-30 DIAGNOSIS — C50.511 MALIGNANT NEOPLASM OF LOWER-OUTER QUADRANT OF RIGHT BREAST OF FEMALE, ESTROGEN RECEPTOR NEGATIVE: ICD-10-CM

## 2023-11-30 DIAGNOSIS — Z15.01 MONOALLELIC MUTATION OF BRCA1 GENE: Primary | ICD-10-CM

## 2023-11-30 PROCEDURE — 99999 PR PBB SHADOW E&M-EST. PATIENT-LVL III: ICD-10-PCS | Mod: PBBFAC,,, | Performed by: GENETIC COUNSELOR, MS

## 2023-11-30 PROCEDURE — 96040 PR GENETIC COUNSELING, EACH 30 MIN: ICD-10-PCS | Mod: S$GLB,,, | Performed by: GENETIC COUNSELOR, MS

## 2023-11-30 PROCEDURE — 96040 PR GENETIC COUNSELING, EACH 30 MIN: CPT | Mod: S$GLB,,, | Performed by: GENETIC COUNSELOR, MS

## 2023-11-30 PROCEDURE — 99999 PR PBB SHADOW E&M-EST. PATIENT-LVL III: CPT | Mod: PBBFAC,,, | Performed by: GENETIC COUNSELOR, MS

## 2023-11-30 NOTE — LETTER
[facility]reditary & [facility]  [address] [place] [address]  Phone [phone]  Fax [phone]    Dear relative of Arabella Catalan,    Arabella recently had genetic testing that revealed a mutation in her BRCA1 gene, which is associated with an increased risk for breast, ovarian, prostate and possibly other cancers. Since mutations are passed directly from parent to child, with each child having a 50% chance of inheriting the mutation, this BRCA1 mutation could be present in Arabella's siblings, children, aunt/uncles, cousins, etc. Genetic counseling and testing is recommended to determine if you or other blood relatives of Arabella have this mutation. The genetics provider will determine if you only need testing for this BRCA1 mutation or if you should also be tested for mutations in other genes. If you only need testing for this BRCA1 mutation, [facility] will test your BRCA1 gene for free if your specimen is received within 150 days of Arabella's results date. Please give this letter to your genetics provider.    PROBAND Arabella Catalan      1980   LAB [facility]   TEST [facility] Common Hereditary Cancers +RNA (48 genes)   REQUISITION # [id number]   RESULT DATE 2023           (+150 days = 2023)   RESULT BRCA1+ (c.3400G>T)       Genetic counseling appointments:   Anyone interested in pursuing genetic counseling/testing can contact the [facility] to schedule an appointment with a genetics provider by calling [phone]. In-person visits are available in [facility], and [place]. Virtual visits are available for individuals located in [place]. Virtual patients must be able to access the virtual visit through the MyChart ([facility]) portal. Anyone who is unable to see an [facility] provider or prefers an in-person visit with someone closer to home can find a genetic counselor in their area by visiting the  website for the National Society of Genetic Counselors (www.NSGC.org) and clicking Find a Genetic Counselor.     Finding out you have a genetic mutation can be difficult, but knowledge is power, and increased screening and risk-reduction strategies can be recommended to prevent cancer or identify it at an earlier, more treatable stage.     Sincerely,    Yamile Valiente, [facility] [place]  Senior Genetic Counselor  [facility]

## 2023-11-30 NOTE — LETTER
November 30, 2023    Arabella Ga Alayna  [address]  [facility] [address]             [facility]  [address] [place] [id number]  Phone: [phone]  Fax: [phone] Dear Arabella,    It was a pleasure to meeting you on November 30, 2023 for your genetic counseling appointment at the [facility]. We reviewed your medical history, family history and the implications of the BRCA1 mutation found in you. Below is a summary of our discussion.    Cancer Genetics    Before we discussed your results, we briefly reviewed some information about genetics. DNA is our genetic code, as it provides instructions for our bodies on how to grow and function. Our DNA is organized into chunks called genes, and each gene has an important role in our bodies. Some genes determine our physical traits, such as height and eye color, but other genes are supposed to protect our bodies from developing cancer, including BRCA1. When an individual is born with a damaging mutation (pathogenic variant) in one of these genes, it is important to identify them so that increased screening recommendations and/or preventative options can be made.    Genetic Test Results    You had a sample submitted to [facility] on 8/21/23 for Common Hereditary Cancers Panel testing. This panel includes testing of the following genes:    APC, MARK, AXIN2, BAP1, BARD1, BMPR1A, BRCA1, BRCA2, BRIP1, CDH1, CDK4, CDKN2A, CHEK2, CTNNA1, DICER1, EPCAM, FH, GREM1, HOXB13, KIT, MBD4, MEN1, MLH1, MSH2, MSH3, MSH6, MUTYH, NF1, NTHL1, PALB2, PDGFRA, PMS2, POLD1, POLE, PTEN, RAD51C, RAD51D, SDHA, SDHB, SDHC, SDHD, SMAD4, SMARCA4, STK11, TP53, TSC1, TSC2, VHL    RESULT: POSITIVE  BRCA1+ (c.3400G>T)  heterozygous     The results were positive for a pathogenic variant in BRCA1 . This is diagnostic of Hereditary Breast and Ovarian Cancer (HBOC) syndrome.    Additionally, a variant of unknown significance  "(VUS) was found. A VUS is a chance in a gene that may or may not be involved in cancer risk. However, there is not currently enough information to determine whether it is truly disease-causing ("pathogenic/positive") or completely harmless ("benign/negative").     The VUS was found in the NF1 gene, pathogenic mutations in which cause Neurofibromatosis type 1. However, it is unknown if the variant found in you, labeled c.6964C>T, is related to cancer risk. One computer model predicts it to disrupt NF1 protein function, but it has never been identified in individuals with a clinical diagnosis of Neurofibromatosis type 1. Therefore, it is not recommended to alter management for you or your family at this time, based on this variant.    Most VUS (>90%) are eventually reclassified as benign . When the variant is reclassified, the genetic testing lab will issue an amended results report to Dr. Min's office, and you will be notified. No actions are needed for a VUS other than checking in with the genetic testing lab or the cancer genetics team periodically.    Cancer Risks    We discussed that women with a BRCA1 mutation have up to an 87% lifetime risk of breast cancer, up to a 60% lifetime risk of a second breast cancer and up to a 44% lifetime risk of ovarian cancer.  There may be an increased risk of male breast, prostate, pancreatic and melanoma cancers, but exact risk estimates are not available at this time. Additionally, new data suggests that women with a BRCA1 mutation are at increased risk of serous endometrial cancer, although risk estimates are not available at this time.    Additionally, when an individual has biallelic BRCA1 mutations, this causes a condition known as Fanconi anemia, which is characterized by bone marrow failure, birth defects, short stature, abnormal skin pigmentation and increased risk of childhood cancers (especially blood cancers). If both parents have a BRCA1 mutation, there is a " 25% chance with each pregnancy for the child to have Fanconi anemia.     MANAGEMENT RECOMMENDATIONS    The National Comprehensive Cancer Network (NCCN) recommends that women with a BRCA1 mutation consider undergoing the following breast cancer screening:   Breast awareness starting at age 18  Clinical breast exams every 6-12 months, starting at age 25  Breast MRI with contrast and mammogram with consideration of tomosynthesis annually, beginning at age 25 and 30, respectively  Individualized screening after age 75  Consideration of chemoprevention after age 35  Consideration of prophylactic mastectomy to reduce breast cancer risk by more than 90%    The NCCN recommends that women with a BRCA1 mutation consider undergoing the following ovarian cancer screening:  Transvaginal ultrasound, beginning at the age of 30-35, at the clinician's discretion  CA-125 blood test, beginning at the age of 30-35, at the clinician's discretion  Consideration of prophylactic bilateral salphingo-oophorectomy between the age of 35-40 to reduce the risk of ovarian cancer by approximately 80-85% (or earlier depending on the family history)  Consideration of oral contraceptives to reduce the risk of ovarian cancer    Salpingectomy alone is not the standard of care for risk reduction, but there are currently ongoing clinical trials investigating the efficacy and safety of interval salpingectomy and delayed oophorectomy. I provided you with a copy of the [facility] trial information if you would like to participate in the future.    Additionally, individuals with a BRCA1 mutation may discuss concurrent hysterectomy at the time of bilateral salpingo-oophorectomy with their providers due to the potential serous endometrial cancer risk. These individuals may also be candidates for estrogen-alone HRT.     The NCCN recommends that men with a BRCA1 mutation should undergo the following cancer screening:   Breast awareness beginning at age  35  Clinical breast exams annually, beginning at age 35  Consider annual mammogram (especially BRCA2+) beginning at age 50 (or 10 years before the youngest male breast cancer diagnosis in the family)  Consider prostate cancer screening (consisting of a PSA blood test and potentially a digital rectal exam) beginning at age 40    The NCCN recommends that men and women with a BRCA1 mutation undergo the following pancreatic cancer screening:  Annual contrast-enhanced MRI/MRCP and/or endoscopic ultrasound beginning at 50 years-old (or 10 years younger than the earliest exocrine pancreatic cancer diagnosis in the family) if there is at least one first- or second-degree affected relative     Family Members and Inheritance    HBOC is inherited in an autosomal dominant pattern, meaning each of your first degree relatives (children, parents, siblings) have a 50% chance to have also inherited the same BRCA1 mutation found in you. Based on your family history, it would appear that the BRCA1 mutation was inherited from your maternal side of the family. Therefore, it is recommended that in addition to your sister who already underwent testing and eventually your sons, for your mother and other maternal relatives to undergo predictive testing for this BRCA1 mutation. Any relatives who are interested in undergoing familial mutation testing should bring a copy of your genetic test report to their appointment with their provider. Please send them a copy of the family letter I sent to you, as well.    Your relatives could have a genetic mutation you did not inherit and may be at increased risk for cancer based on their own personal and family history. Therefore, they should meet with a genetics provider to determine if they should have testing for mutations in genes other than BRCA1.  Additionally, heterozygous mutations in BRCA1 are not known to cause pediatric cancers, therefore, testing is not recommended until at least 18  years-old.    Anyone interested in pursuing genetic counseling/testing can contact the [facility] for an in-personal or virtual appointment in [place] [phone] or [place] [phone]. Virtual patients must be located in [place] and able to access the virtual visit through the MyChart ([facility]) portal. Anyone who is located outside of [place] or prefers to see someone in-person closer to home can visit [email or web address] to locate a local genetic counselor.     Support    [employer] ("[employer][email or web address])    Follow-up    Cancer genetics is a rapidly evolving field, and there may be additional information available in the future that could be beneficial to you or your family. Please update me through [facility] with any changes to your personal or family history and with any relative's genetic testing results. I'm happy to review their results to determine how they might affect you and other family members.     Sincerely,      Yamile Valiente, [facility], [facility]  Senior Genetic Counselor, Hereditary/High-Risk Clinic  [facility]    Phone:  [phone]

## 2023-12-02 ENCOUNTER — PATIENT MESSAGE (OUTPATIENT)
Dept: ADMINISTRATIVE | Facility: OTHER | Age: 43
End: 2023-12-02
Payer: COMMERCIAL

## 2023-12-04 ENCOUNTER — LAB VISIT (OUTPATIENT)
Dept: LAB | Facility: HOSPITAL | Age: 43
End: 2023-12-04
Attending: INTERNAL MEDICINE
Payer: COMMERCIAL

## 2023-12-04 ENCOUNTER — OFFICE VISIT (OUTPATIENT)
Dept: HEMATOLOGY/ONCOLOGY | Facility: CLINIC | Age: 43
End: 2023-12-04
Payer: COMMERCIAL

## 2023-12-04 VITALS
WEIGHT: 123.69 LBS | HEART RATE: 91 BPM | SYSTOLIC BLOOD PRESSURE: 104 MMHG | DIASTOLIC BLOOD PRESSURE: 74 MMHG | BODY MASS INDEX: 21.91 KG/M2 | OXYGEN SATURATION: 99 % | HEIGHT: 63 IN | RESPIRATION RATE: 18 BRPM | TEMPERATURE: 98 F

## 2023-12-04 DIAGNOSIS — Z15.09 BRCA GENE POSITIVE: ICD-10-CM

## 2023-12-04 DIAGNOSIS — L27.0 DRUG RASH: ICD-10-CM

## 2023-12-04 DIAGNOSIS — K76.9 LIVER DISEASE, UNSPECIFIED: ICD-10-CM

## 2023-12-04 DIAGNOSIS — Z15.01 BRCA GENE POSITIVE: ICD-10-CM

## 2023-12-04 DIAGNOSIS — R53.83 CHEMOTHERAPY-INDUCED FATIGUE: ICD-10-CM

## 2023-12-04 DIAGNOSIS — C50.511 MALIGNANT NEOPLASM OF LOWER-OUTER QUADRANT OF RIGHT BREAST OF FEMALE, ESTROGEN RECEPTOR NEGATIVE: Primary | ICD-10-CM

## 2023-12-04 DIAGNOSIS — Z17.1 MALIGNANT NEOPLASM OF LOWER-OUTER QUADRANT OF RIGHT BREAST OF FEMALE, ESTROGEN RECEPTOR NEGATIVE: Primary | ICD-10-CM

## 2023-12-04 DIAGNOSIS — Z17.1 MALIGNANT NEOPLASM OF LOWER-OUTER QUADRANT OF RIGHT BREAST OF FEMALE, ESTROGEN RECEPTOR NEGATIVE: ICD-10-CM

## 2023-12-04 DIAGNOSIS — T45.1X5A CHEMOTHERAPY-INDUCED FATIGUE: ICD-10-CM

## 2023-12-04 DIAGNOSIS — C50.911 INVASIVE DUCTAL CARCINOMA OF BREAST, FEMALE, RIGHT: ICD-10-CM

## 2023-12-04 DIAGNOSIS — C50.511 MALIGNANT NEOPLASM OF LOWER-OUTER QUADRANT OF RIGHT BREAST OF FEMALE, ESTROGEN RECEPTOR NEGATIVE: ICD-10-CM

## 2023-12-04 LAB
ALBUMIN SERPL BCP-MCNC: 4.3 G/DL (ref 3.5–5.2)
ALP SERPL-CCNC: 45 U/L (ref 55–135)
ALT SERPL W/O P-5'-P-CCNC: 11 U/L (ref 10–44)
ANION GAP SERPL CALC-SCNC: 9 MMOL/L (ref 8–16)
AST SERPL-CCNC: 17 U/L (ref 10–40)
B-HCG UR QL: NEGATIVE
BASOPHILS # BLD AUTO: ABNORMAL K/UL (ref 0–0.2)
BASOPHILS NFR BLD: 0 % (ref 0–1.9)
BILIRUB SERPL-MCNC: 0.2 MG/DL (ref 0.1–1)
BUN SERPL-MCNC: 13 MG/DL (ref 6–20)
CALCIUM SERPL-MCNC: 9.6 MG/DL (ref 8.7–10.5)
CHLORIDE SERPL-SCNC: 104 MMOL/L (ref 95–110)
CO2 SERPL-SCNC: 27 MMOL/L (ref 23–29)
CREAT SERPL-MCNC: 0.8 MG/DL (ref 0.5–1.4)
DIFFERENTIAL METHOD: ABNORMAL
EOSINOPHIL # BLD AUTO: ABNORMAL K/UL (ref 0–0.5)
EOSINOPHIL NFR BLD: 0 % (ref 0–8)
ERYTHROCYTE [DISTWIDTH] IN BLOOD BY AUTOMATED COUNT: 14.7 % (ref 11.5–14.5)
EST. GFR  (NO RACE VARIABLE): >60 ML/MIN/1.73 M^2
GLUCOSE SERPL-MCNC: 100 MG/DL (ref 70–110)
HCT VFR BLD AUTO: 36.5 % (ref 37–48.5)
HGB BLD-MCNC: 12.2 G/DL (ref 12–16)
IMM GRANULOCYTES # BLD AUTO: ABNORMAL K/UL (ref 0–0.04)
IMM GRANULOCYTES NFR BLD AUTO: ABNORMAL % (ref 0–0.5)
LYMPHOCYTES # BLD AUTO: ABNORMAL K/UL (ref 1–4.8)
LYMPHOCYTES NFR BLD: 58 % (ref 18–48)
MCH RBC QN AUTO: 32.7 PG (ref 27–31)
MCHC RBC AUTO-ENTMCNC: 33.4 G/DL (ref 32–36)
MCV RBC AUTO: 98 FL (ref 82–98)
MONOCYTES # BLD AUTO: ABNORMAL K/UL (ref 0.3–1)
MONOCYTES NFR BLD: 12 % (ref 4–15)
NEUTROPHILS NFR BLD: 28 % (ref 38–73)
NEUTS BAND NFR BLD MANUAL: 2 %
NRBC BLD-RTO: 0 /100 WBC
PLATELET # BLD AUTO: 305 K/UL (ref 150–450)
PLATELET BLD QL SMEAR: ABNORMAL
PMV BLD AUTO: 8.6 FL (ref 9.2–12.9)
POTASSIUM SERPL-SCNC: 4.1 MMOL/L (ref 3.5–5.1)
PROT SERPL-MCNC: 7.6 G/DL (ref 6–8.4)
RBC # BLD AUTO: 3.73 M/UL (ref 4–5.4)
SODIUM SERPL-SCNC: 140 MMOL/L (ref 136–145)
WBC # BLD AUTO: 3.33 K/UL (ref 3.9–12.7)

## 2023-12-04 PROCEDURE — 1159F MED LIST DOCD IN RCRD: CPT | Mod: CPTII,S$GLB,, | Performed by: INTERNAL MEDICINE

## 2023-12-04 PROCEDURE — 1160F RVW MEDS BY RX/DR IN RCRD: CPT | Mod: CPTII,S$GLB,, | Performed by: INTERNAL MEDICINE

## 2023-12-04 PROCEDURE — 3008F PR BODY MASS INDEX (BMI) DOCUMENTED: ICD-10-PCS | Mod: CPTII,S$GLB,, | Performed by: INTERNAL MEDICINE

## 2023-12-04 PROCEDURE — 1160F PR REVIEW ALL MEDS BY PRESCRIBER/CLIN PHARMACIST DOCUMENTED: ICD-10-PCS | Mod: CPTII,S$GLB,, | Performed by: INTERNAL MEDICINE

## 2023-12-04 PROCEDURE — 99215 OFFICE O/P EST HI 40 MIN: CPT | Mod: S$GLB,,, | Performed by: INTERNAL MEDICINE

## 2023-12-04 PROCEDURE — 85007 BL SMEAR W/DIFF WBC COUNT: CPT | Mod: PN | Performed by: INTERNAL MEDICINE

## 2023-12-04 PROCEDURE — 3074F PR MOST RECENT SYSTOLIC BLOOD PRESSURE < 130 MM HG: ICD-10-PCS | Mod: CPTII,S$GLB,, | Performed by: INTERNAL MEDICINE

## 2023-12-04 PROCEDURE — 1159F PR MEDICATION LIST DOCUMENTED IN MEDICAL RECORD: ICD-10-PCS | Mod: CPTII,S$GLB,, | Performed by: INTERNAL MEDICINE

## 2023-12-04 PROCEDURE — 80053 COMPREHEN METABOLIC PANEL: CPT | Mod: PN | Performed by: INTERNAL MEDICINE

## 2023-12-04 PROCEDURE — 85027 COMPLETE CBC AUTOMATED: CPT | Mod: PN | Performed by: INTERNAL MEDICINE

## 2023-12-04 PROCEDURE — 3078F PR MOST RECENT DIASTOLIC BLOOD PRESSURE < 80 MM HG: ICD-10-PCS | Mod: CPTII,S$GLB,, | Performed by: INTERNAL MEDICINE

## 2023-12-04 PROCEDURE — 3078F DIAST BP <80 MM HG: CPT | Mod: CPTII,S$GLB,, | Performed by: INTERNAL MEDICINE

## 2023-12-04 PROCEDURE — 3074F SYST BP LT 130 MM HG: CPT | Mod: CPTII,S$GLB,, | Performed by: INTERNAL MEDICINE

## 2023-12-04 PROCEDURE — 99999 PR PBB SHADOW E&M-EST. PATIENT-LVL III: CPT | Mod: PBBFAC,,, | Performed by: INTERNAL MEDICINE

## 2023-12-04 PROCEDURE — 36415 COLL VENOUS BLD VENIPUNCTURE: CPT | Mod: PN | Performed by: INTERNAL MEDICINE

## 2023-12-04 PROCEDURE — 99215 PR OFFICE/OUTPT VISIT, EST, LEVL V, 40-54 MIN: ICD-10-PCS | Mod: S$GLB,,, | Performed by: INTERNAL MEDICINE

## 2023-12-04 PROCEDURE — 3008F BODY MASS INDEX DOCD: CPT | Mod: CPTII,S$GLB,, | Performed by: INTERNAL MEDICINE

## 2023-12-04 PROCEDURE — 99999 PR PBB SHADOW E&M-EST. PATIENT-LVL III: ICD-10-PCS | Mod: PBBFAC,,, | Performed by: INTERNAL MEDICINE

## 2023-12-04 PROCEDURE — 81025 URINE PREGNANCY TEST: CPT | Mod: PN | Performed by: INTERNAL MEDICINE

## 2023-12-04 NOTE — PROGRESS NOTES
PROGRESS NOTE    Subjective:       Patient ID: Arabella Catalan is a 43 y.o. female.  MRN: [id number]  : 1980    Chief Complaint: cT2 cN0 TNBC     History of Present Illness:   Arabella Catalan is a 43 y.o. female who is referred for TNBC.      As previously documented she started screening mammograms for a few years after her ovarian cancer , in her late 20's. Routine pelvic exam revealed an ovarian cancer, s/p right oophorectomy for dysgerminoma.     Resumed routine mammograms at 42, had a normal screening mammogram in May 2023 but felt a lump in the right breast in July. Further imaging showed 2 lesions, both > 2 cm , both biopsied to be G3 IDC, ER/IA and Her 2 jose eduardo negative.       Menarche at age 14 year old. . Age at first live birth 31. Did  breast feed 2 year and 6 months OCP-yes 20 years ago  Menopause at none. HRT-none     Family history cancer:  Mother breast cancer at 38 and cervical cancer     Smoker Pk/yr quit date , smoked around 0.5 pack a day     Interim history:    On neoadjuvant chemo based on KEYNOTE-522. She is using Dignicap.     Here for cycle 6 D 1,  AC+ Pembro. Had an echo on 11/3, EF 65-70%.   Previously noticed joint pains with Taxol have resolved. Did have fatigue between 7-10 days, then improved.   No mucositis, rash or diarrhea.     Had dsyuria and pelvic pressure 2- 3 weeks ago, U/a was normal. Did not need abx, now resolved.     Breast mass is no longer palpable. Has US mid cycle 4 that shows responsive disease.   ANC is 924 today, chemo is due on .     Oncology History:  5/3/23  Impression:  Bilateral  There is no mammographic evidence of malignancy.     BI-RADS Category:   Overall: 2 - Benign    23  US  Impression:  Right  Mass: Right breast 1.5 cm x 1 cm x 1.6 cm mass at the 8 o'clock position. Assessment: 4 - Suspicious finding. Biopsy and possible aspiration are recommended.   Mass: Right breast  2 cm x 1.1 cm x 1.6 cm mass at the 7 o'clock position. Assessment: 4 - Suspicious finding. Biopsy is recommended.      BI-RADS Category:   Overall: 4 - Suspicious    8/2/23  MRI  Impression:  Right  Mass: Right breast 2.7 cm x 2.2 cm x 2 cm mass at the 8 o'clock position. Assessment: 6 - Known biopsy, proven malignancy.   Mass: Right breast 2 cm x 1.6 cm x 1.6 cm mass at the 7 o'clock position. Assessment: 6 - Known biopsy, proven malignancy.      Left  There is no MR evidence of malignancy in the left breast.     BI-RADS Category:   Overall: 6 - Known Biopsy-Proven Malignancy  7/31/23:  DIAGNOSIS:   07/28/2023 JL:nidia     1. RIGHT BREAST AT 8:00 7 CM FROM NIPPLE CORE NEEDLE BIOPSIES:   - INVASIVE DUCT CARCINOMA, HIGH-GRADE (3,2,3).     2. RIGHT BREAST AT 7:00 3 CM FROM NIPPLE CORE NEEDLE BIOPSIES:   - IN SITU AND INVASIVE DUCT CARCINOMA, HIGH-GRADE (3,3,2).     RIGHT BREAST: INVASIVE DUCTAL CARCINOMA   GRADE: HIGH     ER: NEGATIVE, MN: NEGATIVE, HER2**: NEGATIVE       8/7/23  CT chest abd pelvis  Impression:     1. Known right breast malignancy, better evaluated on comparison breast MRI exam.  2. Mild asymmetrically prominent, but not pathologically enlarged right axillary lymph nodes, measuring up to 6 mm in short axis dimension.  No mediastinal or hilar lymphadenopathy.  3. Mild hepatomegaly with multiple small hypoattenuating hepatic lesions, the largest measuring 0.9 cm in the right hepatic lobe, which are too small to definitively characterize.  Metastases not excluded.  Consider further evaluation with PET-CT and/or contrast enhanced liver protocol MRI.  4. Solid, noncalcified 2 mm pulmonary nodule in the right lower lobe, nonspecific. Attention on follow-up imaging recommended.     8/18/23  Liver MRI      Impression:     Small circumscribed lesions in the liver are most compatible with hemangiomas.  No suspicious liver lesions.    8/10/123  Bone scan   Impression:     No evidence of osseous metastatic  disease.     10/24/23:  US right breast   Impression:     Interval decrease in size of both right breast masses compatible with favorable response to neoadjuvant chemotherapy    US transvaginal  Impression:     Left ovarian 3 mm echogenic lesion, possibly a small dermoid.  History:  Past Medical History:   Diagnosis Date    Abnormal Pap smear of cervix     BRCA1 positive     Breast cancer 07/26/2023    right    Breast cancer 07/26/2023    right    HPV (human papilloma virus) infection     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      Past Surgical History:   Procedure Laterality Date    BREAST BIOPSY Right 2015    benign    BREAST BIOPSY Right 07/26/2023    BREAST BIOPSY Right 07/26/2023    BREAST BIOPSY Right 08/16/2023    benign LN    INSERTION OF TUNNELED CENTRAL VENOUS CATHETER (CVC) WITH SUBCUTANEOUS PORT N/A 08/15/2023    Procedure: [id number]-[id number];  Surgeon: Skyler Aldrich MD;  Location: [facility];  Service: General;  Laterality: N/A;    OOPHORECTOMY Right 2009    stage 1a dysgerminoma    VAGINAL DELIVERY  12/13/2016     Family History   Problem Relation Age of Onset    Breast cancer Mother 38    Cervical cancer Mother         dx in 20s    BRCA 1/2 Sister     Brain cancer Maternal Uncle     Colon cancer Neg Hx     Colon polyps Neg Hx     Esophageal cancer Neg Hx     Stomach cancer Neg Hx     Inflammatory bowel disease Neg Hx       Social History     Tobacco Use    Smoking status: Former     Types: Cigarettes    Smokeless tobacco: Not on file   Substance and Sexual Activity    Alcohol use: Yes     Comment: occasionally    Drug use: No    Sexual activity: Not Currently     Partners: Male     Birth control/protection: None        ROS:   Review of Systems   Constitutional:  Positive for malaise/fatigue. Negative for fever and weight loss.   HENT:  Negative for congestion, hearing loss, nosebleeds and sore throat.    Eyes:  Negative for double vision and photophobia.   Respiratory:  Negative for cough,  "hemoptysis, sputum production, shortness of breath and wheezing.    Cardiovascular:  Negative for chest pain, palpitations, orthopnea and leg swelling.   Gastrointestinal:  Negative for abdominal pain, blood in stool, constipation, diarrhea, heartburn, nausea and vomiting.   Genitourinary:  Negative for dysuria, frequency, hematuria and urgency.   Musculoskeletal:  Positive for joint pain. Negative for back pain and myalgias.   Skin:  Negative for itching and rash.   Neurological:  Negative for dizziness, tingling, seizures, weakness and headaches.   Endo/Heme/Allergies:  Negative for polydipsia. Does not bruise/bleed easily.   Psychiatric/Behavioral:  Negative for depression and memory loss. The patient is nervous/anxious and has insomnia (during steroid days).         Objective:     Vitals:    12/04/23 1250   BP: 104/74   Pulse: 91   Resp: 18   Temp: 97.5 °F (36.4 °C)   TempSrc: Temporal   SpO2: 99%   Weight: 56.1 kg (123 lb 10.9 oz)   Height: 5' 3" (1.6 m)   PainSc: 0-No pain             Physical Examination:   Physical Exam  Vitals and nursing note reviewed.   Constitutional:       General: She is not in acute distress.     Appearance: She is not diaphoretic.   HENT:      Head: Normocephalic.      Mouth/Throat:      Pharynx: No oropharyngeal exudate.   Eyes:      General: No scleral icterus.     Conjunctiva/sclera: Conjunctivae normal.   Neck:      Thyroid: No thyromegaly.   Cardiovascular:      Rate and Rhythm: Normal rate and regular rhythm.      Heart sounds: Normal heart sounds. No murmur heard.  Pulmonary:      Effort: Pulmonary effort is normal. No respiratory distress.      Breath sounds: No stridor. No wheezing or rales.   Chest:      Chest wall: No tenderness.   Abdominal:      General: Bowel sounds are normal. There is no distension.      Palpations: Abdomen is soft. There is no mass.      Tenderness: There is no abdominal tenderness. There is no rebound.   Musculoskeletal:         General: No " tenderness or deformity. Normal range of motion.      Cervical back: Neck supple.   Lymphadenopathy:      Cervical: No cervical adenopathy.   Skin:     General: Skin is warm and dry.      Findings: No erythema or rash.   Neurological:      Mental Status: She is alert and oriented to person, place, and time.      Cranial Nerves: No cranial nerve deficit.      Coordination: Coordination normal.      Gait: Gait is intact.   Psychiatric:         Mood and Affect: Affect normal.         Cognition and Memory: Memory normal.         Judgment: Judgment normal.        Diagnostic Tests:  Significant Imaging: I have reviewed and interpreted all pertinent imaging results/findings.    Laboratory Data:  All pertinent labs have been reviewed.    Labs:   Lab Results   Component Value Date    WBC 3.33 (L) 12/04/2023    HGB 12.2 12/04/2023    HCT 36.5 (L) 12/04/2023    MCV 98 12/04/2023     12/04/2023       Assessment/Plan:   Invasive ductal carcinoma of breast, female, right  cT2 cN0 G3 IDC, TNBC    We discussed the more agressive nature and multifocal nature of her TNBC. Clinically node negative. Based on size, she is a candidate for neoadjuvant chemo immunotherapy based on Keynote-522 to prevent systemic spread and to monitor response to therapy and that 64% of the patients on trial had a pCR.     She is agreeable,echo is normal, no evidence for distant metastatic disease noted.s/p C5 AC+ Pembro. ANC is < 1000 at this time, repeat cbc prior to cycle 6 on 12/7 and administer cycle 6 if ANC > 1000 and add Neulasta for the next 3 cycles. Discussed with infusion.     Offer supportive care with IO and onc psych.     After completing neoadjuvant therapy, she would be referred back for surgery.     She has also been enrolled in the Protocol [id number], Disparities in Results of Immune Checkpoint Inhibitor Treatment (DIRECT): A Prospective Cohort Study of Cancer Survivors Treated with anti-PD-L1 Immunotherapy in a Community Oncology  Setting.     We also discussed [id number] trial regarding adjuvant Pembrolizumab if she has a complete response. She received additional information and will think about it.     Transaminitis   Grade 2, held Keytruda for cycle 2, treated with a short course of steroids, now LFTs have normalized and she received Keytruda since then and she has had no further issues.     History of ovarian cancer  BRCA positive   Plan for b/l mastectomy. Follow up with Gyn onc, GI and dermatology.   Referred to genetics clinic.     Joint pains  Improving.       MDM includes  :    - Acute or chronic illness or injury that poses a threat to life or bodily function  - Independent review and explanation of 3+ results from unique tests  - Discussion of management and ordering 3+ unique tests  - Extensive discussion of treatment and management  - Prescription drug management  - Drug therapy requiring intensive monitoring for toxicity      ECOG SCORE             Discussion:   No follow-ups on file.    Plan was discussed with the patient at length, and she verbalized understanding. Arabella was given an opportunity to ask questions that were answered to her satisfaction, and she was advised to call in the interval if any problems or questions arise.    Electronically signed by Corina Mcgovern MD        Route Chart for Scheduling    Med Onc Chart Routing  Urgent    Follow up with physician . 1/18 overbook   Follow up with SAUL . 12/28   Infusion scheduling note   12/7,12/28,1/18   Injection scheduling note 12/8 neulasta and then after every chemo   Labs CBC, CMP and TSH   Scheduling:  Preferred lab:  Lab interval:  cbc on 12/7, then all labs on 12/28 and 1/18   Imaging    Pharmacy appointment    Other referrals                  Treatment Plan Information   OP PEMBROLIZUMAB WITH WEEKLY PACLITAXEL CARBOPLATIN (AUC 1.5) FOLLOWED BY PEMBROLIZUMAB DOXORUBICIN CYCLOPHOSPHAMIDE FOLLOWED BY PEMBROLIZUMAB 200MG Q3W   Corina Mcgovern MD   Upcoming  Treatment Dates - OP PEMBROLIZUMAB WITH WEEKLY PACLITAXEL CARBOPLATIN (AUC 1.5) FOLLOWED BY PEMBROLIZUMAB DOXORUBICIN CYCLOPHOSPHAMIDE FOLLOWED BY PEMBROLIZUMAB 200MG Q3W    12/7/2023       Chemotherapy       DOXOrubicin chemo injection 94 mg       cycloPHOSphamide 600 mg/m2 = 940 mg in sodium chloride 0.9% 254.7 mL chemo infusion       Antiemetics       aprepitant (CINVANTI) injection 130 mg       palonosetron 0.25mg/dexAMETHasone 12mg in NS IVPB 0.25 mg 50 mL       LORazepam injection 0.5 mg       Immunotherapy       pembrolizumab (KEYTRUDA) 200 mg in sodium chloride 0.9% SolP 108 mL infusion  12/28/2023       Chemotherapy       DOXOrubicin chemo injection 94 mg       cycloPHOSphamide 600 mg/m2 = 940 mg in sodium chloride 0.9% 254.7 mL chemo infusion       Antiemetics       aprepitant (CINVANTI) injection 130 mg       palonosetron 0.25mg/dexAMETHasone 12mg in NS IVPB 0.25 mg 50 mL       LORazepam injection 0.5 mg       Immunotherapy       pembrolizumab (KEYTRUDA) 200 mg in sodium chloride 0.9% SolP 108 mL infusion  1/18/2024       Chemotherapy       DOXOrubicin chemo injection 94 mg       cycloPHOSphamide 600 mg/m2 = 940 mg in sodium chloride 0.9% 254.7 mL chemo infusion       Antiemetics       aprepitant (CINVANTI) injection 130 mg       palonosetron 0.25mg/dexAMETHasone 12mg in NS IVPB 0.25 mg 50 mL       LORazepam injection 0.5 mg       Immunotherapy       pembrolizumab (KEYTRUDA) 200 mg in sodium chloride 0.9% SolP 108 mL infusion  2/8/2024       Immunotherapy       pembrolizumab (KEYTRUDA) 200 mg in sodium chloride 0.9% SolP 108 mL infusion        Answers submitted by the patient for this visit:  Review of Systems Questionnaire (Submitted on 11/11/2023)  appetite change : No  unexpected weight change: No  mouth sores: No  visual disturbance: No  adenopathy: No    Answers submitted by the patient for this visit:  Review of Systems Questionnaire (Submitted on 12/2/2023)  appetite change : No  unexpected weight  change: No  mouth sores: No  visual disturbance: No  adenopathy: No

## 2023-12-06 ENCOUNTER — TELEPHONE (OUTPATIENT)
Dept: HEMATOLOGY/ONCOLOGY | Facility: CLINIC | Age: 43
End: 2023-12-06
Payer: COMMERCIAL

## 2023-12-06 ENCOUNTER — CLINICAL SUPPORT (OUTPATIENT)
Dept: REHABILITATION | Facility: HOSPITAL | Age: 43
End: 2023-12-06
Payer: COMMERCIAL

## 2023-12-06 DIAGNOSIS — C50.511 MALIGNANT NEOPLASM OF LOWER-OUTER QUADRANT OF RIGHT BREAST OF FEMALE, ESTROGEN RECEPTOR NEGATIVE: ICD-10-CM

## 2023-12-06 DIAGNOSIS — R45.89 ANXIETY ABOUT HEALTH: Primary | ICD-10-CM

## 2023-12-06 DIAGNOSIS — Z17.1 MALIGNANT NEOPLASM OF LOWER-OUTER QUADRANT OF RIGHT BREAST OF FEMALE, ESTROGEN RECEPTOR NEGATIVE: ICD-10-CM

## 2023-12-06 PROCEDURE — 97810 ACUP 1/> WO ESTIM 1ST 15 MIN: CPT | Mod: PN | Performed by: ACUPUNCTURIST

## 2023-12-06 PROCEDURE — 97811 ACUP 1/> W/O ESTIM EA ADD 15: CPT | Mod: PN | Performed by: ACUPUNCTURIST

## 2023-12-07 ENCOUNTER — LAB VISIT (OUTPATIENT)
Dept: LAB | Facility: HOSPITAL | Age: 43
End: 2023-12-07
Attending: SURGERY
Payer: COMMERCIAL

## 2023-12-07 ENCOUNTER — RESEARCH ENCOUNTER (OUTPATIENT)
Dept: RESEARCH | Facility: HOSPITAL | Age: 43
End: 2023-12-07
Payer: COMMERCIAL

## 2023-12-07 ENCOUNTER — INFUSION (OUTPATIENT)
Dept: INFUSION THERAPY | Facility: HOSPITAL | Age: 43
End: 2023-12-07
Attending: INTERNAL MEDICINE
Payer: COMMERCIAL

## 2023-12-07 ENCOUNTER — TELEPHONE (OUTPATIENT)
Dept: HEMATOLOGY/ONCOLOGY | Facility: CLINIC | Age: 43
End: 2023-12-07
Payer: COMMERCIAL

## 2023-12-07 VITALS
RESPIRATION RATE: 16 BRPM | BODY MASS INDEX: 22.19 KG/M2 | HEART RATE: 95 BPM | HEIGHT: 63 IN | SYSTOLIC BLOOD PRESSURE: 106 MMHG | DIASTOLIC BLOOD PRESSURE: 64 MMHG | WEIGHT: 125.25 LBS | TEMPERATURE: 98 F

## 2023-12-07 DIAGNOSIS — C50.911 INVASIVE DUCTAL CARCINOMA OF BREAST, FEMALE, RIGHT: Primary | ICD-10-CM

## 2023-12-07 DIAGNOSIS — C50.911 INVASIVE DUCTAL CARCINOMA OF BREAST, FEMALE, RIGHT: ICD-10-CM

## 2023-12-07 LAB
BASOPHILS # BLD AUTO: 0.04 K/UL (ref 0–0.2)
BASOPHILS NFR BLD: 1.2 % (ref 0–1.9)
DIFFERENTIAL METHOD: ABNORMAL
EOSINOPHIL # BLD AUTO: 0.1 K/UL (ref 0–0.5)
EOSINOPHIL NFR BLD: 1.5 % (ref 0–8)
ERYTHROCYTE [DISTWIDTH] IN BLOOD BY AUTOMATED COUNT: 14.3 % (ref 11.5–14.5)
HCT VFR BLD AUTO: 37.7 % (ref 37–48.5)
HGB BLD-MCNC: 12.5 G/DL (ref 12–16)
IMM GRANULOCYTES # BLD AUTO: 0.04 K/UL (ref 0–0.04)
IMM GRANULOCYTES NFR BLD AUTO: 1.2 % (ref 0–0.5)
LYMPHOCYTES # BLD AUTO: 1.7 K/UL (ref 1–4.8)
LYMPHOCYTES NFR BLD: 49 % (ref 18–48)
MCH RBC QN AUTO: 32.6 PG (ref 27–31)
MCHC RBC AUTO-ENTMCNC: 33.2 G/DL (ref 32–36)
MCV RBC AUTO: 98 FL (ref 82–98)
MONOCYTES # BLD AUTO: 0.7 K/UL (ref 0.3–1)
MONOCYTES NFR BLD: 19.1 % (ref 4–15)
NEUTROPHILS # BLD AUTO: 1 K/UL (ref 1.8–7.7)
NEUTROPHILS NFR BLD: 28 % (ref 38–73)
NRBC BLD-RTO: 0 /100 WBC
PLATELET # BLD AUTO: 341 K/UL (ref 150–450)
PMV BLD AUTO: 8.5 FL (ref 9.2–12.9)
RBC # BLD AUTO: 3.83 M/UL (ref 4–5.4)
WBC # BLD AUTO: 3.41 K/UL (ref 3.9–12.7)

## 2023-12-07 PROCEDURE — A4216 STERILE WATER/SALINE, 10 ML: HCPCS | Mod: PN | Performed by: INTERNAL MEDICINE

## 2023-12-07 PROCEDURE — 96411 CHEMO IV PUSH ADDL DRUG: CPT | Mod: PN

## 2023-12-07 PROCEDURE — 36415 COLL VENOUS BLD VENIPUNCTURE: CPT | Mod: PN | Performed by: INTERNAL MEDICINE

## 2023-12-07 PROCEDURE — 96413 CHEMO IV INFUSION 1 HR: CPT | Mod: PN

## 2023-12-07 PROCEDURE — 63600175 PHARM REV CODE 636 W HCPCS: Mod: PN | Performed by: INTERNAL MEDICINE

## 2023-12-07 PROCEDURE — 96417 CHEMO IV INFUS EACH ADDL SEQ: CPT | Mod: PN

## 2023-12-07 PROCEDURE — 25000003 PHARM REV CODE 250: Mod: PN | Performed by: INTERNAL MEDICINE

## 2023-12-07 PROCEDURE — 96367 TX/PROPH/DG ADDL SEQ IV INF: CPT | Mod: PN

## 2023-12-07 PROCEDURE — 85025 COMPLETE CBC W/AUTO DIFF WBC: CPT | Mod: PN | Performed by: INTERNAL MEDICINE

## 2023-12-07 PROCEDURE — 96375 TX/PRO/DX INJ NEW DRUG ADDON: CPT | Mod: PN

## 2023-12-07 RX ORDER — LORAZEPAM 2 MG/ML
0.5 INJECTION INTRAMUSCULAR
Status: CANCELLED
Start: 2023-12-07

## 2023-12-07 RX ORDER — LORAZEPAM 2 MG/ML
0.5 INJECTION INTRAMUSCULAR
Status: DISCONTINUED | OUTPATIENT
Start: 2023-12-07 | End: 2023-12-07 | Stop reason: HOSPADM

## 2023-12-07 RX ORDER — DOXORUBICIN HYDROCHLORIDE 2 MG/ML
60 INJECTION, SOLUTION INTRAVENOUS
Status: COMPLETED | OUTPATIENT
Start: 2023-12-07 | End: 2023-12-07

## 2023-12-07 RX ORDER — SODIUM CHLORIDE 0.9 % (FLUSH) 0.9 %
10 SYRINGE (ML) INJECTION
Status: DISCONTINUED | OUTPATIENT
Start: 2023-12-07 | End: 2023-12-07 | Stop reason: HOSPADM

## 2023-12-07 RX ORDER — DIPHENHYDRAMINE HYDROCHLORIDE 50 MG/ML
50 INJECTION INTRAMUSCULAR; INTRAVENOUS ONCE AS NEEDED
Status: CANCELLED | OUTPATIENT
Start: 2023-12-07

## 2023-12-07 RX ORDER — DOXORUBICIN HYDROCHLORIDE 2 MG/ML
60 INJECTION, SOLUTION INTRAVENOUS
Status: CANCELLED | OUTPATIENT
Start: 2023-12-07

## 2023-12-07 RX ORDER — EPINEPHRINE 0.3 MG/.3ML
0.3 INJECTION SUBCUTANEOUS ONCE AS NEEDED
Status: CANCELLED | OUTPATIENT
Start: 2023-12-07

## 2023-12-07 RX ORDER — SODIUM CHLORIDE 0.9 % (FLUSH) 0.9 %
10 SYRINGE (ML) INJECTION
Status: CANCELLED | OUTPATIENT
Start: 2023-12-07

## 2023-12-07 RX ORDER — HEPARIN 100 UNIT/ML
500 SYRINGE INTRAVENOUS
Status: CANCELLED | OUTPATIENT
Start: 2023-12-07

## 2023-12-07 RX ORDER — PROCHLORPERAZINE EDISYLATE 5 MG/ML
10 INJECTION INTRAMUSCULAR; INTRAVENOUS ONCE AS NEEDED
Status: CANCELLED
Start: 2023-12-07

## 2023-12-07 RX ADMIN — Medication 10 ML: at 03:12

## 2023-12-07 RX ADMIN — SODIUM CHLORIDE: 9 INJECTION, SOLUTION INTRAVENOUS at 10:12

## 2023-12-07 RX ADMIN — PALONOSETRON 0.25 MG: 0.05 INJECTION, SOLUTION INTRAVENOUS at 11:12

## 2023-12-07 RX ADMIN — SODIUM CHLORIDE 200 MG: 9 INJECTION, SOLUTION INTRAVENOUS at 10:12

## 2023-12-07 RX ADMIN — CYCLOPHOSPHAMIDE 940 MG: 200 INJECTION, SOLUTION INTRAVENOUS at 12:12

## 2023-12-07 RX ADMIN — APREPITANT 130 MG: 130 INJECTION, EMULSION INTRAVENOUS at 11:12

## 2023-12-07 RX ADMIN — PEGFILGRASTIM 6 MG: KIT SUBCUTANEOUS at 03:12

## 2023-12-07 RX ADMIN — DOXORUBICIN HYDROCHLORIDE 94 MG: 2 INJECTION, SOLUTION INTRAVENOUS at 11:12

## 2023-12-07 RX ADMIN — LORAZEPAM 0.5 MG: 2 INJECTION INTRAMUSCULAR; INTRAVENOUS at 11:12

## 2023-12-07 NOTE — PROGRESS NOTES
Acupuncture Follow-Up Note     Name: Arabella Koroma [facility] Number: [id number]    Traditional Chinese Medicine (TCM) Diagnosis: Qi Stagnation, Blood Stasis, Damp, and Phlegm  Medical Diagnosis:   1. Anxiety about health    2. Malignant neoplasm of lower-outer quadrant of right breast of female, estrogen receptor negative         Evaluation Date: 12/06/23    Visit #/Visits authorized:     Precautions: Standard    Subjective     Chief Concern: Insomnia (2/10.  Patient thinks may be coming from hot flashes interrupting sleep.) and Hot Flashes (Symptom level is 6/10 patient would like to get those under control, since seems to be the last of her SE related issues that is still poorly regulated.)       Medical necessity is demonstrated by the following IMPAIRMENTS: Medical Necessity: Decreased mobility limits day to day activities, social, and emergent situations              Aggravating Factors:  stress     Relieving Factors:  rest    Symptom Description:     Quality:  Hot, Cold, and Variable  Severity:  6  Frequency:  every day      Objective     Observation: Patient responding well to all acupuncture treatment protocols.  Las of these issues are the hot flashes which she feels is keeping her awake at night and may be related to her anxiety.       Pulse:        thready       New Findings:  na    Treatment     Treatment Principles:  move qi and blood, relieve bi, calm perez, nourish yin    Acupuncture points used:  4 GONZALEZ, Du20, Gb34, Ki3, Ki6, Li11, Sp6, Sp9, St36, and YIN MOON    Bilateral points:  Unilateral points:  Auricular Treatment:  perez men    Needles In: 22  Needles Out: 22  Needles W/O STIM placed: 1135  Needles W/O STIM removed: 1155      Other Traditional Chinese Medicine Modalities -  na    Assessment     After treatment, patient felt improvement with each visit.     Patient prognosis is Good.     Patient will continue to benefit from acupuncture treatment to address the deficits listed in the  problem list box on initial evaluation, provide patient family education and to maximize pt's level of independence in the home and community environment.     Patient's spiritual, cultural and educational needs considered and pt agreeable to plan of care and goals.     Anticipated barriers to treatment: none    Plan     Recommend 1 /week for 12 sessions then re-assess.      Education:  Patient is aware of cumulative benefit of acupuncture

## 2023-12-07 NOTE — TELEPHONE ENCOUNTER
----- Message from Bhumika Doshi sent at 12/7/2023  3:49 PM CST -----  Regarding: Resend enrollment form  Name of Who is Calling:TRESA CABRAL [[id number]] [facility] access        What is the request in detail: office needs the fax for the enrollment form to be resent. Everything that's needed needs to checked off on form. Please advise        Can the clinic reply by [facility]:No        What Number to Call Back if not in [facility]: Fax#[phone]     Call placed to mother. Appointment scheduled 2/3/23.

## 2023-12-07 NOTE — PLAN OF CARE
Pt tolerated Ketruda, A/C regimen well.  No s/s of infusion reaction noted.  Dignicap applied to head per 's directions.  Neulasta OBI applied to pts left upper arm and written times for when the medication will be delivered and when she can remove the device given to pt.  Reminded pt to take her Claritin daily for the next few days.  Instructed to call MD with any problems

## 2023-12-07 NOTE — PROGRESS NOTES
[facility] - Breast Surgery   History and Physical    Subjective:      Arabella Catalan is a 43 y.o. female     Pt can not feel mass.  Undergoing OP PEMBROLIZUMAB WITH WEEKLY PACLITAXEL CARBOPLATIN (AUC 1.5) FOLLOWED BY PEMBROLIZUMAB DOXORUBICIN CYCLOPHOSPHAMIDE FOLLOWED BY PEMBROLIZUMAB 200MG Q3W keynote 522 with Dr Mcgovern    24 last chemo planned. Surg planning 24    RTC 1st part of 2024 with US prior      History with presentation:  Palpable mass.  2023  Some pain in right breast    neg MMG       Relevant medical history:  Right Ovarian Cancer Stage 1a Dysgerminoma Dr. Jose Angel Zazueta,  Right oophorectomy    Right Breast Biopsy  [facility]     Right oophorectomy for dysgerminoma. No chemo, no xrt    Mother breast cancer at 38        Menarche at age 14 year old. . Age at first live birth 31. Did  breast feed 2 year and 6 months. LMP . OCP-yes 20 years ago  Menopause at none. HRT-none   Personal history of ovarian/breast yes ovarian cancer in . Had previous breast biopsy  - stereo needle biopsy, benign.      Family history cancer:  Mother breast cancer at 38 and cervical cancer    Smoker Pk/yr quit date , smoked around 0.5 pack a day    No Covid-19 vaccine     Relevant info: patient mother had breast and cervical cancer diagnosed in mid 30s.   Patient  Roman is with patient at initial visit.            Patient Active Problem List   Diagnosis    Fetal Cleft Lip and Palate    Verruca plantaris    Breast cancer of lower-outer quadrant of right female breast    Invasive ductal carcinoma of breast, female, right    Anxiety about health    BRCA1 gene mutation positive    CINV (chemotherapy-induced nausea and vomiting)    Immunocompromised state due to drug therapy    Immunotherapy induced skin rash    Immunotherapy induced hepatitis    Chemotherapy-induced fatigue    Insomnia    Arthralgia     Current Outpatient Medications on File  Prior to Visit   Medication Sig Dispense Refill    dexAMETHasone (DECADRON) 4 MG Tab Take 1 tab as needed twice a day for nausea that is persistent despite zofran. Take morning and afternoon, no later than 3pm. 40 tablet 3    LIDOcaine-prilocaine (EMLA) cream Apply topically as needed (apply 30 to 60 minutes prior to chemo). 30 g 2    OLANZapine (ZYPREXA) 5 MG tablet Take 1 tablet (5 mg total) by mouth every evening. Take as directed on days 1-4, 8-10, and 15-17 of your chemotherapy cycles 1-4. Take on days 1, 2, and 3 of cycles 5-8. 10 tablet 11    ondansetron (ZOFRAN) 8 MG tablet Take 1 tablet (8 mg total) by mouth every 8 (eight) hours as needed for Nausea. 60 tablet 2    ondansetron (ZOFRAN-ODT) 8 MG TbDL Take 1 tablet (8 mg total) by mouth every 6 (six) hours as needed (nausea). 60 tablet 3    promethazine (PHENERGAN) 12.5 MG Tab (Take 1-2 tabs every 6 hours as needed for nausea persistent despite zofran) 40 tablet 3     Current Facility-Administered Medications on File Prior to Visit   Medication Dose Route Frequency Provider Last Rate Last Admin    HYDROmorphone injection 0.5 mg  0.5 mg Intravenous Q5 Min PRN Rosales Sousa MD        ondansetron injection 4 mg  4 mg Intravenous Daily PRN Rosales Sousa MD        prochlorperazine injection Soln 10 mg  10 mg Intravenous Q30 Min PRN Rosales Sousa MD         Review of patient's allergies indicates:   Allergen Reactions    Codeine Anaphylaxis     Past Medical History:   Diagnosis Date    Abnormal Pap smear of cervix     BRCA1 positive     Breast cancer 07/26/2023    right    Breast cancer 07/26/2023    right    HPV (human papilloma virus) infection     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     Past Surgical History:   Procedure Laterality Date    BREAST BIOPSY Right 2015    benign    BREAST BIOPSY Right 07/26/2023    BREAST BIOPSY Right 07/26/2023    BREAST BIOPSY Right 08/16/2023    benign LN    INSERTION OF TUNNELED CENTRAL VENOUS  CATHETER (CVC) WITH SUBCUTANEOUS PORT N/A 08/15/2023    Procedure: [id number];  Surgeon: Skyler Aldrich MD;  Location: [facility] OR;  Service: General;  Laterality: N/A;    OOPHORECTOMY Right 2009    stage 1a dysgerminoma    VAGINAL DELIVERY  12/13/2016     Family History   Problem Relation Age of Onset    Breast cancer Mother 38    Cervical cancer Mother         dx in 20s    BRCA 1/2 Sister     Brain cancer Maternal Uncle     Colon cancer Neg Hx     Colon polyps Neg Hx     Esophageal cancer Neg Hx     Stomach cancer Neg Hx     Inflammatory bowel disease Neg Hx      Social History     Socioeconomic History    Marital status:    Tobacco Use    Smoking status: Former     Types: Cigarettes   Substance and Sexual Activity    Alcohol use: Yes     Comment: occasionally    Drug use: No    Sexual activity: Not Currently     Partners: Male     Birth control/protection: None     Social Determinants of Health     Financial Resource Strain: Low Risk  (11/29/2023)    Overall Financial Resource Strain (CARDIA)     Difficulty of Paying Living Expenses: Not hard at all   Food Insecurity: No Food Insecurity (11/29/2023)    Hunger Vital Sign     Worried About Running Out of Food in the Last Year: Never true     Ran Out of Food in the Last Year: Never true   Transportation Needs: No Transportation Needs (11/29/2023)    PRAPARE - Transportation     Lack of Transportation (Medical): No     Lack of Transportation (Non-Medical): No   Physical Activity: Insufficiently Active (11/29/2023)    Exercise Vital Sign     Days of Exercise per Week: 3 days     Minutes of Exercise per Session: 20 min   Stress: No Stress Concern Present (11/29/2023)    [employer] [place] [employer] - Occupational Stress Questionnaire     Feeling of Stress : Only a little   Social Connections: Unknown (11/29/2023)    Social Connection and Isolation Panel [NHANES]     Frequency of Communication with Friends and Family: Three times a week      "Frequency of Social Gatherings with Friends and Family: Once a week     Active Member of Clubs or Organizations: No     Attends Club or Organization Meetings: Never     Marital Status:    Housing Stability: Low Risk  (11/29/2023)    Housing Stability Vital Sign     Unable to Pay for Housing in the Last Year: No     Number of Places Lived in the Last Year: 1     Unstable Housing in the Last Year: No         Review of Systems    No CP, No SOB      Objective:      /78 (BP Location: Left arm, Patient Position: Sitting, BP Method: Medium (Automatic))   Pulse 105   Temp 97.9 °F (36.6 °C) (Temporal)   Resp 16   Ht 5' 3" (1.6 m)   Wt 57.6 kg (126 lb 15.8 oz)   LMP 09/10/2023   SpO2 99%   BMI 22.49 kg/m²     Constitutional: NAD AAOX4  HEENT: EOMI, nonicteric sclera  NECK: no mass, no thyromegaly, no lymphadenopathy  CV: regular rate  PULM: good respiratory effort  ABDOMEN: soft, Nontender, nondistended. No guarding. No rebound.  MUSCULOSKELETAL: Good ROM  SKIN: No rashes or ulcerations seen.   NEUROLOGIC: CN grossly intact. Motor, sensory grossly intact    Breast exam 8/7/23  C some ptosis  Right: No discharge, dimpling, lymphadenopathy  Right 0800 3.5cm FN 2x2cm movable mass  Right 0700 2cm FN 1.5cm movable mass  R1 and R2 are 2cm apart    Left:  No mass, discharge, dimpling, lymphadenopathy      12/11/23  Right: No mass, discharge, dimpling, lymphadenopathy  No masses appreciated  Left:  No mass, discharge, dimpling, lymphadenopathy      Patient Active Problem List   Diagnosis    Fetal Cleft Lip and Palate    Verruca plantaris    Breast cancer of lower-outer quadrant of right female breast    Invasive ductal carcinoma of breast, female, right    Anxiety about health    BRCA1 gene mutation positive    CINV (chemotherapy-induced nausea and vomiting)    Immunocompromised state due to drug therapy    Immunotherapy induced skin rash    Immunotherapy induced hepatitis    Chemotherapy-induced fatigue    " Insomnia    Arthralgia        High complexity of problems addressed - breast cancer, treatment options, expectations, effects of treatment, risks, benefits. Extensive and complex data reviewed, independent interpretation of tests, discussion of management with another health care provider.  Time on pt and reviewing records 40min.     12/11/2023 reviewed all complex data again.  MA/NP functioned as a scribe.  Services and exam were personally performed, decisions made, complex data reviewed by Dr. Min.      Alternative efficacious methods of treatment of breast cancer were given.  Options including lumpectomy, mastectomy, reconstruction options with advantages/disadvantages of each, provisions assuring coverage of reconstructive surgery costs, how the patient may access reconstructive care including the potential to be transferred to a facility that provides reconstructive care or choosing reconstruction after completion of breast cancer surgery and treatment.  LDH, LCLTF breast cancer brochure given.    I have given her all options - lumpectomy XRT, unilateral or bilateral mastectomy +/- reconstruction. I have explained procedure, risks, benefits, postop expectations. All questions answered. Risks include but are not limited to infection, bleeding, injury to nerves, vessels, numbness, weakness, difficulty lifting arm, swelling of arm (lymphedema), inability to remove all lesion, residual breast tissue, recurrence of malignancy, need for further procedure, loss of skin flap, seroma (fluid collection), inability to find sentinel lymph node, need for axillary dissection, chronic pain, radioactive substance may be given, allergic reaction, staining of the skin, difficulty with future imaging, close or positive margins requiring additional surgery.      Imaging and Chronology:     9/25/2012 Bilaterals Screening Mammo - [facility]  No mammographic evidence of maliganancy.   BI-RADS: 1 Negative      9/29/2015 Bilateral  Screening Mammo - [facility]  Right 7 O'C anterior depth, new clustered indistinct calcifications.   BI-RADS : 0 Needs additional imaging    Recommendations: Additional views are recommended.      10/6/2015 Right Dx Mammo - [facility]  Right 7 O'C, anterior depth new clustered slightly heterogeneous punctate calcifications, appear suspicious of malignancy.    BI-RADS: 4 Suspicious abnormality    Recommendations: Stereotactic Biopsy is recommended.       10/12/2015 Right Breast Microcalcification's,Stereotactic Bx, 7 O'C - [facility]  Benign fibrocystic changes including papillary apocrine change, cysts, areas of dense stromal fibrosis,   Fibroadenomatoid change.   Microcalcifications are present.  Negative for AH, and malignancy.     4/22/2022 Bilateral Screening Mammo - [facility]   No mammographic evidence of malignancy.    5/3/2023 Bilateral Screening Mammo - [facility]   No mammographic evidence of malignancy.  BI-RADS Category: Overall: 2 - Benign       7/20/2023 Right Ultrasound Complete - [facility]   Right 8 O'C,  1.5 cm x 1 cm x 1.6 cm mass. Assessment: 4 - Suspicious finding. Biopsy and possible aspiration are recommended.   Right 7 O'C,   2 cm x 1.1 cm x 1.6 cm mass. Assessment: 4 - Suspicious finding. Biopsy is recommended.   BI-RADS Category: Overall: 4 - Suspicious       7/26/2023 Right Breast 8 O'C 7 CFN, CNB, Heart Clip - [facility]    IDC, High Grade (3,2,3)   TNBC ki67 61.4 %     Right Breast 7 O'C 3 CFN, CNB, Ribbon Clip   In Situ and IDC High Grade (3,3,2)  TNBC Ki67 81.3%     8/2/2023 MRI Breast - [facility]   Right  Mass: 8OC 7 CMFN   (1.3 cm from the skin, and 0.8 cm from the chest wall.)  2.7 cm x 2.2 cm x 2 cm mass   Right Mass: 7OC  3 CMFN   (1.1 cm from the skin, and 2.3 cm from the chest wall.)   2 cm x 1.6 cm x 1.6 cm mass      Left  There is no MR evidence of malignancy in the left breast.     8/10/23  CT CAP  5 liver lesions 5-9mm, indeterminate  RLL lung 2mm nodule, rec FU imaging  Prominent right axilla LN  Increased attenuation right  iliac    08/16/2023 right axillary lymph node biopsy  9 mm right axillary node with 5 mm cortical thickness  Prominent intramammary node at 9:00 9 cm from nipple 8 mm with 3 mm cortex  Biopsy showed fragments of benign lymph node  Benign and concordant    08/16/2023  SHANDA  placed in 2 right breast areas    8/21/23 BRCA1 pos  VUS NF1    OP PEMBROLIZUMAB WITH WEEKLY PACLITAXEL CARBOPLATIN (AUC 1.5) FOLLOWED BY PEMBROLIZUMAB DOXORUBICIN CYCLOPHOSPHAMIDE FOLLOWED BY PEMBROLIZUMAB 200MG Q3W  Keynote 522 started 8/6/23 Dr Mcgovern      10/24/23 Right breast US [facility]  Findings:  Right 8 OC 7 cm FN 0.8 cm x 0.4 cm x 1.3 cm mass 1.3 cm from the skin, and 0.8 cm from the chest wall.   Compared to the previous study, the mass decreased in size.      Right 7 OC 3 cm FN  1.5 cm x 0.6 cm x 1 cm mass. 1.1 cm from the skin, and 2.3 cm from the chest wall.   Compared to the previous study, the mass decreased in size.   There is a stable 9 mm lymph node seen in the right axilla. Cortical thickness measures 3 mm on the right.      Impression:Interval decrease in size of both right breast masses compatible with favorable response to neoadjuvant chemotherapy.       12/06/23 Right breast US [facility]   Right 8 OC 7 cm FN   0.8 cm x 0.3 cm x 1.1 cm mass Compared to the previous study, there are no significant changes.      Right 7OC 3 cm FN  1.4 cm x 0.5 cm x 1 cm mass.  Compared to the previous study, there are no significant changes.      Right 9OC 9 cm FN 5 mm x 3 mm x 4 mm intramammary lymph node. Compared to the previous study, the intramammary lymph node decreased in size.      Impression: There has been no significant change in the biopsy proven cancers in the right breast compared to 10/24/2023.   The intramammary lymph node at 9:00 9cmfn has decreased in size.          Assessment/Plan:      Cancer Staging   Breast cancer of lower-outer quadrant of right female breast  Staging form: Breast, AJCC 8th Edition  - Clinical stage from  8/7/2023: Stage IIB (cT2, cN0, cM0, G3, ER-, WV-, HER2-) - Signed by Monica Min MD on 8/7/2023      Right multifocal breast cancer Grade 3 IDC TNBC  Right LOQ 2.7cm and 2cm    Right axilla LN core biopsy benign    H/o ovarian dysgerminoma  Mother breast cancer at 38    BRCA1 pos. Has met with Dr Calderon gyn onc. Will cont to follow  Met with Zonia JACKSON. Planning endoscopy after breast cancer done  Derm next week 12/18/23 week    Has clyde in 2 lesions    On Keynote 522. Has had considerable response. No longer palpable and much smaller by US. (Last US stable) Last chemo 1/18/24. Surg planning 2/29/24    Pt decided on Bilateral mastectomy, right SLNB, recon 2/29/24  nipples clear from imaging standpoint  Lesions are 1cm from skin right LOQ    See me beginning feb 2024 with right US prior for check up prior to surgery.

## 2023-12-07 NOTE — PROGRESS NOTES
"Protocol: [id number], Disparities in Results of Immune Checkpoint Inhibitor Treatment (DIRECT): A Prospective Cohort Study of Cancer Survivors Treated with anti-PD-L1 Immunotherapy in a Community Oncology Setting  IRB# 2022.126  Version Date: 1/11/2022  Treating MD: Dr. Mcgovern  Study ID# [id number]  12/07/2023      The patient presented at the [facility] today for her 6th infusion of Q3 week Keytruda. The patient presented with her , A&O, without noted distress, and with appropriate mood and affect to the situation.     The patient had labs drawn and an appointment with Dr. Mcgovern on 12/04/23 for Cycle 6 Keytruda clearance. The patient was deemed appropriate for treatment on 12/07/23. This CRC reviewed the treating MD's note from 12/04/23 before meeting with the patient today.      The patient continues to freely agree with participation in the referenced study and denied taking any oral or IV antibiotics or antifungals agents since her last infusion. The patient mentioned that she has been having an "eye twitch" that occurs throughout the day, every day since her last infusion (11/16/23). The patient denied having any problems with sleeping. She said that she forgot to mention this to Dr. Mcgovern on 12/4/23. This CRC told the patient that Dr. Mcgovern would be notified about the eye twitch. Dr. Mcgovern does not attribute the eye twitch to Keytruda (see 12/13/23 secure chat in shadow chart). The patient denied having any other new or worsening symptoms.       Toxicities attributable to last infusion of Keytruda :  Grade 1 Fatigue (9/14/2023 - ongoing): treating clinician deems probably attributable to immunotherapy - the patient notes mild fatigue starting 3 weeks after her 1st Keytruda infusion. The patient reports that the fatigue comes and goes. The treating clinician does not deem CS, no new orders given.                  Clinical Record Information:  Between the last study visit, the " patient has not been diagnosed with any of the following autoimmune diseases or conditions:          The patient denied having any questions for this CRC. Encouraged the patient to call with any questions or concerns.     Next set of [id number] questionnaires and labs due:              - February 24th, 2024 +/- 1 month (6 months after initiation of immunotherapy)     Tamia Herrera, CRC

## 2023-12-11 ENCOUNTER — OFFICE VISIT (OUTPATIENT)
Dept: SURGERY | Facility: CLINIC | Age: 43
End: 2023-12-11
Payer: COMMERCIAL

## 2023-12-11 ENCOUNTER — DOCUMENTATION ONLY (OUTPATIENT)
Dept: SURGERY | Facility: CLINIC | Age: 43
End: 2023-12-11

## 2023-12-11 VITALS
OXYGEN SATURATION: 99 % | HEIGHT: 63 IN | TEMPERATURE: 98 F | DIASTOLIC BLOOD PRESSURE: 78 MMHG | WEIGHT: 127 LBS | SYSTOLIC BLOOD PRESSURE: 115 MMHG | RESPIRATION RATE: 16 BRPM | HEART RATE: 105 BPM | BODY MASS INDEX: 22.5 KG/M2

## 2023-12-11 DIAGNOSIS — Z15.09 BRCA1 GENE MUTATION POSITIVE: ICD-10-CM

## 2023-12-11 DIAGNOSIS — C50.511 MALIGNANT NEOPLASM OF LOWER-OUTER QUADRANT OF RIGHT BREAST OF FEMALE, ESTROGEN RECEPTOR NEGATIVE: Primary | ICD-10-CM

## 2023-12-11 DIAGNOSIS — Z17.1 MALIGNANT NEOPLASM OF LOWER-OUTER QUADRANT OF RIGHT BREAST OF FEMALE, ESTROGEN RECEPTOR NEGATIVE: Primary | ICD-10-CM

## 2023-12-11 DIAGNOSIS — Z15.01 BRCA1 GENE MUTATION POSITIVE: ICD-10-CM

## 2023-12-11 PROCEDURE — 99215 PR OFFICE/OUTPT VISIT, EST, LEVL V, 40-54 MIN: ICD-10-PCS | Mod: S$GLB,,, | Performed by: SURGERY

## 2023-12-11 PROCEDURE — 1159F MED LIST DOCD IN RCRD: CPT | Mod: CPTII,S$GLB,, | Performed by: SURGERY

## 2023-12-11 PROCEDURE — 3078F PR MOST RECENT DIASTOLIC BLOOD PRESSURE < 80 MM HG: ICD-10-PCS | Mod: CPTII,S$GLB,, | Performed by: SURGERY

## 2023-12-11 PROCEDURE — 3074F SYST BP LT 130 MM HG: CPT | Mod: CPTII,S$GLB,, | Performed by: SURGERY

## 2023-12-11 PROCEDURE — 99999 PR PBB SHADOW E&M-EST. PATIENT-LVL III: CPT | Mod: PBBFAC,,, | Performed by: SURGERY

## 2023-12-11 PROCEDURE — 3078F DIAST BP <80 MM HG: CPT | Mod: CPTII,S$GLB,, | Performed by: SURGERY

## 2023-12-11 PROCEDURE — 99215 OFFICE O/P EST HI 40 MIN: CPT | Mod: S$GLB,,, | Performed by: SURGERY

## 2023-12-11 PROCEDURE — 3008F PR BODY MASS INDEX (BMI) DOCUMENTED: ICD-10-PCS | Mod: CPTII,S$GLB,, | Performed by: SURGERY

## 2023-12-11 PROCEDURE — 3074F PR MOST RECENT SYSTOLIC BLOOD PRESSURE < 130 MM HG: ICD-10-PCS | Mod: CPTII,S$GLB,, | Performed by: SURGERY

## 2023-12-11 PROCEDURE — 1159F PR MEDICATION LIST DOCUMENTED IN MEDICAL RECORD: ICD-10-PCS | Mod: CPTII,S$GLB,, | Performed by: SURGERY

## 2023-12-11 PROCEDURE — 99999 PR PBB SHADOW E&M-EST. PATIENT-LVL III: ICD-10-PCS | Mod: PBBFAC,,, | Performed by: SURGERY

## 2023-12-11 PROCEDURE — 3008F BODY MASS INDEX DOCD: CPT | Mod: CPTII,S$GLB,, | Performed by: SURGERY

## 2023-12-12 DIAGNOSIS — C50.911 INVASIVE DUCTAL CARCINOMA OF BREAST, FEMALE, RIGHT: ICD-10-CM

## 2023-12-12 RX ORDER — OLANZAPINE 5 MG/1
5 TABLET ORAL NIGHTLY
Qty: 8 TABLET | Refills: 0 | Status: SHIPPED | OUTPATIENT
Start: 2023-12-12 | End: 2024-02-21

## 2023-12-13 ENCOUNTER — TELEPHONE (OUTPATIENT)
Dept: HEMATOLOGY/ONCOLOGY | Facility: CLINIC | Age: 43
End: 2023-12-13
Payer: COMMERCIAL

## 2023-12-13 NOTE — TELEPHONE ENCOUNTER
Returned call to [employer] and asked that they fax over the needed paperwork to [phone]. Will complete and have Dr Mcgovern sign tomorrow.    ----- Message from Eugenia Headley sent at 12/13/2023  1:49 PM CST -----  Type: Need Medical Advice  Who Called:[facility]  Best callback number:  fax form to   Additional Information: Patient is missing the physician part of the enrollment form for keytruda this is the 3rd request for this information, the case will close as of 5pm 12/14  Please call to further assist, Thanks.

## 2023-12-15 ENCOUNTER — PATIENT MESSAGE (OUTPATIENT)
Dept: ADMINISTRATIVE | Facility: OTHER | Age: 43
End: 2023-12-15
Payer: COMMERCIAL

## 2023-12-18 ENCOUNTER — OFFICE VISIT (OUTPATIENT)
Dept: DERMATOLOGY | Facility: CLINIC | Age: 43
End: 2023-12-18
Payer: COMMERCIAL

## 2023-12-18 DIAGNOSIS — Z15.01 BRCA1 GENE MUTATION POSITIVE: ICD-10-CM

## 2023-12-18 DIAGNOSIS — Z15.09 BRCA1 GENE MUTATION POSITIVE: ICD-10-CM

## 2023-12-18 DIAGNOSIS — C50.911 INVASIVE DUCTAL CARCINOMA OF BREAST, FEMALE, RIGHT: ICD-10-CM

## 2023-12-18 DIAGNOSIS — D22.9 MULTIPLE BENIGN NEVI: Primary | ICD-10-CM

## 2023-12-18 DIAGNOSIS — L81.4 LENTIGINES: ICD-10-CM

## 2023-12-18 DIAGNOSIS — Z12.83 SCREENING FOR SKIN CANCER: ICD-10-CM

## 2023-12-18 DIAGNOSIS — D18.01 CHERRY ANGIOMA: ICD-10-CM

## 2023-12-18 PROCEDURE — 1159F MED LIST DOCD IN RCRD: CPT | Mod: CPTII,S$GLB,, | Performed by: DERMATOLOGY

## 2023-12-18 PROCEDURE — 1159F PR MEDICATION LIST DOCUMENTED IN MEDICAL RECORD: ICD-10-PCS | Mod: CPTII,S$GLB,, | Performed by: DERMATOLOGY

## 2023-12-18 PROCEDURE — 99999 PR PBB SHADOW E&M-EST. PATIENT-LVL II: CPT | Mod: PBBFAC,,, | Performed by: DERMATOLOGY

## 2023-12-18 PROCEDURE — 99999 PR PBB SHADOW E&M-EST. PATIENT-LVL II: ICD-10-PCS | Mod: PBBFAC,,, | Performed by: DERMATOLOGY

## 2023-12-18 PROCEDURE — 99203 PR OFFICE/OUTPT VISIT, NEW, LEVL III, 30-44 MIN: ICD-10-PCS | Mod: S$GLB,,, | Performed by: DERMATOLOGY

## 2023-12-18 PROCEDURE — 99203 OFFICE O/P NEW LOW 30 MIN: CPT | Mod: S$GLB,,, | Performed by: DERMATOLOGY

## 2023-12-18 NOTE — PROGRESS NOTES
Subjective:      Patient ID:  Arabella Catalan is a 43 y.o. female who presents for No chief complaint on file.    HPI    New patient.  Here today for total body skin exam.   No personal or known family hx skin cancer.      +BRCA1  Hx invasive ductal carcinoma of R breast, undergoing chemotherapy current with plans for surgery in Feb 2023  Hx ovarian cancer (dysgermioma) in 2009, s/p surgery        Review of Systems    Objective:   Physical Exam   Constitutional: She appears well-nourished. No distress.   Neurological: She is alert and oriented to person, place, and time. She is not disoriented.   Psychiatric: She has a normal mood and affect.   Skin:   Areas Examined (abnormalities noted in diagram):   Scalp / Hair Palpated and Inspected  Head / Face Inspection Performed  Neck Inspection Performed  Chest / Axilla Inspection Performed  Abdomen Inspection Performed  Genitals / Buttocks / Groin Inspection Performed  Back Inspection Performed  RUE Inspected  LUE Inspection Performed  RLE Inspected  LLE Inspection Performed  Nails and Digits Inspection Performed                 Diagram Legend     Erythematous scaling macule/papule c/w actinic keratosis       Vascular papule c/w angioma      Pigmented verrucoid papule/plaque c/w seborrheic keratosis      Yellow umbilicated papule c/w sebaceous hyperplasia      Irregularly shaped tan macule c/w lentigo     1-2 mm smooth white papules consistent with Milia      Movable subcutaneous cyst with punctum c/w epidermal inclusion cyst      Subcutaneous movable cyst c/w pilar cyst      Firm pink to brown papule c/w dermatofibroma      Pedunculated fleshy papule(s) c/w skin tag(s)      Evenly pigmented macule c/w junctional nevus     Mildly variegated pigmented, slightly irregular-bordered macule c/w mildly atypical nevus      Flesh colored to evenly pigmented papule c/w intradermal nevus       Pink pearly papule/plaque c/w basal cell carcinoma      Erythematous  hyperkeratotic cursted plaque c/w SCC      Surgical scar with no sign of skin cancer recurrence      Open and closed comedones      Inflammatory papules and pustules      Verrucoid papule consistent consistent with wart     Erythematous eczematous patches and plaques     Dystrophic onycholytic nail with subungual debris c/w onychomycosis     Umbilicated papule    Erythematous-base heme-crusted tan verrucoid plaque consistent with inflamed seborrheic keratosis     Erythematous Silvery Scaling Plaque c/w Psoriasis     See annotation    Assessment / Plan:        Multiple benign nevi  - Discussed diagnosis, etiology, and benign-nature of condition.  - Reassured; no lesions suspicious for malignancy noted on exam today.   - Recommended routine self examination of skin. Discussed the ABCDEs of melanoma and ugly duckling sign.   - Recommended daily sun protection, including the use of OTC broad-spectrum sunscreen (SPF 30 or greater) and sun-protective clothing.      Lentigines  - Benign; reassured treatment not necessary.   - Recommended daily sun protection, including the use of OTC broad-spectrum sunscreen (SPF 30 or greater) and sun-protective clothing.       Cherry angioma  - Benign; reassured treatment not necessary.      BRCA1 gene mutation positive  -     Ambulatory referral/consult to Dermatology  Invasive ductal carcinoma of breast, female, right  -     Ambulatory referral/consult to Dermatology  Screening for skin cancer  - Total body skin examination performed today.  - Findings listed above.   - Recommended routine self examination of skin.    - Recommended daily sun protection, including the use of OTC broad-spectrum sunscreen (SPF 30 or greater) and sun-protective clothing.               Follow up for annual skin checks.

## 2023-12-21 ENCOUNTER — PATIENT MESSAGE (OUTPATIENT)
Dept: HEMATOLOGY/ONCOLOGY | Facility: CLINIC | Age: 43
End: 2023-12-21
Payer: COMMERCIAL

## 2023-12-22 ENCOUNTER — PATIENT MESSAGE (OUTPATIENT)
Dept: ADMINISTRATIVE | Facility: OTHER | Age: 43
End: 2023-12-22
Payer: COMMERCIAL

## 2023-12-22 ENCOUNTER — TELEPHONE (OUTPATIENT)
Dept: HEMATOLOGY/ONCOLOGY | Facility: CLINIC | Age: 43
End: 2023-12-22
Payer: COMMERCIAL

## 2023-12-22 NOTE — TELEPHONE ENCOUNTER
Spoke with Lorenzo regards to ref [id number]. Informed Lorenzo to refax paperwork to [phone]. He verbalized understanding.    ----- Message from Lucia Joseph, Patient Care Assistant sent at 12/22/2023  7:26 AM CST -----  Type: Needs Medical Advice  Who Called:  Kaitlin   Best Call Back Number: [phone] ref [id number]  Additional Information: Kaitlin from [employer] states she sent over a benefits package yesterday and it had the wrong patient name srikanth Raymondub , [id number] , please send correct patient info for emilee sanders

## 2023-12-27 ENCOUNTER — CLINICAL SUPPORT (OUTPATIENT)
Dept: REHABILITATION | Facility: HOSPITAL | Age: 43
End: 2023-12-27
Payer: COMMERCIAL

## 2023-12-27 ENCOUNTER — LAB VISIT (OUTPATIENT)
Dept: LAB | Facility: HOSPITAL | Age: 43
End: 2023-12-27
Attending: INTERNAL MEDICINE
Payer: COMMERCIAL

## 2023-12-27 ENCOUNTER — OFFICE VISIT (OUTPATIENT)
Dept: HEMATOLOGY/ONCOLOGY | Facility: CLINIC | Age: 43
End: 2023-12-27
Payer: COMMERCIAL

## 2023-12-27 VITALS
SYSTOLIC BLOOD PRESSURE: 106 MMHG | HEIGHT: 63 IN | OXYGEN SATURATION: 99 % | WEIGHT: 125.44 LBS | BODY MASS INDEX: 22.23 KG/M2 | TEMPERATURE: 98 F | RESPIRATION RATE: 16 BRPM | HEART RATE: 98 BPM | DIASTOLIC BLOOD PRESSURE: 72 MMHG

## 2023-12-27 DIAGNOSIS — C50.911 INVASIVE DUCTAL CARCINOMA OF BREAST, FEMALE, RIGHT: ICD-10-CM

## 2023-12-27 DIAGNOSIS — R23.2 HOT FLASHES: ICD-10-CM

## 2023-12-27 DIAGNOSIS — T45.1X5A CHEMOTHERAPY-INDUCED FATIGUE: ICD-10-CM

## 2023-12-27 DIAGNOSIS — B37.0 ORAL CANDIDIASIS: ICD-10-CM

## 2023-12-27 DIAGNOSIS — Z17.1 MALIGNANT NEOPLASM OF LOWER-OUTER QUADRANT OF RIGHT BREAST OF FEMALE, ESTROGEN RECEPTOR NEGATIVE: ICD-10-CM

## 2023-12-27 DIAGNOSIS — C50.511 MALIGNANT NEOPLASM OF LOWER-OUTER QUADRANT OF RIGHT BREAST OF FEMALE, ESTROGEN RECEPTOR NEGATIVE: Primary | ICD-10-CM

## 2023-12-27 DIAGNOSIS — C50.511 MALIGNANT NEOPLASM OF LOWER-OUTER QUADRANT OF RIGHT BREAST OF FEMALE, ESTROGEN RECEPTOR NEGATIVE: ICD-10-CM

## 2023-12-27 DIAGNOSIS — R45.89 ANXIETY ABOUT HEALTH: Primary | ICD-10-CM

## 2023-12-27 DIAGNOSIS — R53.83 CHEMOTHERAPY-INDUCED FATIGUE: ICD-10-CM

## 2023-12-27 DIAGNOSIS — Z17.1 MALIGNANT NEOPLASM OF LOWER-OUTER QUADRANT OF RIGHT BREAST OF FEMALE, ESTROGEN RECEPTOR NEGATIVE: Primary | ICD-10-CM

## 2023-12-27 LAB
ALBUMIN SERPL BCP-MCNC: 4.4 G/DL (ref 3.5–5.2)
ALP SERPL-CCNC: 56 U/L (ref 55–135)
ALT SERPL W/O P-5'-P-CCNC: 13 U/L (ref 10–44)
ANION GAP SERPL CALC-SCNC: 11 MMOL/L (ref 8–16)
AST SERPL-CCNC: 20 U/L (ref 10–40)
B-HCG UR QL: NEGATIVE
BASOPHILS # BLD AUTO: 0.06 K/UL (ref 0–0.2)
BASOPHILS NFR BLD: 1.3 % (ref 0–1.9)
BILIRUB SERPL-MCNC: 0.3 MG/DL (ref 0.1–1)
BUN SERPL-MCNC: 14 MG/DL (ref 6–20)
CALCIUM SERPL-MCNC: 9.4 MG/DL (ref 8.7–10.5)
CHLORIDE SERPL-SCNC: 102 MMOL/L (ref 95–110)
CO2 SERPL-SCNC: 26 MMOL/L (ref 23–29)
CREAT SERPL-MCNC: 0.7 MG/DL (ref 0.5–1.4)
DIFFERENTIAL METHOD: ABNORMAL
EOSINOPHIL # BLD AUTO: 0 K/UL (ref 0–0.5)
EOSINOPHIL NFR BLD: 0.4 % (ref 0–8)
ERYTHROCYTE [DISTWIDTH] IN BLOOD BY AUTOMATED COUNT: 14.1 % (ref 11.5–14.5)
EST. GFR  (NO RACE VARIABLE): >60 ML/MIN/1.73 M^2
GLUCOSE SERPL-MCNC: 96 MG/DL (ref 70–110)
HCT VFR BLD AUTO: 36.5 % (ref 37–48.5)
HGB BLD-MCNC: 12.3 G/DL (ref 12–16)
IMM GRANULOCYTES # BLD AUTO: 0.02 K/UL (ref 0–0.04)
IMM GRANULOCYTES NFR BLD AUTO: 0.4 % (ref 0–0.5)
LYMPHOCYTES # BLD AUTO: 1.5 K/UL (ref 1–4.8)
LYMPHOCYTES NFR BLD: 33.9 % (ref 18–48)
MCH RBC QN AUTO: 32.9 PG (ref 27–31)
MCHC RBC AUTO-ENTMCNC: 33.7 G/DL (ref 32–36)
MCV RBC AUTO: 98 FL (ref 82–98)
MONOCYTES # BLD AUTO: 0.8 K/UL (ref 0.3–1)
MONOCYTES NFR BLD: 18 % (ref 4–15)
NEUTROPHILS # BLD AUTO: 2.1 K/UL (ref 1.8–7.7)
NEUTROPHILS NFR BLD: 46 % (ref 38–73)
NRBC BLD-RTO: 0 /100 WBC
PLATELET # BLD AUTO: 538 K/UL (ref 150–450)
PMV BLD AUTO: 8.8 FL (ref 9.2–12.9)
POTASSIUM SERPL-SCNC: 4.2 MMOL/L (ref 3.5–5.1)
PROT SERPL-MCNC: 7.4 G/DL (ref 6–8.4)
RBC # BLD AUTO: 3.74 M/UL (ref 4–5.4)
SODIUM SERPL-SCNC: 139 MMOL/L (ref 136–145)
WBC # BLD AUTO: 4.51 K/UL (ref 3.9–12.7)

## 2023-12-27 PROCEDURE — 1159F PR MEDICATION LIST DOCUMENTED IN MEDICAL RECORD: ICD-10-PCS | Mod: CPTII,S$GLB,, | Performed by: NURSE PRACTITIONER

## 2023-12-27 PROCEDURE — 3074F PR MOST RECENT SYSTOLIC BLOOD PRESSURE < 130 MM HG: ICD-10-PCS | Mod: CPTII,S$GLB,, | Performed by: NURSE PRACTITIONER

## 2023-12-27 PROCEDURE — 1159F MED LIST DOCD IN RCRD: CPT | Mod: CPTII,S$GLB,, | Performed by: NURSE PRACTITIONER

## 2023-12-27 PROCEDURE — 3074F SYST BP LT 130 MM HG: CPT | Mod: CPTII,S$GLB,, | Performed by: NURSE PRACTITIONER

## 2023-12-27 PROCEDURE — 99213 PR OFFICE/OUTPT VISIT, EST, LEVL III, 20-29 MIN: ICD-10-PCS | Mod: S$GLB,,, | Performed by: NURSE PRACTITIONER

## 2023-12-27 PROCEDURE — 85025 COMPLETE CBC W/AUTO DIFF WBC: CPT | Mod: PN | Performed by: INTERNAL MEDICINE

## 2023-12-27 PROCEDURE — 99999 PR PBB SHADOW E&M-EST. PATIENT-LVL IV: ICD-10-PCS | Mod: PBBFAC,,, | Performed by: NURSE PRACTITIONER

## 2023-12-27 PROCEDURE — 3008F BODY MASS INDEX DOCD: CPT | Mod: CPTII,S$GLB,, | Performed by: NURSE PRACTITIONER

## 2023-12-27 PROCEDURE — 99213 OFFICE O/P EST LOW 20 MIN: CPT | Mod: S$GLB,,, | Performed by: NURSE PRACTITIONER

## 2023-12-27 PROCEDURE — 97811 ACUP 1/> W/O ESTIM EA ADD 15: CPT | Mod: PN | Performed by: ACUPUNCTURIST

## 2023-12-27 PROCEDURE — 3078F PR MOST RECENT DIASTOLIC BLOOD PRESSURE < 80 MM HG: ICD-10-PCS | Mod: CPTII,S$GLB,, | Performed by: NURSE PRACTITIONER

## 2023-12-27 PROCEDURE — 97810 ACUP 1/> WO ESTIM 1ST 15 MIN: CPT | Mod: PN | Performed by: ACUPUNCTURIST

## 2023-12-27 PROCEDURE — 36415 COLL VENOUS BLD VENIPUNCTURE: CPT | Mod: PN | Performed by: INTERNAL MEDICINE

## 2023-12-27 PROCEDURE — 80053 COMPREHEN METABOLIC PANEL: CPT | Mod: PN | Performed by: INTERNAL MEDICINE

## 2023-12-27 PROCEDURE — 99999 PR PBB SHADOW E&M-EST. PATIENT-LVL IV: CPT | Mod: PBBFAC,,, | Performed by: NURSE PRACTITIONER

## 2023-12-27 PROCEDURE — 3008F PR BODY MASS INDEX (BMI) DOCUMENTED: ICD-10-PCS | Mod: CPTII,S$GLB,, | Performed by: NURSE PRACTITIONER

## 2023-12-27 PROCEDURE — 3078F DIAST BP <80 MM HG: CPT | Mod: CPTII,S$GLB,, | Performed by: NURSE PRACTITIONER

## 2023-12-27 PROCEDURE — 81025 URINE PREGNANCY TEST: CPT | Mod: PN | Performed by: INTERNAL MEDICINE

## 2023-12-27 RX ORDER — HEPARIN 100 UNIT/ML
500 SYRINGE INTRAVENOUS
Status: CANCELLED | OUTPATIENT
Start: 2023-12-28

## 2023-12-27 RX ORDER — SODIUM CHLORIDE 0.9 % (FLUSH) 0.9 %
10 SYRINGE (ML) INJECTION
Status: CANCELLED | OUTPATIENT
Start: 2023-12-28

## 2023-12-27 RX ORDER — NYSTATIN 100000 [USP'U]/ML
6 SUSPENSION ORAL 4 TIMES DAILY
Qty: 240 ML | Refills: 0 | Status: SHIPPED | OUTPATIENT
Start: 2023-12-27 | End: 2024-01-07

## 2023-12-27 RX ORDER — EPINEPHRINE 0.3 MG/.3ML
0.3 INJECTION SUBCUTANEOUS ONCE AS NEEDED
Status: CANCELLED | OUTPATIENT
Start: 2023-12-28

## 2023-12-27 RX ORDER — LORAZEPAM 2 MG/ML
0.5 INJECTION INTRAMUSCULAR
Status: CANCELLED
Start: 2023-12-28

## 2023-12-27 RX ORDER — DIPHENHYDRAMINE HYDROCHLORIDE 50 MG/ML
50 INJECTION INTRAMUSCULAR; INTRAVENOUS ONCE AS NEEDED
Status: CANCELLED | OUTPATIENT
Start: 2023-12-28

## 2023-12-27 RX ORDER — PROCHLORPERAZINE EDISYLATE 5 MG/ML
10 INJECTION INTRAMUSCULAR; INTRAVENOUS ONCE AS NEEDED
Status: CANCELLED
Start: 2023-12-28

## 2023-12-27 RX ORDER — DOXORUBICIN HYDROCHLORIDE 2 MG/ML
60 INJECTION, SOLUTION INTRAVENOUS
Status: CANCELLED | OUTPATIENT
Start: 2023-12-28

## 2023-12-27 NOTE — PROGRESS NOTES
Acupuncture Follow-Up Note     Name: Arabella Koroma [facility] Number: [id number]    Traditional Chinese Medicine (TCM) Diagnosis: Qi Stagnation, Blood Stasis, Qi Deficiency, Blood Deficiency, Damp, and Yin Deficiency  Medical Diagnosis:   1. Anxiety about health    2. Malignant neoplasm of lower-outer quadrant of right breast of female, estrogen receptor negative         Evaluation Date: 12/27/2023    Visit #/Visits authorized:     Precautions: Standard    Subjective     Chief Concern: Hot Flashes (6/10, intermittent/) and Anxiety (Anxiety of upcoming surgical procedure.1/10)       Medical necessity is demonstrated by the following IMPAIRMENTS: Medical Necessity: Decreased mobility limits day to day activities, social, and emergent situations              Aggravating Factors:  movement     Relieving Factors:  rest    Symptom Description:     Quality:  Hot, Cold, and Variable  Severity:  6  Frequency:  every day      Objective     Observation: Patient reports hot flashes which have worsened between infusions.  Anxiety has improved.      Pulse:        thready       New Findings:  na    Treatment     Treatment Principles:  move qi and blood, calm perez, clear heat, nourish yin.    Acupuncture points used:  Du20, ER TEO, Gb34, Ki3, Ki6, Li11, and YIN MOON    Bilateral points: Ki 7, LI4  Unilateral points:  Auricular Treatment:  perez men    Needles In: 18  Needles Out: 18  Needles W/O STIM placed: 1035  Needles W/O STIM removed: 1055      Other Traditional Chinese Medicine Modalities -  na    Assessment     After treatment, patient felt relief     Patient prognosis is Good.     Patient will continue to benefit from acupuncture treatment to address the deficits listed in the problem list box on initial evaluation, provide patient family education and to maximize pt's level of independence in the home and community environment.     Patient's spiritual, cultural and educational needs considered and pt agreeable to  plan of care and goals.     Anticipated barriers to treatment: none    Plan     Recommend 1 /week for 12 sessions then re-assess.      Education:  Patient is aware of cumulative benefit of acupuncture

## 2023-12-27 NOTE — PROGRESS NOTES
"                                                 PROGRESS NOTE    Subjective:       Patient ID: Arabella Catalan is a 43 y.o. female.  MRN: [id number]  : 1980    Chief Complaint: cT2 cN0 TNBC     History of Present Illness:   Arabella Catalan is a 43 y.o. female who is referred for TNBC.      As previously documented she started screening mammograms for a few years after her ovarian cancer , in her late 20's. Routine pelvic exam revealed an ovarian cancer, s/p right oophorectomy for dysgerminoma.     Resumed routine mammograms at 42, had a normal screening mammogram in May 2023 but felt a lump in the right breast in July. Further imaging showed 2 lesions, both > 2 cm , both biopsied to be G3 IDC, ER/MS and Her 2 jose eduardo negative.       Menarche at age 14 year old. . Age at first live birth 31. Did  breast feed 2 year and 6 months OCP-yes 20 years ago  Menopause at none. HRT-none     Family history cancer:  Mother breast cancer at 38 and cervical cancer     Smoker Pk/yr quit date , smoked around 0.5 pack a day     Interim history:    On neoadjuvant chemo based on KEYNOTE-522. She is using Dignicap.   Presents to the clinic today for evaluation prior to cycle 7 D 1,  AC+ Pembro. Had an echo on 11/3, EF 65-70%.   Fatigue the 1st week of chemo; improves thereafter.   Reports an eye "twitch" to left eye; no drainage, dryness; tolerable.     ANC is 2.1 today, chemo is due on .     Oncology History:  5/3/23  Impression:  Bilateral  There is no mammographic evidence of malignancy.     BI-RADS Category:   Overall: 2 - Benign    23  US  Impression:  Right  Mass: Right breast 1.5 cm x 1 cm x 1.6 cm mass at the 8 o'clock position. Assessment: 4 - Suspicious finding. Biopsy and possible aspiration are recommended.   Mass: Right breast 2 cm x 1.1 cm x 1.6 cm mass at the 7 o'clock position. Assessment: 4 - Suspicious finding. Biopsy is recommended.      BI-RADS Category:   Overall: 4 - " Suspicious    8/2/23  MRI  Impression:  Right  Mass: Right breast 2.7 cm x 2.2 cm x 2 cm mass at the 8 o'clock position. Assessment: 6 - Known biopsy, proven malignancy.   Mass: Right breast 2 cm x 1.6 cm x 1.6 cm mass at the 7 o'clock position. Assessment: 6 - Known biopsy, proven malignancy.      Left  There is no MR evidence of malignancy in the left breast.     BI-RADS Category:   Overall: 6 - Known Biopsy-Proven Malignancy  7/31/23:  DIAGNOSIS:   07/28/2023 JL:tml     1. RIGHT BREAST AT 8:00 7 CM FROM NIPPLE CORE NEEDLE BIOPSIES:   - INVASIVE DUCT CARCINOMA, HIGH-GRADE (3,2,3).     2. RIGHT BREAST AT 7:00 3 CM FROM NIPPLE CORE NEEDLE BIOPSIES:   - IN SITU AND INVASIVE DUCT CARCINOMA, HIGH-GRADE (3,3,2).     RIGHT BREAST: INVASIVE DUCTAL CARCINOMA   GRADE: HIGH     ER: NEGATIVE, PA: NEGATIVE, HER2**: NEGATIVE       8/7/23  CT chest abd pelvis  Impression:     1. Known right breast malignancy, better evaluated on comparison breast MRI exam.  2. Mild asymmetrically prominent, but not pathologically enlarged right axillary lymph nodes, measuring up to 6 mm in short axis dimension.  No mediastinal or hilar lymphadenopathy.  3. Mild hepatomegaly with multiple small hypoattenuating hepatic lesions, the largest measuring 0.9 cm in the right hepatic lobe, which are too small to definitively characterize.  Metastases not excluded.  Consider further evaluation with PET-CT and/or contrast enhanced liver protocol MRI.  4. Solid, noncalcified 2 mm pulmonary nodule in the right lower lobe, nonspecific. Attention on follow-up imaging recommended.     8/18/23  Liver MRI      Impression:     Small circumscribed lesions in the liver are most compatible with hemangiomas.  No suspicious liver lesions.    8/10/123  Bone scan   Impression:     No evidence of osseous metastatic disease.     10/24/23:  US right breast   Impression:     Interval decrease in size of both right breast masses compatible with favorable response to  neoadjuvant chemotherapy    US transvaginal  Impression:     Left ovarian 3 mm echogenic lesion, possibly a small dermoid.  History:  Past Medical History:   Diagnosis Date    Abnormal Pap smear of cervix     BRCA1 positive     Breast cancer 07/26/2023    right    Breast cancer 07/26/2023    right    HPV (human papilloma virus) infection     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      Past Surgical History:   Procedure Laterality Date    BREAST BIOPSY Right 2015    benign    BREAST BIOPSY Right 07/26/2023    BREAST BIOPSY Right 07/26/2023    BREAST BIOPSY Right 08/16/2023    benign LN    INSERTION OF TUNNELED CENTRAL VENOUS CATHETER (CVC) WITH SUBCUTANEOUS PORT N/A 08/15/2023    Procedure: [id number];  Surgeon: Skyler Aldrich MD;  Location: [facility] OR;  Service: General;  Laterality: N/A;    OOPHORECTOMY Right 2009    stage 1a dysgerminoma    VAGINAL DELIVERY  12/13/2016     Family History   Problem Relation Age of Onset    Breast cancer Mother 38    Cervical cancer Mother         dx in 20s    BRCA 1/2 Sister     Brain cancer Maternal Uncle     Colon cancer Neg Hx     Colon polyps Neg Hx     Esophageal cancer Neg Hx     Stomach cancer Neg Hx     Inflammatory bowel disease Neg Hx       Social History     Tobacco Use    Smoking status: Former     Types: Cigarettes    Smokeless tobacco: Not on file   Substance and Sexual Activity    Alcohol use: Yes     Comment: occasionally    Drug use: No    Sexual activity: Not Currently     Partners: Male     Birth control/protection: None        ROS:   Review of Systems   Constitutional:  Positive for malaise/fatigue. Negative for fever and weight loss.   HENT:  Negative for congestion, hearing loss, nosebleeds and sore throat.    Eyes:  Negative for double vision and photophobia.   Respiratory:  Negative for cough, hemoptysis, sputum production, shortness of breath and wheezing.    Cardiovascular:  Negative for chest pain, palpitations, orthopnea and leg  "swelling.   Gastrointestinal:  Negative for abdominal pain, blood in stool, constipation, diarrhea, heartburn, nausea and vomiting.   Genitourinary:  Negative for dysuria, frequency, hematuria and urgency.   Musculoskeletal:  Negative for back pain, joint pain and myalgias.   Skin:  Negative for itching and rash.   Neurological:  Negative for dizziness, tingling, seizures, weakness and headaches.   Endo/Heme/Allergies:  Negative for polydipsia. Does not bruise/bleed easily.   Psychiatric/Behavioral:  Negative for depression and memory loss. The patient is nervous/anxious and has insomnia (during steroid days).         Objective:   Weight:  Gain of 1 1/2 pounds in 3 weeks  Vitals:    12/27/23 0901   BP: 106/72   Pulse: 98   Resp: 16   Temp: 97.6 °F (36.4 °C)   TempSrc: Temporal   SpO2: 99%   Weight: 56.9 kg (125 lb 7.1 oz)   Height: 5' 3" (1.6 m)   PainSc: 0-No pain     Physical Examination:   Physical Exam  Vitals and nursing note reviewed.   Constitutional:       General: She is not in acute distress.     Appearance: She is not diaphoretic.   HENT:      Head: Normocephalic and atraumatic.      Mouth/Throat:      Pharynx: Oropharyngeal exudate present.   Eyes:      General: No scleral icterus.     Conjunctiva/sclera: Conjunctivae normal.   Neck:      Thyroid: No thyromegaly.   Cardiovascular:      Rate and Rhythm: Normal rate and regular rhythm.      Heart sounds: Normal heart sounds. No murmur heard.  Pulmonary:      Effort: Pulmonary effort is normal. No respiratory distress.      Breath sounds: No stridor. No wheezing or rales.   Chest:      Chest wall: No tenderness.   Abdominal:      General: Bowel sounds are normal. There is no distension.      Palpations: Abdomen is soft. There is no mass.      Tenderness: There is no abdominal tenderness. There is no rebound.   Musculoskeletal:         General: No tenderness or deformity. Normal range of motion.      Cervical back: Neck supple.      Right lower leg: No " edema.      Left lower leg: No edema.   Lymphadenopathy:      Cervical: No cervical adenopathy.   Skin:     General: Skin is warm and dry.      Findings: No erythema or rash.   Neurological:      Mental Status: She is alert and oriented to person, place, and time.      Cranial Nerves: No cranial nerve deficit.      Coordination: Coordination normal.      Gait: Gait is intact.   Psychiatric:         Mood and Affect: Affect normal.         Behavior: Behavior normal.         Thought Content: Thought content normal.         Cognition and Memory: Memory normal.         Judgment: Judgment normal.        Diagnostic Tests:  Significant Imaging: I have reviewed and interpreted all pertinent imaging results/findings.    Laboratory Data:  All pertinent labs have been reviewed.    Labs:   Lab Results   Component Value Date    WBC 4.51 12/27/2023    HGB 12.3 12/27/2023    HCT 36.5 (L) 12/27/2023    MCV 98 12/27/2023     (H) 12/27/2023     ANC = 2.1    CMP  Sodium   Date Value Ref Range Status   12/27/2023 139 136 - 145 mmol/L Final     Potassium   Date Value Ref Range Status   12/27/2023 4.2 3.5 - 5.1 mmol/L Final     Chloride   Date Value Ref Range Status   12/27/2023 102 95 - 110 mmol/L Final     CO2   Date Value Ref Range Status   12/27/2023 26 23 - 29 mmol/L Final     Glucose   Date Value Ref Range Status   12/27/2023 96 70 - 110 mg/dL Final     BUN   Date Value Ref Range Status   12/27/2023 14 6 - 20 mg/dL Final     Creatinine   Date Value Ref Range Status   12/27/2023 0.7 0.5 - 1.4 mg/dL Final     Calcium   Date Value Ref Range Status   12/27/2023 9.4 8.7 - 10.5 mg/dL Final     Total Protein   Date Value Ref Range Status   12/27/2023 7.4 6.0 - 8.4 g/dL Final     Albumin   Date Value Ref Range Status   12/27/2023 4.4 3.5 - 5.2 g/dL Final     Total Bilirubin   Date Value Ref Range Status   12/27/2023 0.3 0.1 - 1.0 mg/dL Final     Comment:     For infants and newborns, interpretation of results should be based  on  gestational age, weight and in agreement with clinical  observations.    Premature Infant recommended reference ranges:  Up to 24 hours.............<8.0 mg/dL  Up to 48 hours............<12.0 mg/dL  3-5 days..................<15.0 mg/dL  6-29 days.................<15.0 mg/dL       Alkaline Phosphatase   Date Value Ref Range Status   12/27/2023 56 55 - 135 U/L Final     AST   Date Value Ref Range Status   12/27/2023 20 10 - 40 U/L Final     ALT   Date Value Ref Range Status   12/27/2023 13 10 - 44 U/L Final     Anion Gap   Date Value Ref Range Status   12/27/2023 11 8 - 16 mmol/L Final     eGFR   Date Value Ref Range Status   12/27/2023 >60.0 >60 mL/min/1.73 m^2 Final       Assessment/Plan:   Invasive ductal carcinoma of breast, female, right  cT2 cN0 G3 IDC, TNBC    We discussed the more agressive nature and multifocal nature of her TNBC. Clinically node negative. Based on size, she is a candidate for neoadjuvant chemo immunotherapy based on Keynote-522 to prevent systemic spread and to monitor response to therapy and that 64% of the patients on trial had a pCR.     She is agreeable,echo is normal, no evidence for distant metastatic disease noted.s/p C5 AC+ Pembro. ANC is < 1000 at this time, repeat cbc prior to cycle 6 on 12/7 and administer cycle 6 if ANC > 1000 and add Neulasta for the next 3 cycles. Discussed with infusion.     Offer supportive care with IO and onc psych.     After completing neoadjuvant therapy, she would be referred back for surgery.     She has also been enrolled in the Protocol [id number], Disparities in Results of Immune Checkpoint Inhibitor Treatment (DIRECT): A Prospective Cohort Study of Cancer Survivors Treated with anti-PD-L1 Immunotherapy in a Community Oncology Setting.     We also discussed [id number] trial regarding adjuvant Pembrolizumab if she has a complete response. She received additional information and will think about it.     Proceed with Cycle 7 keynote (keytruda/ac) on 12/28;  f/u in 3 weeks with Dr. Mcgovern as scheduled with labs prior.    Oral candidiasis  -Nystatin 6 mg swish/swallow qid x 10 day.    Transaminitis   Grade 2, held Keytruda for cycle 2, treated with a short course of steroids, now LFTs have normalized and she received Keytruda since then and she has had no further issues.     History of ovarian cancer  BRCA positive   Plan for b/l mastectomy. Follow up with Gyn onc, GI and dermatology.   Referred to genetics clinic.     Joint pains  Resolved        No follow-ups on file.    Plan was discussed with the patient at length, and she verbalized understanding. Arabella was given an opportunity to ask questions that were answered to her satisfaction, and she was advised to call in the interval if any problems or questions arise.    Electronically signed by Edgar Riojas NP    20  minutes were spent in coordination of patient's care, record review and counseling.      Route Chart for Scheduling    Med Onc Chart Routing      Follow up with physician . F/u as scheduled with Dr. Mcgovern on 01/18 with labs on 01/17   Follow up with SAUL    Infusion scheduling note   Proceed with Cycle 7 keynote on Thursday, 12/28   Injection scheduling note    Labs    Imaging    Pharmacy appointment    Other referrals                  Treatment Plan Information   OP PEMBROLIZUMAB WITH WEEKLY PACLITAXEL CARBOPLATIN (AUC 1.5) FOLLOWED BY PEMBROLIZUMAB DOXORUBICIN CYCLOPHOSPHAMIDE FOLLOWED BY PEMBROLIZUMAB 200MG Q3W   Corina Mcgovern MD   Upcoming Treatment Dates - OP PEMBROLIZUMAB WITH WEEKLY PACLITAXEL CARBOPLATIN (AUC 1.5) FOLLOWED BY PEMBROLIZUMAB DOXORUBICIN CYCLOPHOSPHAMIDE FOLLOWED BY PEMBROLIZUMAB 200MG Q3W    12/28/2023       Chemotherapy       DOXOrubicin chemo injection 94 mg       cycloPHOSphamide 600 mg/m2 = 940 mg in sodium chloride 0.9% 254.7 mL chemo infusion       Antiemetics       aprepitant (CINVANTI) injection 130 mg       palonosetron 0.25mg/dexAMETHasone 12mg in NS IVPB 0.25 mg 50  mL       LORazepam injection 0.5 mg       Immunotherapy       pembrolizumab (KEYTRUDA) 200 mg in sodium chloride 0.9% SolP 108 mL infusion  1/18/2024       Chemotherapy       DOXOrubicin chemo injection 94 mg       cycloPHOSphamide 600 mg/m2 = 940 mg in sodium chloride 0.9% 254.7 mL chemo infusion       Antiemetics       aprepitant (CINVANTI) injection 130 mg       palonosetron 0.25mg/dexAMETHasone 12mg in NS IVPB 0.25 mg 50 mL       LORazepam injection 0.5 mg       Immunotherapy       pembrolizumab (KEYTRUDA) 200 mg in sodium chloride 0.9% SolP 108 mL infusion  2/8/2024       Immunotherapy       pembrolizumab (KEYTRUDA) 200 mg in sodium chloride 0.9% SolP 108 mL infusion  2/29/2024       Immunotherapy       pembrolizumab (KEYTRUDA) 200 mg in sodium chloride 0.9% SolP 108 mL infusion              Answers submitted by the patient for this visit:  Review of Systems Questionnaire (Submitted on 12/21/2023)  appetite change : No  unexpected weight change: No  mouth sores: No  visual disturbance: No  adenopathy: No

## 2023-12-28 ENCOUNTER — DOCUMENTATION ONLY (OUTPATIENT)
Dept: INFUSION THERAPY | Facility: HOSPITAL | Age: 43
End: 2023-12-28
Payer: COMMERCIAL

## 2023-12-28 ENCOUNTER — RESEARCH ENCOUNTER (OUTPATIENT)
Dept: RESEARCH | Facility: HOSPITAL | Age: 43
End: 2023-12-28
Payer: COMMERCIAL

## 2023-12-28 ENCOUNTER — INFUSION (OUTPATIENT)
Dept: INFUSION THERAPY | Facility: HOSPITAL | Age: 43
End: 2023-12-28
Attending: INTERNAL MEDICINE
Payer: COMMERCIAL

## 2023-12-28 VITALS
DIASTOLIC BLOOD PRESSURE: 65 MMHG | HEIGHT: 63 IN | TEMPERATURE: 98 F | RESPIRATION RATE: 16 BRPM | WEIGHT: 127 LBS | HEART RATE: 92 BPM | OXYGEN SATURATION: 100 % | SYSTOLIC BLOOD PRESSURE: 105 MMHG | BODY MASS INDEX: 22.5 KG/M2

## 2023-12-28 DIAGNOSIS — C50.911 INVASIVE DUCTAL CARCINOMA OF BREAST, FEMALE, RIGHT: Primary | ICD-10-CM

## 2023-12-28 PROCEDURE — A4216 STERILE WATER/SALINE, 10 ML: HCPCS | Mod: PN | Performed by: NURSE PRACTITIONER

## 2023-12-28 PROCEDURE — 25000003 PHARM REV CODE 250: Mod: PN | Performed by: NURSE PRACTITIONER

## 2023-12-28 PROCEDURE — 96413 CHEMO IV INFUSION 1 HR: CPT | Mod: PN

## 2023-12-28 PROCEDURE — 96375 TX/PRO/DX INJ NEW DRUG ADDON: CPT | Mod: PN

## 2023-12-28 PROCEDURE — 96411 CHEMO IV PUSH ADDL DRUG: CPT | Mod: PN

## 2023-12-28 PROCEDURE — 96367 TX/PROPH/DG ADDL SEQ IV INF: CPT | Mod: PN

## 2023-12-28 PROCEDURE — 96417 CHEMO IV INFUS EACH ADDL SEQ: CPT | Mod: PN

## 2023-12-28 PROCEDURE — 96377 APPLICATON ON-BODY INJECTOR: CPT | Mod: PN

## 2023-12-28 PROCEDURE — 63600175 PHARM REV CODE 636 W HCPCS: Mod: JZ,JG,PN | Performed by: NURSE PRACTITIONER

## 2023-12-28 RX ORDER — PROCHLORPERAZINE EDISYLATE 5 MG/ML
10 INJECTION INTRAMUSCULAR; INTRAVENOUS ONCE AS NEEDED
Status: DISCONTINUED | OUTPATIENT
Start: 2023-12-28 | End: 2023-12-28 | Stop reason: HOSPADM

## 2023-12-28 RX ORDER — EPINEPHRINE 0.3 MG/.3ML
0.3 INJECTION SUBCUTANEOUS ONCE AS NEEDED
Status: DISCONTINUED | OUTPATIENT
Start: 2023-12-28 | End: 2023-12-28 | Stop reason: HOSPADM

## 2023-12-28 RX ORDER — LORAZEPAM 2 MG/ML
0.5 INJECTION INTRAMUSCULAR
Status: DISCONTINUED | OUTPATIENT
Start: 2023-12-28 | End: 2023-12-28 | Stop reason: HOSPADM

## 2023-12-28 RX ORDER — DOXORUBICIN HYDROCHLORIDE 2 MG/ML
60 INJECTION, SOLUTION INTRAVENOUS
Status: COMPLETED | OUTPATIENT
Start: 2023-12-28 | End: 2023-12-28

## 2023-12-28 RX ORDER — DIPHENHYDRAMINE HYDROCHLORIDE 50 MG/ML
50 INJECTION INTRAMUSCULAR; INTRAVENOUS ONCE AS NEEDED
Status: DISCONTINUED | OUTPATIENT
Start: 2023-12-28 | End: 2023-12-28 | Stop reason: HOSPADM

## 2023-12-28 RX ORDER — SODIUM CHLORIDE 0.9 % (FLUSH) 0.9 %
10 SYRINGE (ML) INJECTION
Status: DISCONTINUED | OUTPATIENT
Start: 2023-12-28 | End: 2023-12-28 | Stop reason: HOSPADM

## 2023-12-28 RX ADMIN — DOXORUBICIN HYDROCHLORIDE 94 MG: 2 INJECTION, SOLUTION INTRAVENOUS at 11:12

## 2023-12-28 RX ADMIN — PEGFILGRASTIM 6 MG: KIT SUBCUTANEOUS at 12:12

## 2023-12-28 RX ADMIN — SODIUM CHLORIDE 200 MG: 9 INJECTION, SOLUTION INTRAVENOUS at 10:12

## 2023-12-28 RX ADMIN — DEXAMETHASONE SODIUM PHOSPHATE 0.25 MG: 10 INJECTION, SOLUTION INTRAMUSCULAR; INTRAVENOUS at 10:12

## 2023-12-28 RX ADMIN — Medication 10 ML: at 12:12

## 2023-12-28 RX ADMIN — APREPITANT 130 MG: 130 INJECTION, EMULSION INTRAVENOUS at 10:12

## 2023-12-28 RX ADMIN — LORAZEPAM 0.5 MG: 2 INJECTION INTRAMUSCULAR; INTRAVENOUS at 10:12

## 2023-12-28 RX ADMIN — SODIUM CHLORIDE: 9 INJECTION, SOLUTION INTRAVENOUS at 09:12

## 2023-12-28 RX ADMIN — CYCLOPHOSPHAMIDE 940 MG: 200 INJECTION, SOLUTION INTRAVENOUS at 11:12

## 2023-12-28 NOTE — PROGRESS NOTES
Oncology Nutrition   Chemotherapy Infusion Visit    Nutrition Follow Up   RD met with patient at chairside during infusion treatment. Pt reports continues to do well nutritionally- eating without difficulty, maintaining weight, and denies nutrition related side effects.    Wt Readings from Last 10 Encounters:   12/28/23 57.6 kg (126 lb 15.8 oz)   12/27/23 56.9 kg (125 lb 7.1 oz)   12/11/23 57.6 kg (126 lb 15.8 oz)   12/07/23 56.8 kg (125 lb 3.5 oz)   12/04/23 56.1 kg (123 lb 10.9 oz)   11/16/23 55.7 kg (122 lb 12.7 oz)   11/13/23 55.7 kg (122 lb 12.7 oz)   11/09/23 54.7 kg (120 lb 9.5 oz)   11/06/23 54.7 kg (120 lb 9.5 oz)   11/03/23 54.4 kg (120 lb)       All other nutrition questions/concerns addressed as appropriate. Will continue to follow and monitor throughout treatment PRN.     Orquidea Vu, MS, RD, LDN  12/28/2023  11:02 AM

## 2023-12-28 NOTE — PROGRESS NOTES
"Protocol: [id number], Disparities in Results of Immune Checkpoint Inhibitor Treatment (DIRECT): A Prospective Cohort Study of Cancer Survivors Treated with anti-PD-L1 Immunotherapy in a Community Oncology Setting  IRB# 2022.126  Version Date: 1/11/2022  Treating MD: Dr. Mcgovern  Study ID# [id number]  12/28/2023      The patient presented at the [facility] today for her 7th infusion of Q3 week Keytruda. The patient presented with her , A&O, without noted distress, and with appropriate mood and affect to the situation.     The patient saw Edgar Riojas NP, for Cycle 7 Keytruda clearance yesterday. The patient was deemed appropriate for treatment. This CRC reviewed the NP's note before meeting with the patient today.      The patient continues to freely agree with participation in the referenced study and denied taking any oral or IV antibiotics or antifungals agents since her last infusion. The patient reports that she continues to have an "eye twitch" that occurs throughout the day, every day. Dr. Mcgovern does not attribute the eye twitch to Keytruda (see 12/13/23 secure chat in shadow chart). The NP's note mentions that the patient has Grade 1 Oral candidiasis and that the patient was prescribed Nystatin 6 mg. The patient reports being asymptomatic of oral candidiasis and states that she was first aware of it yesterday when Edgar discovered it upon her physical exam. Therefore a start date of 12/27/23 will be used. The patient states that she has not started taking the Nystatin 6 mg yet. The treating clinician deems this unlikely attributable to the Keytruda and probably attributable to the chemotherapy. The patient denied having any other new or worsening symptoms.       Toxicities attributable to last infusion of Keytruda :  Grade 1 Fatigue (9/14/2023 - ongoing): treating clinician deems probably attributable to immunotherapy - the patient notes mild fatigue starting 3 weeks after her " 1st Keytruda infusion. The patient reports that the fatigue continues to come and go throughout each cycle. The treating clinician does not deem CS, no new orders given.                  Clinical Record Information:  Between the last study visit, the patient has not been diagnosed with any of the following autoimmune diseases or conditions:          The patient denied having any questions for this CRC. Encouraged the patient to call with any questions or concerns.     Next set of [id number] questionnaires and labs due:              - February 24th, 2024 +/- 1 month (6 months after initiation of immunotherapy)     Tamia Herrera CRC

## 2023-12-28 NOTE — PLAN OF CARE
Problem: Fatigue  Goal: Improved Activity Tolerance  Outcome: Ongoing, Progressing  Intervention: Promote Improved Energy  Flowsheets (Taken 12/28/2023 1232)  Fatigue Management:   paced activity encouraged   frequent rest breaks encouraged   fatigue-related activity identified   activity assistance provided  Sleep/Rest Enhancement:   therapeutic touch utilized   noise level reduced   consistent schedule promoted   awakenings minimized   natural light exposure provided   relaxation techniques promoted   family presence promoted  Activity Management:   Ambulated -L4   Up in chair - L3     Problem: Adult Inpatient Plan of Care  Goal: Plan of Care Review  Outcome: Ongoing, Progressing  Flowsheets (Taken 12/28/2023 1232)  Plan of Care Reviewed With:   patient   spouse  Goal: Patient-Specific Goal (Individualized)  Outcome: Ongoing, Progressing  Flowsheets (Taken 12/28/2023 1232)  Anxieties, Fears or Concerns: none voiced  Individualized Care Needs: education, conversation, home electric blanket, pillow, dignicap, sleep,  at chairside, snacks, reading  Goal: Optimal Comfort and Wellbeing  Outcome: Ongoing, Progressing  Intervention: Provide Person-Centered Care  Flowsheets (Taken 12/28/2023 1232)  Trust Relationship/Rapport:   care explained   questions encouraged   choices provided   reassurance provided   thoughts/feelings acknowledged   emotional support provided   empathic listening provided   questions answered

## 2023-12-28 NOTE — PLAN OF CARE
Pt arrived to clinic today for C7D1 Keytruda, Adriamycin, and Cyclophosphamide infusions and tolerated well with no changes throughout therapy. Dignicap used per pt request and tolerated well with home electric blanket used and spouse at chairside. Neulasta OBI applied and pt given handout for reference with instructions/time for removal/infusion. Pt aware of side effects and f/u appts and discharged to home in NAD with  and has aware of instructions for Dignicap supplies and hair maintenance.

## 2024-01-02 ENCOUNTER — TELEPHONE (OUTPATIENT)
Dept: HEMATOLOGY/ONCOLOGY | Facility: CLINIC | Age: 44
End: 2024-01-02
Payer: COMMERCIAL

## 2024-01-03 ENCOUNTER — OFFICE VISIT (OUTPATIENT)
Dept: HEMATOLOGY/ONCOLOGY | Facility: CLINIC | Age: 44
End: 2024-01-03
Payer: COMMERCIAL

## 2024-01-03 VITALS
BODY MASS INDEX: 22.53 KG/M2 | WEIGHT: 127.13 LBS | SYSTOLIC BLOOD PRESSURE: 95 MMHG | TEMPERATURE: 97 F | DIASTOLIC BLOOD PRESSURE: 66 MMHG | HEART RATE: 100 BPM | OXYGEN SATURATION: 100 % | HEIGHT: 63 IN

## 2024-01-03 DIAGNOSIS — Z17.1 MALIGNANT NEOPLASM OF LOWER-OUTER QUADRANT OF RIGHT BREAST OF FEMALE, ESTROGEN RECEPTOR NEGATIVE: ICD-10-CM

## 2024-01-03 DIAGNOSIS — T45.1X5A CHEMOTHERAPY-INDUCED FATIGUE: ICD-10-CM

## 2024-01-03 DIAGNOSIS — R53.83 CHEMOTHERAPY-INDUCED FATIGUE: ICD-10-CM

## 2024-01-03 DIAGNOSIS — C50.511 MALIGNANT NEOPLASM OF LOWER-OUTER QUADRANT OF RIGHT BREAST OF FEMALE, ESTROGEN RECEPTOR NEGATIVE: ICD-10-CM

## 2024-01-03 DIAGNOSIS — R23.2 HOT FLASHES: Primary | ICD-10-CM

## 2024-01-03 PROCEDURE — 99999 PR PBB SHADOW E&M-EST. PATIENT-LVL IV: CPT | Mod: PBBFAC,,, | Performed by: NURSE PRACTITIONER

## 2024-01-03 PROCEDURE — 99215 OFFICE O/P EST HI 40 MIN: CPT | Mod: S$GLB,,, | Performed by: NURSE PRACTITIONER

## 2024-01-03 PROCEDURE — 3008F BODY MASS INDEX DOCD: CPT | Mod: CPTII,S$GLB,, | Performed by: NURSE PRACTITIONER

## 2024-01-03 PROCEDURE — 3078F DIAST BP <80 MM HG: CPT | Mod: CPTII,S$GLB,, | Performed by: NURSE PRACTITIONER

## 2024-01-03 PROCEDURE — 1159F MED LIST DOCD IN RCRD: CPT | Mod: CPTII,S$GLB,, | Performed by: NURSE PRACTITIONER

## 2024-01-03 PROCEDURE — 1160F RVW MEDS BY RX/DR IN RCRD: CPT | Mod: CPTII,S$GLB,, | Performed by: NURSE PRACTITIONER

## 2024-01-03 PROCEDURE — 3074F SYST BP LT 130 MM HG: CPT | Mod: CPTII,S$GLB,, | Performed by: NURSE PRACTITIONER

## 2024-01-03 NOTE — PROGRESS NOTES
Arabella Koroma Chapotel  43 y.o. is here to seek an integrative approach to discuss side effects related to breast cancer treatment.     HPI  She had a routine mammogram in May which was clear. A month later in June she felt a lump and began having pain.   She has a history of ovarian cancer with the right ovary removed when she was 29 years old. She saw Dr. Aldrich yesterday and is awaiting port placement. She has a CT scan today. She states she is sleeping well and does not have fatigue. She has stress and anxiety due to the diagnosis, but has been busy with the kids and her mother which has kept her mind occupied. The kids went back to school today and she has more time to think and became tearful. Their children know she has cancer and will get medicine weekly, but they have not addressed the possible side effects that may come with treatment.   She has a good appetite and eats healthy and low or healthy carbs. She started beach body and then took a few weeks off due to doctor's appointments.   She is  and has 3 sons. She has a good support system. She helps her mother who has early onset Alzheimer's. She works as needed from home.     10/12/2023  Arabella is here today getting chemo with her  at the chairside. She reports fatigue is the biggest side effect.   She does not sleep well the night of chemo and 2 nights after. She has a hard staying asleep and is generally sleeping around 5 hours on the night of chemo and the 2 days after and then 7-8 on the other nights.  She continues to have a good appetite and is eating healthy. She has had 2 acupuncture sessions for joint pain to back, hips, knees, and ankles. She takes tylenol which helps slightly with pain.  Her steroid was decreased today by Dr. Mcgovern and she is hoping this helps her sleep better. She reports her stress and anxiety are low. Her  states she is doing well and her stress comes from regular day to day life with three boys.  "    Today's Visit   Arabella reports she has one more chemo and has surgery scheduled for 2/29/2024. She will have a double mastectomy with DAVID flap reconstruction. She reports she is sleeping well. She is no longer working, but will go back when treatment is completed. She reports her appetite is good and is eating fairly healthy. She does crave more "comfort foods" than normally. She states she is exercising daily, walking on the treadmill and light weight lifting, other than the week after the AC treatment due to fatigue. She will take a nap daily after the AC. She has hot flashes that occur randomly through the day and they occur more at night. Overall, she states she is doing well. She continues to have a good support system.     Pillars Assessment    Sleep  How many hours of sleep per night? 7-8 hours  Do you have trouble falling asleep, staying asleep or waking up earlier than you need to? no  Do you have daytime fatigue? no  Do you need medication for sleep? no  Do you use any supplements or other interventions for sleep? none    Resilience  Rate your current level of stress- low    Nutrition   Food allergies or sensitivities: no  Do you adhere to a particular type of diet? no  Do you have any concerns with your eating habits? no    Exercise  How would you describe your physical activity level? Low/moderate    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      Past Surgical History   Past Surgical History:   Procedure Laterality Date    BREAST BIOPSY Right 2015    benign    BREAST BIOPSY Right 07/26/2023    BREAST BIOPSY Right 07/26/2023    BREAST BIOPSY Right 08/16/2023    benign LN    INSERTION OF TUNNELED CENTRAL VENOUS CATHETER (CVC) WITH SUBCUTANEOUS PORT N/A 08/15/2023    " Procedure: [id number];  Surgeon: Skyler Aldrich MD;  Location: [facility] OR;  Service: General;  Laterality: N/A;    OOPHORECTOMY Right 2009    stage 1a dysgerminoma    VAGINAL DELIVERY  12/13/2016      Family History   Family History   Problem Relation Age of Onset    Breast cancer Mother 38    Cervical cancer Mother         dx in 20s    BRCA 1/2 Sister     Brain cancer Maternal Uncle     Colon cancer Neg Hx     Colon polyps Neg Hx     Esophageal cancer Neg Hx     Stomach cancer Neg Hx     Inflammatory bowel disease Neg Hx       Allergies  Review of patient's allergies indicates:   Allergen Reactions    Codeine Anaphylaxis      Current Medications:    Current Outpatient Medications:     dexAMETHasone (DECADRON) 4 MG Tab, Take 1 tab as needed twice a day for nausea that is persistent despite zofran. Take morning and afternoon, no later than 3pm. (Patient not taking: Reported on 12/28/2023), Disp: 40 tablet, Rfl: 3    LIDOcaine-prilocaine (EMLA) cream, Apply topically as needed (apply 30 to 60 minutes prior to chemo)., Disp: 30 g, Rfl: 2    nystatin (MYCOSTATIN) 100,000 unit/mL suspension, Take 6 mLs (600,000 Units total) by mouth 4 (four) times daily. for 10 days, Disp: 240 mL, Rfl: 0    OLANZapine (ZYPREXA) 5 MG tablet, Take 1 tablet (5 mg total) by mouth every evening. Take as directed on days 1-4, 8-10, and 15-17 of your chemotherapy cycles 1-4. Take on days 1, 2, and 3 of cycles 5-8. (Patient not taking: Reported on 12/28/2023), Disp: 8 tablet, Rfl: 0    ondansetron (ZOFRAN) 8 MG tablet, Take 1 tablet (8 mg total) by mouth every 8 (eight) hours as needed for Nausea. (Patient not taking: Reported on 12/28/2023), Disp: 60 tablet, Rfl: 2    ondansetron (ZOFRAN-ODT) 8 MG TbDL, Take 1 tablet (8 mg total) by mouth every 6 (six) hours as needed (nausea). (Patient not taking: Reported on 12/28/2023), Disp: 60 tablet, Rfl: 3    promethazine (PHENERGAN) 12.5 MG Tab, (Take 1-2 tabs every 6 hours as needed for  "nausea persistent despite zofran) (Patient not taking: Reported on 12/28/2023), Disp: 40 tablet, Rfl: 3  No current facility-administered medications for this visit.    Facility-Administered Medications Ordered in Other Visits:     HYDROmorphone injection 0.5 mg, 0.5 mg, Intravenous, Q5 Min PRN, Rosales Sousa MD    ondansetron injection 4 mg, 4 mg, Intravenous, Daily PRN, Rosales Sousa MD    prochlorperazine injection Soln 10 mg, 10 mg, Intravenous, Q30 Min PRN, Rosales Sousa MD     Review of Systems  Review of Systems   Constitutional:  Positive for malaise/fatigue.   HENT: Negative.     Eyes: Negative.    Respiratory: Negative.     Cardiovascular: Negative.    Gastrointestinal: Negative.    Genitourinary: Negative.    Musculoskeletal: Negative.    Skin: Negative.    Neurological: Negative.    Endo/Heme/Allergies: Negative.    Psychiatric/Behavioral: Negative.        Physical Exam      Vitals:    01/03/24 0938   BP: 95/66   Pulse: 100   Temp: 97.3 °F (36.3 °C)   TempSrc: Temporal   SpO2: 100%   Weight: 57.7 kg (127 lb 1.6 oz)   Height: 5' 3" (1.6 m)       Physical Exam  Vitals reviewed.   Constitutional:       Appearance: Normal appearance.   Neurological:      Mental Status: She is alert.   Psychiatric:         Mood and Affect: Mood normal.         Behavior: Behavior normal.        ASSESSMENT :  1. Hot flashes    2. Chemotherapy-induced fatigue    3. Malignant neoplasm of lower-outer quadrant of right breast of female, estrogen receptor negative      PLAN:  Reviewed all information discussed at today's visit and all questions were answered.    Counseled on healthy lifestyle and behavior modifications:   Counseled on foods to avoid to help with hot flashes including caffeine, processes foods, spicy/hot foods, alcohol.   See Nutrition during treatment as needed   Referral to Acupuncture  Continue Magnesium Glycinate 400 mg nightly  I discussed and recommended the following support services:  Garfield " Chi and Yoga I suggested Garfield Chi and/or Yoga as these practices reduce stress, increases flexibility and muscle strength, improves balance and promotes serenity in the power of movement to help fight disease and boost your immune system.   Music and relaxation therapy and Meditation can help reduce stress, pain, depression, and prepare the mind and body for sleep.     Follow up with [facility] in 6 months for survivorship visit    I spent a total of 42 minutes on the day of the visit.This includes face to face time and non-face to face time preparing to see the patient (eg, review of tests), obtaining and/or reviewing separately obtained history, documenting clinical information in the electronic or other health record, independently interpreting results and communicating results to the patient/family/caregiver, or care coordinator.

## 2024-01-08 ENCOUNTER — PATIENT MESSAGE (OUTPATIENT)
Dept: ADMINISTRATIVE | Facility: OTHER | Age: 44
End: 2024-01-08
Payer: COMMERCIAL

## 2024-01-08 ENCOUNTER — PATIENT MESSAGE (OUTPATIENT)
Dept: HEMATOLOGY/ONCOLOGY | Facility: CLINIC | Age: 44
End: 2024-01-08
Payer: COMMERCIAL

## 2024-01-09 ENCOUNTER — PATIENT MESSAGE (OUTPATIENT)
Dept: ADMINISTRATIVE | Facility: OTHER | Age: 44
End: 2024-01-09
Payer: COMMERCIAL

## 2024-01-09 ENCOUNTER — TELEPHONE (OUTPATIENT)
Dept: HEMATOLOGY/ONCOLOGY | Facility: CLINIC | Age: 44
End: 2024-01-09
Payer: COMMERCIAL

## 2024-01-09 ENCOUNTER — LAB VISIT (OUTPATIENT)
Dept: LAB | Facility: HOSPITAL | Age: 44
End: 2024-01-09
Attending: INTERNAL MEDICINE
Payer: COMMERCIAL

## 2024-01-09 DIAGNOSIS — R39.9 URINARY TRACT INFECTION SYMPTOMS: ICD-10-CM

## 2024-01-09 DIAGNOSIS — N30.00 ACUTE CYSTITIS WITHOUT HEMATURIA: Primary | ICD-10-CM

## 2024-01-09 DIAGNOSIS — Z17.1 MALIGNANT NEOPLASM OF LOWER-OUTER QUADRANT OF RIGHT BREAST OF FEMALE, ESTROGEN RECEPTOR NEGATIVE: ICD-10-CM

## 2024-01-09 DIAGNOSIS — C50.511 MALIGNANT NEOPLASM OF LOWER-OUTER QUADRANT OF RIGHT BREAST OF FEMALE, ESTROGEN RECEPTOR NEGATIVE: ICD-10-CM

## 2024-01-09 LAB
BACTERIA #/AREA URNS HPF: ABNORMAL /HPF
BILIRUB UR QL STRIP: NEGATIVE
CLARITY UR: CLEAR
COLOR UR: YELLOW
GLUCOSE UR QL STRIP: NEGATIVE
HGB UR QL STRIP: NEGATIVE
KETONES UR QL STRIP: NEGATIVE
LEUKOCYTE ESTERASE UR QL STRIP: ABNORMAL
MICROSCOPIC COMMENT: ABNORMAL
NITRITE UR QL STRIP: NEGATIVE
PH UR STRIP: 7 [PH] (ref 5–8)
PROT UR QL STRIP: NEGATIVE
SP GR UR STRIP: 1.01 (ref 1–1.03)
SQUAMOUS #/AREA URNS HPF: 2 /HPF
URN SPEC COLLECT METH UR: ABNORMAL
WBC #/AREA URNS HPF: 18 /HPF (ref 0–5)

## 2024-01-09 PROCEDURE — 81000 URINALYSIS NONAUTO W/SCOPE: CPT | Mod: PN | Performed by: INTERNAL MEDICINE

## 2024-01-09 PROCEDURE — 87086 URINE CULTURE/COLONY COUNT: CPT | Performed by: INTERNAL MEDICINE

## 2024-01-09 RX ORDER — CIPROFLOXACIN 500 MG/1
500 TABLET ORAL EVERY 12 HOURS
Qty: 10 TABLET | Refills: 0 | Status: SHIPPED | OUTPATIENT
Start: 2024-01-09 | End: 2024-03-15

## 2024-01-09 NOTE — PROGRESS NOTES
Sent Cipro to her pharmacy. Please let her know that she has a UTI and no blood in her urine. Thanks

## 2024-01-11 ENCOUNTER — PATIENT MESSAGE (OUTPATIENT)
Dept: ADMINISTRATIVE | Facility: OTHER | Age: 44
End: 2024-01-11
Payer: COMMERCIAL

## 2024-01-11 LAB
BACTERIA UR CULT: NORMAL
BACTERIA UR CULT: NORMAL

## 2024-01-12 ENCOUNTER — PATIENT MESSAGE (OUTPATIENT)
Dept: ADMINISTRATIVE | Facility: OTHER | Age: 44
End: 2024-01-12
Payer: COMMERCIAL

## 2024-01-12 ENCOUNTER — TELEPHONE (OUTPATIENT)
Dept: HEMATOLOGY/ONCOLOGY | Facility: CLINIC | Age: 44
End: 2024-01-12
Payer: COMMERCIAL

## 2024-01-12 NOTE — TELEPHONE ENCOUNTER
Returned phone call.  PA is needed for Zina.  Communicated with to pre auth dept, Katerina Levy, who will forward to correct team member to have it submitted.    ----- Message from Lucia Joseph, Patient Care Assistant sent at 1/12/2024 10:27 AM CST -----  Type: Needs Medical Advice  Who Called:  tank  Best Call Back Number: [phone]    Additional Information: tank is calling from [employer] access about PA complete for keytruda , please call to further discuss,. Thank you .

## 2024-01-17 ENCOUNTER — LAB VISIT (OUTPATIENT)
Dept: LAB | Facility: HOSPITAL | Age: 44
End: 2024-01-17
Attending: INTERNAL MEDICINE
Payer: COMMERCIAL

## 2024-01-17 ENCOUNTER — TELEPHONE (OUTPATIENT)
Dept: HEMATOLOGY/ONCOLOGY | Facility: CLINIC | Age: 44
End: 2024-01-17
Payer: COMMERCIAL

## 2024-01-17 DIAGNOSIS — C50.511 MALIGNANT NEOPLASM OF LOWER-OUTER QUADRANT OF RIGHT BREAST OF FEMALE, ESTROGEN RECEPTOR NEGATIVE: ICD-10-CM

## 2024-01-17 DIAGNOSIS — Z17.1 MALIGNANT NEOPLASM OF LOWER-OUTER QUADRANT OF RIGHT BREAST OF FEMALE, ESTROGEN RECEPTOR NEGATIVE: ICD-10-CM

## 2024-01-17 DIAGNOSIS — C50.911 INVASIVE DUCTAL CARCINOMA OF BREAST, FEMALE, RIGHT: ICD-10-CM

## 2024-01-17 LAB
ALBUMIN SERPL BCP-MCNC: 4.2 G/DL (ref 3.5–5.2)
ALP SERPL-CCNC: 54 U/L (ref 55–135)
ALT SERPL W/O P-5'-P-CCNC: 10 U/L (ref 10–44)
ANION GAP SERPL CALC-SCNC: 11 MMOL/L (ref 8–16)
AST SERPL-CCNC: 15 U/L (ref 10–40)
B-HCG UR QL: NEGATIVE
BASOPHILS # BLD AUTO: 0.09 K/UL (ref 0–0.2)
BASOPHILS NFR BLD: 1.8 % (ref 0–1.9)
BILIRUB SERPL-MCNC: 0.2 MG/DL (ref 0.1–1)
BUN SERPL-MCNC: 16 MG/DL (ref 6–20)
CALCIUM SERPL-MCNC: 9.5 MG/DL (ref 8.7–10.5)
CHLORIDE SERPL-SCNC: 106 MMOL/L (ref 95–110)
CO2 SERPL-SCNC: 24 MMOL/L (ref 23–29)
CREAT SERPL-MCNC: 0.6 MG/DL (ref 0.5–1.4)
DIFFERENTIAL METHOD BLD: ABNORMAL
EOSINOPHIL # BLD AUTO: 0 K/UL (ref 0–0.5)
EOSINOPHIL NFR BLD: 0.6 % (ref 0–8)
ERYTHROCYTE [DISTWIDTH] IN BLOOD BY AUTOMATED COUNT: 13.9 % (ref 11.5–14.5)
EST. GFR  (NO RACE VARIABLE): >60 ML/MIN/1.73 M^2
GLUCOSE SERPL-MCNC: 92 MG/DL (ref 70–110)
HCT VFR BLD AUTO: 35.2 % (ref 37–48.5)
HGB BLD-MCNC: 12.1 G/DL (ref 12–16)
IMM GRANULOCYTES # BLD AUTO: 0.02 K/UL (ref 0–0.04)
IMM GRANULOCYTES NFR BLD AUTO: 0.4 % (ref 0–0.5)
LYMPHOCYTES # BLD AUTO: 1.4 K/UL (ref 1–4.8)
LYMPHOCYTES NFR BLD: 28.6 % (ref 18–48)
MCH RBC QN AUTO: 33 PG (ref 27–31)
MCHC RBC AUTO-ENTMCNC: 34.4 G/DL (ref 32–36)
MCV RBC AUTO: 96 FL (ref 82–98)
MONOCYTES # BLD AUTO: 0.8 K/UL (ref 0.3–1)
MONOCYTES NFR BLD: 16.4 % (ref 4–15)
NEUTROPHILS # BLD AUTO: 2.6 K/UL (ref 1.8–7.7)
NEUTROPHILS NFR BLD: 52.2 % (ref 38–73)
NRBC BLD-RTO: 0 /100 WBC
PLATELET # BLD AUTO: 289 K/UL (ref 150–450)
PMV BLD AUTO: 9.4 FL (ref 9.2–12.9)
POTASSIUM SERPL-SCNC: 4.3 MMOL/L (ref 3.5–5.1)
PROT SERPL-MCNC: 7.3 G/DL (ref 6–8.4)
RBC # BLD AUTO: 3.67 M/UL (ref 4–5.4)
SODIUM SERPL-SCNC: 141 MMOL/L (ref 136–145)
WBC # BLD AUTO: 5 K/UL (ref 3.9–12.7)

## 2024-01-17 PROCEDURE — 80053 COMPREHEN METABOLIC PANEL: CPT | Mod: PN | Performed by: INTERNAL MEDICINE

## 2024-01-17 PROCEDURE — 36415 COLL VENOUS BLD VENIPUNCTURE: CPT | Mod: PN | Performed by: INTERNAL MEDICINE

## 2024-01-17 PROCEDURE — 81025 URINE PREGNANCY TEST: CPT | Mod: PN | Performed by: INTERNAL MEDICINE

## 2024-01-17 PROCEDURE — 85025 COMPLETE CBC W/AUTO DIFF WBC: CPT | Mod: PN | Performed by: INTERNAL MEDICINE

## 2024-01-17 NOTE — TELEPHONE ENCOUNTER
Returned phone call, and forwarded this message to Pre certification dept.    ----- Message from Lucia Joseph, Patient Care Assistant sent at 1/17/2024  2:47 PM CST -----  Type: Needs Medical Advice  Who Called:  jenn Chavez Call Back Number: [phone] case # [id number]    Additional Information: jenn is calling to check on the keytruda enrollment form , please call back to further  discuss thank you .

## 2024-01-18 ENCOUNTER — INFUSION (OUTPATIENT)
Dept: INFUSION THERAPY | Facility: HOSPITAL | Age: 44
End: 2024-01-18
Attending: INTERNAL MEDICINE
Payer: COMMERCIAL

## 2024-01-18 ENCOUNTER — OFFICE VISIT (OUTPATIENT)
Dept: HEMATOLOGY/ONCOLOGY | Facility: CLINIC | Age: 44
End: 2024-01-18
Payer: COMMERCIAL

## 2024-01-18 ENCOUNTER — RESEARCH ENCOUNTER (OUTPATIENT)
Dept: RESEARCH | Facility: HOSPITAL | Age: 44
End: 2024-01-18
Payer: COMMERCIAL

## 2024-01-18 VITALS
TEMPERATURE: 98 F | WEIGHT: 129 LBS | OXYGEN SATURATION: 98 % | RESPIRATION RATE: 18 BRPM | DIASTOLIC BLOOD PRESSURE: 68 MMHG | HEIGHT: 63 IN | SYSTOLIC BLOOD PRESSURE: 110 MMHG | BODY MASS INDEX: 22.86 KG/M2 | HEART RATE: 83 BPM

## 2024-01-18 VITALS
WEIGHT: 129 LBS | RESPIRATION RATE: 18 BRPM | OXYGEN SATURATION: 98 % | TEMPERATURE: 98 F | DIASTOLIC BLOOD PRESSURE: 68 MMHG | HEART RATE: 83 BPM | SYSTOLIC BLOOD PRESSURE: 110 MMHG | BODY MASS INDEX: 22.86 KG/M2 | HEIGHT: 63 IN

## 2024-01-18 DIAGNOSIS — R53.83 CHEMOTHERAPY-INDUCED FATIGUE: ICD-10-CM

## 2024-01-18 DIAGNOSIS — R23.2 HOT FLASHES: ICD-10-CM

## 2024-01-18 DIAGNOSIS — Z17.1 MALIGNANT NEOPLASM OF LOWER-OUTER QUADRANT OF RIGHT BREAST OF FEMALE, ESTROGEN RECEPTOR NEGATIVE: Primary | ICD-10-CM

## 2024-01-18 DIAGNOSIS — C50.511 MALIGNANT NEOPLASM OF LOWER-OUTER QUADRANT OF RIGHT BREAST OF FEMALE, ESTROGEN RECEPTOR NEGATIVE: Primary | ICD-10-CM

## 2024-01-18 DIAGNOSIS — N30.00 ACUTE CYSTITIS WITHOUT HEMATURIA: ICD-10-CM

## 2024-01-18 DIAGNOSIS — Z17.1 MALIGNANT NEOPLASM OF LOWER-OUTER QUADRANT OF RIGHT BREAST OF FEMALE, ESTROGEN RECEPTOR NEGATIVE: ICD-10-CM

## 2024-01-18 DIAGNOSIS — Z15.01 BRCA GENE POSITIVE: ICD-10-CM

## 2024-01-18 DIAGNOSIS — B37.0 ORAL CANDIDIASIS: ICD-10-CM

## 2024-01-18 DIAGNOSIS — C50.511 MALIGNANT NEOPLASM OF LOWER-OUTER QUADRANT OF RIGHT BREAST OF FEMALE, ESTROGEN RECEPTOR NEGATIVE: ICD-10-CM

## 2024-01-18 DIAGNOSIS — T45.1X5A CHEMOTHERAPY-INDUCED FATIGUE: ICD-10-CM

## 2024-01-18 DIAGNOSIS — Z15.09 BRCA GENE POSITIVE: ICD-10-CM

## 2024-01-18 DIAGNOSIS — C50.911 INVASIVE DUCTAL CARCINOMA OF BREAST, FEMALE, RIGHT: Primary | ICD-10-CM

## 2024-01-18 LAB — CORTIS SERPL-MCNC: 9 UG/DL

## 2024-01-18 PROCEDURE — 99999 PR PBB SHADOW E&M-EST. PATIENT-LVL IV: CPT | Mod: PBBFAC,,, | Performed by: INTERNAL MEDICINE

## 2024-01-18 PROCEDURE — 3074F SYST BP LT 130 MM HG: CPT | Mod: CPTII,S$GLB,, | Performed by: INTERNAL MEDICINE

## 2024-01-18 PROCEDURE — 96417 CHEMO IV INFUS EACH ADDL SEQ: CPT | Mod: PN

## 2024-01-18 PROCEDURE — 96377 APPLICATON ON-BODY INJECTOR: CPT | Mod: 59,PN

## 2024-01-18 PROCEDURE — 96367 TX/PROPH/DG ADDL SEQ IV INF: CPT | Mod: PN

## 2024-01-18 PROCEDURE — 96413 CHEMO IV INFUSION 1 HR: CPT | Mod: PN

## 2024-01-18 PROCEDURE — 3078F DIAST BP <80 MM HG: CPT | Mod: CPTII,S$GLB,, | Performed by: INTERNAL MEDICINE

## 2024-01-18 PROCEDURE — 96375 TX/PRO/DX INJ NEW DRUG ADDON: CPT | Mod: PN

## 2024-01-18 PROCEDURE — 63600175 PHARM REV CODE 636 W HCPCS: Mod: PN | Performed by: INTERNAL MEDICINE

## 2024-01-18 PROCEDURE — 96411 CHEMO IV PUSH ADDL DRUG: CPT | Mod: PN

## 2024-01-18 PROCEDURE — 25000003 PHARM REV CODE 250: Mod: PN | Performed by: INTERNAL MEDICINE

## 2024-01-18 PROCEDURE — 82533 TOTAL CORTISOL: CPT | Performed by: INTERNAL MEDICINE

## 2024-01-18 PROCEDURE — 99215 OFFICE O/P EST HI 40 MIN: CPT | Mod: S$GLB,,, | Performed by: INTERNAL MEDICINE

## 2024-01-18 PROCEDURE — 3008F BODY MASS INDEX DOCD: CPT | Mod: CPTII,S$GLB,, | Performed by: INTERNAL MEDICINE

## 2024-01-18 RX ORDER — SODIUM CHLORIDE 0.9 % (FLUSH) 0.9 %
10 SYRINGE (ML) INJECTION
Status: DISCONTINUED | OUTPATIENT
Start: 2024-01-18 | End: 2024-01-18 | Stop reason: HOSPADM

## 2024-01-18 RX ORDER — PROCHLORPERAZINE EDISYLATE 5 MG/ML
10 INJECTION INTRAMUSCULAR; INTRAVENOUS ONCE AS NEEDED
Status: CANCELLED
Start: 2024-01-18

## 2024-01-18 RX ORDER — HEPARIN 100 UNIT/ML
500 SYRINGE INTRAVENOUS
Status: CANCELLED | OUTPATIENT
Start: 2024-01-18

## 2024-01-18 RX ORDER — LORAZEPAM 2 MG/ML
0.5 INJECTION INTRAMUSCULAR
Status: DISCONTINUED | OUTPATIENT
Start: 2024-01-18 | End: 2024-01-18 | Stop reason: HOSPADM

## 2024-01-18 RX ORDER — DOXORUBICIN HYDROCHLORIDE 2 MG/ML
60 INJECTION, SOLUTION INTRAVENOUS
Status: CANCELLED | OUTPATIENT
Start: 2024-01-18

## 2024-01-18 RX ORDER — EPINEPHRINE 0.3 MG/.3ML
0.3 INJECTION SUBCUTANEOUS ONCE AS NEEDED
Status: CANCELLED | OUTPATIENT
Start: 2024-01-18

## 2024-01-18 RX ORDER — DOXORUBICIN HYDROCHLORIDE 2 MG/ML
60 INJECTION, SOLUTION INTRAVENOUS
Status: COMPLETED | OUTPATIENT
Start: 2024-01-18 | End: 2024-01-18

## 2024-01-18 RX ORDER — SODIUM CHLORIDE 0.9 % (FLUSH) 0.9 %
10 SYRINGE (ML) INJECTION
Status: CANCELLED | OUTPATIENT
Start: 2024-01-18

## 2024-01-18 RX ORDER — DIPHENHYDRAMINE HYDROCHLORIDE 50 MG/ML
50 INJECTION INTRAMUSCULAR; INTRAVENOUS ONCE AS NEEDED
Status: CANCELLED | OUTPATIENT
Start: 2024-01-18

## 2024-01-18 RX ADMIN — DOXORUBICIN HYDROCHLORIDE 94 MG: 2 INJECTION, SOLUTION INTRAVENOUS at 11:01

## 2024-01-18 RX ADMIN — PEGFILGRASTIM 6 MG: KIT SUBCUTANEOUS at 12:01

## 2024-01-18 RX ADMIN — LORAZEPAM 0.5 MG: 2 INJECTION INTRAMUSCULAR; INTRAVENOUS at 11:01

## 2024-01-18 RX ADMIN — SODIUM CHLORIDE 200 MG: 9 INJECTION, SOLUTION INTRAVENOUS at 10:01

## 2024-01-18 RX ADMIN — SODIUM CHLORIDE: 9 INJECTION, SOLUTION INTRAVENOUS at 09:01

## 2024-01-18 RX ADMIN — DEXAMETHASONE SODIUM PHOSPHATE 0.25 MG: 10 INJECTION, SOLUTION INTRAMUSCULAR; INTRAVENOUS at 11:01

## 2024-01-18 RX ADMIN — APREPITANT 130 MG: 130 INJECTION, EMULSION INTRAVENOUS at 11:01

## 2024-01-18 RX ADMIN — CYCLOPHOSPHAMIDE 940 MG: 200 INJECTION, SOLUTION INTRAVENOUS at 11:01

## 2024-01-18 NOTE — PROGRESS NOTES
"Protocol: [id number], Disparities in Results of Immune Checkpoint Inhibitor Treatment (DIRECT): A Prospective Cohort Study of Cancer Survivors Treated with anti-PD-L1 Immunotherapy in a Community Oncology Setting  IRB# 2022.126  Version Date: 1/11/2022  Treating MD: Dr. Mcgovern  Study ID# [id number]  01/18/2024      The patient presented at the [facility] clinic today for follow up with Dr. Mcgovern and clearance of Cycle 8 Q3 week Keytruda. The patient presented with her , A&O, without noted distress, and with appropriate mood and affect to the situation. The patient was deemed appropriate for treatment. This will be her last infusion before her bilateral mastectomy, planned on 2/29/24.     The patient continues to freely agree with participation in the referenced study. The patient reports that the "eye twitch" has resolved since her last infusion. Dr. Mcgovern does not attribute the eye twitch to Keytruda (see 12/13/23 secure chat in shadow chart). The patient was diagnosed with Grade 1 oral candidiasis at her last clinic visit with NP, Edgar Riojas. The patient was asymptomatic at the time and was prescribed Nystatin 6mg for treatment. The patient reports starting the Nystatin on 12/28/23 and taking it for 2 days. The treating clinician deemed this unlikely attributable to the Keytruda and probably attributable to the chemotherapy.  The patient was diagnosed with a urinary tract infection on 1/9/24 and was prescribed Cipro for treatment. The patient reports starting Cipro on 1/9/24 and taking it for 5 days. Dr. Mcgovern deems the UTI as unrelated to immunotherapy. The patient mentioned having a Grade 1 rash on her face. She said that the rash develops right after each infusion and resolves after a few days. Dr. Mcgovern deems the rash as unrelated to immunotherapy. The patient denied having any other new or worsening symptoms.       Toxicities attributable to last infusion of Keytruda:    Grade 1 " Fatigue (9/14/2023 - ongoing): treating clinician deems probably attributable to immunotherapy - the patient notes mild fatigue starting 3 weeks after her 1st Keytruda infusion. The patient reports that the fatigue continues to come and go throughout each cycle. The treating clinician does not deem CS, no new orders given.                  Clinical Record Information:  Between the last study visit, the patient has not been diagnosed with any of the following autoimmune diseases or conditions:          The patient denied having any questions for this CRC. Encouraged the patient to call with any questions or concerns.     Next set of [id number] questionnaires and labs due:              - February 24th, 2024 +/- 1 month (6 months after initiation of immunotherapy)  Planning to collect at patients March 21st appointment. Pt aware and agreed to plan.       Tamia Herrera, CRC

## 2024-01-18 NOTE — PLAN OF CARE
Problem: Fatigue  Goal: Improved Activity Tolerance  Outcome: Ongoing, Progressing  Intervention: Promote Improved Energy  Flowsheets (Taken 1/18/2024 0957)  Fatigue Management: paced activity encouraged  Sleep/Rest Enhancement:   relaxation techniques promoted   noise level reduced   family presence promoted  Activity Management:   Ambulated -L4   Up in chair - L3     Problem: Adult Inpatient Plan of Care  Goal: Plan of Care Review  Flowsheets (Taken 1/18/2024 0957)  Plan of Care Reviewed With:   patient   spouse  Goal: Patient-Specific Goal (Individualized)  Outcome: Ongoing, Progressing  Flowsheets (Taken 1/18/2024 0957)  Anxieties, Fears or Concerns: last treatment before surgery  Individualized Care Needs: conversation, spouse chairside, personal items    Pt tolerated keytruda and AC. PAC with +blood return throughout treatment, OBI applied to LUE. Pt completed and tolerated cool cap. VSS, NAD noted. Pt d/c home.

## 2024-01-18 NOTE — PROGRESS NOTES
PROGRESS NOTE    Subjective:       Patient ID: Arabella Catalan is a 43 y.o. female.  MRN: [id number]  : 1980    Chief Complaint: cT2 cN0 TNBC     History of Present Illness:   Arabella Catalan is a 43 y.o. female who is referred for TNBC.      As previously documented she started screening mammograms for a few years after her ovarian cancer , in her late 20's. Routine pelvic exam revealed an ovarian cancer, s/p right oophorectomy for dysgerminoma.     Resumed routine mammograms at 42, had a normal screening mammogram in May 2023 but felt a lump in the right breast in July. Further imaging showed 2 lesions, both > 2 cm , both biopsied to be G3 IDC, ER/NC and Her 2 jose eduardo negative.       Menarche at age 14 year old. . Age at first live birth 31. Did  breast feed 2 year and 6 months OCP-yes 20 years ago  Menopause at none. HRT-none     Family history cancer:  Mother breast cancer at 38 and cervical cancer     Smoker Pk/yr quit date , smoked around 0.5 pack a day     Interim history:    On neoadjuvant chemo based on KEYNOTE-522. She is using Dignicap.   Presents to the clinic today for evaluation prior to cycle  D 1,  AC+ Pembro. Had an echo on 11/3, EF 65-70%.   Fatigue the 1st week of chemo; improves thereafter.   Twitching to the eye has stopped.   Does have a mild facial flush the first few days, that improves over time.     Patient sent a message on , reporting dysuria and pinkish urine, UA was positive for a UTI, treated with cipro for 5 days with complete resolution of symptoms.   Hot flashes are mild and stable.   Has not had a menstrual cycle since September.     Has a follow up visit with Dr. Pako harris and scheduled for b/l mastectomy on .     Oncology History:  5/3/23  Impression:  Bilateral  There is no mammographic evidence of malignancy.     BI-RADS Category:   Overall: 2 -  Benign    7/18/23  US  Impression:  Right  Mass: Right breast 1.5 cm x 1 cm x 1.6 cm mass at the 8 o'clock position. Assessment: 4 - Suspicious finding. Biopsy and possible aspiration are recommended.   Mass: Right breast 2 cm x 1.1 cm x 1.6 cm mass at the 7 o'clock position. Assessment: 4 - Suspicious finding. Biopsy is recommended.      BI-RADS Category:   Overall: 4 - Suspicious    8/2/23  MRI  Impression:  Right  Mass: Right breast 2.7 cm x 2.2 cm x 2 cm mass at the 8 o'clock position. Assessment: 6 - Known biopsy, proven malignancy.   Mass: Right breast 2 cm x 1.6 cm x 1.6 cm mass at the 7 o'clock position. Assessment: 6 - Known biopsy, proven malignancy.      Left  There is no MR evidence of malignancy in the left breast.     BI-RADS Category:   Overall: 6 - Known Biopsy-Proven Malignancy  7/31/23:  DIAGNOSIS:   07/28/2023 JL:nidia     1. RIGHT BREAST AT 8:00 7 CM FROM NIPPLE CORE NEEDLE BIOPSIES:   - INVASIVE DUCT CARCINOMA, HIGH-GRADE (3,2,3).     2. RIGHT BREAST AT 7:00 3 CM FROM NIPPLE CORE NEEDLE BIOPSIES:   - IN SITU AND INVASIVE DUCT CARCINOMA, HIGH-GRADE (3,3,2).     RIGHT BREAST: INVASIVE DUCTAL CARCINOMA   GRADE: HIGH     ER: NEGATIVE, [place]: NEGATIVE, HER2**: NEGATIVE       8/7/23  CT chest abd pelvis  Impression:     1. Known right breast malignancy, better evaluated on comparison breast MRI exam.  2. Mild asymmetrically prominent, but not pathologically enlarged right axillary lymph nodes, measuring up to 6 mm in short axis dimension.  No mediastinal or hilar lymphadenopathy.  3. Mild hepatomegaly with multiple small hypoattenuating hepatic lesions, the largest measuring 0.9 cm in the right hepatic lobe, which are too small to definitively characterize.  Metastases not excluded.  Consider further evaluation with PET-CT and/or contrast enhanced liver protocol MRI.  4. Solid, noncalcified 2 mm pulmonary nodule in the right lower lobe, nonspecific. Attention on follow-up imaging recommended.      8/18/23  Liver MRI      Impression:     Small circumscribed lesions in the liver are most compatible with hemangiomas.  No suspicious liver lesions.    8/10/123  Bone scan   Impression:     No evidence of osseous metastatic disease.     10/24/23:  US right breast   Impression:     Interval decrease in size of both right breast masses compatible with favorable response to neoadjuvant chemotherapy    US transvaginal  Impression:     Left ovarian 3 mm echogenic lesion, possibly a small dermoid.  History:  Past Medical History:   Diagnosis Date    Abnormal Pap smear of cervix     BRCA1 positive     Breast cancer 07/26/2023    right    Breast cancer 07/26/2023    right    HPV (human papilloma virus) infection     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      Past Surgical History:   Procedure Laterality Date    BREAST BIOPSY Right 2015    benign    BREAST BIOPSY Right 07/26/2023    BREAST BIOPSY Right 07/26/2023    BREAST BIOPSY Right 08/16/2023    benign LN    INSERTION OF TUNNELED CENTRAL VENOUS CATHETER (CVC) WITH SUBCUTANEOUS PORT N/A 08/15/2023    Procedure: [id number];  Surgeon: Skyler Aldrich MD;  Location: [facility] OR;  Service: General;  Laterality: N/A;    OOPHORECTOMY Right 2009    stage 1a dysgerminoma    VAGINAL DELIVERY  12/13/2016     Family History   Problem Relation Age of Onset    Breast cancer Mother 38    Cervical cancer Mother         dx in 20s    BRCA 1/2 Sister     Brain cancer Maternal Uncle     Colon cancer Neg Hx     Colon polyps Neg Hx     Esophageal cancer Neg Hx     Stomach cancer Neg Hx     Inflammatory bowel disease Neg Hx       Social History     Tobacco Use    Smoking status: Former     Types: Cigarettes    Smokeless tobacco: Not on file   Substance and Sexual Activity    Alcohol use: Yes     Comment: occasionally    Drug use: No    Sexual activity: Not Currently     Partners: Male     Birth control/protection: Condom        ROS:   Review of Systems   Constitutional:   "Positive for malaise/fatigue. Negative for fever and weight loss.   HENT:  Negative for congestion, hearing loss, nosebleeds and sore throat.    Eyes:  Negative for double vision and photophobia.   Respiratory:  Negative for cough, hemoptysis, sputum production, shortness of breath and wheezing.    Cardiovascular:  Negative for chest pain, palpitations, orthopnea and leg swelling.   Gastrointestinal:  Negative for abdominal pain, blood in stool, constipation, diarrhea, heartburn, nausea and vomiting.   Genitourinary:  Negative for dysuria, frequency, hematuria and urgency.   Musculoskeletal:  Negative for back pain, joint pain and myalgias.   Skin:  Negative for itching and rash.   Neurological:  Negative for dizziness, tingling, seizures, weakness and headaches.   Endo/Heme/Allergies:  Negative for polydipsia. Does not bruise/bleed easily.   Psychiatric/Behavioral:  Negative for depression and memory loss. The patient is nervous/anxious and has insomnia (during steroid days).         Objective:   Weight:  Gain of 1 1/2 pounds in 3 weeks  Vitals:    01/18/24 0848   BP: 110/68   Pulse: 83   Resp: 18   Temp: 97.5 °F (36.4 °C)   TempSrc: Temporal   SpO2: 98%   Weight: 58.5 kg (128 lb 15.5 oz)   Height: 5' 3" (1.6 m)   PainSc: 0-No pain     Physical Examination:   Physical Exam  Vitals and nursing note reviewed.   Constitutional:       General: She is not in acute distress.     Appearance: She is not diaphoretic.   HENT:      Head: Normocephalic and atraumatic.      Comments: Mild facial flushing      Mouth/Throat:      Pharynx: No oropharyngeal exudate.   Eyes:      General: No scleral icterus.     Conjunctiva/sclera: Conjunctivae normal.   Neck:      Thyroid: No thyromegaly.   Cardiovascular:      Rate and Rhythm: Normal rate and regular rhythm.      Heart sounds: Normal heart sounds. No murmur heard.  Pulmonary:      Effort: Pulmonary effort is normal. No respiratory distress.      Breath sounds: No stridor. No " wheezing or rales.   Chest:      Chest wall: No tenderness.   Abdominal:      General: Bowel sounds are normal. There is no distension.      Palpations: Abdomen is soft. There is no mass.      Tenderness: There is no abdominal tenderness. There is no rebound.   Musculoskeletal:         General: No tenderness or deformity. Normal range of motion.      Cervical back: Neck supple.      Right lower leg: No edema.      Left lower leg: No edema.   Lymphadenopathy:      Cervical: No cervical adenopathy.   Skin:     General: Skin is warm and dry.      Findings: No erythema or rash.   Neurological:      Mental Status: She is alert and oriented to person, place, and time.      Cranial Nerves: No cranial nerve deficit.      Coordination: Coordination normal.      Gait: Gait is intact.   Psychiatric:         Mood and Affect: Affect normal.         Behavior: Behavior normal.         Thought Content: Thought content normal.         Cognition and Memory: Memory normal.         Judgment: Judgment normal.        Diagnostic Tests:  Significant Imaging: I have reviewed and interpreted all pertinent imaging results/findings.    Laboratory Data:  All pertinent labs have been reviewed.    Labs:   Lab Results   Component Value Date    WBC 5.00 01/17/2024    HGB 12.1 01/17/2024    HCT 35.2 (L) 01/17/2024    MCV 96 01/17/2024     01/17/2024     ANC = 2.1    CMP  Sodium   Date Value Ref Range Status   01/17/2024 141 136 - 145 mmol/L Final     Potassium   Date Value Ref Range Status   01/17/2024 4.3 3.5 - 5.1 mmol/L Final     Chloride   Date Value Ref Range Status   01/17/2024 106 95 - 110 mmol/L Final     CO2   Date Value Ref Range Status   01/17/2024 24 23 - 29 mmol/L Final     Glucose   Date Value Ref Range Status   01/17/2024 92 70 - 110 mg/dL Final     BUN   Date Value Ref Range Status   01/17/2024 16 6 - 20 mg/dL Final     Creatinine   Date Value Ref Range Status   01/17/2024 0.6 0.5 - 1.4 mg/dL Final     Calcium   Date Value Ref  Range Status   01/17/2024 9.5 8.7 - 10.5 mg/dL Final     Total Protein   Date Value Ref Range Status   01/17/2024 7.3 6.0 - 8.4 g/dL Final     Albumin   Date Value Ref Range Status   01/17/2024 4.2 3.5 - 5.2 g/dL Final     Total Bilirubin   Date Value Ref Range Status   01/17/2024 0.2 0.1 - 1.0 mg/dL Final     Comment:     For infants and newborns, interpretation of results should be based  on gestational age, weight and in agreement with clinical  observations.    Premature Infant recommended reference ranges:  Up to 24 hours.............<8.0 mg/dL  Up to 48 hours............<12.0 mg/dL  3-5 days..................<15.0 mg/dL  6-29 days.................<15.0 mg/dL       Alkaline Phosphatase   Date Value Ref Range Status   01/17/2024 54 (L) 55 - 135 U/L Final     AST   Date Value Ref Range Status   01/17/2024 15 10 - 40 U/L Final     ALT   Date Value Ref Range Status   01/17/2024 10 10 - 44 U/L Final     Anion Gap   Date Value Ref Range Status   01/17/2024 11 8 - 16 mmol/L Final     eGFR   Date Value Ref Range Status   01/17/2024 >60.0 >60 mL/min/1.73 m^2 Final       Assessment/Plan:   Invasive ductal carcinoma of breast, female, right  cT2 cN0 G3 IDC, TNBC    We discussed the more agressive nature and multifocal nature of her TNBC. Clinically node negative. Based on size, she is a candidate for neoadjuvant chemo immunotherapy based on Keynote-522 to prevent systemic spread and to monitor response to therapy and that 64% of the patients on trial had a pCR.     She is agreeable,echo is normal, no evidence for distant metastatic disease noted.  Neulasta added for the last 3 cycles due to chemotherapy induced neutropenia.   She is cleared to receive cycle 8/8 of AC+ Pembro in the neoadjuvant setting.     Surgery scheduled, will see her 3 weeks post op to discuss pathology and make further recommendations. Check cortisol level today.     She has also been enrolled in the Protocol [id number], Disparities in Results of  Immune Checkpoint Inhibitor Treatment (DIRECT): A Prospective Cohort Study of Cancer Survivors Treated with anti-PD-L1 Immunotherapy in a Community Oncology Setting.     We also discussed [id number] trial regarding adjuvant Pembrolizumab if she has a complete response. She received additional information and will think about it.     UTI   Treated with ciprofloxacin,resolved.     Transaminitis   Grade 2, held Keytruda for cycle 2, treated with a short course of steroids, now LFTs have normalized and she received Keytruda since then and she has had no further issues.     History of ovarian cancer  BRCA positive   Plan for b/l mastectomy. Follow up with Gyn onc, GI and dermatology.   Referred to genetics clinic.     Joint pains  Resolved        No follow-ups on file.    Plan was discussed with the patient at length, and she verbalized understanding. Arabella was given an opportunity to ask questions that were answered to her satisfaction, and she was advised to call in the interval if any problems or questions arise.    Electronically signed by Corina Mcgovern MD          Route Chart for Scheduling    Med Onc Chart Routing      Follow up with physician . 3/21   Follow up with [facility]    Infusion scheduling note    Injection scheduling note    Labs    Imaging    Pharmacy appointment    Other referrals                  Treatment Plan Information   OP PEMBROLIZUMAB WITH WEEKLY PACLITAXEL CARBOPLATIN (AUC 1.5) FOLLOWED BY PEMBROLIZUMAB DOXORUBICIN CYCLOPHOSPHAMIDE FOLLOWED BY PEMBROLIZUMAB 200MG Q3W   Corina Mcgovern MD   Upcoming Treatment Dates - OP PEMBROLIZUMAB WITH WEEKLY PACLITAXEL CARBOPLATIN (AUC 1.5) FOLLOWED BY PEMBROLIZUMAB DOXORUBICIN CYCLOPHOSPHAMIDE FOLLOWED BY PEMBROLIZUMAB 200MG Q3W    1/18/2024       Chemotherapy       DOXOrubicin chemo injection 94 mg       cycloPHOSphamide 600 mg/m2 = 940 mg in sodium chloride 0.9% 254.7 mL chemo infusion       Antiemetics       aprepitant (CINVANTI) injection 130 mg        palonosetron 0.25mg/dexAMETHasone 12mg in NS IVPB 0.25 mg 50 mL       LORazepam injection 0.5 mg       Immunotherapy       pembrolizumab (KEYTRUDA) 200 mg in sodium chloride 0.9% SolP 108 mL infusion  2/8/2024       Immunotherapy       pembrolizumab (KEYTRUDA) 200 mg in sodium chloride 0.9% SolP 108 mL infusion  2/29/2024       Immunotherapy       pembrolizumab (KEYTRUDA) 200 mg in sodium chloride 0.9% SolP 108 mL infusion  3/21/2024       Immunotherapy       pembrolizumab (KEYTRUDA) 200 mg in sodium chloride 0.9% SolP 108 mL infusion            Answers submitted by the patient for this visit:  Review of Systems Questionnaire (Submitted on 1/11/2024)  appetite change : No  unexpected weight change: No  mouth sores: No  visual disturbance: Yes  adenopathy: No

## 2024-01-19 ENCOUNTER — PATIENT MESSAGE (OUTPATIENT)
Dept: HEMATOLOGY/ONCOLOGY | Facility: CLINIC | Age: 44
End: 2024-01-19
Payer: COMMERCIAL

## 2024-01-19 ENCOUNTER — TELEPHONE (OUTPATIENT)
Dept: HEMATOLOGY/ONCOLOGY | Facility: CLINIC | Age: 44
End: 2024-01-19
Payer: COMMERCIAL

## 2024-01-31 DIAGNOSIS — C50.511 MALIGNANT NEOPLASM OF LOWER-OUTER QUADRANT OF RIGHT BREAST OF FEMALE, ESTROGEN RECEPTOR NEGATIVE: Primary | ICD-10-CM

## 2024-01-31 DIAGNOSIS — Z17.1 MALIGNANT NEOPLASM OF LOWER-OUTER QUADRANT OF RIGHT BREAST OF FEMALE, ESTROGEN RECEPTOR NEGATIVE: Primary | ICD-10-CM

## 2024-02-06 ENCOUNTER — PATIENT MESSAGE (OUTPATIENT)
Dept: HEMATOLOGY/ONCOLOGY | Facility: CLINIC | Age: 44
End: 2024-02-06
Payer: COMMERCIAL

## 2024-02-07 ENCOUNTER — PATIENT MESSAGE (OUTPATIENT)
Dept: ADMINISTRATIVE | Facility: OTHER | Age: 44
End: 2024-02-07
Payer: COMMERCIAL

## 2024-02-19 ENCOUNTER — CLINICAL SUPPORT (OUTPATIENT)
Dept: REHABILITATION | Facility: HOSPITAL | Age: 44
End: 2024-02-19
Payer: COMMERCIAL

## 2024-02-19 ENCOUNTER — LAB VISIT (OUTPATIENT)
Dept: LAB | Facility: HOSPITAL | Age: 44
End: 2024-02-19
Attending: SURGERY
Payer: COMMERCIAL

## 2024-02-19 DIAGNOSIS — Z17.1 MALIGNANT NEOPLASM OF LOWER-OUTER QUADRANT OF RIGHT BREAST OF FEMALE, ESTROGEN RECEPTOR NEGATIVE: ICD-10-CM

## 2024-02-19 DIAGNOSIS — Z91.89 AT RISK FOR LYMPHEDEMA: ICD-10-CM

## 2024-02-19 DIAGNOSIS — C50.511 MALIGNANT NEOPLASM OF LOWER-OUTER QUADRANT OF RIGHT BREAST OF FEMALE, ESTROGEN RECEPTOR NEGATIVE: ICD-10-CM

## 2024-02-19 LAB
ALBUMIN SERPL BCP-MCNC: 4.3 G/DL (ref 3.5–5.2)
ALP SERPL-CCNC: 52 U/L (ref 55–135)
ALT SERPL W/O P-5'-P-CCNC: 13 U/L (ref 10–44)
ANION GAP SERPL CALC-SCNC: 9 MMOL/L (ref 8–16)
AST SERPL-CCNC: 21 U/L (ref 10–40)
BASOPHILS # BLD AUTO: 0.07 K/UL (ref 0–0.2)
BASOPHILS NFR BLD: 0.8 % (ref 0–1.9)
BILIRUB SERPL-MCNC: 0.2 MG/DL (ref 0.1–1)
BUN SERPL-MCNC: 18 MG/DL (ref 6–20)
CALCIUM SERPL-MCNC: 9.7 MG/DL (ref 8.7–10.5)
CHLORIDE SERPL-SCNC: 105 MMOL/L (ref 95–110)
CO2 SERPL-SCNC: 26 MMOL/L (ref 23–29)
CREAT SERPL-MCNC: 0.7 MG/DL (ref 0.5–1.4)
DIFFERENTIAL METHOD BLD: ABNORMAL
EOSINOPHIL # BLD AUTO: 1.2 K/UL (ref 0–0.5)
EOSINOPHIL NFR BLD: 12.8 % (ref 0–8)
ERYTHROCYTE [DISTWIDTH] IN BLOOD BY AUTOMATED COUNT: 13.3 % (ref 11.5–14.5)
EST. GFR  (NO RACE VARIABLE): >60 ML/MIN/1.73 M^2
GLUCOSE SERPL-MCNC: 101 MG/DL (ref 70–110)
HCT VFR BLD AUTO: 36 % (ref 37–48.5)
HGB BLD-MCNC: 12.2 G/DL (ref 12–16)
IMM GRANULOCYTES # BLD AUTO: 0.01 K/UL (ref 0–0.04)
IMM GRANULOCYTES NFR BLD AUTO: 0.1 % (ref 0–0.5)
INR PPP: 1 (ref 0.8–1.2)
LYMPHOCYTES # BLD AUTO: 1.6 K/UL (ref 1–4.8)
LYMPHOCYTES NFR BLD: 17.8 % (ref 18–48)
MCH RBC QN AUTO: 32.6 PG (ref 27–31)
MCHC RBC AUTO-ENTMCNC: 33.9 G/DL (ref 32–36)
MCV RBC AUTO: 96 FL (ref 82–98)
MONOCYTES # BLD AUTO: 0.7 K/UL (ref 0.3–1)
MONOCYTES NFR BLD: 8 % (ref 4–15)
NEUTROPHILS # BLD AUTO: 5.5 K/UL (ref 1.8–7.7)
NEUTROPHILS NFR BLD: 60.5 % (ref 38–73)
NRBC BLD-RTO: 0 /100 WBC
PLATELET # BLD AUTO: 224 K/UL (ref 150–450)
PMV BLD AUTO: 8.7 FL (ref 9.2–12.9)
POTASSIUM SERPL-SCNC: 3.8 MMOL/L (ref 3.5–5.1)
PROT SERPL-MCNC: 7.1 G/DL (ref 6–8.4)
PROTHROMBIN TIME: 11.1 SEC (ref 9–12.5)
RBC # BLD AUTO: 3.74 M/UL (ref 4–5.4)
SODIUM SERPL-SCNC: 140 MMOL/L (ref 136–145)
WBC # BLD AUTO: 9.05 K/UL (ref 3.9–12.7)

## 2024-02-19 PROCEDURE — 97161 PT EVAL LOW COMPLEX 20 MIN: CPT | Mod: PN

## 2024-02-19 PROCEDURE — 85610 PROTHROMBIN TIME: CPT | Performed by: SURGERY

## 2024-02-19 PROCEDURE — 80053 COMPREHEN METABOLIC PANEL: CPT | Mod: PN | Performed by: SURGERY

## 2024-02-19 PROCEDURE — 36415 COLL VENOUS BLD VENIPUNCTURE: CPT | Mod: PN | Performed by: SURGERY

## 2024-02-19 PROCEDURE — 85025 COMPLETE CBC W/AUTO DIFF WBC: CPT | Mod: PN | Performed by: SURGERY

## 2024-02-19 NOTE — PATIENT INSTRUCTIONS
Garments recommended: Sigvaris Secure Lite upper arm sleeve with silicone sleeve at top, in size small with 15-20 mmHg. Sigvaris Secure Lite gauntlet in size small with 15-20 mmHg. Please begin wearing these garments 6 weeks after surgery for one year during the day, remove at night to rest. Please contact your insurance to see if they will cover. PT will obtain the orders and  they will be in your chart.   Post Operative Breast Cancer Surgery Exercises    After surgery your shoulder and chest may feel tight and sore. Movement may cause stretching across your chest, axilla (armpit), and the incision site limiting your ability to raise your arm. Exercise will be extremely important in preventing swelling and in helping you regain movement, strength, and use of your arm.     Your post-operative exercise program can be divided into three stages. Your surgeon will inform you when to move to the next stage.     STAGE TIME PURPOSE EXERCISE   I Post-op day 1 to 4  (Drains are in) To prevent and/or reduce swelling - Positioning  - Hand and arm precautions   II Post-op day 5 to 14  (After drains are removed) To provide gentle movement without much stretching  - Shoulder shrugs  - Shoulder retraction   III Post-op day 15-6 wks  (After suture are removed) To stretch and regain full motion - Wall ladder  - Range of motion exercises  - Wand exercises       These exercises are general guideline for use following a mastectomy. Please consult your physician for additional information. Of a tissue expander has been placed, or if you have had reconstruction, you must get approval from your physician before beginning exercises.   Check with your physician prior to beginning a new stage.  Avoid elbows above shoulders until after post-op day 14.    STAGE 1      Abdominal Breathing Exercises      Get comfortable and relax your neck and shoulders. You can sit or lie down. Place one hand on your upper chest and place the other hand on your  belly button. Use your hands to feel the movements as you breathe in and out.  Take a deep breath in through your nose and feel the hand on your stomach move out. Do not let your shoulders move up. The hand on your chest should not move.   Breathe out slow and gentle through your mouth. Pucker your lips as if you were going to whistle or blow out a candle. The hand on your stomach should move in as you breathe out. Breathe out as long as you can until all the air is gone.   To help keep the lymphatic system moving well, practice two breaths every hour using the steps for abdominal breathing exercises.        Positioning    Several times a day, elevate your arm on pillows so your hand is above the level of your heart. This position will allow gravity to drain excess fluids out of your hand and arm. Try to place pillows under involved arm while in sitting position or while laying down.                STAGE 2    Shoulder Shrugs Shoulder Retraction    While sitting with arms supported, shrug both of your shoulder and then relax. Repeat 10 times, 3 times a day.   While sitting or standing, pull both shoulder back, bringing shoulder blades together. Repeat 10 times,   3 times a day.        STAGE 3  Range of Motion Exercises go to stretch not to pain    To retain full motion of your shoulder, it must be moved in all planes, several times a day. Begin arm range of motion exercises with 10 reps of each, 2 times a day. Progress to 3 x 10 reps, as tolerated 2 times a day.    Wand - Shoulder Flexion       Lay on your back in a comfortable position. Raise both arms overhead, then bring back down by your side.        Wand - Shoulder Abduction       Lay on your back in a comfortable position. Raise both arms out to your side, then bring back down by your side.                Wall Climbing - Shoulder Flexion      Stand facing the wall and extend the involved arm directly in front of you so that your fingertips touch the wall (at  arm's length away from the wall). Creep up the side of the wall with your fingertips, taking a step toward the wall as you reach higher and higher up the wall. Repeat this procedure going down the wall, but taking a step backward this time. Begin with 5 wall climbs, then progress to 10 reps at a time, 1-2 times a day.        Wall Climbing - Shoulder Abduction     ([email or web address]  home-exercises-for-frozen-shoulder/) Repeat the above procedure, but this time position yourself perpendicular (at a right angle) to the wall so that the involved arm will extend sideways up the wall. Keep your trunk straight, not leaning toward the wall or shrugging your shoulder. Place a ibrahima (tape) on the wall each week to see if you are making progress. Begin with 5 wall climbs, then progress to 10 reps at a time, 1-2 times a day.        PRECAUTIONS    As a result of the removal of lymph nodes and vessels, your arm is susceptible to infection and swelling. A hot, reddened or swollen arm denotes the presence of infection and should be brought to the attention of your physician immediately. Try to avoid cuts, scratches, pinpricks, hangnails, sunburns, insect bites, chemical irritation, and irritating detergent.    The following are helpful hints:    Avoid injections, blood draws, blood pressure, and IVs to be done to your involved arm. If you have undergone axillary dissection, then consider using the opposite only for injections, blood draws, blood pressure, and IVs.  Prevent chapping of your hand and arm by applying lotion liberally several times a day. Recommend Eucerin lotion, No massages to affected upper extremity if you have had lymph nodes dissection or radiation to lymph nodes.   Do not cut nails too close to the quick. Do not cut or pick your cuticles. Use cuticle cream or remover.  Avoid carrying heavy objects (over 5 lbs) with your involved arm.   Protect your arm from burns with small electrical  appliances such as irons, curling irons, and frying pans.   Wear sunscreen in the sun.  Apply insect repellent when going to areas where you might be exposed to insect bites.   Use an electric razor for shaving.   Wear loose fitting work/rubber gloves when washing dishes, using , or using steel wool.  Wear garden gloves when gardening or arranging thorn flowers.  Do not wear tight jewelry on your affected arm.  Do not wear narrow tight straps on clothing or under garments.    IMPORTANT    If you do cut, burn, or casiano the skin, wash the area and use an antiseptic. If it becomes red, warm, or unusually hard or swollen, contact your physician immediately.     If there is swelling without redness, increased warmth or hardness, the positioning and pumping exercises as described above can help decrease the swelling. Position your hand higher than the elbow, elbow higher than the shoulder, and shoulder higher than your heart. Maintain this position for 30 minutes. Repeat as necessary.     OCCUPATIONAL AND PHYSICAL THERAPY    Most women have enough motion in their involved arm to perform normal activities within 2 months after surgery. Any post-operative swelling or pain has probably disappeared. However; some women may develop persistent stiffness, weakness, pain, or swelling. If this is your experience, call your physician. He or she may recommend that a physical or occupational therapist work with you to minimize your problems.     CONCLUSION    Besides your exercise program, your daily routine at home can be continued and will be beneficial toward your recovery:  Getting dressed every day  Daily grooming  Cooking and light housework  Being active with family and friends    REMINDER  Pace yourself!  Strive for a balance between work and relaxation  Avoid excessive pulling and lifting which may strain your shoulder

## 2024-02-19 NOTE — PROGRESS NOTES
[facility]  Physical Therapy Initial Evaluation PRE-OP  Lymphedema Therapy    Visit Date: 2/19/2024     Name: Arabella Koroma [facility] Number: [id number]  Therapy Diagnosis: at risk for lymphedema  Physician: Monica Min MD  Physician Orders: PT Eval and Treat  Medical Diagnosis from Referral: at risk for lymphedema, Invasive ductal carcinoma of breast, female, right   Chart review pertaining to cancer hx:    Cancer Staging   Breast cancer of lower-outer quadrant of right female breast  Staging form: Breast, AJCC 8th Edition  - Clinical stage from 8/7/2023: Stage IIB (cT2, cN0, cM0, G3, ER-, VT-, HER2-) - Signed by Monica Min MD on 8/7/2023        Right multifocal breast cancer Grade 3 IDC TNBC  Right LOQ 2.7cm and 2cm     Right axilla LN core biopsy benign     H/o ovarian dysgerminoma  Mother breast cancer at 38     BRCA1 pos. Has met with Dr Calderon gyn onc. Will cont to follow  Met with Zonia JACKSON. Planning endoscopy after breast cancer done  Has clyde in 2 lesions     On Keynote 522. Has had considerable response. No longer palpable and much smaller by US. (Last US stable) Last chemo 1/18/24. Surg planning 2/29/24     Pt decided on Bilateral mastectomy, right SLNB, recon 2/29/24 with DAVID flap  nipples clear from imaging standpoint  Lesions are 1cm from skin right LOQ     Surgery date: Bilateral mastectomy, right SLNB, recon 2/29/24  Radiation: none needed  Chemotherapy: completed her chemo regimen     Evaluation Date: 2/19/2024  Authorization: pending  Plan of Care Expiration: PT f/u 8 weeks post-op  Reassessment Due: 8 weeks post surgery      Visit: 1 / 3  PTA Visit: -- / 5  Time In: 2 PM  Time Out: 3PM  Total Billable Time: 60 minutes    Precautions: Standard, cancer and avoid blood pressure, IV, blood draws and injections in R UE    Subjective     Pt reports: ready to get the surgery done, glad she has finished her chemo.     Pain  Location: denies  pain   Current 0/10      Past Medical History:   Past Medical History:   Diagnosis Date    Abnormal Pap smear of cervix     BRCA1 positive     Breast cancer 07/26/2023    right    Breast cancer 07/26/2023    right    HPV (human papilloma virus) infection     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       Past Surgical History:  has a past surgical history that includes Oophorectomy (Right, 2009); Vaginal delivery (12/13/2016); Breast biopsy (Right, 2015); Breast biopsy (Right, 07/26/2023); Insertion of tunneled central venous catheter (CVC) with subcutaneous port (N/A, 08/15/2023); Breast biopsy (Right, 07/26/2023); and Breast biopsy (Right, 08/16/2023).    Medications: has a current medication list which includes the following prescription(s): ciprofloxacin hcl, dexamethasone, lidocaine-prilocaine, olanzapine, ondansetron, ondansetron, and promethazine, and the following Facility-Administered Medications: hydromorphone, ondansetron, and prochlorperazine.    Allergies:   Review of patient's allergies indicates:   Allergen Reactions    Codeine Anaphylaxis          Hand Dominance: Left  Diet: good appetite  Habitus: well developed, well nourished  BMI 22.67    Social History: lives with their family,  and 3 kids 11,9, 7   Place of Residence (Steps/Adaptations): no steps to enter, has an upstairs   Occupation: Pt does not work.   Prior Exercise Routine: was lifting weights, now doing treadmill about 20-25 min a day.  Prior Level of Function: indep  Current Level of Function:  indep    Patient's Goals: recover as quick as she can    Objective     Mental Status: Alert/Oriented    Observations  Posture: good awareness of head position, slight rounded shoulders  Joint Integrity: WFLs bilateral  Skin Integrity: intact bilateral  Edema: none bilateral    Sensation  Light Touch: intact bilateral  Proprioception: intact bilateral    A/PROM  (L) UE: WFLs  (R) UE: WFLs  Limitations:  none    STRENGTH  (L) UE:  WFLs  (R) UE: WFLs  Limitations:  none    Baseline Measurements of BUEs  LANDMARK RIGHT UE (cm)   W + 16 inches 27.0   W + 14 inches 25.0   Elbow 22.5   W + 7 inches 22.5   W + 5 inches 19.8   Wrist 14.5   DPC 17.6   IP Thumb 5.5   length 17 inch   Garments recommended: Sigvaris Secure Lite upper arm sleeve with silicone sleeve at top, in size small with 15-20 mmHg. Sigvaris Secure Lite gauntlet in size small with 15-20 mmHg. Please begin wearing these garments 6 weeks after surgery for one year during the day, remove at night to rest. Please contact your insurance to see if they will cover. PT will obtain the orders and  they will be in your chart.        Functional Mobility   Bed mobility: independent   Roll to left: independent   Roll to right: independent   Supine to prone: independent   Scooting to edge of bed: independent   Supine to sit: independent   Sit to supine: independent   Transfers to bed: independent   Transfers to toilet: independent   Sit to stand: independent   Stand pivot: independent   Car transfers: independent     Gait Assessment  AD used: none  Assistance: independent  Distance: community distances  Endurance: WFL     Gait Pattern: wfl       Treatment     Treatment Time In: 2:30 PM  Treatment Time Out: 3:30 PM  Total Treatment time separate from Evaluation: 30 minutes      Self-Care/Home Management to improve behavioral/activity modifications related to ADLs, compensatory training, safety procedures, and adaptive equipment for 10 minutes including:  Garments: PT recommend wearing garments for one year per guidelines for prophylactical assist to decrease risk for lymphedema  Skin care  Weight management  Sleep  Nutrition  Infection prevention      Therapeutic Exercise to develop ROM, flexibility, and posture for 10 minutes including:  Surgical protocol for position recommendations  Diaphragmatic Breathing  Exercises by day, complete with pictures of exercises and description for safe  progression of motion      Therapeutic Activity to improve dynamic functional performance for 5 minutes including:  Bed mobility, log rolling  Transfers and functional mobility safely  Review of sleeping in recliner      Education: Instructed on general anatomy/physiology, lymphedema information (definitions, signs, symptoms, precautions), role of therapy in multi-disciplinary team, purpose of lymphedema physical therapy and the benefits/risks of treatment, risks of refusing treatment, POC, and goals for therapy were discussed with the pt.    Written Home Exercises Provided: yes.  Exercises were reviewed and Arabella was able to demonstrate them prior to the end of the session. Arabella demonstrated good  understanding of the education provided.     See EMR under Patient Instructions for exercises provided 2/19/2024.    Assessment     Arabella is a 43 y.o. female referred to outpatient physical therapy with a medical diagnosis of at risk for lymphedema, Invasive ductal carcinoma of breast, female, right  with surgical procedure planned on 2/29/2024. Pt was seen today pre-operatively to assess strength and ROM of BUEs, to take baseline circumferential measurements of BUEs to aid in the early detection of lymphedema post-operatively, and to provide pt education on exercises/precautions post-operative. Pt does not exhibit any ROM impairments currently. This pt will benefit from skilled PT for reassessment of baseline measurements 6-8 week post-operatively and to address future impairments following surgery such as pain, limited ROM, or decreased mobility. Will need to wait until pt is medically cleared to determine if pt is safe for MLD, pneumatic compression pump, or lymphatouch treatments.      Plan of care discussed with patient: Yes  Pt's spiritual, cultural and educational needs considered and patient is agreeable to the plan of care and goals as stated below:     Anticipated barriers for therapy:  none    Medical  Necessity is demonstrated by the following:  History  Co-morbidities and personal factors that may impact the plan of care Co-morbidities:   history of cancer    Personal Factors:   none     low   Examination  Body Structures and Functions, activity limitations and participation restrictions that may impact the plan of care Body Systems:    gross symmetry    Activity limitations:   Mobility  no deficits    Self care  no deficits    Domestic Life  no deficits    Participation Restrictions:   none         low   Clinical Presentation evolving clinical presentation with changing clinical characteristics moderate   Decision Making/ Complexity Score: low       GOALS  Short Term Goals: 3 months  Pt to be seen for reassessment in 8 weeks after surgery.  Pt will demonstrate 100% knowledge of lymphedema precautions and signs of infection.  Pt to obtain compression garments for prophylactic concerns according to APTA clinical guidelines published in Journal of Physical Therapy.  4. Pt to receive HEP to include resistive band exercise for all major muscle groups once cleared by MD.  Long Term Goals: deferred    Plan     Plan of Care: 2/19/2024 to 4/30/2024.    Patient to be seen in 8 weeks following surgical date for 2 visits for reassessment. Patient will benefit from Outpatient Physical Therapy services which may include the following interventions: patient education, HEP, therapeutic exercises, neuromuscular re-education, therapeutic activity, manual therapy, self care/home management, modalities, gait training, decongestive massage, multi-layered bandaging, self massage, self bandaging, and assistance in obtaining appropriate compression garment.      If pt is to undergo XRT, POC must exclude MLD, pneumatic compression pump, and lymphatouch to any radiated area.       Korin Abraham, PT, CLT

## 2024-03-05 ENCOUNTER — PATIENT MESSAGE (OUTPATIENT)
Dept: REHABILITATION | Facility: HOSPITAL | Age: 44
End: 2024-03-05
Payer: COMMERCIAL

## 2024-03-06 ENCOUNTER — TELEPHONE (OUTPATIENT)
Dept: HEMATOLOGY/ONCOLOGY | Facility: CLINIC | Age: 44
End: 2024-03-06
Payer: COMMERCIAL

## 2024-03-06 NOTE — TELEPHONE ENCOUNTER
She will need to be seen in clinic with her final pathology to determine next steps and duration of Keytruda. They can reach out to us after her appointment with me on 3/21. Thanks

## 2024-03-06 NOTE — TELEPHONE ENCOUNTER
Spoke with mj---  Confirmed last cycle of chemo/immuno with her---as 1/18/24  Confirmed sx was on the 29th of february  Will ask dr cutler how many cycles of keytruda alone she will get post sx---and call mj back.        Message routed to sherron armstrong and dr cutler

## 2024-03-06 NOTE — TELEPHONE ENCOUNTER
----- Message from Александр Pierre RN sent at 3/6/2024 10:13 AM CST -----    ----- Message -----  From: Lucia Joseph, Patient Care Assistant  Sent: 3/6/2024   9:47 AM CST  To: Froilan Arriaga ([facility]) Staff    Type: Needs Medical Advice  Who Called:      Best Call Back Number: [phone]   Additional Information:  Hanna li barrett  is wanting to speak to nurse about her keytruda treatment ,  would like to know the duration of treatment as well as how many treatments are left , for further information pleased call  at above number , thank you .

## 2024-03-20 DIAGNOSIS — Z17.1 MALIGNANT NEOPLASM OF LOWER-OUTER QUADRANT OF RIGHT BREAST OF FEMALE, ESTROGEN RECEPTOR NEGATIVE: Primary | ICD-10-CM

## 2024-03-20 DIAGNOSIS — Z00.6 EXAMINATION OF PARTICIPANT IN CLINICAL TRIAL: ICD-10-CM

## 2024-03-20 DIAGNOSIS — C50.511 MALIGNANT NEOPLASM OF LOWER-OUTER QUADRANT OF RIGHT BREAST OF FEMALE, ESTROGEN RECEPTOR NEGATIVE: Primary | ICD-10-CM

## 2024-03-21 ENCOUNTER — OFFICE VISIT (OUTPATIENT)
Dept: HEMATOLOGY/ONCOLOGY | Facility: CLINIC | Age: 44
End: 2024-03-21
Payer: COMMERCIAL

## 2024-03-21 ENCOUNTER — RESEARCH ENCOUNTER (OUTPATIENT)
Dept: RESEARCH | Facility: HOSPITAL | Age: 44
End: 2024-03-21
Payer: COMMERCIAL

## 2024-03-21 VITALS
HEART RATE: 82 BPM | BODY MASS INDEX: 22.58 KG/M2 | OXYGEN SATURATION: 100 % | WEIGHT: 127.44 LBS | RESPIRATION RATE: 18 BRPM | DIASTOLIC BLOOD PRESSURE: 72 MMHG | HEIGHT: 63 IN | TEMPERATURE: 98 F | SYSTOLIC BLOOD PRESSURE: 107 MMHG

## 2024-03-21 DIAGNOSIS — Z15.01 BRCA GENE POSITIVE: ICD-10-CM

## 2024-03-21 DIAGNOSIS — Z17.1 MALIGNANT NEOPLASM OF LOWER-OUTER QUADRANT OF RIGHT BREAST OF FEMALE, ESTROGEN RECEPTOR NEGATIVE: Primary | ICD-10-CM

## 2024-03-21 DIAGNOSIS — C50.511 MALIGNANT NEOPLASM OF LOWER-OUTER QUADRANT OF RIGHT BREAST OF FEMALE, ESTROGEN RECEPTOR NEGATIVE: Primary | ICD-10-CM

## 2024-03-21 DIAGNOSIS — Z15.09 BRCA GENE POSITIVE: ICD-10-CM

## 2024-03-21 DIAGNOSIS — Z15.01 BRCA1 GENE MUTATION POSITIVE: ICD-10-CM

## 2024-03-21 DIAGNOSIS — Z15.09 BRCA1 GENE MUTATION POSITIVE: ICD-10-CM

## 2024-03-21 PROCEDURE — 1159F MED LIST DOCD IN RCRD: CPT | Mod: CPTII,S$GLB,, | Performed by: INTERNAL MEDICINE

## 2024-03-21 PROCEDURE — 3074F SYST BP LT 130 MM HG: CPT | Mod: CPTII,S$GLB,, | Performed by: INTERNAL MEDICINE

## 2024-03-21 PROCEDURE — 99999 PR PBB SHADOW E&M-EST. PATIENT-LVL IV: CPT | Mod: PBBFAC,,, | Performed by: INTERNAL MEDICINE

## 2024-03-21 PROCEDURE — 1160F RVW MEDS BY RX/DR IN RCRD: CPT | Mod: CPTII,S$GLB,, | Performed by: INTERNAL MEDICINE

## 2024-03-21 PROCEDURE — 1111F DSCHRG MED/CURRENT MED MERGE: CPT | Mod: CPTII,S$GLB,, | Performed by: INTERNAL MEDICINE

## 2024-03-21 PROCEDURE — 3008F BODY MASS INDEX DOCD: CPT | Mod: CPTII,S$GLB,, | Performed by: INTERNAL MEDICINE

## 2024-03-21 PROCEDURE — 99215 OFFICE O/P EST HI 40 MIN: CPT | Mod: S$GLB,,, | Performed by: INTERNAL MEDICINE

## 2024-03-21 PROCEDURE — 3078F DIAST BP <80 MM HG: CPT | Mod: CPTII,S$GLB,, | Performed by: INTERNAL MEDICINE

## 2024-03-21 NOTE — PROGRESS NOTES
PROGRESS NOTE    Subjective:       Patient ID: Arabella Catalan is a 43 y.o. female.  MRN: [id number]  : 1980    Chief Complaint: cT2 cN0 TNBC     History of Present Illness:   Arabella Catalan is a 43 y.o. female who is referred for TNBC.      As previously documented she started screening mammograms for a few years after her ovarian cancer , in her late 20's. Routine pelvic exam revealed an ovarian cancer, s/p right oophorectomy for dysgerminoma.     Resumed routine mammograms at 42, had a normal screening mammogram in May 2023 but felt a lump in the right breast in July. Further imaging showed 2 lesions, both > 2 cm , both biopsied to be G3 IDC, ER/OK and Her 2 jose eduardo negative.       Menarche at age 14 year old. . Age at first live birth 31. Did  breast feed 2 year and 6 months OCP-yes 20 years ago  Menopause at none. HRT-none     Family history cancer:  Mother breast cancer at 38 and cervical cancer     Smoker Pk/yr quit date , smoked around 0.5 pack a day     Interim history:    S/p neoadjuvant chemo based on KEYNOTE-522.     S/p b/l mastectomy on 24 with flap reconstruction. Needed 1 unit PRBC transfusion, stayed in the hospital for 3 days, recovering very well.   Pathology is consistent with pCR.    Having hot flashes but it is improving. No menstrual cycle yet.   No further UTI or eye twitches.     ECOG PS 0    Oncology History:  5/3/23  Impression:  Bilateral  There is no mammographic evidence of malignancy.     BI-RADS Category:   Overall: 2 - Benign    23  US  Impression:  Right  Mass: Right breast 1.5 cm x 1 cm x 1.6 cm mass at the 8 o'clock position. Assessment: 4 - Suspicious finding. Biopsy and possible aspiration are recommended.   Mass: Right breast 2 cm x 1.1 cm x 1.6 cm mass at the 7 o'clock position. Assessment: 4 - Suspicious finding. Biopsy is recommended.      BI-RADS Category:   Overall: 4 -  Suspicious    8/2/23  MRI  Impression:  Right  Mass: Right breast 2.7 cm x 2.2 cm x 2 cm mass at the 8 o'clock position. Assessment: 6 - Known biopsy, proven malignancy.   Mass: Right breast 2 cm x 1.6 cm x 1.6 cm mass at the 7 o'clock position. Assessment: 6 - Known biopsy, proven malignancy.      Left  There is no MR evidence of malignancy in the left breast.     BI-RADS Category:   Overall: 6 - Known Biopsy-Proven Malignancy  7/31/23:  DIAGNOSIS:   07/28/2023 JL:tml     1. RIGHT BREAST AT 8:00 7 CM FROM NIPPLE CORE NEEDLE BIOPSIES:   - INVASIVE DUCT CARCINOMA, HIGH-GRADE (3,2,3).     2. RIGHT BREAST AT 7:00 3 CM FROM NIPPLE CORE NEEDLE BIOPSIES:   - IN SITU AND INVASIVE DUCT CARCINOMA, HIGH-GRADE (3,3,2).     RIGHT BREAST: INVASIVE DUCTAL CARCINOMA   GRADE: HIGH     ER: NEGATIVE, IL: NEGATIVE, HER2**: NEGATIVE       8/7/23  CT chest abd pelvis  Impression:     1. Known right breast malignancy, better evaluated on comparison breast MRI exam.  2. Mild asymmetrically prominent, but not pathologically enlarged right axillary lymph nodes, measuring up to 6 mm in short axis dimension.  No mediastinal or hilar lymphadenopathy.  3. Mild hepatomegaly with multiple small hypoattenuating hepatic lesions, the largest measuring 0.9 cm in the right hepatic lobe, which are too small to definitively characterize.  Metastases not excluded.  Consider further evaluation with PET-CT and/or contrast enhanced liver protocol MRI.  4. Solid, noncalcified 2 mm pulmonary nodule in the right lower lobe, nonspecific. Attention on follow-up imaging recommended.     8/18/23  Liver MRI      Impression:     Small circumscribed lesions in the liver are most compatible with hemangiomas.  No suspicious liver lesions.    8/10/123  Bone scan   Impression:     No evidence of osseous metastatic disease.     10/24/23:  US right breast   Impression:     Interval decrease in size of both right breast masses compatible with favorable response to  neoadjuvant chemotherapy    US transvaginal  Impression:     Left ovarian 3 mm echogenic lesion, possibly a small dermoid.    2/29  1. RIGHT BREAST, SKIN AND NIPPLE SPARING MASTECTOMY:   - NO RESIDUAL TUMOR SEEN.   - EXTENSIVE FIBROADENOMATOID HYPERPLASIA.   - BIOPSY SITE REACTIONS.     2. RIGHT AXILLARY SENTINEL LYMPH NODE, BIOPSY:   - TWO LYMPH NODES, BOTH NEGATIVE FOR TUMOR (0/2).     3. RIGHT BREAST, RE-EXCISION INFERIOR MARGIN:   - ADIPOSE TISSUE NEGATIVE FOR TUMOR.     4. RIGHT BREAST, RE-EXCISION DEEP MARGIN:   - SKELETAL MUSCLE AND BREAST TISSUE NEGATIVE FOR TUMOR.     5. RIGHT BREAST, RE-EXCISION LATERAL MARGIN:   - NO TUMOR SEEN.     6. RIGHT BREAST, RE-EXCISION ANTERIOR MARGIN:   - NO TUMOR SEEN.     7. RIGHT BREAST, RE-EXCISION MEDIAL MARGIN:   - ADIPOSE TISSUE NEGATIVE FOR TUMOR.     8. RIGHT BREAST, RE-EXCISION SUPERIOR MARGIN:   - ADIPOSE TISSUE NEGATIVE FOR TUMOR.     9. LEFT BREAST, SKIN AND NIPPLE SPARING MASTECTOMY:   - NO TUMOR SEEN.   - EXTENSIVE FIBROADENOMATOID HYPERPLASIA.   History:  Past Medical History:   Diagnosis Date    Abnormal Pap smear of cervix     BRCA1 positive     Breast cancer 07/26/2023    right    Breast cancer 07/26/2023    right    HPV (human papilloma virus) infection     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      Past Surgical History:   Procedure Laterality Date    BREAST BIOPSY Right 2015    benign    BREAST BIOPSY Right 07/26/2023    BREAST BIOPSY Right 07/26/2023    BREAST BIOPSY Right 08/16/2023    benign LN    INSERTION OF TUNNELED CENTRAL VENOUS CATHETER (CVC) WITH SUBCUTANEOUS PORT N/A 08/15/2023    Procedure: [id number];  Surgeon: Skyler Aldrich MD;  Location: [facility];  Service: General;  Laterality: N/A;    OOPHORECTOMY Right 2009    stage 1a dysgerminoma    RECONSTRUCTION OF BREAST WITH DEEP INFERIOR EPIGASTRIC ARTERY  (DAVID) FREE FLAP Bilateral 2/29/2024    Procedure: RECONSTRUCTION, BREAST, USING DAVID FREE FLAP;  Surgeon: Razia  MD Ed;  Location: [facility] OR;  Service: Plastics;  Laterality: Bilateral;    REPAIR, NERVE USING ALLOGRAFT Bilateral 2/29/2024    Procedure: REPAIR, NERVE USING ALLOGRAFT;  Surgeon: Ed Randle MD;  Location: [facility] OR;  Service: Plastics;  Laterality: Bilateral;    SENTINEL LYMPH NODE BIOPSY Right 2/29/2024    Procedure: BIOPSY, LYMPH NODE, SENTINEL;  Surgeon: Monica Min MD;  Location: [facility] OR;  Service: General;  Laterality: Right;    SIMPLE MASTECTOMY Bilateral 2/29/2024    Procedure: MASTECTOMY, SIMPLE;  Surgeon: Monica Min MD;  Location: [facility] OR;  Service: General;  Laterality: Bilateral;    VAGINAL DELIVERY  12/13/2016     Family History   Problem Relation Age of Onset    Breast cancer Mother 38    Cervical cancer Mother         dx in 20s    BRCA 1/2 Sister     Brain cancer Maternal Uncle     Colon cancer Neg Hx     Colon polyps Neg Hx     Esophageal cancer Neg Hx     Stomach cancer Neg Hx     Inflammatory bowel disease Neg Hx       Social History     Tobacco Use    Smoking status: Former     Types: Cigarettes    Smokeless tobacco: Not on file   Substance and Sexual Activity    Alcohol use: Yes     Comment: occasionally    Drug use: No    Sexual activity: Not Currently     Partners: Male     Birth control/protection: Condom        ROS:   Review of Systems   Constitutional:  Negative for fever, malaise/fatigue and weight loss.   HENT:  Negative for congestion, hearing loss, nosebleeds and sore throat.    Eyes:  Negative for double vision and photophobia.   Respiratory:  Negative for cough, hemoptysis, sputum production, shortness of breath and wheezing.    Cardiovascular:  Negative for chest pain, palpitations, orthopnea and leg swelling.   Gastrointestinal:  Negative for abdominal pain, blood in stool, constipation, diarrhea, heartburn, nausea and vomiting.   Genitourinary:  Negative for dysuria, frequency, hematuria and urgency.   Musculoskeletal:  Negative for back pain, joint pain and  "myalgias.   Skin:  Negative for itching and rash.   Neurological:  Negative for dizziness, tingling, seizures, weakness and headaches.   Endo/Heme/Allergies:  Negative for polydipsia. Does not bruise/bleed easily.   Psychiatric/Behavioral:  Negative for depression and memory loss. The patient is not nervous/anxious and does not have insomnia.         Objective:   Weight:  Gain of 1 1/2 pounds in 3 weeks  Vitals:    03/21/24 1050   BP: 107/72   Pulse: 82   Resp: 18   Temp: 97.6 °F (36.4 °C)   TempSrc: Temporal   SpO2: 100%   Weight: 57.8 kg (127 lb 6.8 oz)   Height: 5' 3" (1.6 m)   PainSc: 0-No pain     Physical Examination:   Physical Exam  Vitals and nursing note reviewed.   Constitutional:       General: She is not in acute distress.     Appearance: She is not diaphoretic.   HENT:      Head: Normocephalic and atraumatic.   Eyes:      General: No scleral icterus.     Conjunctiva/sclera: Conjunctivae normal.   Neck:      Thyroid: No thyromegaly.   Cardiovascular:      Rate and Rhythm: Normal rate.   Pulmonary:      Effort: Pulmonary effort is normal.   Musculoskeletal:         General: Normal range of motion.      Cervical back: Neck supple.   Skin:     General: Skin is warm and dry.      Findings: No erythema or rash.   Neurological:      Mental Status: She is alert and oriented to person, place, and time.      Gait: Gait is intact.   Psychiatric:         Mood and Affect: Affect normal.         Behavior: Behavior normal.         Thought Content: Thought content normal.         Cognition and Memory: Memory normal.         Judgment: Judgment normal.        Diagnostic Tests:  Significant Imaging: I have reviewed and interpreted all pertinent imaging results/findings.    Laboratory Data:  All pertinent labs have been reviewed.    Labs:   Lab Results   Component Value Date    WBC 11.75 03/02/2024    HGB 9.7 (L) 03/03/2024    HCT 28.7 (L) 03/03/2024     (H) 03/02/2024     03/02/2024     ANC = " 2.1    CMP  Sodium   Date Value Ref Range Status   03/02/2024 137 136 - 145 mmol/L Final     Potassium   Date Value Ref Range Status   03/02/2024 4.0 3.5 - 5.1 mmol/L Final     Comment:     Anion Gap reference range revised on 4/28/2023     Chloride   Date Value Ref Range Status   03/02/2024 104 95 - 110 mmol/L Final     CO2   Date Value Ref Range Status   03/02/2024 28 22 - 31 mmol/L Final     Glucose   Date Value Ref Range Status   03/02/2024 312 (H) 70 - 110 mg/dL Final     Comment:     The ADA recommends the following guidelines for fasting glucose:    Normal:       less than 100 mg/dL    Prediabetes:  100 mg/dL to 125 mg/dL    Diabetes:     126 mg/dL or higher       BUN   Date Value Ref Range Status   03/02/2024 9 7 - 18 mg/dL Final     Creatinine   Date Value Ref Range Status   03/02/2024 0.45 (L) 0.50 - 1.40 mg/dL Final     Calcium   Date Value Ref Range Status   03/02/2024 8.2 (L) 8.4 - 10.2 mg/dL Final     Total Protein   Date Value Ref Range Status   02/19/2024 7.1 6.0 - 8.4 g/dL Final     Albumin   Date Value Ref Range Status   02/19/2024 4.3 3.5 - 5.2 g/dL Final     Total Bilirubin   Date Value Ref Range Status   02/19/2024 0.2 0.1 - 1.0 mg/dL Final     Comment:     For infants and newborns, interpretation of results should be based  on gestational age, weight and in agreement with clinical  observations.    Premature Infant recommended reference ranges:  Up to 24 hours.............<8.0 mg/dL  Up to 48 hours............<12.0 mg/dL  3-5 days..................<15.0 mg/dL  6-29 days.................<15.0 mg/dL       Alkaline Phosphatase   Date Value Ref Range Status   02/19/2024 52 (L) 55 - 135 U/L Final     AST   Date Value Ref Range Status   02/19/2024 21 10 - 40 U/L Final     ALT   Date Value Ref Range Status   02/19/2024 13 10 - 44 U/L Final     Anion Gap   Date Value Ref Range Status   03/02/2024 5 5 - 12 mmol/L Final     Comment:     Anion Gap reference range revised on 4/28/2023     eGFR   Date Value  Ref Range Status   03/02/2024 >60 >60 mL/min/1.73 m^2 Final       Assessment/Plan:   Invasive ductal carcinoma of breast, female, right  cT2 cN0 G3 IDC, TNBC   ypT0N0(sn)     Completed 8/8 cycles of NACT per Keynote 522.   S/p b/l mastectomy and reconstruction with pCR.     She has  been enrolled in the Protocol [id number], Disparities in Results of Immune Checkpoint Inhibitor Treatment (DIRECT): A Prospective Cohort Study of Cancer Survivors Treated with anti-PD-L1 Immunotherapy in a Community Oncology Setting.     We also discussed [id number] trial regarding adjuvant Pembrolizumab and she is agreeable. Will follow up with the research nurse and will continue adjuvant Pembro vs observation based on randomization.     Transaminitis   Grade 2, held Keytruda for cycle 2, treated with a short course of steroids, now LFTs have normalized and she received Keytruda since then and she has had no further issues.     History of ovarian cancer  BRCA positive   Plan for b/l mastectomy. Follow up with Gyn onc, GI and dermatology.   Referred to genetics clinic.         No follow-ups on file.    Plan was discussed with the patient at length, and she verbalized understanding. Arabella was given an opportunity to ask questions that were answered to her satisfaction, and she was advised to call in the interval if any problems or questions arise.    Electronically signed by Corina Mcgovern MD      MDM includes  :    - Acute or chronic illness or injury that poses a threat to life or bodily function  - Independent review and explanation of 3+ results from unique tests  - Discussion of management and ordering 3+ unique tests  - Extensive discussion of treatment and management  - Prescription drug management  - Drug therapy requiring intensive monitoring for toxicity     Route Chart for Scheduling    Med Onc Chart Routing      Follow up with physician 3 weeks. 3-4 weeks with Dr. Sexton with same day Keytruda   Follow up with [facility]    Infusion  scheduling note    Injection scheduling note    Labs CBC, CMP and TSH   Scheduling:  Preferred lab:  Lab interval:  ca 125   Imaging    Pharmacy appointment    Other referrals                  Treatment Plan Information   OP PEMBROLIZUMAB WITH WEEKLY PACLITAXEL CARBOPLATIN (AUC 1.5) FOLLOWED BY PEMBROLIZUMAB DOXORUBICIN CYCLOPHOSPHAMIDE FOLLOWED BY PEMBROLIZUMAB 200MG Q3W   Corina Mcgovern MD   Upcoming Treatment Dates - OP PEMBROLIZUMAB WITH WEEKLY PACLITAXEL CARBOPLATIN (AUC 1.5) FOLLOWED BY PEMBROLIZUMAB DOXORUBICIN CYCLOPHOSPHAMIDE FOLLOWED BY PEMBROLIZUMAB 200MG Q3W    2/8/2024       Immunotherapy       pembrolizumab (KEYTRUDA) 200 mg in sodium chloride 0.9% SolP 108 mL infusion  2/29/2024       Immunotherapy       pembrolizumab (KEYTRUDA) 200 mg in sodium chloride 0.9% SolP 108 mL infusion  3/21/2024       Immunotherapy       pembrolizumab (KEYTRUDA) 200 mg in sodium chloride 0.9% SolP 108 mL infusion  4/11/2024       Immunotherapy       pembrolizumab (KEYTRUDA) 200 mg in sodium chloride 0.9% SolP 108 mL infusion            Answers submitted by the patient for this visit:  Review of Systems Questionnaire (Submitted on 3/18/2024)  appetite change : No  unexpected weight change: No  mouth sores: No  visual disturbance: No  adenopathy: No

## 2024-03-21 NOTE — PROGRESS NOTES
Protocol: [id number], Disparities in Results of Immune Checkpoint Inhibitor Treatment (DIRECT): A Prospective Cohort Study of Cancer Survivors Treated with anti-PD-L1 Immunotherapy in a Community Oncology Setting  IRB# 2022.126  Version Date: 1/11/2022  Treating MD: Dr. Mcgovern  Study ID# [id number]  03/21/2024      Assessment # 3 / Kit #[id number]     The patient presented at the [facility] clinic today for follow up with Dr. Mcgovern following her 2/29/24 bilateral mastectomy. The patient presented with her , A&O, without noted distress, and with appropriate mood and affect to the situation. The patient continues to freely agree with participation in the referenced study. She reports feeling well and denies taking any antifungals since her last study visit. Her bilateral mastectomy revealed a complete response to neoadjuvant therapy. While in the hospital, the patient was on Caphalexin (antibiotic) from 3/1/24 - 3/3/24 for surgical prophylaxis. Due to her complete response, the patient is potentially eligible for and interested in another clinical trial - [id number]. Explained to the patient that the [id number] sponsor would be contacted to confirm that she could participate in another clinical trial while on the [id number] trial.     Toxicities attributable to last infusion of Keytruda:     Grade 1 Fatigue (9/14/2023 - Ended 2/1/24): treating clinician deems probably attributable to immunotherapy - the patient notes mild fatigue starting 3 weeks after her 1st Keytruda infusion. Today the patient reports that the fatigue resolved 2 weeks following her last Keytruda infusion. The treating clinician does not deem CS, no new orders given.     Following the visit with Dr Mcgovern, the patient's A3 blood specimens were collected in the 1st floor lab by the phlebotomist using two patient identifiers with CRC present. The patient completed all A3 questionnaires via a link on her phone. A3 samples collected, processed, and placed in the -20  freezer today.      Clinical Record Information:     Between the last study visit, the patient has not been diagnosed with any of the following autoimmune diseases or conditions:       After completing A3 procedures, the patient denied having any new questions for this CRC. Encouraged the patient to call with any questions or concerns in the future.      See source documents:  Blood/Saliva Requisition Form     Next study appointment:     A4 labs (1 year +/- 3 month from the patient's 1st infusion) due August 23rd, 2024 +/- 3 months       Tamia Herrera CRC

## 2024-03-22 ENCOUNTER — TELEPHONE (OUTPATIENT)
Dept: HEMATOLOGY/ONCOLOGY | Facility: CLINIC | Age: 44
End: 2024-03-22
Payer: COMMERCIAL

## 2024-03-22 NOTE — TELEPHONE ENCOUNTER
3/22/24  14:26    Called the patient to select a day to review [id number] trial consent. At the pt's 3/21/24 oncology visit, the patient said that she was interested in participating in the trial. We discussed meeting some time next week to review the consent. A voicemail was left detailing the reason for the call and a call back number was provided. Instructed the patient to call back at her convenience.     aTmia Herrera, CRC

## 2024-03-25 ENCOUNTER — TELEPHONE (OUTPATIENT)
Dept: HEMATOLOGY/ONCOLOGY | Facility: CLINIC | Age: 44
End: 2024-03-25
Payer: COMMERCIAL

## 2024-03-28 ENCOUNTER — LAB VISIT (OUTPATIENT)
Dept: LAB | Facility: HOSPITAL | Age: 44
End: 2024-03-28
Attending: INTERNAL MEDICINE
Payer: COMMERCIAL

## 2024-03-28 ENCOUNTER — RESEARCH ENCOUNTER (OUTPATIENT)
Dept: RESEARCH | Facility: HOSPITAL | Age: 44
End: 2024-03-28
Payer: COMMERCIAL

## 2024-03-28 DIAGNOSIS — Z00.6 EXAMINATION OF PARTICIPANT IN CLINICAL TRIAL: ICD-10-CM

## 2024-03-28 DIAGNOSIS — Z17.1 MALIGNANT NEOPLASM OF LOWER-OUTER QUADRANT OF RIGHT BREAST OF FEMALE, ESTROGEN RECEPTOR NEGATIVE: Primary | ICD-10-CM

## 2024-03-28 DIAGNOSIS — C50.911 INVASIVE DUCTAL CARCINOMA OF BREAST, FEMALE, RIGHT: ICD-10-CM

## 2024-03-28 DIAGNOSIS — C50.511 MALIGNANT NEOPLASM OF LOWER-OUTER QUADRANT OF RIGHT BREAST OF FEMALE, ESTROGEN RECEPTOR NEGATIVE: Primary | ICD-10-CM

## 2024-03-28 DIAGNOSIS — C50.511 MALIGNANT NEOPLASM OF LOWER-OUTER QUADRANT OF RIGHT BREAST OF FEMALE, ESTROGEN RECEPTOR NEGATIVE: ICD-10-CM

## 2024-03-28 DIAGNOSIS — Z17.1 MALIGNANT NEOPLASM OF LOWER-OUTER QUADRANT OF RIGHT BREAST OF FEMALE, ESTROGEN RECEPTOR NEGATIVE: ICD-10-CM

## 2024-03-28 LAB
ALBUMIN SERPL BCP-MCNC: 4.5 G/DL (ref 3.5–5.2)
ALP SERPL-CCNC: 57 U/L (ref 55–135)
ALT SERPL W/O P-5'-P-CCNC: 17 U/L (ref 10–44)
ANION GAP SERPL CALC-SCNC: 9 MMOL/L (ref 8–16)
AST SERPL-CCNC: 23 U/L (ref 10–40)
BASOPHILS # BLD AUTO: 0.05 K/UL (ref 0–0.2)
BASOPHILS NFR BLD: 1 % (ref 0–1.9)
BILIRUB SERPL-MCNC: 0.3 MG/DL (ref 0.1–1)
BUN SERPL-MCNC: 13 MG/DL (ref 6–20)
CALCIUM SERPL-MCNC: 10.3 MG/DL (ref 8.7–10.5)
CHLORIDE SERPL-SCNC: 103 MMOL/L (ref 95–110)
CO2 SERPL-SCNC: 28 MMOL/L (ref 23–29)
CORTIS SERPL-MCNC: 7.2 UG/DL
CREAT SERPL-MCNC: 0.6 MG/DL (ref 0.5–1.4)
DIFFERENTIAL METHOD BLD: ABNORMAL
EOSINOPHIL # BLD AUTO: 0.9 K/UL (ref 0–0.5)
EOSINOPHIL NFR BLD: 17 % (ref 0–8)
ERYTHROCYTE [DISTWIDTH] IN BLOOD BY AUTOMATED COUNT: 12.1 % (ref 11.5–14.5)
EST. GFR  (NO RACE VARIABLE): >60 ML/MIN/1.73 M^2
GLUCOSE SERPL-MCNC: 90 MG/DL (ref 70–110)
HCT VFR BLD AUTO: 38.4 % (ref 37–48.5)
HGB BLD-MCNC: 12.6 G/DL (ref 12–16)
IMM GRANULOCYTES # BLD AUTO: 0 K/UL (ref 0–0.04)
IMM GRANULOCYTES NFR BLD AUTO: 0 % (ref 0–0.5)
LYMPHOCYTES # BLD AUTO: 1.6 K/UL (ref 1–4.8)
LYMPHOCYTES NFR BLD: 29.6 % (ref 18–48)
MCH RBC QN AUTO: 31.3 PG (ref 27–31)
MCHC RBC AUTO-ENTMCNC: 32.8 G/DL (ref 32–36)
MCV RBC AUTO: 96 FL (ref 82–98)
MONOCYTES # BLD AUTO: 0.4 K/UL (ref 0.3–1)
MONOCYTES NFR BLD: 8.4 % (ref 4–15)
NEUTROPHILS # BLD AUTO: 2.3 K/UL (ref 1.8–7.7)
NEUTROPHILS NFR BLD: 44 % (ref 38–73)
NRBC BLD-RTO: 0 /100 WBC
PLATELET # BLD AUTO: 222 K/UL (ref 150–450)
PMV BLD AUTO: 8.4 FL (ref 9.2–12.9)
POTASSIUM SERPL-SCNC: 4.4 MMOL/L (ref 3.5–5.1)
PROT SERPL-MCNC: 7.6 G/DL (ref 6–8.4)
RBC # BLD AUTO: 4.02 M/UL (ref 4–5.4)
SODIUM SERPL-SCNC: 140 MMOL/L (ref 136–145)
WBC # BLD AUTO: 5.23 K/UL (ref 3.9–12.7)

## 2024-03-28 PROCEDURE — 80053 COMPREHEN METABOLIC PANEL: CPT | Mod: PN | Performed by: INTERNAL MEDICINE

## 2024-03-28 PROCEDURE — 85025 COMPLETE CBC W/AUTO DIFF WBC: CPT | Mod: PN | Performed by: INTERNAL MEDICINE

## 2024-03-28 PROCEDURE — 36415 COLL VENOUS BLD VENIPUNCTURE: CPT | Mod: PN | Performed by: INTERNAL MEDICINE

## 2024-03-28 PROCEDURE — 82533 TOTAL CORTISOL: CPT | Performed by: INTERNAL MEDICINE

## 2024-03-28 PROCEDURE — 84702 CHORIONIC GONADOTROPIN TEST: CPT | Performed by: INTERNAL MEDICINE

## 2024-03-28 NOTE — PROGRESS NOTES
Protocol: [id number]: OptimICE-pCR: De-Escalation of Therapy in Early-Stage TNBC Patients Who Achieve pCR After Neoadjuvant Chemotherapy with Checkpoint Inhibitor Therapy  IRB #: 2023.168  PI: CHEYANNE Khan MD  Treating MD: Dr. Mcgovern   March 28th, 2024     Informed Consent Note:     Met with the patient to discuss the above-mentioned protocol in the [facility] clinic consultation room. The patient was alert and oriented x 3. Mood and affect appropriate to the situation. The patient freely agreed to continue with [id number] informed consent today. This CRC reviewed all elements of the informed consent with the patient.     Patient is participating in one other research trial. The other trial is observational and only requires blood draws, saliva collection, and QOLs from the patient. Before consenting the patient, this CRC confirmed with the sponsor of the other trial that participation in [id number] would be allowed (see e-mail). A protocol search was conducted to see if participation in other trials is permitted per [id number] protocol. No information stating participation in other trials is not allowed was found. An e-mail to confirm the above was sent to the [id number] sponsor.    Overview and key information reviewed with patient to include: voluntary participation and withdrawal from the research, the usual approach to early-stage Triple Negative breast cancer, choices if she decides not to take part in the study, what happens if she decides to take part in the study, risks and benefits, purpose of the study, study groups, number of participants, procedures, mandatory blood samples, potential side effects, responsibilities, costs, what happens if patient is injured, privacy and confidentiality, payment for participation and/or reimbursement of expenses, questions about your rights, optional studies, new findings, and HIPAA authorization to release information for research all reviewed at length with the  patient.    Optional Studies reviewed with the patient who answered yes, to the Quality-of-Life Study, yes, to the Side Effect Survey, and yes, to be contacted for future research. She answered NO, to allowing any leftover samples to be kept in a biobank for use in future health research.     The patient was given ample opportunity to ask questions and her questions were answered to her satisfaction. Patient willingly and independently signed informed consent on [id number]. A copy of the signed consent was provided to the patient along with contact information for myself and TETE Jerry. Following the signing of consent, the patient completed the baseline Quality of Life questionnaire and Side Effect Survey. The patient then had CBC, CMP, cortisol, and a pregnancy test drawn in the lab to as needed for eligibility.    Prior immune (pembrolizumab)-related adverse events (irAEs) including myocarditis, colitis, pneumonitis, dermatitis radiation, rash maculo-papular, adrenal insufficiency, hypothyroidism, and peripheral sensory neuropathy were reviewed with the patient. The patient denied currently having any of the above. The patient also denied having a history of all the above except for a Grade 1 IO induced rash from 9/3/23 - 9/8/23.    All study procedures needed before registration were completed after the patient signed consent. The patient was encouraged to call this CRC or Dr. Mcgovern if any questions or concerns arise. The patient expressed understanding.    TETE Cullen

## 2024-03-29 LAB — HCG INTACT+B SERPL-ACNC: <2.4 MIU/ML

## 2024-04-02 ENCOUNTER — RESEARCH ENCOUNTER (OUTPATIENT)
Dept: RESEARCH | Facility: HOSPITAL | Age: 44
End: 2024-04-02
Payer: COMMERCIAL

## 2024-04-02 NOTE — PROGRESS NOTES
Protocol: [id number]: OptimICE-pCR: De-Escalation of Therapy in Early-Stage TNBC Patients Who Achieve pCR After Neoadjuvant Chemotherapy with Checkpoint Inhibitor Therapy  IRB #: 2023.168  PI: CHEYANNE Khan MD  Treating MD: Dr. Mcgovern   April 2nd, 2024    Eligibility Note:     Patient has been worked-up for the above-mentioned clinical trial and has been deemed eligible per double-eligibility check on 01APR2024. See link below for full proof of eligibility.     Tamia Herrera, CRC

## 2024-04-04 ENCOUNTER — TELEPHONE (OUTPATIENT)
Dept: HEMATOLOGY/ONCOLOGY | Facility: CLINIC | Age: 44
End: 2024-04-04
Payer: COMMERCIAL

## 2024-04-04 DIAGNOSIS — Z91.89 AT RISK FOR LYMPHEDEMA: ICD-10-CM

## 2024-04-04 DIAGNOSIS — Z17.1 MALIGNANT NEOPLASM OF LOWER-OUTER QUADRANT OF RIGHT BREAST OF FEMALE, ESTROGEN RECEPTOR NEGATIVE: ICD-10-CM

## 2024-04-04 DIAGNOSIS — C50.911 INVASIVE DUCTAL CARCINOMA OF BREAST, FEMALE, RIGHT: Primary | ICD-10-CM

## 2024-04-04 DIAGNOSIS — C50.511 MALIGNANT NEOPLASM OF LOWER-OUTER QUADRANT OF RIGHT BREAST OF FEMALE, ESTROGEN RECEPTOR NEGATIVE: ICD-10-CM

## 2024-04-05 ENCOUNTER — RESEARCH ENCOUNTER (OUTPATIENT)
Dept: RESEARCH | Facility: HOSPITAL | Age: 44
End: 2024-04-05
Payer: COMMERCIAL

## 2024-04-05 NOTE — PROGRESS NOTES
"Protocol: [id number]: OptimICE-pCR: De-Escalation of Therapy in Early-Stage TNBC Patients Who Achieve pCR After Neoadjuvant Chemotherapy with Checkpoint Inhibitor Therapy  IRB #: 2023.168  PI: CHEYANNE Khan MD  Treating MD: Dr. Mcgovern   April 5th, 2024    Briefly spoke with the patient over the phone to gather baseline medical hx for the [id number] trial. The patient agreed that she wanted to continue with participation in the referenced study and agreed that she had a moment to discuss baseline history with this CRC.     The patient's baseline medical and surgical history was reviewed and confirmed with the patient. The patient reports 1 formed stool per day/ every other day. The patient denies all solicited medical conditions w/ the exclusion of "breast pain" that the patient reports is due to surgery. She describes the pain as a mild 2/10 (Grade 1). The patient current medications/supplement list was reviewed and confirmed with the patient. See baseline AE, conmed list, and baseline medical history: solicited conditions in shadow chart.    The Health Care Utilization Questionnaire was verbally administered to the patient. The patient denied having any doctor visits within the last 2 weeks.     Notified the patient that she would likely be registered and randomized next Thursday, April 11th, so if randomized to study tx, she could begin tx within 7 days per protocol. The patient expressed understanding.    Next steps:    Registration  MD appt with Dr. Sexton on 4/16  "

## 2024-04-09 ENCOUNTER — RESEARCH ENCOUNTER (OUTPATIENT)
Dept: RESEARCH | Facility: HOSPITAL | Age: 44
End: 2024-04-09
Payer: COMMERCIAL

## 2024-04-09 ENCOUNTER — PATIENT MESSAGE (OUTPATIENT)
Dept: HEMATOLOGY/ONCOLOGY | Facility: CLINIC | Age: 44
End: 2024-04-09
Payer: COMMERCIAL

## 2024-04-09 DIAGNOSIS — Z00.6 EXAMINATION OF PARTICIPANT IN CLINICAL TRIAL: ICD-10-CM

## 2024-04-09 DIAGNOSIS — C50.911 INVASIVE DUCTAL CARCINOMA OF BREAST, FEMALE, RIGHT: Primary | ICD-10-CM

## 2024-04-09 DIAGNOSIS — Z17.1 MALIGNANT NEOPLASM OF LOWER-OUTER QUADRANT OF RIGHT BREAST OF FEMALE, ESTROGEN RECEPTOR NEGATIVE: ICD-10-CM

## 2024-04-09 DIAGNOSIS — C50.511 MALIGNANT NEOPLASM OF LOWER-OUTER QUADRANT OF RIGHT BREAST OF FEMALE, ESTROGEN RECEPTOR NEGATIVE: ICD-10-CM

## 2024-04-09 NOTE — PROGRESS NOTES
Protocol: [id number]: OptimICE-pCR: De-Escalation of Therapy in Early-Stage TNBC Patients Who Achieve pCR After Neoadjuvant Chemotherapy with Checkpoint Inhibitor Therapy  IRB #: 2023.168  PI: CHEYANNE Khan MD  Treating MD: Dr. Mcgovern --> Dr. Sexton  April 9th, 2024    Note to File: Pt care transferring from Dr. Mcgovern to Dr. Sexton    Please note that the patient's previous treating MD, Dr. Mcgovern, is no longer at [facility]. Therefore, the patient will now be under the care of Dr. Sexton. Dr. Sexton has reviewed the [id number] trial and attached patient eligibility criteria. Dr. Sexton agrees with all eligibility criteria and has signed and dated the attached form. The patient will be registered and randomized into the trial with Dr. Sexton assigned as the treating MD/investigator.     Tamia Herrera CRC

## 2024-04-10 ENCOUNTER — TELEPHONE (OUTPATIENT)
Dept: HEMATOLOGY/ONCOLOGY | Facility: CLINIC | Age: 44
End: 2024-04-10
Payer: COMMERCIAL

## 2024-04-10 NOTE — TELEPHONE ENCOUNTER
----- Message from Lucia Joseph, Patient Care Assistant sent at 4/10/2024  2:44 PM CDT -----  Type: Needs Medical Advice  Who Called:  reyes ( case mangtyshawn )   Best Call Back Number: [phone]   Additional Information: reyes with health insurance is wanting to speak ot nurse about her care plan and a clinical trail ,. Please call to further discuss thank you .

## 2024-04-11 ENCOUNTER — LAB VISIT (OUTPATIENT)
Dept: LAB | Facility: HOSPITAL | Age: 44
End: 2024-04-11
Attending: INTERNAL MEDICINE
Payer: COMMERCIAL

## 2024-04-11 ENCOUNTER — RESEARCH ENCOUNTER (OUTPATIENT)
Dept: RESEARCH | Facility: HOSPITAL | Age: 44
End: 2024-04-11
Payer: COMMERCIAL

## 2024-04-11 DIAGNOSIS — Z00.6 EXAMINATION OF PARTICIPANT IN CLINICAL TRIAL: ICD-10-CM

## 2024-04-11 DIAGNOSIS — Z17.1 MALIGNANT NEOPLASM OF LOWER-OUTER QUADRANT OF RIGHT BREAST OF FEMALE, ESTROGEN RECEPTOR NEGATIVE: Primary | ICD-10-CM

## 2024-04-11 DIAGNOSIS — C50.511 MALIGNANT NEOPLASM OF LOWER-OUTER QUADRANT OF RIGHT BREAST OF FEMALE, ESTROGEN RECEPTOR NEGATIVE: Primary | ICD-10-CM

## 2024-04-11 DIAGNOSIS — C50.911 INVASIVE DUCTAL CARCINOMA OF BREAST, FEMALE, RIGHT: ICD-10-CM

## 2024-04-11 LAB — B-HCG UR QL: NEGATIVE

## 2024-04-11 PROCEDURE — 81025 URINE PREGNANCY TEST: CPT | Mod: PN | Performed by: INTERNAL MEDICINE

## 2024-04-11 NOTE — PROGRESS NOTES
Protocol: [id number]: OptimICE-pCR: De-Escalation of Therapy in Early-Stage TNBC Patients Who Achieve pCR After Neoadjuvant Chemotherapy with Checkpoint Inhibitor Therapy  IRB #: 2023.168  PI: CHEYANNE Khan MD  Treating MD: Dr. Sexton  PID: [id number]  ARM 2: Observation    April 11th, 2024     Registration and Randomization Note:     [id number] randomization/registration completed via the [employer] system. The patient was assigned to ARM 2: Observation.     Stratification factors:  1) Pepito status prior to preoperative chemotherapy: negative  2) Receipt of preoperative anthracycline chemotherapy: yes    Baseline QOLs were completed on 3/28/34 and 4/5/24 (see CRC notes).   Pt is scheduled to have SOC labs, trial labs (w/i 7 days after registration) and appt with Dr. Sexton on Tuesday, 04/16/2024. This CRC will work on obtaining biopsy and surgical tissue for study specimen collection (due within 7 days after registration).     Pt was called and notified of randomization assignment and next appts. The patient agreed to the appointment dates and times. She denied further questions and knows to contact me should she need anything.      Next appts:    SOC and study labs on 4/16/24  MD appt with Dr. Sexton on 4/16/24    Tamia Herrera, CRC

## 2024-04-16 ENCOUNTER — OFFICE VISIT (OUTPATIENT)
Dept: HEMATOLOGY/ONCOLOGY | Facility: CLINIC | Age: 44
End: 2024-04-16
Payer: COMMERCIAL

## 2024-04-16 ENCOUNTER — RESEARCH ENCOUNTER (OUTPATIENT)
Dept: RESEARCH | Facility: HOSPITAL | Age: 44
End: 2024-04-16
Payer: COMMERCIAL

## 2024-04-16 ENCOUNTER — LAB VISIT (OUTPATIENT)
Dept: LAB | Facility: HOSPITAL | Age: 44
End: 2024-04-16
Attending: INTERNAL MEDICINE
Payer: COMMERCIAL

## 2024-04-16 VITALS
HEART RATE: 92 BPM | DIASTOLIC BLOOD PRESSURE: 78 MMHG | WEIGHT: 125 LBS | BODY MASS INDEX: 22.14 KG/M2 | SYSTOLIC BLOOD PRESSURE: 110 MMHG | TEMPERATURE: 97 F | OXYGEN SATURATION: 99 %

## 2024-04-16 DIAGNOSIS — Z00.6 EXAMINATION OF PARTICIPANT IN CLINICAL TRIAL: ICD-10-CM

## 2024-04-16 DIAGNOSIS — Z17.1 MALIGNANT NEOPLASM OF LOWER-OUTER QUADRANT OF RIGHT BREAST OF FEMALE, ESTROGEN RECEPTOR NEGATIVE: ICD-10-CM

## 2024-04-16 DIAGNOSIS — Z15.01 BRCA1 GENE MUTATION POSITIVE: ICD-10-CM

## 2024-04-16 DIAGNOSIS — R45.89 ANXIETY ABOUT HEALTH: ICD-10-CM

## 2024-04-16 DIAGNOSIS — C50.911 INVASIVE DUCTAL CARCINOMA OF BREAST, FEMALE, RIGHT: ICD-10-CM

## 2024-04-16 DIAGNOSIS — C50.511 MALIGNANT NEOPLASM OF LOWER-OUTER QUADRANT OF RIGHT BREAST OF FEMALE, ESTROGEN RECEPTOR NEGATIVE: Primary | ICD-10-CM

## 2024-04-16 DIAGNOSIS — C50.511 MALIGNANT NEOPLASM OF LOWER-OUTER QUADRANT OF RIGHT BREAST OF FEMALE, ESTROGEN RECEPTOR NEGATIVE: ICD-10-CM

## 2024-04-16 DIAGNOSIS — Z17.1 MALIGNANT NEOPLASM OF LOWER-OUTER QUADRANT OF RIGHT BREAST OF FEMALE, ESTROGEN RECEPTOR NEGATIVE: Primary | ICD-10-CM

## 2024-04-16 DIAGNOSIS — Z15.09 BRCA1 GENE MUTATION POSITIVE: ICD-10-CM

## 2024-04-16 LAB
ALBUMIN SERPL BCP-MCNC: 4.5 G/DL (ref 3.5–5.2)
ALP SERPL-CCNC: 53 U/L (ref 55–135)
ALT SERPL W/O P-5'-P-CCNC: 21 U/L (ref 10–44)
ANION GAP SERPL CALC-SCNC: 10 MMOL/L (ref 8–16)
AST SERPL-CCNC: 25 U/L (ref 10–40)
BASOPHILS # BLD AUTO: 0.04 K/UL (ref 0–0.2)
BASOPHILS NFR BLD: 0.9 % (ref 0–1.9)
BILIRUB SERPL-MCNC: 0.2 MG/DL (ref 0.1–1)
BUN SERPL-MCNC: 14 MG/DL (ref 6–20)
CALCIUM SERPL-MCNC: 10 MG/DL (ref 8.7–10.5)
CHLORIDE SERPL-SCNC: 105 MMOL/L (ref 95–110)
CO2 SERPL-SCNC: 26 MMOL/L (ref 23–29)
CREAT SERPL-MCNC: 0.7 MG/DL (ref 0.5–1.4)
DIFFERENTIAL METHOD BLD: ABNORMAL
EOSINOPHIL # BLD AUTO: 0.7 K/UL (ref 0–0.5)
EOSINOPHIL NFR BLD: 15.5 % (ref 0–8)
ERYTHROCYTE [DISTWIDTH] IN BLOOD BY AUTOMATED COUNT: 11.5 % (ref 11.5–14.5)
EST. GFR  (NO RACE VARIABLE): >60 ML/MIN/1.73 M^2
GLUCOSE SERPL-MCNC: 119 MG/DL (ref 70–110)
HCT VFR BLD AUTO: 37.9 % (ref 37–48.5)
HGB BLD-MCNC: 13.1 G/DL (ref 12–16)
IMM GRANULOCYTES # BLD AUTO: 0.01 K/UL (ref 0–0.04)
IMM GRANULOCYTES NFR BLD AUTO: 0.2 % (ref 0–0.5)
LYMPHOCYTES # BLD AUTO: 1.5 K/UL (ref 1–4.8)
LYMPHOCYTES NFR BLD: 34.2 % (ref 18–48)
MCH RBC QN AUTO: 31.6 PG (ref 27–31)
MCHC RBC AUTO-ENTMCNC: 34.6 G/DL (ref 32–36)
MCV RBC AUTO: 91 FL (ref 82–98)
MONOCYTES # BLD AUTO: 0.5 K/UL (ref 0.3–1)
MONOCYTES NFR BLD: 10.3 % (ref 4–15)
NEUTROPHILS # BLD AUTO: 1.7 K/UL (ref 1.8–7.7)
NEUTROPHILS NFR BLD: 38.9 % (ref 38–73)
NRBC BLD-RTO: 0 /100 WBC
PLATELET # BLD AUTO: 208 K/UL (ref 150–450)
PMV BLD AUTO: 8.6 FL (ref 9.2–12.9)
POTASSIUM SERPL-SCNC: 4.2 MMOL/L (ref 3.5–5.1)
PROT SERPL-MCNC: 7.3 G/DL (ref 6–8.4)
RBC # BLD AUTO: 4.15 M/UL (ref 4–5.4)
SODIUM SERPL-SCNC: 141 MMOL/L (ref 136–145)
WBC # BLD AUTO: 4.38 K/UL (ref 3.9–12.7)

## 2024-04-16 PROCEDURE — 1159F MED LIST DOCD IN RCRD: CPT | Mod: CPTII,S$GLB,, | Performed by: INTERNAL MEDICINE

## 2024-04-16 PROCEDURE — 85025 COMPLETE CBC W/AUTO DIFF WBC: CPT | Mod: PN | Performed by: INTERNAL MEDICINE

## 2024-04-16 PROCEDURE — 84443 ASSAY THYROID STIM HORMONE: CPT | Performed by: INTERNAL MEDICINE

## 2024-04-16 PROCEDURE — 3074F SYST BP LT 130 MM HG: CPT | Mod: CPTII,S$GLB,, | Performed by: INTERNAL MEDICINE

## 2024-04-16 PROCEDURE — 86304 IMMUNOASSAY TUMOR CA 125: CPT | Performed by: INTERNAL MEDICINE

## 2024-04-16 PROCEDURE — 36415 COLL VENOUS BLD VENIPUNCTURE: CPT | Mod: PN | Performed by: INTERNAL MEDICINE

## 2024-04-16 PROCEDURE — G2211 COMPLEX E/M VISIT ADD ON: HCPCS | Mod: S$GLB,,, | Performed by: INTERNAL MEDICINE

## 2024-04-16 PROCEDURE — 99999 PR PBB SHADOW E&M-EST. PATIENT-LVL IV: CPT | Mod: PBBFAC,,, | Performed by: INTERNAL MEDICINE

## 2024-04-16 PROCEDURE — 80053 COMPREHEN METABOLIC PANEL: CPT | Mod: PN | Performed by: INTERNAL MEDICINE

## 2024-04-16 PROCEDURE — 99215 OFFICE O/P EST HI 40 MIN: CPT | Mod: S$GLB,,, | Performed by: INTERNAL MEDICINE

## 2024-04-16 PROCEDURE — 3078F DIAST BP <80 MM HG: CPT | Mod: CPTII,S$GLB,, | Performed by: INTERNAL MEDICINE

## 2024-04-16 PROCEDURE — 3008F BODY MASS INDEX DOCD: CPT | Mod: CPTII,S$GLB,, | Performed by: INTERNAL MEDICINE

## 2024-04-16 NOTE — PROGRESS NOTES
"                                        Name: Arabella Catalan  MRN:  [id number]  :  1980 Age 44 y.o.  Date of Service: 2024    Reason for visit:  Arabella Catalan is a 44 y.o. female here regarding...    #Triple Negative Right Sided Breast Cancer   Date of Original Diagnosis: 23  Original Stage: [id number] Stage 2B grade 3 Triple neg-->pCR!   Current Sites of Disease: none  Current Goals of Therapy: curative   Current Therapy: pending adjuvant olaparib    Oncologic History/History of Present Illness:   Per Dr. Rivas Note:   As previously documented she started screening mammograms for a few years after her ovarian cancer , in her late 's. Routine pelvic exam revealed an ovarian cancer, s/p right oophorectomy for dysgerminoma.    Resumed routine mammograms at 42, had a normal screening mammogram in May 2023 but felt a lump in the right breast in July. Further imaging showed 2 lesions, both > 2 cm , both biopsied to be G3 IDC, ER/ID and Her 2 jose eduardo negative.   Menarche at age 14 year old. . Age at first live birth 31. Did  breast feed 2 year and 6 months . LMP  . OCP-yes 20 years ago  Menopause at none. HRT-none      Genetics completed- + BRCA1, Mother breast cancer at 38 and cervical cancer    23-24  NACT per keynote 522  24 bilateral mastectomy --> pCR!       Interim history:  She was completed the neoadjuvant portion of keynote 5-2 and is now enrolled in "[id number]: OptimICE-pCR: De-Escalation of Therapy in Early-Stage TNBC Patients Who Achieve pCR After Neoadjuvant Chemotherapy with Checkpoint Inhibitor Therapy".  She was randomized to the observation arm this would not be getting her adjuvant Keytruda x9 doses per study.  -she was BRCA positive and based on the [place] trial she qualifies for 1 year worth of adjuvant olaparib, I reviewed the trial data with her and encouraged her consider 1 year worth of olaparib therapy  -she has 1 ovary, she currently is " very concerned about Alzheimer's induced by early menopause, she was seen an OBGYN recommended possibly removal of just the fallopian tube, I encouraged her to consider the olaparib even more so in the event she decides to keep her ovaries for now, she wanted to have her hormones checked because if she was already in chemo induced-menopause anyway she would be more okay with removal of the ovary        PHYSICAL EXAMINATION:  /78 (BP Location: Left arm, Patient Position: Sitting, BP Method: Medium (Manual))   Pulse 92   Temp 97.1 °F (36.2 °C) (Temporal)   Wt 56.7 kg (125 lb)   SpO2 99%   BMI 22.14 kg/m²   Wt Readings from Last 3 Encounters:   04/16/24 56.7 kg (125 lb)   03/21/24 57.8 kg (127 lb 6.8 oz)   03/12/24 58.1 kg (128 lb)     ECOG PERFORMANCE STATUS: 0  Physical Exam   General:  Well-appearing, nontoxic  Eyes:  Equal and round pupils, EOMI, no scleral icterus  Mouth:  No lesions, moist  Cardiovascular:  Warm, well-perfused, no peripheral edema  Lungs:  Unlabored on room air, no wheezing  Neurologic:  Awake, alert and oriented, participating in the exam  Psych:  Appropriate mood and affect  Skin:  Normal pallor  Heme:  No petechiae, no purpura  Breast:  Bilateral mastectomy with reconstruction    LABORATORY:  CBC  Lab Results   Component Value Date    WBC 4.38 04/16/2024    HGB 13.1 04/16/2024    HCT 37.9 04/16/2024    MCV 91 04/16/2024     04/16/2024         BMP  Lab Results   Component Value Date     04/16/2024    K 4.2 04/16/2024     04/16/2024    CO2 26 04/16/2024    BUN 14 04/16/2024    CREATININE 0.7 04/16/2024    CALCIUM 10.0 04/16/2024    ANIONGAP 10 04/16/2024    ESTGFRAFRICA >60 01/04/2010    EGFRNONAA >60 01/04/2010         PATHOLOGY:    DIAGNOSIS:   03/07/2024 JHL:l[place]en   1. RIGHT BREAST, SKIN AND NIPPLE SPARING MASTECTOMY:   - NO RESIDUAL TUMOR SEEN.   - EXTENSIVE FIBROADENOMATOID HYPERPLASIA.   - BIOPSY SITE REACTIONS.   2. RIGHT AXILLARY SENTINEL LYMPH NODE, BIOPSY:  "  - TWO LYMPH NODES, BOTH NEGATIVE FOR TUMOR (0/2     RADIOLOGY:  MRI Breast  Right  There is a 2.7 cm x 2.2 cm x 2 cm irregularly shaped, homogeneous mass with angular margins seen in the right breast at 8 o'clock, 7 cm from the nipple, 1.3 cm from the skin, and 0.8 cm from the chest wall. Kinetics initial phase is fast. Delayed phase is washout.      There is a 2 cm x 1.6 cm x 1.6 cm irregularly shaped, homogeneous mass with angular margins seen in the right breast at 7 o'clock, 3 cm from the nipple, 1.1 cm from the skin, and 2.3 cm from the chest wall. Kinetics initial phase is fast. Delayed phase is washout.       ASSESSMENT AND PLAN:  Arabella Catalan is a 44 y.o. female with...    #Triple Negative Right Sided Breast Cancer   Date of Original Diagnosis: 7/26/23  Original Stage: [id number] Stage 2B grade 3 Triple neg-->pCR!   Current Sites of Disease: none  Current Goals of Therapy: curative   Current Therapy: pending adjuvant olaparib    She has also been enrolled in the Protocol [id number], Disparities in Results of Immune Checkpoint Inhibitor Treatment (DIRECT): A Prospective Cohort Study of Cancer Survivors Treated with anti-PD-L1 Immunotherapy in a Community Oncology Setting.     She completed the neoadjuvant portion of her therapy on January 18, 2024 Ms. Subsequently enrolled in  "[id number]: OptimICE-pCR: De-Escalation of Therapy in Early-Stage TNBC Patients Who Achieve pCR After Neoadjuvant Chemotherapy with Checkpoint Inhibitor Therapy" per her prior oncologist Dr. Mcgovern.  She was randomized to the observation arm thus will not be getting adjuvant Keytruda x9 doses, per the study.  -she is BRCA positive and based on the [place] trial she qualifies for 1 year worth of adjuvant olaparib, I reviewed the trial data with her and encouraged her consider 1 year worth of olaparib therapy  -she still has 1 ovary, she currently is very concerned about Alzheimer's induced by early menopause, she has seen an " OBGYN who recommended possibly removal of just the fallopian tube, I encouraged her to consider the olaparib even more so in the event she decides to keep her ovary for now. She wants to have her hormones checked because if she is already in chemo induced-menopause anyway, she would be more okay with removal of the ovary. Hormones ordered.      [place] trial--Published Yaz 3, 2021N Engl J Med 2021;384:2394-2405DOI: 10.1056/LODObl1577499ELD. 384 NO. 25    Additionally she was not been seen post surgically, I reached out to Dr. Roman to please evaluate her for adjuvant radiation therapy.    #IO induced rash- grade 1 involving 30% the body after C1D1,, no sloughing or breakdown, no pupules or pustules, +puritis, RX for hydrocortisone cream, counseled about emollients, nearly resolved one week later  #IO induced hepatitis- grade 2, resolved with steroids.     #BRCA1 + - found as part of genetic work up- counseled her about breast and ovarian risks, advised her to get 1 remaining ovary removed.  Status post bilateral mastectomy.    #CINV- well controlled, continue monitoring      #anxiety about health- referral sent to onc psych, good mood today     Sole Sexton M.D.  Hematology/Oncology   Route Chart for Scheduling    Med Onc Chart Routing      Follow up with physician . 5-6 weeks   Follow up with [facility]    Infusion scheduling note    Injection scheduling note    Labs CBC and CMP   Scheduling:  Preferred lab:  Lab interval:  labs including hormones 3 days prior to next visit   Imaging    Pharmacy appointment    Other referrals              Treatment Plan Information   OP PEMBROLIZUMAB WITH WEEKLY PACLITAXEL CARBOPLATIN (AUC 1.5) FOLLOWED BY PEMBROLIZUMAB DOXORUBICIN CYCLOPHOSPHAMIDE FOLLOWED BY PEMBROLIZUMAB 200MG Q3W   Corina Mcgovern MD   Upcoming Treatment Dates - OP PEMBROLIZUMAB WITH WEEKLY PACLITAXEL CARBOPLATIN (AUC 1.5) FOLLOWED BY PEMBROLIZUMAB DOXORUBICIN CYCLOPHOSPHAMIDE FOLLOWED BY PEMBROLIZUMAB 200MG  Q3W    2/8/2024       Immunotherapy       pembrolizumab (KEYTRUDA) 200 mg in sodium chloride 0.9% SolP 108 mL infusion  2/29/2024       Immunotherapy       pembrolizumab (KEYTRUDA) 200 mg in sodium chloride 0.9% SolP 108 mL infusion  3/21/2024       Immunotherapy       pembrolizumab (KEYTRUDA) 200 mg in sodium chloride 0.9% SolP 108 mL infusion  4/11/2024       Immunotherapy       pembrolizumab (KEYTRUDA) 200 mg in sodium chloride 0.9% SolP 108 mL infusion

## 2024-04-16 NOTE — PROGRESS NOTES
Protocol: [id number]: OptimICE-pCR: De-Escalation of Therapy in Early-Stage TNBC Patients Who Achieve pCR After Neoadjuvant Chemotherapy with Checkpoint Inhibitor Therapy  IRB #: 2023.168  PI: CHEYANNE Khan MD  Treating MD: Dr. Sexton  PID: [id number]  ARM 2: Observation  April 16th, 2024    Met with patient, who presented alone, in the lab this morning to collect her baseline [id number] labs. The patient was alert and oriented x 3. Mood and affect appropriate to the situation. The patient continued to freely agree to participation in the [id number] study. Baseline blood was collected by the phlebotomist with CRC present using two patient identifiers. Blood was then processed and placed in the -20C freezer at 11:20AM. Planning to ship specimen tomorrow, 4/17/24.    Addendum: Study labs shipped on dry ice on 4/17/24. See shipping manifest in patient shadow chart.    After her lab appointment, this CRC met the patient at her clinic appointment to establish care with Dr. Sexton and begin Arm: 2 Observation on the above mentioned clinical trial. The patient states that she continues to feel well. She states that she continues to have mild breast pain and hot flashes. She notes that the hot flashes are improving. She denies any other new or worsening symptoms. See baseline AE, AE, and conmed logs in patient's shadow chart.    Baseline Adverse Events:    Grade 1 Hot Flashes (Sept/Oct 2023 - ongoing) - Pt reports experiencing mild hot flashes since September/October of last year. Today she notes that the hot flashes are steadily improving. She recalls having about 2 per day. Dr. Sexton aware. Not CS. No new orders.    Grade 1 Breast Pain (2/29/24 - ongoing)- Pt reports mild breast pain following her bilateral mastectomy surgery on 2/29/24. Today she continues to have mild pain at the surgical site. She reports everything is healing well. Dr. Sexton aware. Not CS. No new orders.    Adverse Events: Due to the pt being on an observational  "arm, the following Aes are unrelated to study intervention.    Grade 1 Neutrophil count decreased (4/16/24 - ongoing)- Today's CBC revealed an ANC of 1.7K/uL, just below LLN. The patient reports feeling well. Dr. Sexton aware. Not CS. No new orders.  Grade 1 Hyperglycemia (4/16/24 - ongoing) - Today's CMP revealed a glucose of 119mg/dL. The patient is not symptomatic. Dr. Sexton aware. Not CS. No new orders.    At today's visit, Dr. Sexton explained to the patient that based on the [place] trial, she qualifies for 1 year worth of adjuvant Olaparib, due to her BRCA1+ status.  Dr. Sexton explained the mechanism of Olaparib to the patient and inquired if the patient was interested. The patient expressed interest but wanted to read more about it and discuss it with her family before deciding to take it. The patient was encouraged to call or message the clinic and/or this CRC when she has made her decision.     This CRC emailed the [id number] Nursing contact after the patient's office visit to inquire if BRCA1+ patients could take Olaparib while on Arm 2 of [id number]. It was communicated that this would not be permitted per protocol as "additional treatment would impact study endpoints". The patient would have to come off study intervention early if she takes Olaparib and would move to follow up.    Patient aware to RTC in 12 weeks +/- 28 days for [id number] follow up MD visit and QOLs. The patient is aware that she starts Olaparib, she will have a different follow up schedule per study.    The patient denied having any additional questions or concerns for myself of Dr. Sexton. She was encouraged to reach out to myself and/or Dr. Sexton if any questions or concerns arise.     Next appts:    Pt to notify clinic and CRC of Olaparib decision  [id number] 12 week f/u due July 4th, 2024 +/- 28 days (unless pt starts Olaparib).    Tamia Herrera, TETE  "

## 2024-04-17 ENCOUNTER — PATIENT MESSAGE (OUTPATIENT)
Dept: HEMATOLOGY/ONCOLOGY | Facility: CLINIC | Age: 44
End: 2024-04-17
Payer: COMMERCIAL

## 2024-04-17 DIAGNOSIS — Z15.01 BRCA GENE POSITIVE: ICD-10-CM

## 2024-04-17 DIAGNOSIS — Z17.1 MALIGNANT NEOPLASM OF LOWER-OUTER QUADRANT OF RIGHT BREAST OF FEMALE, ESTROGEN RECEPTOR NEGATIVE: Primary | ICD-10-CM

## 2024-04-17 DIAGNOSIS — C50.511 MALIGNANT NEOPLASM OF LOWER-OUTER QUADRANT OF RIGHT BREAST OF FEMALE, ESTROGEN RECEPTOR NEGATIVE: Primary | ICD-10-CM

## 2024-04-17 DIAGNOSIS — Z15.09 BRCA GENE POSITIVE: ICD-10-CM

## 2024-04-17 LAB
CANCER AG125 SERPL-ACNC: 7 U/ML (ref 0–30)
TSH SERPL DL<=0.005 MIU/L-ACNC: 0.58 UIU/ML (ref 0.4–4)

## 2024-04-19 ENCOUNTER — RESEARCH ENCOUNTER (OUTPATIENT)
Dept: RESEARCH | Facility: HOSPITAL | Age: 44
End: 2024-04-19
Payer: COMMERCIAL

## 2024-04-19 NOTE — PROGRESS NOTES
"Protocol: [id number]: OptimICE-pCR: De-Escalation of Therapy in Early-Stage TNBC Patients Who Achieve pCR After Neoadjuvant Chemotherapy with Checkpoint Inhibitor Therapy  IRB #: 2023.168  PI: CHEYANNE Khan MD  Treating MD: Dr. Sexton  PID: [id number]  ARM 2: Observation  April 19th, 2024    Briefly spoke with the patient over the phone to discuss [id number] next steps following the patient's decision to start Olaparib. The patient was queried on whether she would like to continue with participation in the referenced trial. The patient freely and fully agreed to continue participating in [id number].     The patient stated that she has not received Olaparib yet. Respectfully asked the patient to notify myself or Dr. Sexton when she receives and begins Olaparib, as that date will be used as her "off intervention" date for [id number]. Explained that she will continue to remain on the [id number] study while taking Olaparib, but will be removed from "protocol intervention" and transition to follow up. This CRC explained that her first follow up study visit would be due 6 months from her registration date. The patient expressed understanding.    Currently waiting on [facility] contacts to confirm if blood specimens are still needed at 27 week "end of treatment or observation" time point or if the patient would skip that time point d/t stopping protocol intervention. The patient is aware that her next protocol blood collection will likely be in 6 months. She is also aware that QOLs will continue at the expected protocol time points.     The patient denied having any questions or concerns for this CRC. She was encouraged to call myself or Dr. Sexton if any questions or concerns arise. The patient expressed understanding.    Next appts:    Next QOLs and CRC administered HO1 questionnaire due 12 weeks after registration (~7/4/24)  Next study blood sample (pending Sponsor confirmation) due in 6 months  6 month follow up ~ October 11th, 2024 +/- 2 " months    Tamia Herrera, CRC

## 2024-04-22 ENCOUNTER — PATIENT MESSAGE (OUTPATIENT)
Dept: RESEARCH | Facility: HOSPITAL | Age: 44
End: 2024-04-22
Payer: COMMERCIAL

## 2024-04-29 ENCOUNTER — OFFICE VISIT (OUTPATIENT)
Dept: RADIATION ONCOLOGY | Facility: CLINIC | Age: 44
End: 2024-04-29
Payer: COMMERCIAL

## 2024-04-29 VITALS
SYSTOLIC BLOOD PRESSURE: 126 MMHG | RESPIRATION RATE: 16 BRPM | BODY MASS INDEX: 21.95 KG/M2 | DIASTOLIC BLOOD PRESSURE: 80 MMHG | OXYGEN SATURATION: 100 % | WEIGHT: 123.88 LBS | TEMPERATURE: 98 F | HEART RATE: 79 BPM

## 2024-04-29 DIAGNOSIS — Z17.1 MALIGNANT NEOPLASM OF LOWER-OUTER QUADRANT OF RIGHT BREAST OF FEMALE, ESTROGEN RECEPTOR NEGATIVE: Primary | ICD-10-CM

## 2024-04-29 DIAGNOSIS — Z15.09 BRCA1 GENE MUTATION POSITIVE: ICD-10-CM

## 2024-04-29 DIAGNOSIS — C50.511 MALIGNANT NEOPLASM OF LOWER-OUTER QUADRANT OF RIGHT BREAST OF FEMALE, ESTROGEN RECEPTOR NEGATIVE: Primary | ICD-10-CM

## 2024-04-29 DIAGNOSIS — Z15.01 BRCA1 GENE MUTATION POSITIVE: ICD-10-CM

## 2024-04-29 PROCEDURE — 99999 PR PBB SHADOW E&M-EST. PATIENT-LVL III: CPT | Mod: PBBFAC,,, | Performed by: RADIOLOGY

## 2024-04-29 PROCEDURE — 99215 OFFICE O/P EST HI 40 MIN: CPT | Mod: S$GLB,,, | Performed by: RADIOLOGY

## 2024-04-29 NOTE — PROGRESS NOTES
04/29/2024    [facility]  [facility]   Radiation Oncology Follow Up    ASSESSMENT  This is a 44 y.o. y/o female with Stage cIIB (cT2, N0, M0, Grade 3, ER-/SC-/HER2-) Invasive ductal carcinoma of the lower outer RIGHT breast. She completed neoadjuvant chemotherapy and skin-sparing mastectomy 2/29/24 with no residual disease. She is seen for discussion of adjuvant treatment options.       PLAN    Treatment options were discussed with the patient including adjuvant post-mastectomy RT.  We discussed the goals of treatment to be curative.  The patient has multiple risk factors that indicate potential benefit to the addition of post-mastectomy radiation, including young age at diagnosis, tumor >2cm, G3 disease, ER-.  We discussed the paucity of evidence in this particular clinical scenario and awaiting data from [facility]. Given excellent clinical response, absence of michelle disease, there is likely limited benefit to the addition of post-mastectomy radiation therapy in this patient's case  The risks, benefits, scheduling, alternatives to and rationale of radiation therapy were explained in detail.   We discussed the indications, course, and potential risks associated with radiation therapy, including but not limited to fatigue, local skin reaction such as hyperpigmentation, tenderness or erythema, breast pain, and potential long term toxicities such as rib fragility, and remote risks of lung inflammation, esophageal inflammation, heart inflammation, or secondary malignancy.  She expressed understanding of these risks, all questions were answered to her apparent satisfaction.   After this discussion, she elected to proceed with ongoing immunotherpy.    She was given our contact information, and she was told that she could call our clinic at any time if she has any questions or concerns.      Radiation Treatment Details:   No radiation therapy at this time      Chief Complaint   Patient presents with     Breast Cancer       HPI: The patient is a 44 y.o. year old female with a diagnosis of breast cancer. She initially presented due to palpable right breast mass. She was initially seen in consultation 8/7/23.    5/3/23 Screening mammogram:  no evidence of suspicious masses, BI-RADS 2, dense breasts    7/20/23 Diagnostic Right breast ultrasound:   1.5cm x 1.6cm mass at 8:00 7cmfn  2.0 x 1.6cm mass at 7:00 3cmfn    7/26/23 Right breast Biopsy:  Right 8:00 7cmfn biopsy: invasive ductal carcinoma, G3, -/-/-, Ki67 61%  Right 7:00 3cmfn: in situ and invasive duct carcinoma, G3    8/2/23 MRI Breasts:   right 8:00 2.7 x 2.2cm mass  Right 7:00 2.0cm mass    8/10/23 CT chest, abdomen, and pelvis:   mildly asymmetrically prominent but not pathologically enlarged right axillary lymph node  Mild hepatomegaly with small hypoattenuating hepatic lesions  Solid, noncalcified 0.2cm RLL pulmonary nodule    8/13/23 Right axillary lymph node biopsy: benign    Gene analysis: BRCA1+    She underwent neoadjuvant chemotherapy with Keynote 522 (Dr. Mcgovern), completed 1/18/24 1/26/24 Right breast ultrasound:  0.7cm mass at 8:00 7cmfn  0.9cm mass at 7:00 3cmfn    2/29/24 Right skin sparing mastectomy and DAVID flap:  No residual tumor  0/2 sentinel nodes  Additional margins all negative  Prophylactic skin-sparing left mastectomy: no evidence of malignancy    Possibility of pregnancy: Yes: hcg prior to treatment  History of prior irradiation: No  History of prior systemic anti-cancer therapy: Yes- Keynote  History of collagen vascular disease: No  Implanted electronic device (pacer/defib/nerve stimulator): No      Past Medical History:   Diagnosis Date    Abnormal Pap smear of cervix     BRCA1 positive     Breast cancer 07/26/2023    right    Breast cancer 07/26/2023    right    HPV (human papilloma virus) infection     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       Past Surgical History:   Procedure Laterality Date    BREAST BIOPSY  Right 2015    benign    BREAST BIOPSY Right 07/26/2023    BREAST BIOPSY Right 07/26/2023    BREAST BIOPSY Right 08/16/2023    benign LN    INSERTION OF TUNNELED CENTRAL VENOUS CATHETER (CVC) WITH SUBCUTANEOUS PORT N/A 08/15/2023    Procedure: [id number];  Surgeon: Skyler Aldrich MD;  Location: [facility] OR;  Service: General;  Laterality: N/A;    OOPHORECTOMY Right 2009    stage 1a dysgerminoma    RECONSTRUCTION OF BREAST WITH DEEP INFERIOR EPIGASTRIC ARTERY  (DAVID) FREE FLAP Bilateral 2/29/2024    Procedure: RECONSTRUCTION, BREAST, USING DAVID FREE FLAP;  Surgeon: Ed Randle MD;  Location: [facility] OR;  Service: Plastics;  Laterality: Bilateral;    REPAIR, NERVE USING ALLOGRAFT Bilateral 2/29/2024    Procedure: REPAIR, NERVE USING ALLOGRAFT;  Surgeon: Ed Randle MD;  Location: [facility] OR;  Service: Plastics;  Laterality: Bilateral;    SENTINEL LYMPH NODE BIOPSY Right 2/29/2024    Procedure: BIOPSY, LYMPH NODE, SENTINEL;  Surgeon: Monica Min MD;  Location: [facility] OR;  Service: General;  Laterality: Right;    SIMPLE MASTECTOMY Bilateral 2/29/2024    Procedure: MASTECTOMY, SIMPLE;  Surgeon: Monica Min MD;  Location: [facility] OR;  Service: General;  Laterality: Bilateral;    VAGINAL DELIVERY  12/13/2016       Social History     Tobacco Use    Smoking status: Former     Types: Cigarettes   Substance Use Topics    Alcohol use: Yes     Comment: occasionally    Drug use: No       Cancer-related family history includes Brain cancer in her maternal uncle; Breast cancer (age of onset: 38) in her mother; Cervical cancer in her mother. There is no history of Esophageal cancer.    Current Outpatient Medications on File Prior to Visit   Medication Sig Dispense Refill    ascorbic acid, vitamin C, (VITAMIN C) 500 MG tablet Take 500 mg by mouth once daily.      Lactobacillus rhamnosus GG (CULTURELLE) 10 billion cell capsule Take 1 capsule by mouth once daily.      LIDOcaine-prilocaine (EMLA) cream  Apply topically as needed (apply 30 to 60 minutes prior to chemo). (Patient not taking: Reported on 4/16/2024) 30 g 2    magnesium 30 mg Tab Take by mouth 2 (two) times a day.      multivitamin (THERAGRAN) per tablet Take 1 tablet by mouth once daily.      mupirocin (BACTROBAN) 2 % ointment Apply 1 gram to each nostril twice daily for 5 days. Start on Feb 27 (Patient not taking: Reported on 4/16/2024) 22 g 0    olaparib (LYNPARZA) tablet 150 mg Take 2 tablets (300 mg total) by mouth 2 (two) times daily. 120 tablet 2    omega-3 fatty acids/fish oil (FISH OIL-OMEGA-3 FATTY ACIDS) 300-1,000 mg capsule Take 1 capsule by mouth 2 (two) times a day.      TURMERIC ORAL Take 1 tablet by mouth 2 (two) times daily as needed.       Current Facility-Administered Medications on File Prior to Visit   Medication Dose Route Frequency Provider Last Rate Last Admin    HYDROmorphone injection 0.5 mg  0.5 mg Intravenous Q5 Min PRN Rosales Sousa MD        LIDOcaine (PF) 10 mg/ml (1%) injection 10 mg  1 mL Intradermal Once Niki Izaguirre NP        ondansetron injection 4 mg  4 mg Intravenous Daily PRN Rosales Sousa MD        prochlorperazine injection Soln 10 mg  10 mg Intravenous Q30 Min PRN Rosales Sousa MD           Review of patient's allergies indicates:  No Known Allergies    Review of Systems   Constitutional:  Negative for chills, fever and malaise/fatigue.   Eyes:  Negative for blurred vision and double vision.   Respiratory:  Negative for cough and shortness of breath.    Cardiovascular:  Negative for chest pain.   Genitourinary:  Negative for dysuria and hematuria.   Neurological:  Negative for dizziness and headaches.        Vital Signs: /80   Pulse 79   Temp 98.2 °F (36.8 °C)   Resp 16   Wt 56.2 kg (123 lb 14.4 oz)   SpO2 100%   BMI 21.95 kg/m²     ECOG Performance Status: 0 - Fully Active    Physical Exam  Vitals reviewed.   Constitutional:       General: She is not in acute distress.      Appearance: Normal appearance. She is not ill-appearing or toxic-appearing.   HENT:      Head: Normocephalic and atraumatic.      Nose: Nose normal.   Eyes:      Extraocular Movements: Extraocular movements intact.      Conjunctiva/sclera: Conjunctivae normal.      Pupils: Pupils are equal, round, and reactive to light.   Pulmonary:      Effort: Pulmonary effort is normal.   Chest:      Chest wall: No mass.   Breasts:     Breasts are symmetrical.      Comments: Status post bilat skin-sparing mastectomy with DAVID recon  Musculoskeletal:         General: Normal range of motion.      Cervical back: Normal range of motion.   Lymphadenopathy:      Upper Body:      Right upper body: No supraclavicular, axillary or pectoral adenopathy.      Left upper body: No supraclavicular, axillary or pectoral adenopathy.   Skin:     General: Skin is warm.   Neurological:      General: No focal deficit present.      Mental Status: She is alert and oriented to person, place, and time.      Cranial Nerves: No cranial nerve deficit.      Gait: Gait normal.   Psychiatric:         Mood and Affect: Mood normal.         Behavior: Behavior normal.         Thought Content: Thought content normal.         Judgment: Judgment normal.            Imaging: I have personally reviewed the patient's available images and reports and summarized pertinent findings above in HPI.     Pathology: I have personally reviewed the patient's available pathology and summarized pertinent findings above in HPI.     This case was discussed with Dr. Sexton, Dr. Merritt ([facility])      - Thank you for allowing me to participate in the care of your patient.    Rosemarie Roman MD

## 2024-04-30 ENCOUNTER — TELEPHONE (OUTPATIENT)
Dept: HEMATOLOGY/ONCOLOGY | Facility: CLINIC | Age: 44
End: 2024-04-30
Payer: COMMERCIAL

## 2024-04-30 ENCOUNTER — CLINICAL SUPPORT (OUTPATIENT)
Dept: REHABILITATION | Facility: HOSPITAL | Age: 44
End: 2024-04-30
Payer: COMMERCIAL

## 2024-04-30 DIAGNOSIS — Z17.1 MALIGNANT NEOPLASM OF LOWER-OUTER QUADRANT OF RIGHT BREAST OF FEMALE, ESTROGEN RECEPTOR NEGATIVE: ICD-10-CM

## 2024-04-30 DIAGNOSIS — Z91.89 AT RISK FOR LYMPHEDEMA: ICD-10-CM

## 2024-04-30 DIAGNOSIS — C50.911 INVASIVE DUCTAL CARCINOMA OF BREAST, FEMALE, RIGHT: ICD-10-CM

## 2024-04-30 DIAGNOSIS — C50.511 MALIGNANT NEOPLASM OF LOWER-OUTER QUADRANT OF RIGHT BREAST OF FEMALE, ESTROGEN RECEPTOR NEGATIVE: ICD-10-CM

## 2024-04-30 PROCEDURE — 97164 PT RE-EVAL EST PLAN CARE: CPT | Mod: PN

## 2024-04-30 NOTE — PROGRESS NOTES
Physical Therapy Daily Treatment Note/Lymphedema Management     Name: Arabella Koroma [facility] Number: [id number]    Therapy Diagnosis:   Encounter Diagnoses   Name Primary?    Malignant neoplasm of lower-outer quadrant of right breast of female, estrogen receptor negative     Invasive ductal carcinoma of breast, female, right     At risk for lymphedema      Physician: Monica Min MD    Visit Date: 4/30/2024    Physician Orders: PT eval and treat  Medical Diagnosis: at risk for lymphedema, IDC of R breast  Evaluation Date: 2/19/2024      Time In: 9 AM  Time Out: 10AM  Total Billable Time: 60 minutes    Precautions:  Standard, cancer and avoid blood pressure, IV, blood draws and injections in R UE     Subjective     Pt reports: L upper rib pain intermittent in nature. Knee pain with walking which is new since surgery.  She was compliant with home exercise program.  Response to previous treatment: good  Functional change: returned to gym doing light weights    Pain: 0/10 described as an ache for rib pain  Location: goes up to 5/10 at times, comes and goes. And a burnig feeling in her sternum    Objective    Pt with full AROM B shoulders for all planes of motion  Pelvis with decreased R ilial rotation anterior and posteriorly  R VMO weakness with lateral patellar tracking on R LE.  Baseline Measurements of BUEs  LANDMARK RIGHT UE (cm) R UE    W + 16 inches 27.0 28.3   W + 14 inches 25.0 25.8   Elbow 22.5 22.6   W + 7 inches 22.5 23.2   W + 5 inches 19.8 21.4   Wrist 14.5 14.2   DPC 17.6 17.5   IP Thumb 5.5 5.5   length 17 inch    No signs or symptoms of lymphedema present  Garments recommended: Sigvaris Secure Lite upper arm sleeve with silicone sleeve at top, in size small with 15-20 mmHg. Sigvaris Secure Lite gauntlet in size small with 15-20 mmHg. Please begin wearing these garments 6 weeks after surgery for one year during the day, remove at night to rest. Please contact your insurance to see if  they will cover. Patient will be obtaining this week.         Arabella received therapeutic exercises to develop strength, endurance, ROM, flexibility, posture, and core stabilization for 25 minutes including: posterior pelvic tilt, VMO strengthening exercises and post surgery resistive exercises including: theraband resistance ex: on mat clams, hip abduction, for UE: shoulder ER, scapular retraction, shoulder extension and abduction, sit to stand, skier squats.    Home Exercises Provided and Patient Education Provided     Education provided:   - VMO weakness and pelvis motion, how the two work and how she can relieve her pain with walking.    Written Home Exercises Provided: yes.  Exercises were reviewed and Arabella was able to demonstrate them prior to the end of the session.  Arabella demonstrated good  understanding of the education provided.     See EMR under Patient Instructions for exercises provided 4/30/2024.    Assessment   Pt presents back for post op reassess. Pt has met all goals except to obtain garments which she plans on getting this week.Pt with no signs or symptoms of lymphedema with full AROM present in B UE.  Pt with VMO weakness with lateral patellar tracking and decreased AROM in R ilial rotation. Pt with pain in knee with walking. Pt was provided with written HEP and performed ther ex to address her knee pain as well as resistive exercises for all major muscle groups.    At pre-op, Arabella is a 43 y.o. female referred to outpatient physical therapy with a medical diagnosis of at risk for lymphedema, Invasive ductal carcinoma of breast, female, right  with surgical procedure planned on 2/29/2024. Pt was seen today pre-operatively to assess strength and ROM of BUEs, to take baseline circumferential measurements of BUEs to aid in the early detection of lymphedema post-operatively, and to provide pt education on exercises/precautions post-operative. Pt does not exhibit any ROM impairments  currently. This pt will benefit from skilled PT for reassessment of baseline measurements 6-8 week post-operatively and to address future impairments following surgery such as pain, limited ROM, or decreased mobility. Will need to wait until pt is medically cleared to determine if pt is safe for MLD, pneumatic compression pump, or lymphatouch treatments.      Plan of care discussed with patient: Yes  Pt's spiritual, cultural and educational needs considered and patient is agreeable to the plan of care and goals as stated below:      Anticipated barriers for therapy:  none     Medical Necessity is demonstrated by the following:  History  Co-morbidities and personal factors that may impact the plan of care Co-morbidities:   history of cancer     Personal Factors:   none       low   Examination  Body Structures and Functions, activity limitations and participation restrictions that may impact the plan of care Body Systems:    gross symmetry     Activity limitations:   Mobility  no deficits     Self care  no deficits     Domestic Life  no deficits     Participation Restrictions:   none             low   Clinical Presentation evolving clinical presentation with changing clinical characteristics moderate   Decision Making/ Complexity Score: low         GOALS  Short Term Goals: 3 months  Pt to be seen for reassessment in 8 weeks after surgery.GOAL MET 4/30/2024  Pt will demonstrate 100% knowledge of lymphedema precautions and signs of infection.GOAL MET 4/30/2024  Pt to obtain compression garments for prophylactic concerns according to APTA clinical guidelines published in Journal of Physical Therapy. Not met will be getting this week 4/30/2024  4. Pt to receive HEP to include resistive band exercise for all major muscle groups once cleared by MD.GOAL MET 4/30/2024  Long Term Goals: deferred     Plan    pt to be dc to HEP     Korin Abraham, PT, CLT

## 2024-04-30 NOTE — PATIENT INSTRUCTIONS
Current research recommends 150 minutes of moderate intensity (4/10 or greater) aerobic exercise a week coupled with 2 days a week of resistive exercises for all major muscle groups.       Sit to stand: working on controlled sit to stand and stand to sit using legs only, flexed forward from sitting position, push through both feet to lift hips and go into trunk extension. Then returned to sitting with control bending a knees and hinging at hips.     Skier squats: go into dug out push with legs to go into squat and place arms forward like going down a mountain and hold up to 10- 30 sec 12-15 reps.     Hip abduction in sidelye with and without theraband for resistance     Clams with and without theraband for resistance     Bridges ( do posterior pelvic tilt first) with and without theraband for resistance        LEG exercises in standing with theraband            Upper Body:with theraband  Scapular retraction    External rotation of shoulder and shoulder extension: 12-15 reps       Bicep Curl        Tricep extension        Review of knee pain / need for strengthening:  VMO:1. Theraband exercise with resisted knee extension in stance, R foot in back    3 sets of 10 to start with red tband    2. Well leg exercise: R leg on step controlled lowering of left leg with R knee bending and extending. Starto ut with 40 reps work up to 80 reps    3. Walking backwards on an incline on a treadmill 7% incline, speed is not as important  start at 5 min on TM.       Pelvis motion:  Anterior and posterior pelvic tilts        20 sec hold 5 reps on R      Pelvic clocks: work on R side of your clock for motion

## 2024-04-30 NOTE — TELEPHONE ENCOUNTER
4/30/2023  14:52    Called the patient to inquire if she received and started Olaparib yet. The patient reported that she had not received the medication and has not received an update regarding the prescription since talking to [facility] on 4/23/24.     This CRC informed [facility] pharmacy of the situation above. Kerrie Caba, from [facility] pharmacy called [facility] and left a voicemail. Dr. Sexton's nurse, Mee Terrell, was also notified of the above.    This CRC called the patient back to inform her that specialty pharmacy and the clinic are working on obtaining an update from [facility]. The patient did not answer the phone. A voicemail was left detailing this information.    Tamia Herrera CRC

## 2024-05-07 ENCOUNTER — PATIENT MESSAGE (OUTPATIENT)
Dept: RESEARCH | Facility: HOSPITAL | Age: 44
End: 2024-05-07
Payer: COMMERCIAL

## 2024-05-10 ENCOUNTER — TELEPHONE (OUTPATIENT)
Dept: DERMATOLOGY | Facility: CLINIC | Age: 44
End: 2024-05-10
Payer: COMMERCIAL

## 2024-05-14 ENCOUNTER — PATIENT MESSAGE (OUTPATIENT)
Dept: HEMATOLOGY/ONCOLOGY | Facility: CLINIC | Age: 44
End: 2024-05-14
Payer: COMMERCIAL

## 2024-05-14 ENCOUNTER — RESEARCH ENCOUNTER (OUTPATIENT)
Dept: RESEARCH | Facility: HOSPITAL | Age: 44
End: 2024-05-14
Payer: COMMERCIAL

## 2024-05-14 DIAGNOSIS — Z00.6 EXAMINATION OF PARTICIPANT IN CLINICAL TRIAL: ICD-10-CM

## 2024-05-14 DIAGNOSIS — C50.511 MALIGNANT NEOPLASM OF LOWER-OUTER QUADRANT OF RIGHT BREAST OF FEMALE, ESTROGEN RECEPTOR NEGATIVE: Primary | ICD-10-CM

## 2024-05-14 DIAGNOSIS — Z17.1 MALIGNANT NEOPLASM OF LOWER-OUTER QUADRANT OF RIGHT BREAST OF FEMALE, ESTROGEN RECEPTOR NEGATIVE: Primary | ICD-10-CM

## 2024-05-14 NOTE — PROGRESS NOTES
"Protocol: [id number]: OptimICE-pCR: De-Escalation of Therapy in Early-Stage TNBC Patients Who Achieve pCR After Neoadjuvant Chemotherapy with Checkpoint Inhibitor Therapy  IRB #: 2023.168  PI: CHEYANNE Khan MD  Treating MD: Dr. Sexton  PID: [id number]  ARM 2: Observation  May 14th, 2024    OFF [id number] PROTOCOL INTERVENTION     The patient notified this CRC that she received Olaparib and began taking it today. Previous contact was made with [place] [id number] nursing contact regarding if the patient could remain on protocol intervention while taking Olaparib. It was communicated that the patient would have to come off study intervention early if she takes Olaparib and would move to follow up (see 4/16/2024 CRC note). Because the patient started Olaparib today, today's date (5/14/24) will be used as the patient's "off treatment" date in RAVE and [facility]. The patient agreed to participate in [place] [address] survival follow up. The patient knows to contact this CRC if any questions or concerns arise.     Next study appts:     Next QOLs and CRC administered HO1 questionnaire due 12 weeks after registration (~7/4/24)  Next study blood sample due at "End of observation" - will collect when pt returns to clinic on 5/23/24.  6 month survival follow up ~ October 11th, 2024 +/- 2 months     Tamia Herrera, TETE  "

## 2024-05-23 ENCOUNTER — RESEARCH ENCOUNTER (OUTPATIENT)
Dept: RESEARCH | Facility: HOSPITAL | Age: 44
End: 2024-05-23
Payer: COMMERCIAL

## 2024-05-23 ENCOUNTER — LAB VISIT (OUTPATIENT)
Dept: LAB | Facility: HOSPITAL | Age: 44
End: 2024-05-23
Attending: INTERNAL MEDICINE
Payer: COMMERCIAL

## 2024-05-23 DIAGNOSIS — C50.511 MALIGNANT NEOPLASM OF LOWER-OUTER QUADRANT OF RIGHT BREAST OF FEMALE, ESTROGEN RECEPTOR NEGATIVE: ICD-10-CM

## 2024-05-23 DIAGNOSIS — Z17.1 MALIGNANT NEOPLASM OF LOWER-OUTER QUADRANT OF RIGHT BREAST OF FEMALE, ESTROGEN RECEPTOR NEGATIVE: ICD-10-CM

## 2024-05-23 DIAGNOSIS — Z00.6 EXAMINATION OF PARTICIPANT IN CLINICAL TRIAL: ICD-10-CM

## 2024-05-23 LAB
ALBUMIN SERPL BCP-MCNC: 4.8 G/DL (ref 3.5–5.2)
ALP SERPL-CCNC: 63 U/L (ref 55–135)
ALT SERPL W/O P-5'-P-CCNC: 22 U/L (ref 10–44)
ANION GAP SERPL CALC-SCNC: 15 MMOL/L (ref 8–16)
AST SERPL-CCNC: 43 U/L (ref 10–40)
BASOPHILS # BLD AUTO: 0.04 K/UL (ref 0–0.2)
BASOPHILS NFR BLD: 0.6 % (ref 0–1.9)
BILIRUB SERPL-MCNC: 0.5 MG/DL (ref 0.1–1)
BUN SERPL-MCNC: 9 MG/DL (ref 6–20)
CALCIUM SERPL-MCNC: 10.1 MG/DL (ref 8.7–10.5)
CHLORIDE SERPL-SCNC: 102 MMOL/L (ref 95–110)
CO2 SERPL-SCNC: 24 MMOL/L (ref 23–29)
CREAT SERPL-MCNC: 0.7 MG/DL (ref 0.5–1.4)
DIFFERENTIAL METHOD BLD: ABNORMAL
DRUG STUDY SPECIMEN TYPE: NORMAL
EOSINOPHIL # BLD AUTO: 0.4 K/UL (ref 0–0.5)
EOSINOPHIL NFR BLD: 5.8 % (ref 0–8)
ERYTHROCYTE [DISTWIDTH] IN BLOOD BY AUTOMATED COUNT: 12.1 % (ref 11.5–14.5)
EST. GFR  (NO RACE VARIABLE): >60 ML/MIN/1.73 M^2
ESTRADIOL SERPL-MCNC: <10 PG/ML
FSH SERPL-ACNC: 114.79 MIU/ML
GLUCOSE SERPL-MCNC: 77 MG/DL (ref 70–110)
HCT VFR BLD AUTO: 40.7 % (ref 37–48.5)
HGB BLD-MCNC: 13.9 G/DL (ref 12–16)
IMM GRANULOCYTES # BLD AUTO: 0.01 K/UL (ref 0–0.04)
IMM GRANULOCYTES NFR BLD AUTO: 0.1 % (ref 0–0.5)
LH SERPL-ACNC: 41.5 MIU/ML
LYMPHOCYTES # BLD AUTO: 2 K/UL (ref 1–4.8)
LYMPHOCYTES NFR BLD: 29.6 % (ref 18–48)
MCH RBC QN AUTO: 30.4 PG (ref 27–31)
MCHC RBC AUTO-ENTMCNC: 34.2 G/DL (ref 32–36)
MCV RBC AUTO: 89 FL (ref 82–98)
MONOCYTES # BLD AUTO: 0.5 K/UL (ref 0.3–1)
MONOCYTES NFR BLD: 7.9 % (ref 4–15)
NEUTROPHILS # BLD AUTO: 3.8 K/UL (ref 1.8–7.7)
NEUTROPHILS NFR BLD: 56 % (ref 38–73)
NRBC BLD-RTO: 0 /100 WBC
PLATELET # BLD AUTO: 241 K/UL (ref 150–450)
PMV BLD AUTO: 8.7 FL (ref 9.2–12.9)
POTASSIUM SERPL-SCNC: 4.7 MMOL/L (ref 3.5–5.1)
PROT SERPL-MCNC: 7.6 G/DL (ref 6–8.4)
RBC # BLD AUTO: 4.57 M/UL (ref 4–5.4)
SODIUM SERPL-SCNC: 141 MMOL/L (ref 136–145)
WBC # BLD AUTO: 6.85 K/UL (ref 3.9–12.7)

## 2024-05-23 PROCEDURE — 85025 COMPLETE CBC W/AUTO DIFF WBC: CPT | Mod: PN | Performed by: INTERNAL MEDICINE

## 2024-05-23 PROCEDURE — 80053 COMPREHEN METABOLIC PANEL: CPT | Mod: PN | Performed by: INTERNAL MEDICINE

## 2024-05-23 PROCEDURE — 82670 ASSAY OF TOTAL ESTRADIOL: CPT | Performed by: INTERNAL MEDICINE

## 2024-05-23 PROCEDURE — 83001 ASSAY OF GONADOTROPIN (FSH): CPT | Performed by: INTERNAL MEDICINE

## 2024-05-23 PROCEDURE — 36415 COLL VENOUS BLD VENIPUNCTURE: CPT | Mod: PN | Performed by: INTERNAL MEDICINE

## 2024-05-23 PROCEDURE — 83002 ASSAY OF GONADOTROPIN (LH): CPT | Performed by: INTERNAL MEDICINE

## 2024-05-23 NOTE — PROGRESS NOTES
"Protocol: [id number]: OptimICE-pCR: De-Escalation of Therapy in Early-Stage TNBC Patients Who Achieve pCR After Neoadjuvant Chemotherapy with Checkpoint Inhibitor Therapy  IRB #: 2023.168  PI: CHEYANNE Khan MD  Treating MD: Dr. Sexton  PID: [id number]  May 23rd, 2024     Briefly met the patient in the [facility] lab to collect "End of Observation" [id number] labs. The patient presented alert and oriented x 3. Mood and affect appropriate to the situation. The patient reports doing well. The patient continued to freely agree to participation in the [id number] study and collection of [id number] labs today. End of observation labs were collected by the phlebotomist with CRC present using two patient identifiers.  Labs were processed per [id number] correlative science manual and placed in -20 freezer. Labs to be shipped to [facility] today. See source doc req form and shipping airbill.    TETE Cullen  "

## 2024-05-28 ENCOUNTER — OFFICE VISIT (OUTPATIENT)
Dept: HEMATOLOGY/ONCOLOGY | Facility: CLINIC | Age: 44
End: 2024-05-28
Payer: COMMERCIAL

## 2024-05-28 ENCOUNTER — RESEARCH ENCOUNTER (OUTPATIENT)
Dept: RESEARCH | Facility: HOSPITAL | Age: 44
End: 2024-05-28
Payer: COMMERCIAL

## 2024-05-28 VITALS
DIASTOLIC BLOOD PRESSURE: 76 MMHG | WEIGHT: 121.25 LBS | TEMPERATURE: 98 F | HEIGHT: 63 IN | SYSTOLIC BLOOD PRESSURE: 120 MMHG | OXYGEN SATURATION: 99 % | HEART RATE: 87 BPM | RESPIRATION RATE: 16 BRPM | BODY MASS INDEX: 21.48 KG/M2

## 2024-05-28 DIAGNOSIS — Z95.828 PORT-A-CATH IN PLACE: ICD-10-CM

## 2024-05-28 DIAGNOSIS — T45.1X5A CHEMOTHERAPY-INDUCED FATIGUE: ICD-10-CM

## 2024-05-28 DIAGNOSIS — R53.83 CHEMOTHERAPY-INDUCED FATIGUE: ICD-10-CM

## 2024-05-28 DIAGNOSIS — Z17.1 MALIGNANT NEOPLASM OF LOWER-OUTER QUADRANT OF RIGHT BREAST OF FEMALE, ESTROGEN RECEPTOR NEGATIVE: Primary | ICD-10-CM

## 2024-05-28 DIAGNOSIS — T50.905A DRUG-INDUCED HEPATITIS: ICD-10-CM

## 2024-05-28 DIAGNOSIS — Z15.09 BRCA1 GENE MUTATION POSITIVE: ICD-10-CM

## 2024-05-28 DIAGNOSIS — K71.6 DRUG-INDUCED HEPATITIS: ICD-10-CM

## 2024-05-28 DIAGNOSIS — Z15.01 BRCA1 GENE MUTATION POSITIVE: ICD-10-CM

## 2024-05-28 DIAGNOSIS — C50.511 MALIGNANT NEOPLASM OF LOWER-OUTER QUADRANT OF RIGHT BREAST OF FEMALE, ESTROGEN RECEPTOR NEGATIVE: Primary | ICD-10-CM

## 2024-05-28 DIAGNOSIS — R45.89 ANXIETY ABOUT HEALTH: ICD-10-CM

## 2024-05-28 DIAGNOSIS — C50.911 INVASIVE DUCTAL CARCINOMA OF BREAST, FEMALE, RIGHT: ICD-10-CM

## 2024-05-28 PROBLEM — R11.2 CINV (CHEMOTHERAPY-INDUCED NAUSEA AND VOMITING): Status: RESOLVED | Noted: 2023-09-06 | Resolved: 2024-05-28

## 2024-05-28 PROCEDURE — 3008F BODY MASS INDEX DOCD: CPT | Mod: CPTII,S$GLB,, | Performed by: INTERNAL MEDICINE

## 2024-05-28 PROCEDURE — 1159F MED LIST DOCD IN RCRD: CPT | Mod: CPTII,S$GLB,, | Performed by: INTERNAL MEDICINE

## 2024-05-28 PROCEDURE — 99214 OFFICE O/P EST MOD 30 MIN: CPT | Mod: S$GLB,,, | Performed by: INTERNAL MEDICINE

## 2024-05-28 PROCEDURE — 99999 PR PBB SHADOW E&M-EST. PATIENT-LVL IV: CPT | Mod: PBBFAC,,, | Performed by: INTERNAL MEDICINE

## 2024-05-28 PROCEDURE — 3078F DIAST BP <80 MM HG: CPT | Mod: CPTII,S$GLB,, | Performed by: INTERNAL MEDICINE

## 2024-05-28 PROCEDURE — 3074F SYST BP LT 130 MM HG: CPT | Mod: CPTII,S$GLB,, | Performed by: INTERNAL MEDICINE

## 2024-05-28 RX ORDER — BUPROPION HYDROCHLORIDE 150 MG/1
150 TABLET ORAL DAILY
Qty: 30 TABLET | Refills: 11 | Status: CANCELLED | OUTPATIENT
Start: 2024-05-28 | End: 2025-05-28

## 2024-05-28 NOTE — PROGRESS NOTES
"                                        Name: Arabella Catalan  MRN:  [id number]  :  1980 Age 44 y.o.  Date of Service: 2024    Reason for visit:  Arabella Catalan is a 44 y.o. female here regarding...    #Triple Negative Right Sided Breast Cancer   Date of Original Diagnosis: 23  Original Stage: [id number] Stage 2B grade 3 Triple neg-->pCR!   Current Sites of Disease: none  Current Goals of Therapy: curative   Current Therapy: surveillance     Oncologic History/History of Present Illness:   Per Dr. Rivas Note:   As previously documented she started screening mammograms for a few years after her ovarian cancer , in her late 20's. Routine pelvic exam revealed an ovarian cancer, s/p right oophorectomy for dysgerminoma.    Resumed routine mammograms at 42, had a normal screening mammogram in May 2023 but felt a lump in the right breast in July. Further imaging showed 2 lesions, both > 2 cm , both biopsied to be G3 IDC, ER/IA and Her 2 jose eduardo negative.   Menarche at age 14 year old. . Age at first live birth 31. Did  breast feed 2 year and 6 months . LMP  . OCP-yes 20 years ago  Menopause at none. HRT-none      Genetics completed- + BRCA1, Mother breast cancer at 38 and cervical cancer    23-24  NACT per keynote 522  24 bilateral mastectomy --> pCR!       Interim history:  She was completed the neoadjuvant portion of keynote 522 and is was enrolled in "[id number]: OptimICE-pCR: De-Escalation of Therapy in Early-Stage TNBC Patients Who Achieve pCR After Neoadjuvant Chemotherapy with Checkpoint Inhibitor Therapy".  She was randomized to the observation arm thus would not be getting her adjuvant Keytruda x 9 doses per study. We reviewed the options again as she is off study now and she reports she does not want to resume keytruda at this time.   -she is BRCA positive and based on the [place] trial she qualifies for 1 year worth of adjuvant olaparib, I reviewed the trial data " with her and encouraged her consider 1 year worth of olaparib therapy. She took 2 weeks worth of olaparib and reports anhedonia and generalized body rash w/ olaparib thus self discontinued, saw a dermatologist and they favor drug induced rash   -feeling better since self discontinuation of olaparib       PHYSICAL EXAMINATION:  There were no vitals taken for this visit.  Wt Readings from Last 3 Encounters:   05/24/24 56.2 kg (123 lb 14.4 oz)   04/29/24 56.2 kg (123 lb 14.4 oz)   04/16/24 56.7 kg (125 lb)     ECOG PERFORMANCE STATUS: 0  Physical Exam   General:  Well-appearing, nontoxic  Eyes:  Equal and round pupils, EOMI, no scleral icterus  Mouth:  No lesions, moist  Cardiovascular:  Warm, well-perfused, no peripheral edema  Lungs:  Unlabored on room air, no wheezing  Neurologic:  Awake, alert and oriented, participating in the exam  Psych:  Appropriate mood and affect  Skin:  Normal pallor  Heme:  No petechiae, no purpura  Breast:  Bilateral mastectomy with reconstruction    LABORATORY:  CBC  Lab Results   Component Value Date    WBC 6.85 05/23/2024    HGB 13.9 05/23/2024    HCT 40.7 05/23/2024    MCV 89 05/23/2024     05/23/2024         BMP  Lab Results   Component Value Date     05/23/2024    K 4.7 05/23/2024     05/23/2024    CO2 24 05/23/2024    BUN 9 05/23/2024    CREATININE 0.7 05/23/2024    CALCIUM 10.1 05/23/2024    ANIONGAP 15 05/23/2024    ESTGFRAFRICA >60 01/04/2010    EGFRNONAA >60 01/04/2010         PATHOLOGY:    DIAGNOSIS:   03/07/2024 JHL:fc   1. RIGHT BREAST, SKIN AND NIPPLE SPARING MASTECTOMY:   - NO RESIDUAL TUMOR SEEN.   - EXTENSIVE FIBROADENOMATOID HYPERPLASIA.   - BIOPSY SITE REACTIONS.   2. RIGHT AXILLARY SENTINEL LYMPH NODE, BIOPSY:   - TWO LYMPH NODES, BOTH NEGATIVE FOR TUMOR (0/2     RADIOLOGY:  MRI Breast  Right  There is a 2.7 cm x 2.2 cm x 2 cm irregularly shaped, homogeneous mass with angular margins seen in the right breast at 8 o'clock, 7 cm from the nipple, 1.3 cm  "from the skin, and 0.8 cm from the chest wall. Kinetics initial phase is fast. Delayed phase is washout.      There is a 2 cm x 1.6 cm x 1.6 cm irregularly shaped, homogeneous mass with angular margins seen in the right breast at 7 o'clock, 3 cm from the nipple, 1.1 cm from the skin, and 2.3 cm from the chest wall. Kinetics initial phase is fast. Delayed phase is washout.       ASSESSMENT AND PLAN:  Arabella Catalan is a 44 y.o. female with...    #Triple Negative Right Sided Breast Cancer   Date of Original Diagnosis: 7/26/23  Original Stage: [id number] Stage 2B grade 3 Triple neg-->pCR!   Current Sites of Disease: none  Current Goals of Therapy: curative   Current Therapy: active surveillance    She was completed the neoadjuvant portion of keynote 522 and is was enrolled in "[id number]: OptimICE-pCR: De-Escalation of Therapy in Early-Stage TNBC Patients Who Achieve pCR After Neoadjuvant Chemotherapy with Checkpoint Inhibitor Therapy".  She was randomized to the observation arm thus would not be getting her adjuvant Keytruda x 9 doses per study. We reviewed the options again as she is off study now and she reports she does not want to resume keytruda at this time.   -she is BRCA positive and based on the [place] trial she qualifies for 1 year worth of adjuvant olaparib, I reviewed the trial data with her and encouraged her consider 1 year worth of olaparib therapy. She took 2 weeks worth of olaparib and reports anhedonia and generalized body rash w/ olaparib thus self discontinued, saw a dermatologist and they favor drug induced rash   -feeling better since self discontinuation of olaparib.     Okay to proceed w port removal.     [place] trial--Published Yaz 3, 2021N Engl J Med 2021;384:2394-2405DOI: [id number]. 384 NO. 25    #drug induced rash- secondary to olaparib self d/c'd medication, grade 2, on steroid cream per derm  #IO induced rash- grade 1 involving 30% the body after C1D1,, no sloughing " or breakdown, no pupules or pustules, +puritis, RX for hydrocortisone cream, counseled about emollients, nearly resolved one week later  #IO induced hepatitis- grade 2, resolved with steroids.     #BRCA1 + - found as part of genetic work up- counseled her about breast and ovarian risks, advised her to get 1 remaining ovary removed.  Status post bilateral mastectomy.  She has one remaining ovary, we checked her hormone levels and she is in the post menopausal category thus I advised her to revisit oophorectomy w/ her GYN.     #anxiety about health- referral sent to onc psych, good mood today     Sole Sexton M.D.  Hematology/Oncology   Route Chart for Scheduling    Med Onc Chart Routing      Follow up with physician 3 months and 6 months. in a gown   Follow up with SA[facility]    Infusion scheduling note    Injection scheduling note    Labs   Scheduling:  Preferred lab:  Lab interval:  No labs   Imaging    Pharmacy appointment    Other referrals         Port removal-alex

## 2024-05-28 NOTE — PROGRESS NOTES
Protocol: [id number]: OptimICE-pCR: De-Escalation of Therapy in Early-Stage TNBC Patients Who Achieve pCR After Neoadjuvant Chemotherapy with Checkpoint Inhibitor Therapy  IRB #: 2023.168  PI: CHEYANNE Khan MD  Treating MD: Dr. Sexton  PID: [id number]  ARM 2: Observation  Off [id number] Protocol Intervention on 5/14/24    May 28th, 2024    CRC met the patient at her clinic appointment with Dr. Sexton. The patient presented alert and oriented x 3. Mood and affect appropriate to the situation. The patient reports doing well. The patient continues to freely agree to participation in the survival follow up of the [id number] study.    She states that she stopped taking the Olaparib on Saturday 5/18/24. She reports that she developed a rash 3 weeks ago, and experienced anhedonia. She reports feeling much better after stopping Olaparib. The rash is still present but appears to be fading and improving.  She states that she continues to have hot flashes. Her 5/23/24 labs revealed a very slightly elevated AST of 43 U/L. Dr. Sexton does not find this concerning. 5/23/24 labs revealed that the patient is post menopausal. Dr. Sexton reviewed the results with the patient. The patient is now considering opting for an oophorectomy. She is going to follow up with Gyn-Onc. She denies any other new or worsening symptoms.     Please note that the patient was randomized to the observational arm while on study intervention. Per Dr. Sexton, all of the patient's current Aes are unrelated to [place]. See AE and conmed logs in patient's shadow chart.    Next study appts:     Next QOLs and CRC administered HO1 questionnaire due 12 weeks after registration (~7/4/24)   Planning to collect at pt's 7/8/24 [facility] appt - Pt confirmed.  6 month survival follow up ~ October 11th, 2024 +/- 2 months  6 months from end of observation labs due 11/14/24     TETE Cullen

## 2024-05-29 ENCOUNTER — TELEPHONE (OUTPATIENT)
Dept: HEMATOLOGY/ONCOLOGY | Facility: CLINIC | Age: 44
End: 2024-05-29
Payer: COMMERCIAL

## 2024-05-29 DIAGNOSIS — Z15.01 BRCA1 GENE MUTATION POSITIVE: Primary | ICD-10-CM

## 2024-05-29 DIAGNOSIS — Z15.09 BRCA1 GENE MUTATION POSITIVE: Primary | ICD-10-CM

## 2024-05-29 NOTE — NURSING
Pt requesting an appt with Dr. Calderon to discuss having her last ovary removed.  Scheduled her to see Dr. Calderon on 6/19 at 230pm.  Scheduled Q 6 mos U/S for Tuesday 6/4.  Encouraged her to call with any questions or concerns. She thanked me and verbalized understanding.

## 2024-06-04 ENCOUNTER — HOSPITAL ENCOUNTER (OUTPATIENT)
Dept: RADIOLOGY | Facility: HOSPITAL | Age: 44
Discharge: HOME | End: 2024-06-04
Attending: OBSTETRICS & GYNECOLOGY
Payer: COMMERCIAL

## 2024-06-04 DIAGNOSIS — Z15.01 BRCA1 GENE MUTATION POSITIVE: ICD-10-CM

## 2024-06-04 DIAGNOSIS — Z15.09 BRCA1 GENE MUTATION POSITIVE: ICD-10-CM

## 2024-06-04 PROCEDURE — 76830 TRANSVAGINAL US NON-OB: CPT | Mod: TC,PO

## 2024-06-04 PROCEDURE — 76856 US EXAM PELVIC COMPLETE: CPT | Mod: 26,,, | Performed by: RADIOLOGY

## 2024-06-04 PROCEDURE — 76856 US EXAM PELVIC COMPLETE: CPT | Mod: TC,PO

## 2024-06-04 PROCEDURE — 76830 TRANSVAGINAL US NON-OB: CPT | Mod: 26,,, | Performed by: RADIOLOGY

## 2024-06-19 ENCOUNTER — OFFICE VISIT (OUTPATIENT)
Dept: GYNECOLOGIC ONCOLOGY | Facility: CLINIC | Age: 44
End: 2024-06-19
Payer: COMMERCIAL

## 2024-06-19 VITALS
RESPIRATION RATE: 16 BRPM | SYSTOLIC BLOOD PRESSURE: 113 MMHG | WEIGHT: 124.13 LBS | TEMPERATURE: 97 F | BODY MASS INDEX: 21.99 KG/M2 | HEIGHT: 63 IN | HEART RATE: 91 BPM | DIASTOLIC BLOOD PRESSURE: 79 MMHG | OXYGEN SATURATION: 99 %

## 2024-06-19 DIAGNOSIS — C50.911 INVASIVE DUCTAL CARCINOMA OF BREAST, FEMALE, RIGHT: ICD-10-CM

## 2024-06-19 DIAGNOSIS — Z15.09 BRCA1 GENE MUTATION POSITIVE: Primary | ICD-10-CM

## 2024-06-19 DIAGNOSIS — Z15.01 BRCA1 GENE MUTATION POSITIVE: Primary | ICD-10-CM

## 2024-06-19 PROCEDURE — 99214 OFFICE O/P EST MOD 30 MIN: CPT | Mod: S$GLB,,, | Performed by: OBSTETRICS & GYNECOLOGY

## 2024-06-19 PROCEDURE — 99999 PR PBB SHADOW E&M-EST. PATIENT-LVL III: CPT | Mod: PBBFAC,,, | Performed by: OBSTETRICS & GYNECOLOGY

## 2024-07-05 ENCOUNTER — TELEPHONE (OUTPATIENT)
Dept: HEMATOLOGY/ONCOLOGY | Facility: CLINIC | Age: 44
End: 2024-07-05
Payer: COMMERCIAL

## 2024-07-05 NOTE — TELEPHONE ENCOUNTER
Spoke to pt to remind of appt on 7/8/24 with Ayesha. Pt verbalized understanding and confirmed appt Location was discussed.

## 2024-07-08 ENCOUNTER — OFFICE VISIT (OUTPATIENT)
Dept: HEMATOLOGY/ONCOLOGY | Facility: CLINIC | Age: 44
End: 2024-07-08
Payer: COMMERCIAL

## 2024-07-08 ENCOUNTER — RESEARCH ENCOUNTER (OUTPATIENT)
Dept: RESEARCH | Facility: HOSPITAL | Age: 44
End: 2024-07-08
Payer: COMMERCIAL

## 2024-07-08 VITALS
HEIGHT: 63 IN | WEIGHT: 123.69 LBS | SYSTOLIC BLOOD PRESSURE: 115 MMHG | HEART RATE: 88 BPM | BODY MASS INDEX: 21.91 KG/M2 | DIASTOLIC BLOOD PRESSURE: 67 MMHG | OXYGEN SATURATION: 100 % | TEMPERATURE: 97 F

## 2024-07-08 DIAGNOSIS — C50.511 MALIGNANT NEOPLASM OF LOWER-OUTER QUADRANT OF RIGHT BREAST OF FEMALE, ESTROGEN RECEPTOR NEGATIVE: ICD-10-CM

## 2024-07-08 DIAGNOSIS — N95.1 MENOPAUSAL SYMPTOMS: Primary | ICD-10-CM

## 2024-07-08 DIAGNOSIS — C50.911 INVASIVE DUCTAL CARCINOMA OF BREAST, FEMALE, RIGHT: Primary | ICD-10-CM

## 2024-07-08 DIAGNOSIS — Z00.6 EXAMINATION OF PARTICIPANT IN CLINICAL TRIAL: ICD-10-CM

## 2024-07-08 DIAGNOSIS — Z51.81 ENCOUNTER FOR MONITORING CARDIOTOXIC DRUG THERAPY: ICD-10-CM

## 2024-07-08 DIAGNOSIS — Z79.899 ENCOUNTER FOR MONITORING CARDIOTOXIC DRUG THERAPY: ICD-10-CM

## 2024-07-08 DIAGNOSIS — Z17.1 MALIGNANT NEOPLASM OF LOWER-OUTER QUADRANT OF RIGHT BREAST OF FEMALE, ESTROGEN RECEPTOR NEGATIVE: ICD-10-CM

## 2024-07-08 PROCEDURE — 99999 PR PBB SHADOW E&M-EST. PATIENT-LVL IV: CPT | Mod: PBBFAC,,, | Performed by: NURSE PRACTITIONER

## 2024-07-08 PROCEDURE — 99215 OFFICE O/P EST HI 40 MIN: CPT | Mod: S$GLB,,, | Performed by: NURSE PRACTITIONER

## 2024-07-08 NOTE — PROGRESS NOTES
Protocol: [id number]: OptimICE-pCR: De-Escalation of Therapy in Early-Stage TNBC Patients Who Achieve pCR After Neoadjuvant Chemotherapy with Checkpoint Inhibitor Therapy  IRB #: 2023.168  PI: CHEYANNE Khan MD  Treating MD: Dr. Sexton  PID: [id number]  ARM 2: Observation  Off [id number] Protocol Intervention on 5/14/24 July 8th, 2024     CRC met the patient in the first floor lobby before and after her scheduled appointment with Ayesha Torres NP. The patient presented alert and oriented x 3. Mood and affect appropriate to the situation. She continues to freely agree to participation in the survival follow up of the [id number] study. The patient reports doing well. She reports no longer experiencing anhedonia. She states the anhedonia started the day after starting Olaparib (5/15/24) and ended the day after stopping Olaparib (5/19/24). She reports that she continues to have a rash but has determined that it is likely d/t an allergen in her environment. She plans to follow up with an Allergist. She states that she continues to have hot flashes. She denies any other new or worsening symptoms.      The patient was given her 12 week post registration [id number] QOLs before her appointment and returned the completed QOLs to this CRC after her appointment. The HO1 questionnaire was then administered to the patient via this CRC.     The patient denied having any questions for this CRC. She was encouraged to call if any questions or concerns arise.     Next study appts:     FINAL QOLs and CRC administered HO1 questionnaire due 27 weeks after registration (~10/17/24)   6 month survival follow up ~ October 11th, 2024 +/- 2 months  6 months from end of observation labs due 11/14/24  Planning to collect before/after 12/3/24 appt w/ Dr. Darshan Herrera, CRC

## 2024-07-08 NOTE — PROGRESS NOTES
"PATIENT: Arabella Catalan  MRN: [id number]  DATE: 7/8/2024    Chief Complaint: Survivorship Clinic    Subjective:   Oncology History: Ms. Catalan is a 44 y.o. female  with history of breast cancer who presents for survivorship clinic visit.    Survivorship Assessment:  1. Cardiac Toxicity-None  T he left ventricle is normal in size. Normal wall thickness. Normal wall motion. There is normal systolic function with a visually estimated ejection fraction of 65 - 70%.  GLS is -19.2. There is normal diastolic function.   2. Anxiety/Depression/Distress-She reports mild anxiety.   3. Cognitive function-She reports she will have trouble recalling big words. "I used to pride myself on my vocabulary and I now can't remember big words at times and I have to take longer to recall the words."  4. Fatigue-denies  5. Lymphedema-denies  6. Sexual Function/Hormone related symptoms-She reports hot flashes approximately 6-10 in 24 hours during the day and night.  Generally 3 at night.   7. Pain-Denies  8. Sleep-She is sleeping fair, approximately 7 hours.  She does wake up due to hot flashes, falls back asleep easily.   9. Exercise/Dietary Assessment: She has a good appetite and eats healthy. She states, "I am very conscious of my diet." She exercises 6 days a week--Beach [employer] on demand and walking.     PCP: [facility]  GYN: Dr. Denisha Mcleod, Dr. Caceres, and Dr. Calderon    Social Determinants of Health     Tobacco Use: Medium Risk (7/8/2024)    Patient History     Smoking Tobacco Use: Former     Smokeless Tobacco Use: Unknown     Passive Exposure: Not on file   Alcohol Use: Not At Risk (7/8/2024)    AUDIT-C     Frequency of Alcohol Consumption: Monthly or less     Average Number of Drinks: 1 or 2     Frequency of Binge Drinking: Never   Financial Resource Strain: Low Risk  (7/8/2024)    Overall Financial Resource Strain (CARDIA)     Difficulty of Paying Living Expenses: Not hard at all   Food Insecurity: No Food " Insecurity (7/8/2024)    Hunger Vital Sign     Worried About Running Out of Food in the Last Year: Never true     Ran Out of Food in the Last Year: Never true   Transportation Needs: No Transportation Needs (7/8/2024)    TRANSPORTATION NEEDS     Transportation : No   Physical Activity: Sufficiently Active (7/8/2024)    Exercise Vital Sign     Days of Exercise per Week: 6 days     Minutes of Exercise per Session: 40 min   Stress: Stress Concern Present (7/8/2024)    [employer] [place] [employer] - Occupational Stress Questionnaire     Feeling of Stress : To some extent   Housing Stability: Low Risk  (7/8/2024)    Housing Stability Vital Sign     Unable to Pay for Housing in the Last Year: No     Homeless in the Last Year: No   Depression: Low Risk  (6/19/2024)    Depression     Last PHQ-4: Flowsheet Data: 0   Utilities: Not At Risk (7/8/2024)    [facility] Utilities     Threatened with loss of utilities: No   Health Literacy: Adequate Health Literacy (7/8/2024)     Health Literacy     Frequency of need for help with medical instructions: Never   Social Isolation: Socially Integrated (7/8/2024)    Social Isolation     Social Isolation: 1      Past Medical History:   Past Medical History:   Diagnosis Date    Abnormal Pap smear of cervix     BRCA1 positive     Breast cancer 07/26/2023    right    Breast cancer 07/26/2023    right    HPV (human papilloma virus) infection     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     Past Surgical History:   Past Surgical History:   Procedure Laterality Date    BREAST BIOPSY Right 2015    benign    BREAST BIOPSY Right 07/26/2023    BREAST BIOPSY Right 07/26/2023    BREAST BIOPSY Right 08/16/2023    benign LN    INSERTION OF TUNNELED CENTRAL VENOUS CATHETER (CVC) WITH SUBCUTANEOUS PORT N/A 08/15/2023    Procedure: [id number]-[id number];  Surgeon: Skyler Aldrich MD;  [facility]: [facility];  Service: General;  Laterality: N/A;    OOPHORECTOMY Right 2009    stage 1a  dysgerminoma    RECONSTRUCTION OF BREAST WITH DEEP INFERIOR EPIGASTRIC ARTERY  (DAVID) FREE FLAP Bilateral 2/29/2024    Procedure: RECONSTRUCTION, BREAST, USING DAVID FREE FLAP;  Surgeon: Ed Randle MD;  Location: [facility] OR;  Service: Plastics;  Laterality: Bilateral;    REPAIR, NERVE USING ALLOGRAFT Bilateral 2/29/2024    Procedure: REPAIR, NERVE USING ALLOGRAFT;  Surgeon: Ed Randle MD;  Location: [facility] OR;  Service: Plastics;  Laterality: Bilateral;    SENTINEL LYMPH NODE BIOPSY Right 2/29/2024    Procedure: BIOPSY, LYMPH NODE, SENTINEL;  Surgeon: Monica Min MD;  Location: [facility] OR;  Service: General;  Laterality: Right;    SIMPLE MASTECTOMY Bilateral 2/29/2024    Procedure: MASTECTOMY, SIMPLE;  Surgeon: Monica Min MD;  Location: [facility] OR;  Service: General;  Laterality: Bilateral;    VAGINAL DELIVERY  12/13/2016     Family History:   Family History   Problem Relation Name Age of Onset    Breast cancer Mother  38    Cervical cancer Mother          dx in 20s    BRCA 1/2 Sister      Brain cancer Maternal Uncle      Colon cancer Neg Hx      Colon polyps Neg Hx      Esophageal cancer Neg Hx      Stomach cancer Neg Hx      Inflammatory bowel disease Neg Hx       Social History:  reports that she has quit smoking. Her smoking use included cigarettes. She does not have any smokeless tobacco history on file. She reports current alcohol use. She reports that she does not use drugs.    Allergies:  Review of patient's allergies indicates:  No Known Allergies    Medications:  Current Outpatient Medications   Medication Sig Dispense Refill    ascorbic acid, vitamin C, (VITAMIN C) 500 MG tablet Take 500 mg by mouth once daily.      Lactobacillus rhamnosus GG (CULTURELLE) 10 billion cell capsule Take 1 capsule by mouth once daily.      magnesium 30 mg Tab Take by mouth 2 (two) times a day.      multivitamin (THERAGRAN) per tablet Take 1 tablet by mouth once daily.      omega-3 fatty acids/fish oil  "(FISH OIL-OMEGA-3 FATTY ACIDS) 300-1,000 mg capsule Take 1 capsule by mouth 2 (two) times a day.      triamcinolone acetonide 0.1% (KENALOG) 0.1 % ointment Apply twice daily to affected areas of skin until improved, then can stop and restart as needed 454 g 1    TURMERIC ORAL Take 1 tablet by mouth 2 (two) times daily as needed.       No current facility-administered medications for this visit.     Facility-Administered Medications Ordered in Other Visits   Medication Dose Route Frequency Provider Last Rate Last Admin    HYDROmorphone injection 0.5 mg  0.5 mg Intravenous Q5 Min PRN Rosales Sousa MD        LIDOcaine (PF) 10 mg/ml (1%) injection 10 mg  1 mL Intradermal Once Niki Izaguirre NP        ondansetron injection 4 mg  4 mg Intravenous Daily PRN Rosales Sousa MD        prochlorperazine injection Soln 10 mg  10 mg Intravenous Q30 Min PRN Rosales Sousa MD          Review of Systems:  Review of Systems   Constitutional:         Hot flashes, vaginal dryness   HENT: Negative.     Eyes: Negative.    Respiratory: Negative.     Cardiovascular: Negative.    Gastrointestinal: Negative.    Genitourinary: Negative.    Musculoskeletal: Negative.    Skin: Negative.    Neurological: Negative.    Endo/Heme/Allergies: Negative.    Psychiatric/Behavioral:  The patient is nervous/anxious.       Objective:      Vitals:   Vitals:    07/08/24 1121   BP: 115/67   BP Location: Left arm   Patient Position: Sitting   BP Method: Medium (Manual)   Pulse: 88   Temp: 97.2 °F (36.2 °C)   TempSrc: Temporal   SpO2: 100%   Weight: 56.1 kg (123 lb 11.2 oz)   Height: 5' 3" (1.6 m)   BMI: Body mass index is 21.91 kg/m².      Physical Exam:   Physical Exam  Vitals reviewed.   Constitutional:       Appearance: Normal appearance.   Neurological:      Mental Status: She is alert.   Psychiatric:         Mood and Affect: Mood normal.         Behavior: Behavior normal.        Assessment:     1. Menopausal symptoms    2. " Malignant neoplasm of lower-outer quadrant of right breast of female, estrogen receptor negative    3. Encounter for monitoring cardiotoxic drug therapy      Plan:   Reviewed Cancer Treatment Summary with patient. Care Plan was given to the patient and all questions were answered. Survivorship handout was also reviewed and given to patient.   Counseled on healthy lifestyle and behavior modifications that could reduce risk of recurrence.  Limit alcohol to less than one drink per day, Exercise at least 150 minutes per week of moderate intensity aerobic activity or at least 75 minutes of vigorous activity, Maintaining a healthy weight, Limit red meat to no more than 2-3x per week, Reduce processed foods and meat. Also encouraged wide variety of fruits and vegetables    Follow ups with Breast Surgery and Medical Oncology Scheduled.  Referral to cardio oncology  Recommended Good Clean Love--BioNourish and Almost Naked, samples given  Follow up with Survivorship clinic in 1 year    I spent a total of 56 minutes on the day of the visit.This includes face to face time and non-face to face time preparing to see the patient (eg, review of tests), obtaining and/or reviewing separately obtained history, documenting clinical information in the electronic or other health record, independently interpreting results and communicating results to the patient/family/caregiver, or care coordinator.

## 2024-07-09 PROBLEM — N95.1 MENOPAUSAL SYMPTOMS: Status: ACTIVE | Noted: 2024-07-09

## 2024-07-15 NOTE — PROGRESS NOTES
Subjective:      Chief Complaint:  BRCA1 mutation, recent diagnosis of a triple negative breast cancer     Patient ID: Arabella Catalan is a 44 y.o.  3 para 3 female who returns for follow-up, having last been seen in the office in 2023 at which time she was receiving chemo immunotherapy for stage II B TNBC,  since that visit, the patient completed her chemo immunotherapy and underwent bilateral mastectomy with reconstruction in 2024.  She was started on Lynparza maintenance therapy but stopped this after a week because of a severe rash.  Her menses were normal until she received chemotherapy and she has had no bleeding since then.  A recent pelvic ultrasound is normal with a small stable 4 mm hyperechoic focus, unknown etiology.  Her recent  is normal at 7.  We again had a long discussion regarding the impact of her BRCA1 mutation which increases her risk of ovarian cancer from 1.3% in the general population to 40-60%.  We also again reviewed the recommendations for risk reducing hysterectomy with BSO, usually by age 40.         Past Medical History:   Diagnosis Date    Abnormal Pap smear of cervix     BRCA1 positive     Breast cancer 2023    right    Breast cancer 2023    right    HPV (human papilloma virus) infection     Ovarian cancer     stage 1a dysgerminoma      Past Surgical History:   Procedure Laterality Date    BREAST BIOPSY Right     benign    BREAST BIOPSY Right 2023    BREAST BIOPSY Right 2023    BREAST BIOPSY Right 2023    benign LN    INSERTION OF TUNNELED CENTRAL VENOUS CATHETER (CVC) WITH SUBCUTANEOUS PORT N/A 08/15/2023    Procedure: OVUTTUWDZ-HEFT-Q-CATH;  Surgeon: Skyler Aldrich MD;  [facility];  Service: General;  Laterality: N/A;    OOPHORECTOMY Right     stage 1a dysgerminoma    RECONSTRUCTION OF BREAST WITH DEEP INFERIOR EPIGASTRIC ARTERY  (DAVID) FREE FLAP Bilateral 2024     Procedure: RECONSTRUCTION, BREAST, USING DAVID FREE FLAP;  Surgeon: Ed Randle MD;  Location: [facility];  Service: Plastics;  Laterality: Bilateral;    REPAIR, NERVE USING ALLOGRAFT Bilateral 2/29/2024    Procedure: REPAIR, NERVE USING ALLOGRAFT;  Surgeon: Ed Randle MD;  Location: [facility];  Service: Plastics;  Laterality: Bilateral;    SENTINEL LYMPH NODE BIOPSY Right 2/29/2024    Procedure: BIOPSY, LYMPH NODE, SENTINEL;  Surgeon: Monica Min MD;  Location: [facility];  Service: General;  Laterality: Right;    SIMPLE MASTECTOMY Bilateral 2/29/2024    Procedure: MASTECTOMY, SIMPLE;  Surgeon: Monica Min MD;  Location: [facility];  Service: General;  Laterality: Bilateral;    VAGINAL DELIVERY  12/13/2016      Family History   Problem Relation Name Age of Onset    Breast cancer Mother  38    Cervical cancer Mother          dx in 20s    BRCA 1/2 Sister      Brain cancer Maternal Uncle      Colon cancer Neg Hx      Colon polyps Neg Hx      Esophageal cancer Neg Hx      Stomach cancer Neg Hx      Inflammatory bowel disease Neg Hx        Social History     Tobacco Use    Smoking status: Former     Types: Cigarettes   Substance Use Topics    Alcohol use: Yes     Comment: occasionally    Drug use: No              Objective:        1. General: Well appearing, no apparent distress, alert and oriented.  2. Lymph: Neck symmetric without cervical or supraclavicular adenopathy or mass.  3. Heart:   Regular rate and rhythm.  4. Lungs: Normal respiratory rate, no accessory muscle use.  5. Psych: Normal affect.  6. Abdomen:  Nondistended.  7. Skin: Warm, dry, no rashes or lesions.   8. Extremities: Bilateral lower extremities without edema or tenderness.                 Assessment/Plan   Recent history of premenopausal TNBC, now status post completion of all planned chemotherapy, doing well.  BRCA1 pathogenic mutation with increased risk of ovarian and fallopian tube cancers.  Options for continued careful  surveillance versus risk reducing surgery have been discussed in detail with the patient again and she now wishes to proceed with definitive surgery.  She will let us know when she is agreeable to schedule the surgery, which will include a robotic risk reducing hysterectomy with BSO.  She understands that if an occult malignancy is documented intraoperatively, that surgical staging and postoperative therapy may be warranted.

## 2024-07-19 ENCOUNTER — TELEPHONE (OUTPATIENT)
Dept: CARDIOLOGY | Facility: CLINIC | Age: 44
End: 2024-07-19
Payer: COMMERCIAL

## 2024-09-04 ENCOUNTER — LAB VISIT (OUTPATIENT)
Dept: LAB | Facility: HOSPITAL | Age: 44
End: 2024-09-04
Attending: INTERNAL MEDICINE
Payer: COMMERCIAL

## 2024-09-04 ENCOUNTER — OFFICE VISIT (OUTPATIENT)
Dept: HEMATOLOGY/ONCOLOGY | Facility: CLINIC | Age: 44
End: 2024-09-04
Payer: COMMERCIAL

## 2024-09-04 ENCOUNTER — RESEARCH ENCOUNTER (OUTPATIENT)
Dept: RESEARCH | Facility: HOSPITAL | Age: 44
End: 2024-09-04
Payer: COMMERCIAL

## 2024-09-04 VITALS
HEIGHT: 63 IN | WEIGHT: 130.5 LBS | OXYGEN SATURATION: 98 % | SYSTOLIC BLOOD PRESSURE: 110 MMHG | RESPIRATION RATE: 16 BRPM | TEMPERATURE: 98 F | BODY MASS INDEX: 23.12 KG/M2 | HEART RATE: 85 BPM | DIASTOLIC BLOOD PRESSURE: 76 MMHG

## 2024-09-04 DIAGNOSIS — C50.911 INVASIVE DUCTAL CARCINOMA OF BREAST, FEMALE, RIGHT: Primary | ICD-10-CM

## 2024-09-04 DIAGNOSIS — Z15.09 BRCA1 GENE MUTATION POSITIVE: ICD-10-CM

## 2024-09-04 DIAGNOSIS — C50.911 INVASIVE DUCTAL CARCINOMA OF BREAST, FEMALE, RIGHT: ICD-10-CM

## 2024-09-04 DIAGNOSIS — Z17.1 MALIGNANT NEOPLASM OF LOWER-OUTER QUADRANT OF RIGHT BREAST OF FEMALE, ESTROGEN RECEPTOR NEGATIVE: ICD-10-CM

## 2024-09-04 DIAGNOSIS — N95.1 MENOPAUSAL SYMPTOMS: ICD-10-CM

## 2024-09-04 DIAGNOSIS — D84.821 IMMUNOCOMPROMISED STATE DUE TO DRUG THERAPY: ICD-10-CM

## 2024-09-04 DIAGNOSIS — R45.89 ANXIETY ABOUT HEALTH: ICD-10-CM

## 2024-09-04 DIAGNOSIS — C50.511 MALIGNANT NEOPLASM OF LOWER-OUTER QUADRANT OF RIGHT BREAST OF FEMALE, ESTROGEN RECEPTOR NEGATIVE: ICD-10-CM

## 2024-09-04 DIAGNOSIS — Z15.01 BRCA1 GENE MUTATION POSITIVE: ICD-10-CM

## 2024-09-04 DIAGNOSIS — Z00.6 EXAMINATION OF PARTICIPANT IN CLINICAL TRIAL: ICD-10-CM

## 2024-09-04 DIAGNOSIS — Z79.899 IMMUNOCOMPROMISED STATE DUE TO DRUG THERAPY: ICD-10-CM

## 2024-09-04 LAB
ALBUMIN SERPL BCP-MCNC: 4.2 G/DL (ref 3.5–5.2)
ALP SERPL-CCNC: 58 U/L (ref 55–135)
ALT SERPL W/O P-5'-P-CCNC: 14 U/L (ref 10–44)
ANION GAP SERPL CALC-SCNC: 11 MMOL/L (ref 8–16)
AST SERPL-CCNC: 20 U/L (ref 10–40)
BASOPHILS # BLD AUTO: 0.05 K/UL (ref 0–0.2)
BASOPHILS NFR BLD: 0.7 % (ref 0–1.9)
BILIRUB SERPL-MCNC: 0.2 MG/DL (ref 0.1–1)
BUN SERPL-MCNC: 25 MG/DL (ref 6–20)
CALCIUM SERPL-MCNC: 9.5 MG/DL (ref 8.7–10.5)
CHLORIDE SERPL-SCNC: 105 MMOL/L (ref 95–110)
CO2 SERPL-SCNC: 23 MMOL/L (ref 23–29)
CREAT SERPL-MCNC: 0.7 MG/DL (ref 0.5–1.4)
DIFFERENTIAL METHOD BLD: ABNORMAL
EOSINOPHIL # BLD AUTO: 0.2 K/UL (ref 0–0.5)
EOSINOPHIL NFR BLD: 2.4 % (ref 0–8)
ERYTHROCYTE [DISTWIDTH] IN BLOOD BY AUTOMATED COUNT: 12.9 % (ref 11.5–14.5)
EST. GFR  (NO RACE VARIABLE): >60 ML/MIN/1.73 M^2
GLUCOSE SERPL-MCNC: 99 MG/DL (ref 70–110)
HCT VFR BLD AUTO: 36.6 % (ref 37–48.5)
HGB BLD-MCNC: 12.5 G/DL (ref 12–16)
IMM GRANULOCYTES # BLD AUTO: 0.02 K/UL (ref 0–0.04)
IMM GRANULOCYTES NFR BLD AUTO: 0.3 % (ref 0–0.5)
LYMPHOCYTES # BLD AUTO: 2.4 K/UL (ref 1–4.8)
LYMPHOCYTES NFR BLD: 34.2 % (ref 18–48)
MCH RBC QN AUTO: 31.1 PG (ref 27–31)
MCHC RBC AUTO-ENTMCNC: 34.2 G/DL (ref 32–36)
MCV RBC AUTO: 91 FL (ref 82–98)
MONOCYTES # BLD AUTO: 0.5 K/UL (ref 0.3–1)
MONOCYTES NFR BLD: 7.4 % (ref 4–15)
NEUTROPHILS # BLD AUTO: 3.9 K/UL (ref 1.8–7.7)
NEUTROPHILS NFR BLD: 55 % (ref 38–73)
NRBC BLD-RTO: 0 /100 WBC
PLATELET # BLD AUTO: 213 K/UL (ref 150–450)
PMV BLD AUTO: 8.9 FL (ref 9.2–12.9)
POTASSIUM SERPL-SCNC: 4 MMOL/L (ref 3.5–5.1)
PROT SERPL-MCNC: 7.1 G/DL (ref 6–8.4)
RBC # BLD AUTO: 4.02 M/UL (ref 4–5.4)
SODIUM SERPL-SCNC: 139 MMOL/L (ref 136–145)
WBC # BLD AUTO: 7.14 K/UL (ref 3.9–12.7)

## 2024-09-04 PROCEDURE — 85025 COMPLETE CBC W/AUTO DIFF WBC: CPT | Mod: PN | Performed by: INTERNAL MEDICINE

## 2024-09-04 PROCEDURE — 80053 COMPREHEN METABOLIC PANEL: CPT | Mod: PN | Performed by: INTERNAL MEDICINE

## 2024-09-04 PROCEDURE — 36415 COLL VENOUS BLD VENIPUNCTURE: CPT | Mod: PN | Performed by: INTERNAL MEDICINE

## 2024-09-04 RX ORDER — FEZOLINETANT 45 MG/1
1 TABLET, FILM COATED ORAL
COMMUNITY
Start: 2024-08-16

## 2024-09-04 RX ORDER — ESTRADIOL 0.1 MG/G
CREAM VAGINAL
COMMUNITY
Start: 2024-08-12

## 2024-09-04 NOTE — PROGRESS NOTES
"                                        Name: Arabella Catalan  MRN:  [id number]  :  1980 Age 44 y.o.  Date of Service: 2024    Reason for visit:  Arabella Catalan is a 44 y.o. female here regarding...    #Triple Negative Right Sided Breast Cancer   Date of Original Diagnosis: 23  Original Stage: [id number] Stage 2B grade 3 Triple neg-->pCR!   Current Sites of Disease: none  Current Goals of Therapy: curative   Current Therapy: surveillance     Oncologic History/History of Present Illness:   Per Dr. Rivas Note:   As previously documented she started screening mammograms for a few years after her ovarian cancer , in her late 's. Routine pelvic exam revealed an ovarian cancer, s/p right oophorectomy for dysgerminoma.    Resumed routine mammograms at 42, had a normal screening mammogram in May 2023 but felt a lump in the right breast in July. Further imaging showed 2 lesions, both > 2 cm , both biopsied to be G3 IDC, ER/AK and Her 2 jose eduardo negative.   Menarche at age 14 year old. . Age at first live birth 31. Did  breast feed 2 year and 6 months . LMP  . OCP-yes 20 years ago  Menopause at none. HRT-none      Genetics completed- + BRCA1, Mother breast cancer at 38 and cervical cancer    23-24  NACT per keynote 522 (IO induced rash- grade 1 involving 30% the body after C1D1; IO induced hepatitis- grade 2, resolved with steroids)  24 bilateral mastectomy --> pCR!    She completed the neoadjuvant portion of keynote 522 and is was enrolled in "[id number]: OptimICE-pCR: De-Escalation of Therapy in Early-Stage TNBC Patients Who Achieve pCR After Neoadjuvant Chemotherapy with Checkpoint Inhibitor Therapy".  She was randomized to the observation arm.  Ultimately she elected to come off study to try olaparib. She took 2 weeks worth of olaparib and reported anhedonia and generalized body rash w/ olaparib thus self discontinued, saw a dermatologist and they favor drug induced rash. " "  -checked hormones and she's postmenopausal     Interval Hx (9/4/24)  Feeling well, reports she is looking into getting hysterectomy w/ ovarian removal this year or early next year, no complaints, feeling well.     PHYSICAL EXAMINATION:  /76 (BP Location: Left arm, Patient Position: Sitting, BP Method: Medium (Automatic))   Pulse 85   Temp 97.6 °F (36.4 °C) (Temporal)   Resp 16   Ht 5' 3" (1.6 m)   Wt 59.2 kg (130 lb 8.2 oz)   SpO2 98%   BMI 23.12 kg/m²   Wt Readings from Last 3 Encounters:   09/04/24 59.2 kg (130 lb 8.2 oz)   07/08/24 56.1 kg (123 lb 11.2 oz)   06/19/24 56.3 kg (124 lb 1.9 oz)     ECOG PERFORMANCE STATUS: 0  Physical Exam   General:  Well-appearing, nontoxic  Eyes:  Equal and round pupils, EOMI, no scleral icterus  Mouth:  No lesions, moist  Cardiovascular:  Warm, well-perfused, no peripheral edema  Lungs:  Unlabored on room air, no wheezing  Neurologic:  Awake, alert and oriented, participating in the exam  Psych:  Appropriate mood and affect  Skin:  Normal pallor  Heme:  No petechiae, no purpura  Breast:  Bilateral mastectomy with reconstruction    LABORATORY:  CBC  Lab Results   Component Value Date    WBC 6.85 05/23/2024    HGB 13.9 05/23/2024    HCT 40.7 05/23/2024    MCV 89 05/23/2024     05/23/2024         BMP  Lab Results   Component Value Date     05/23/2024    K 4.7 05/23/2024     05/23/2024    CO2 24 05/23/2024    BUN 9 05/23/2024    CREATININE 0.7 05/23/2024    CALCIUM 10.1 05/23/2024    ANIONGAP 15 05/23/2024    ESTGFRAFRICA >60 01/04/2010    EGFRNONAA >60 01/04/2010         PATHOLOGY:    DIAGNOSIS:   03/07/2024 JHL:fc   1. RIGHT BREAST, SKIN AND NIPPLE SPARING MASTECTOMY:   - NO RESIDUAL TUMOR SEEN.   - EXTENSIVE FIBROADENOMATOID HYPERPLASIA.   - BIOPSY SITE REACTIONS.   2. RIGHT AXILLARY SENTINEL LYMPH NODE, BIOPSY:   - TWO LYMPH NODES, BOTH NEGATIVE FOR TUMOR (0/2     RADIOLOGY:  MRI Breast  Right  There is a 2.7 cm x 2.2 cm x 2 cm irregularly " "shaped, homogeneous mass with angular margins seen in the right breast at 8 o'clock, 7 cm from the nipple, 1.3 cm from the skin, and 0.8 cm from the chest wall. Kinetics initial phase is fast. Delayed phase is washout.      There is a 2 cm x 1.6 cm x 1.6 cm irregularly shaped, homogeneous mass with angular margins seen in the right breast at 7 o'clock, 3 cm from the nipple, 1.1 cm from the skin, and 2.3 cm from the chest wall. Kinetics initial phase is fast. Delayed phase is washout.       ASSESSMENT AND PLAN:  Arabella Catalan is a 44 y.o. female with...    #Triple Negative Right Sided Breast Cancer   Date of Original Diagnosis: 7/26/23  Original Stage: [id number] Stage 2B grade 3 Triple neg-->pCR!   Current Sites of Disease: none  Current Goals of Therapy: curative   Current Therapy: active surveillance    She was completed the neoadjuvant portion of keynote 522 and is was enrolled in "[id number]: OptimICE-pCR: De-Escalation of Therapy in Early-Stage TNBC Patients Who Achieve pCR After Neoadjuvant Chemotherapy with Checkpoint Inhibitor Therapy".  She was randomized to the observation arm thus would not be getting her adjuvant Keytruda x 9 doses per study. We reviewed the options again as she is off study now and she reports she does not want to resume keytruda at this time.   -she is BRCA positive and based on the [place] trial she qualifies for 1 year worth of adjuvant olaparib, I reviewed the trial data with her and encouraged her consider 1 year worth of olaparib therapy. She took 2 weeks worth of olaparib and reported anhedonia and generalized body rash w/ olaparib thus self discontinued, saw a dermatologist and they favor drug induced rash   -she has elected for continued clinical surveillance without any adjuvant therapy, clinically KELSEY     #drug induced rash- secondary to olaparib self d/c'd medication, resolved    #IO induced hepatitis- grade 2, resolved with steroids.     #BRCA1 + - found as part of " genetic work up- counseled her about breast and ovarian risks, advised her to get 1 remaining ovary removed.  Status post bilateral mastectomy.  She has one remaining ovary, we checked her hormone levels and she is in the post menopausal category thus I advised her to revisit oophorectomy w/ her GYN.     #anxiety about health- referral sent to onc psych, good mood today     Sole Sexton M.D.  Hematology/Oncology   Route Chart for Scheduling    Med Onc Chart Routing      Follow up with physician 6 months and 1 year. in a gown   Follow up with SAUL    Infusion scheduling note    Injection scheduling note    Labs CBC and CMP   Scheduling:  Preferred lab:  Lab interval:  cbc and cmp today   Imaging    Pharmacy appointment    Other referrals                       1 or 2

## 2024-09-04 NOTE — PROGRESS NOTES
Protocol: [id number]: OptimICE-pCR: De-Escalation of Therapy in Early-Stage TNBC Patients Who Achieve pCR After Neoadjuvant Chemotherapy with Checkpoint Inhibitor Therapy  IRB #: 2023.168  PI: CHEYANNE Khan MD  Treating MD: Dr. Sexton  PID: [id number]  ARM 2: Observation  Off [id number] Protocol Intervention on 5/14/24 September 4th, 2024     Post Treatment Follow up (Survival Follow Up 02)    CRC met the patient at her clinic appointment with Dr. Sexton. The patient presented alert and oriented x 3. Mood and affect appropriate to the situation. She reports feeling well and continues to freely agree to participation in the survival follow up of the [id number] study. She continues to experience hot flashes but denies any other new or worsening symptoms.    AEs    Grade 2 Rash-Maculo Papular (5/18/24 - 7/15/24) The patient reports that her previous rash resolved on 7/15/24. She states that she saw a Dermatologist who determined that it is likely d/t Olaparib and or an environmental allergen.     Grade 1 Hot flashes (baseline - ongoing) - Pt reports that she still experiences hot flashes. Although, she notes that the hot flashes are less frequent after starting a new medication, Veozah, on 8/19/24. Dr. Sexton aware. Continue to monitor.    Grade 1 AST increased (5/23/24 - 9/4/24) 5/23/24 labs revealed a very slightly elevated AST of 43 U/L . Today's AST resulted WNL. Dr. Sexton aware. Not CS.    Please note that the patient was randomized to the observational arm while on study intervention. Per Dr. Sexton, all of the patient's current AEs are unrelated to Pembro. See AE and conmed logs in patient's shadow chart.    ConMeds:  The patient reports starting estradiol vaginal cream on 8/21/24 and Veozah on 8/19/24.  She denies taking any steroids. She denies starting or stopping any other medications.    The patient previously completed her 12 week post registration [id number] QOLs on 7/8/24. CRC to enter QOLs in 6 month post treatment  follow up CRF. The patient denied having any questions for this CRC. She was encouraged to call if any questions or concerns arise.     Next study appts:     FINAL QOLs and CRC administered HO1 questionnaire due 27 weeks after registration due ~10/17/24 +/- 28 days   Next 6 month post treatment follow up ~ March 4th, 2025 +/- 2 months  6 months from end of observation labs due 11/14/24 +/- 4 weeks    Tamia Herrera, CCRC

## 2024-09-04 NOTE — PROGRESS NOTES
Protocol: [id number], Disparities in Results of Immune Checkpoint Inhibitor Treatment (DIRECT): A Prospective Cohort Study of Cancer Survivors Treated with anti-PD-L1 Immunotherapy in a Community Oncology Setting  IRB# 2022.126  Version Date: 04/02/2024  Treating MD: Dr. Sexton  Study ID# [id number]  09/04/2024      Assessment # 4 / Kit #[id number] and Re-consent    The patient presented today for 3 month follow up with Dr. Sexton. The patient presented with alone, A&O, without noted distress, and with appropriate mood and affect to the situation. The patient continues to freely agree with participation in the referenced study. She reports feeling well and denies taking any oral or IV antibiotics or antifungals since her last study visit.     Toxicities attributable to last infusion of Keytruda:    None     Following the appointment with Dr. Sexton, the patient's A4 blood specimens were collected in the 1st floor lab by the phlebotomist using two patient identifiers with CRC present. The patient completed all A4 questionnaires via a link on her phone. A4 samples were collected, processed, and placed in the -80 freezer today.      Clinical Record Information:     Between the last study visit, the patient has not been diagnosed with any of the following autoimmune diseases or conditions:      Re-Consent     The referenced study's Amended Protocol (Protocol Version Date: 04/02/2024) requires a reconsent for patients enrolled in the study due to the addition of collecting blood specimens for patients who experience Grade 3 irAE and offering new patients the option to provide stool samples to the study. This CRC reviewed the current IRB-approved amended consent form with the patient, and all the patient's questions were answered satisfactorily. The patient freely agreed to sign the updated IRB-approved consent and continue participating in the [id number] study. A copy of the consent was provided to the patient. The patient was  encouraged to call this CRC if any questions or concerns arise.     The patient answered no to the optional stool sample collection as it is only an option for new patients.    After completing the re-consent and A4 procedures, the patient denied having any new questions for this CRC. Encouraged the patient to call with any questions or concerns in the future.      Next study appointment:     A5 labs and QOLS (2 years +/- 3 month from the patient's 1st infusion) due August 23rd, 2025 +/- 3 months       Tamia Herrera, CCRC

## 2024-09-06 PROBLEM — T50.905A DRUG-INDUCED HEPATITIS: Status: RESOLVED | Noted: 2023-09-13 | Resolved: 2024-09-06

## 2024-09-06 PROBLEM — R53.83 CHEMOTHERAPY-INDUCED FATIGUE: Status: RESOLVED | Noted: 2023-10-13 | Resolved: 2024-09-06

## 2024-09-06 PROBLEM — D84.821 IMMUNOCOMPROMISED STATE DUE TO DRUG THERAPY: Status: RESOLVED | Noted: 2023-09-11 | Resolved: 2024-09-06

## 2024-09-06 PROBLEM — T45.1X5A CHEMOTHERAPY-INDUCED FATIGUE: Status: RESOLVED | Noted: 2023-10-13 | Resolved: 2024-09-06

## 2024-09-06 PROBLEM — K71.6 DRUG-INDUCED HEPATITIS: Status: RESOLVED | Noted: 2023-09-13 | Resolved: 2024-09-06

## 2024-09-06 PROBLEM — Z79.899 IMMUNOCOMPROMISED STATE DUE TO DRUG THERAPY: Status: RESOLVED | Noted: 2023-09-11 | Resolved: 2024-09-06

## 2024-09-06 PROBLEM — L27.0 DRUG-INDUCED SKIN RASH: Status: RESOLVED | Noted: 2023-09-13 | Resolved: 2024-09-06

## 2024-10-02 ENCOUNTER — TELEPHONE (OUTPATIENT)
Dept: RESEARCH | Facility: HOSPITAL | Age: 44
End: 2024-10-02
Payer: COMMERCIAL

## 2024-10-02 NOTE — TELEPHONE ENCOUNTER
Protocol: [id number]: OptimICE-pCR: De-Escalation of Therapy in Early-Stage TNBC Patients Who Achieve pCR After Neoadjuvant Chemotherapy with Checkpoint Inhibitor Therapy  IRB #: 2023.168  PI: CHEYANNE Khan MD  Treating MD: Dr. Sexton  PID: [id number]  ARM 2  Off [id number] Protocol Intervention on 5/14/24     Called the patient to schedule a date to collect her next study labs and questionnaires. The patient did not answer the phone. A voicemail was left along with contact information to call back.    Tamia Herrera, CCRC

## 2024-10-03 ENCOUNTER — PATIENT MESSAGE (OUTPATIENT)
Dept: RESEARCH | Facility: HOSPITAL | Age: 44
End: 2024-10-03
Payer: COMMERCIAL

## 2024-10-08 ENCOUNTER — TELEPHONE (OUTPATIENT)
Dept: RESEARCH | Facility: HOSPITAL | Age: 44
End: 2024-10-08
Payer: COMMERCIAL

## 2024-10-08 DIAGNOSIS — C50.911 INVASIVE DUCTAL CARCINOMA OF BREAST, FEMALE, RIGHT: Primary | ICD-10-CM

## 2024-10-08 DIAGNOSIS — Z00.6 EXAMINATION OF PARTICIPANT IN CLINICAL TRIAL: ICD-10-CM

## 2024-10-08 NOTE — TELEPHONE ENCOUNTER
Protocol: [id number]: OptimICE-pCR: De-Escalation of Therapy in Early-Stage TNBC Patients Who Achieve pCR After Neoadjuvant Chemotherapy with Checkpoint Inhibitor Therapy  IRB #: 2023.168  PI: CHEYANNE Khan MD  Treating MD: Dr. Sexton  PID: [id number]  ARM 2  Off [id number] Protocol Intervention on 5/14/24 October 8th, 2024  13:39     Pt returned CRC's previous call (see this CRC's 10/2/24 telephone encounter). The patient reports doing well and continues to agree in all follow up aspects of the [id number] trial. The patient agreed to a lab appointment at [facility] on 10/21/24 at 8:30AM to collect [id number] labs and questionnaires. The patient was thanked for her participation in the trial and was encouraged to call this CRC if she needs to reschedule. The patient expressed understanding.    Tamia Herrera, CCRC

## 2024-10-21 ENCOUNTER — LAB VISIT (OUTPATIENT)
Dept: LAB | Facility: HOSPITAL | Age: 44
End: 2024-10-21
Attending: INTERNAL MEDICINE
Payer: COMMERCIAL

## 2024-10-21 ENCOUNTER — RESEARCH ENCOUNTER (OUTPATIENT)
Dept: RESEARCH | Facility: HOSPITAL | Age: 44
End: 2024-10-21
Payer: COMMERCIAL

## 2024-10-21 DIAGNOSIS — C50.911 INVASIVE DUCTAL CARCINOMA OF BREAST, FEMALE, RIGHT: ICD-10-CM

## 2024-10-21 DIAGNOSIS — Z00.6 EXAMINATION OF PARTICIPANT IN CLINICAL TRIAL: ICD-10-CM

## 2024-10-21 LAB — DRUG STUDY SPECIMEN TYPE: 1

## 2024-10-21 PROCEDURE — 36415 COLL VENOUS BLD VENIPUNCTURE: CPT | Mod: PN | Performed by: INTERNAL MEDICINE

## 2024-10-21 NOTE — PROGRESS NOTES
Protocol: [id number]: OptimICE-pCR: De-Escalation of Therapy in Early-Stage TNBC Patients Who Achieve pCR After Neoadjuvant Chemotherapy with Checkpoint Inhibitor Therapy  IRB #: 2023.168  PI: CHEYANNE Khan MD  Treating MD: Dr. Sexton  PID: [id number]  ARM 2: Observation  Off [id number] Protocol Intervention on 5/14/24 October 21st, 2024     27 Week Post Registration QOL (FINAL) and 6 Month End of Observation Lab Collection:     CRC met the patient in the [facility] lab for [id number] follow up procedures. The patient presented alert and oriented x 3. Mood and affect appropriate to the situation. She reports feeling well and continues to freely agree to participation in the survival follow up of the [id number] study.      The patient was given her FINAL 27 week post registration QOLs and completed and returned QOLs to CRC before leaving. The HO1 questionnaire was administered to the patient via this CRC. Study labs were collected by the phlebotomist with CRC present using two patient identifiers. Labs were processed per [id number] correlative science manual and placed in -80 freezer. Labs to be shipped to [facility] within 30 days (See source doc req form and shipping airbill in patient shadow chart). CRC to enter QOLs in Survival Follow-up 03 CRF in [facility] following pt's 3/18/25 follow up appt. Please note that the patient completed all study QOLs at outlined in section 6.3 of the protocol. No more QOLs required.     ConMeds:  The patient reports continuing estradiol cream and Veozah. She denies taking any steroids. She denies starting or stopping any other medications.    Following the visit, the patient denied having any questions for this CRC. She was encouraged to call if any questions or concerns arise.     Next study appts:    Next 6 month post treatment follow up ~ March 4th, 2025 +/- 2 months  Will complete at pt's 3/18/25 appt with Dr. Sexton  3 years after registration labs due 04/11/27 +/- 4 weeks     Tamia Herrera  CCRC

## 2024-11-05 NOTE — PLAN OF CARE
Pt tolerated weekly Taxol/Carbo infusions well.  No s/s of infusion reaction noted.  Cryotherapy to hands and feet with Taxol infusion and Dignicap done during chemo and for 2 hours post chemo.  Instructed to call MD with any problems.   
assistance/supervision as needed upon d/c/No skilled OT needs

## 2025-01-07 ENCOUNTER — TELEPHONE (OUTPATIENT)
Dept: HEMATOLOGY/ONCOLOGY | Facility: CLINIC | Age: 45
End: 2025-01-07
Payer: COMMERCIAL

## 2025-01-07 NOTE — NURSING
LVM with call back to discuss appointment with Dr. Brar, referral from Meera Romano NP. Appointment scheduled 1/15, will follow for confirmation.

## 2025-01-08 ENCOUNTER — TELEPHONE (OUTPATIENT)
Dept: HEMATOLOGY/ONCOLOGY | Facility: CLINIC | Age: 45
End: 2025-01-08
Payer: COMMERCIAL

## 2025-01-08 NOTE — TELEPHONE ENCOUNTER
----- Message from Rashida sent at 1/8/2025 11:19 AM CST -----  Type: Needs Medical Advice  Who Called:  Pt  Best Call Back Number: [phone]   Additional Information: requesting a call back from Jacquelyn platt about 1/15 appt pt would like to change

## 2025-01-08 NOTE — NURSING
Spoke with patient, returned her call. Rescheduled appointment with Dr. Brar to 2/5 per patient request. Referral from Meera Romano NP. Will continue to follow.

## 2025-02-05 ENCOUNTER — OFFICE VISIT (OUTPATIENT)
Dept: GASTROENTEROLOGY | Facility: CLINIC | Age: 45
End: 2025-02-05
Payer: COMMERCIAL

## 2025-02-05 ENCOUNTER — DOCUMENTATION ONLY (OUTPATIENT)
Dept: HEMATOLOGY/ONCOLOGY | Facility: CLINIC | Age: 45
End: 2025-02-05
Payer: COMMERCIAL

## 2025-02-05 VITALS
WEIGHT: 124.13 LBS | HEIGHT: 63 IN | OXYGEN SATURATION: 99 % | RESPIRATION RATE: 16 BRPM | DIASTOLIC BLOOD PRESSURE: 74 MMHG | BODY MASS INDEX: 21.99 KG/M2 | HEART RATE: 89 BPM | SYSTOLIC BLOOD PRESSURE: 117 MMHG | TEMPERATURE: 98 F

## 2025-02-05 DIAGNOSIS — C50.911 INVASIVE DUCTAL CARCINOMA OF BREAST, FEMALE, RIGHT: ICD-10-CM

## 2025-02-05 DIAGNOSIS — Z15.09 BRCA1 GENE MUTATION POSITIVE: ICD-10-CM

## 2025-02-05 DIAGNOSIS — Z15.01 BRCA1 GENE MUTATION POSITIVE: ICD-10-CM

## 2025-02-05 PROCEDURE — 3008F BODY MASS INDEX DOCD: CPT | Mod: CPTII,S$GLB,, | Performed by: INTERNAL MEDICINE

## 2025-02-05 PROCEDURE — 1159F MED LIST DOCD IN RCRD: CPT | Mod: CPTII,S$GLB,, | Performed by: INTERNAL MEDICINE

## 2025-02-05 PROCEDURE — 99213 OFFICE O/P EST LOW 20 MIN: CPT | Mod: S$GLB,,, | Performed by: INTERNAL MEDICINE

## 2025-02-05 PROCEDURE — 3074F SYST BP LT 130 MM HG: CPT | Mod: CPTII,S$GLB,, | Performed by: INTERNAL MEDICINE

## 2025-02-05 PROCEDURE — 99999 PR PBB SHADOW E&M-EST. PATIENT-LVL IV: CPT | Mod: PBBFAC,,, | Performed by: INTERNAL MEDICINE

## 2025-02-05 PROCEDURE — 3078F DIAST BP <80 MM HG: CPT | Mod: CPTII,S$GLB,, | Performed by: INTERNAL MEDICINE

## 2025-02-05 NOTE — PROGRESS NOTES
GI Clinic Note    HPI  Arabella Catalan is a very pleasant 44 y.o.  female who presents with hx of breast cancer, BRCA1 mutation, here for initial visit to discuss GI cancer risks. She is asymptomatic. She has strong fam hx of breast cancer. She has no fam hx of colon cancer, pancreatic cancer, or other GI cancers.       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       Past Surgical History  Past Surgical History:   Procedure Laterality Date    BREAST BIOPSY Right 2015    benign    BREAST BIOPSY Right 07/26/2023    BREAST BIOPSY Right 07/26/2023    BREAST BIOPSY Right 08/16/2023    benign LN    INSERTION OF TUNNELED CENTRAL VENOUS CATHETER (CVC) WITH SUBCUTANEOUS PORT N/A 08/15/2023    Procedure: [id number];  Surgeon: Skyler Aldrich MD;  Location: [facility] OR;  Service: General;  Laterality: N/A;    OOPHORECTOMY Right 2009    stage 1a dysgerminoma    RECONSTRUCTION OF BREAST WITH DEEP INFERIOR EPIGASTRIC ARTERY  (DAVID) FREE FLAP Bilateral 2/29/2024    Procedure: RECONSTRUCTION, BREAST, USING DAVID FREE FLAP;  Surgeon: Ed Randle MD;  Location: [facility] OR;  Service: Plastics;  Laterality: Bilateral;    REPAIR, NERVE USING ALLOGRAFT Bilateral 2/29/2024    Procedure: REPAIR, NERVE USING ALLOGRAFT;  Surgeon: Ed Randle MD;  Location: [facility] OR;  Service: Plastics;  Laterality: Bilateral;    SENTINEL LYMPH NODE BIOPSY Right 2/29/2024    Procedure: BIOPSY, LYMPH NODE, SENTINEL;  Surgeon: Monica Min MD;  Location: [facility] OR;  Service: General;  Laterality: Right;    SIMPLE MASTECTOMY Bilateral 2/29/2024    Procedure: MASTECTOMY, SIMPLE;  Surgeon: Monica Min MD;  Location: [facility] OR;  Service: General;  Laterality: Bilateral;    VAGINAL DELIVERY   12/13/2016       Medications  Current Outpatient Medications   Medication Instructions    ascorbic acid (vitamin C) (VITAMIN C) 500 mg, Daily    estradioL (ESTRACE) 0.01 % (0.1 mg/gram) vaginal cream     Lactobacillus rhamnosus GG (CULTURELLE) 10 billion cell capsule 1 capsule, Daily    magnesium 30 mg Tab 2 times daily    multivitamin (THERAGRAN) per tablet 1 tablet, Daily    omega-3 fatty acids/fish oil (FISH OIL-OMEGA-3 FATTY ACIDS) 300-1,000 mg capsule 1 capsule, 2 times daily    TURMERIC ORAL 1 tablet, 2 times daily PRN    VEOZAH 45 mg Tab 1 tablet        Allergies  Review of patient's allergies indicates:  No Known Allergies      Review of Systems     ROS negative except as otherwise mentioned in the HPI        Physical Exam    Vitals:    02/05/25 1358   BP: 117/74   Pulse: 89   Resp: 16   Temp: 98.1 °F (36.7 °C)     Physical Examination:     General: well developed, well nourished  Eyes: conjunctivae/corneas clear. PERRL..  HENT: Head:normocephalic, atraumatic. Ears:hearing grossly normal bilaterally. Nose: Nares normal. Septum midline. Mucosa normal. No drainage or sinus tenderness., no discharge. Throat: lips, mucosa, and tongue normal; teeth and gums normal and no throat erythema.  Neck: supple, symmetrical, trachea midline, no JVD and thyroid not enlarged, symmetric, no tenderness/mass/nodules  Lungs:  clear to auscultation bilaterally and normal respiratory effort  Cardiovascular: Heart: regular rate and rhythm, S1, S2 normal, no murmur, click, rub or gallop. Chest Wall: no tenderness. Extremities: no cyanosis or edema, or clubbing. Pulses: 2+ and symmetric.  Abdomen/Rectal: Abdomen: soft, non-tender non-distented; bowel sounds normal; no masses,  no organomegaly. Rectal: not examined  Skin: Skin color, texture, turgor normal. No rashes or lesions  Musculoskeletal:normal gait and no clubbing, cyanosis  Lymph Nodes: No cervical or supraclavicular adenopathy  Neurologic: Normal strength and tone. No focal  numbness or weakness  Psych/Behavioral:  Normal. and Alert and oriented, appropriate affect.              Assessment/Plan    GI cancer screening    -Genetic report reviewed.  Patient has BRCA1 mutation  -GI cancer risks discussed. Colon cancer risk is average risk.  Pancreatic cancer risk is elevated due to BRCA1 mutation (approx 2-3% lifetime risk, compared to 1.5% gen pop)  -Recommend colonscopy at age 47 and at q10 year interval or sooner if polyps found  -Recommend EGD/EUS now, followed by alternating MRI/EUS at q1-2 year interval  -will schedule for EUS    RTC PRN       BRCA1 gene mutation positive  -     Ambulatory referral/consult to Gastroenterology    Invasive ductal carcinoma of breast, female, right  -     Ambulatory referral/consult to Gastroenterology

## 2025-02-05 NOTE — NURSING
Ms. Catalan was seen today in clinic by Dr. Brar. EGD/EUS recommended today. Dr. Brar's clinic to contact Ms. Catalan to schedule EUS. Contact information for [facility] provided. Ms. Catalan instructed to contact Cara with any questions or concerns moving forward. She verbalized understanding. She was escorted out of clinic to [facility] brittanie.    Asthma    Diverticulitis    Hiatal hernia    Hyperthyroidism    MRSA infection  right lung

## 2025-02-13 ENCOUNTER — HOSPITAL ENCOUNTER (OUTPATIENT)
Dept: RADIOLOGY | Facility: HOSPITAL | Age: 45
Discharge: HOME | End: 2025-02-13
Attending: NURSE PRACTITIONER
Payer: COMMERCIAL

## 2025-02-13 DIAGNOSIS — C50.911 INVASIVE DUCTAL CARCINOMA OF BREAST, FEMALE, RIGHT: ICD-10-CM

## 2025-02-13 DIAGNOSIS — Z15.09 BRCA1 GENE MUTATION POSITIVE: ICD-10-CM

## 2025-02-13 DIAGNOSIS — C50.511 MALIGNANT NEOPLASM OF LOWER-OUTER QUADRANT OF RIGHT BREAST OF FEMALE, ESTROGEN RECEPTOR NEGATIVE: ICD-10-CM

## 2025-02-13 DIAGNOSIS — Z17.1 MALIGNANT NEOPLASM OF LOWER-OUTER QUADRANT OF RIGHT BREAST OF FEMALE, ESTROGEN RECEPTOR NEGATIVE: ICD-10-CM

## 2025-02-13 DIAGNOSIS — Z15.01 BRCA1 GENE MUTATION POSITIVE: ICD-10-CM

## 2025-02-13 PROCEDURE — 76641 ULTRASOUND BREAST COMPLETE: CPT | Mod: TC,50,PO

## 2025-03-18 ENCOUNTER — RESEARCH ENCOUNTER (OUTPATIENT)
Dept: RESEARCH | Facility: HOSPITAL | Age: 45
End: 2025-03-18
Payer: COMMERCIAL

## 2025-03-18 ENCOUNTER — OFFICE VISIT (OUTPATIENT)
Dept: HEMATOLOGY/ONCOLOGY | Facility: CLINIC | Age: 45
End: 2025-03-18
Payer: COMMERCIAL

## 2025-03-18 VITALS
BODY MASS INDEX: 22.23 KG/M2 | HEIGHT: 63 IN | HEART RATE: 81 BPM | TEMPERATURE: 98 F | RESPIRATION RATE: 17 BRPM | SYSTOLIC BLOOD PRESSURE: 105 MMHG | DIASTOLIC BLOOD PRESSURE: 86 MMHG | WEIGHT: 125.44 LBS | OXYGEN SATURATION: 98 %

## 2025-03-18 DIAGNOSIS — N94.10 COITUS PAINFUL FOR FEMALE: ICD-10-CM

## 2025-03-18 DIAGNOSIS — Z00.6 EXAMINATION OF PARTICIPANT IN CLINICAL TRIAL: Primary | ICD-10-CM

## 2025-03-18 DIAGNOSIS — Z15.01 MONOALLELIC MUTATION OF BRCA1 GENE: ICD-10-CM

## 2025-03-18 DIAGNOSIS — C50.511 MALIGNANT NEOPLASM OF LOWER-OUTER QUADRANT OF RIGHT BREAST OF FEMALE, ESTROGEN RECEPTOR NEGATIVE: Primary | ICD-10-CM

## 2025-03-18 DIAGNOSIS — Z17.1 MALIGNANT NEOPLASM OF LOWER-OUTER QUADRANT OF RIGHT BREAST OF FEMALE, ESTROGEN RECEPTOR NEGATIVE: Primary | ICD-10-CM

## 2025-03-18 DIAGNOSIS — R23.2 HOT FLASHES: ICD-10-CM

## 2025-03-18 DIAGNOSIS — N95.1 VAGINAL DRYNESS, MENOPAUSAL: ICD-10-CM

## 2025-03-18 DIAGNOSIS — Z15.09 MONOALLELIC MUTATION OF BRCA1 GENE: ICD-10-CM

## 2025-03-18 NOTE — PROGRESS NOTES
Protocol: [id number]: OptimICE-pCR: De-Escalation of Therapy in Early-Stage TNBC Patients Who Achieve pCR After Neoadjuvant Chemotherapy with Checkpoint Inhibitor Therapy  IRB #: 2023.168  PI: CHEYANNE Khan MD  Treating MD: Dr. Sexton  PID: [id number]  ARM 2: Observation  Off [id number] Protocol Intervention on 5/14/24 March 18th, 2025     Post Treatment Follow up (Survival Follow Up 03)     CRC met the patient at her clinic appointment with Dr. Sexton. The patient presented alert and oriented x 3. Mood and affect appropriate to the situation. She reports feeling well and continues to freely agree to participation in the survival follow up of the [id number] study. She continues to experience hot flashes a but denies any other new or worsening symptoms.     AEs    Grade 1 Hot flashes (baseline - ongoing) - Pt continues to experience hot flashes. Although, she notes that the Veozah continues to provide relief. Dr. Sexton aware. Not CS. Continue to monitor.       ConMeds:  Con med list reviewed with the patient. She reports continuing estradiol and Veozah.  She denies taking any steroids. She denies starting or stopping any other medications.     The patient previously completed her 12 week post registration [id number] QOLs on 7/8/24. CRC to enter QOLs in survival follow up 03 CRF. The patient denied having any questions for this CRC. She was encouraged to call if any questions or concerns arise.     Next study appts:     Next 6 month post treatment follow up ~ September 18th, 2025 +/- 2 months  Planning to complete at patient's 9/8/25 clinic appt with Dr. Sexton  3 years after registration labs due 04/11/27 +/- 4 weeks     Tamia Herrera, CCRC

## 2025-03-18 NOTE — PROGRESS NOTES
"                                        Name: Arabella Catalan  MRN:  [id number]  :  1980 Age 44 y.o.  Date of Service: 3/18/2025    Reason for visit:  Arabella Catalan is a 44 y.o. female here regarding...    #Triple Negative Right Sided Breast Cancer   Date of Original Diagnosis: 23  Original Stage: [id number] Stage 2B grade 3 Triple neg-->pCR!   Current Sites of Disease: none  Current Goals of Therapy: curative   Current Therapy: surveillance     Oncologic History/History of Present Illness:   Per Dr. Rivas Note:   As previously documented she started screening mammograms for a few years after her ovarian cancer , in her late 's. Routine pelvic exam revealed an ovarian cancer, s/p right oophorectomy for dysgerminoma.    Resumed routine mammograms at 42, had a normal screening mammogram in May 2023 but felt a lump in the right breast in July. Further imaging showed 2 lesions, both > 2 cm , both biopsied to be G3 IDC, ER/NC and Her 2 jose eduardo negative.   Menarche at age 14 year old. . Age at first live birth 31. Did  breast feed 2 year and 6 months . LMP  . OCP-yes 20 years ago  Menopause at none. HRT-none      Genetics completed- + BRCA1, Mother breast cancer at 38 and cervical cancer    23-24  NACT per keynote 522 (IO induced rash- grade 1 involving 30% the body after C1D1; IO induced hepatitis- grade 2, resolved with steroids)  24 bilateral mastectomy --> pCR!    She completed the neoadjuvant portion of keynote 522 and is was enrolled in "[id number]: OptimICE-pCR: De-Escalation of Therapy in Early-Stage TNBC Patients Who Achieve pCR After Neoadjuvant Chemotherapy with Checkpoint Inhibitor Therapy".  She was randomized to the observation arm.  Ultimately she elected to come off study to try olaparib. She took 2 weeks worth of olaparib and reported anhedonia and generalized body rash w/ olaparib thus self discontinued, saw a dermatologist and they favor drug induced " "rash.   -checked hormones and she's postmenopausal     Interval Hx (03/18/2025)  Feeling well, reports she is looking into getting hysterectomy w/ ovarian removal soon; she had planned on having this done earlier in the year however her son needed a planned oral surgery so she delayed the Hyst/BSO  She plans to have this done in the coming months   Reports she is taking veozah (prescribed by GYN Denisha Mcleod) and estradiol cream; reports estradiol cream has helped some with the vaginal dryness but still has ongoing symptoms.     PHYSICAL EXAMINATION:  /86 (BP Location: Left arm, Patient Position: Sitting)   Pulse 81   Temp 97.8 °F (36.6 °C) (Temporal)   Resp 17   Ht 5' 3" (1.6 m)   Wt 56.9 kg (125 lb 7.1 oz)   SpO2 98%   BMI 22.22 kg/m²   Wt Readings from Last 3 Encounters:   03/18/25 56.9 kg (125 lb 7.1 oz)   02/05/25 56.3 kg (124 lb 1.9 oz)   01/07/25 59.2 kg (130 lb 8.2 oz)     ECOG PERFORMANCE STATUS: 0  Physical Exam   General:  Well-appearing, nontoxic  Eyes:  Equal and round pupils, EOMI, no scleral icterus  Mouth:  No lesions, moist  Cardiovascular:  Warm, well-perfused, no peripheral edema  Lungs:  Unlabored on room air, no wheezing  Neurologic:  Awake, alert and oriented, participating in the exam  Psych:  Appropriate mood and affect  Skin:  Normal pallor  Heme:  No petechiae, no purpura  Breast:  Bilateral mastectomy with reconstruction, no concerning findings     LABORATORY:  CBC  Lab Results   Component Value Date    WBC 7.14 09/04/2024    HGB 12.5 09/04/2024    HCT 36.6 (L) 09/04/2024    MCV 91 09/04/2024     09/04/2024         BMP  Lab Results   Component Value Date     09/04/2024    K 4.0 09/04/2024     09/04/2024    CO2 23 09/04/2024    BUN 25 (H) 09/04/2024    CREATININE 0.7 09/04/2024    CALCIUM 9.5 09/04/2024    ANIONGAP 11 09/04/2024    ESTGFRAFRICA >60 01/04/2010    EGFRNONAA >60 01/04/2010         PATHOLOGY:    DIAGNOSIS:   03/07/2024 JHL:fc   1. RIGHT BREAST, " "SKIN AND NIPPLE SPARING MASTECTOMY:   - NO RESIDUAL TUMOR SEEN.   - EXTENSIVE FIBROADENOMATOID HYPERPLASIA.   - BIOPSY SITE REACTIONS.   2. RIGHT AXILLARY SENTINEL LYMPH NODE, BIOPSY:   - TWO LYMPH NODES, BOTH NEGATIVE FOR TUMOR (0/2     RADIOLOGY:  MRI Breast  Right  There is a 2.7 cm x 2.2 cm x 2 cm irregularly shaped, homogeneous mass with angular margins seen in the right breast at 8 o'clock, 7 cm from the nipple, 1.3 cm from the skin, and 0.8 cm from the chest wall. Kinetics initial phase is fast. Delayed phase is washout.      There is a 2 cm x 1.6 cm x 1.6 cm irregularly shaped, homogeneous mass with angular margins seen in the right breast at 7 o'clock, 3 cm from the nipple, 1.1 cm from the skin, and 2.3 cm from the chest wall. Kinetics initial phase is fast. Delayed phase is washout.       ASSESSMENT AND PLAN:  Arabella Catalan is a 44 y.o. female with...    #Triple Negative Right Sided Breast Cancer   Date of Original Diagnosis: 7/26/23  Original Stage: [id number] Stage 2B grade 3 Triple neg-->pCR!   Current Sites of Disease: none  Current Goals of Therapy: curative   Current Therapy: active surveillance    She was completed the neoadjuvant portion of keynote 522 and is was enrolled in "[id number]: OptimICE-pCR: De-Escalation of Therapy in Early-Stage TNBC Patients Who Achieve pCR After Neoadjuvant Chemotherapy with Checkpoint Inhibitor Therapy".  She was randomized to the observation arm thus would not be getting her adjuvant Keytruda x 9 doses per study.   -she is BRCA positive and based on the [place] trial she qualifies for 1 year worth of adjuvant olaparib, I reviewed the trial data with her and encouraged her consider 1 year worth of olaparib therapy. She took 2 weeks worth of olaparib and reported anhedonia and generalized body rash w/ olaparib thus self discontinued, saw a dermatologist and they favor drug induced rash   -she has elected for continued clinical surveillance without any adjuvant " therapy, clinically KELSEY     #BRCA1 + - found as part of genetic work up- counseled her about breast and ovarian risks, advised her to get 1 remaining ovary removed.  Status post bilateral mastectomy.  She has one remaining     #Post menopausal hot flashes  -markedly better with Veozah; rx by Denisha Mcleod    #Post Menopausal vaginal dryness   -reports pain with coitus   -somewhat improved with topical estradiol; counseled on the use of boric acid and coconut oil.     #anxiety about health- good mood today     Sole Sexton M.D.  Hematology/Oncology   Route Chart for Scheduling    Med Onc Chart Routing      Follow up with physician 1 year. in a gown no labs   Follow up with [facility]    Infusion scheduling note    Injection scheduling note    Labs    Imaging    Pharmacy appointment    Other referrals

## 2025-07-03 ENCOUNTER — TELEPHONE (OUTPATIENT)
Dept: HEMATOLOGY/ONCOLOGY | Facility: CLINIC | Age: 45
End: 2025-07-03
Payer: COMMERCIAL

## 2025-07-03 NOTE — TELEPHONE ENCOUNTER
Spoke to pt to remind of appt 7/7/25 with Ayesha Torres NP. Pt verbalized understanding and confirmed appt Location was discussed.

## 2025-07-07 ENCOUNTER — OFFICE VISIT (OUTPATIENT)
Dept: HEMATOLOGY/ONCOLOGY | Facility: CLINIC | Age: 45
End: 2025-07-07
Payer: COMMERCIAL

## 2025-07-07 VITALS
DIASTOLIC BLOOD PRESSURE: 61 MMHG | HEART RATE: 81 BPM | WEIGHT: 128.38 LBS | BODY MASS INDEX: 22.75 KG/M2 | SYSTOLIC BLOOD PRESSURE: 107 MMHG | TEMPERATURE: 98 F | OXYGEN SATURATION: 98 % | HEIGHT: 63 IN

## 2025-07-07 DIAGNOSIS — N95.1 MENOPAUSAL SYMPTOMS: ICD-10-CM

## 2025-07-07 DIAGNOSIS — Z17.1 MALIGNANT NEOPLASM OF LOWER-OUTER QUADRANT OF RIGHT BREAST OF FEMALE, ESTROGEN RECEPTOR NEGATIVE: Primary | ICD-10-CM

## 2025-07-07 DIAGNOSIS — C50.511 MALIGNANT NEOPLASM OF LOWER-OUTER QUADRANT OF RIGHT BREAST OF FEMALE, ESTROGEN RECEPTOR NEGATIVE: Primary | ICD-10-CM

## 2025-07-07 PROCEDURE — 1159F MED LIST DOCD IN RCRD: CPT | Mod: CPTII,S$GLB,, | Performed by: NURSE PRACTITIONER

## 2025-07-07 PROCEDURE — 3074F SYST BP LT 130 MM HG: CPT | Mod: CPTII,S$GLB,, | Performed by: NURSE PRACTITIONER

## 2025-07-07 PROCEDURE — 99215 OFFICE O/P EST HI 40 MIN: CPT | Mod: S$GLB,,, | Performed by: NURSE PRACTITIONER

## 2025-07-07 PROCEDURE — 99999 PR PBB SHADOW E&M-EST. PATIENT-LVL IV: CPT | Mod: PBBFAC,,, | Performed by: NURSE PRACTITIONER

## 2025-07-07 PROCEDURE — 3078F DIAST BP <80 MM HG: CPT | Mod: CPTII,S$GLB,, | Performed by: NURSE PRACTITIONER

## 2025-07-07 PROCEDURE — 1160F RVW MEDS BY RX/DR IN RCRD: CPT | Mod: CPTII,S$GLB,, | Performed by: NURSE PRACTITIONER

## 2025-07-07 PROCEDURE — 3008F BODY MASS INDEX DOCD: CPT | Mod: CPTII,S$GLB,, | Performed by: NURSE PRACTITIONER

## 2025-07-07 NOTE — PROGRESS NOTES
Arabella Koroma Chapotel  45 y.o. is here to seek an integrative approach to discuss side effects related to breast cancer treatment.     HPI  She had a routine mammogram in May which was clear. A month later in June she felt a lump and began having pain.   She has a history of ovarian cancer with the right ovary removed when she was 29 years old. She saw Dr. Aldrich yesterday and is awaiting port placement. She has a CT scan today. She states she is sleeping well and does not have fatigue. She has stress and anxiety due to the diagnosis, but has been busy with the kids and her mother which has kept her mind occupied. The kids went back to school today and she has more time to think and became tearful. Their children know she has cancer and will get medicine weekly, but they have not addressed the possible side effects that may come with treatment.   She has a good appetite and eats healthy and low or healthy carbs. She started beach body and then took a few weeks off due to doctor's appointments.   She is  and has 3 sons. She has a good support system. She helps her mother who has early onset Alzheimer's. She works as needed from home.     10/12/2023  Arabella is here today getting chemo with her  at the chairside. She reports fatigue is the biggest side effect.   She does not sleep well the night of chemo and 2 nights after. She has a hard staying asleep and is generally sleeping around 5 hours on the night of chemo and the 2 days after and then 7-8 on the other nights.  She continues to have a good appetite and is eating healthy. She has had 2 acupuncture sessions for joint pain to back, hips, knees, and ankles. She takes tylenol which helps slightly with pain.  Her steroid was decreased today by Dr. Mcgovern and she is hoping this helps her sleep better. She reports her stress and anxiety are low. Her  states she is doing well and her stress comes from regular day to day life with three boys.  "    7/8/2024  Arabella reports she has one more chemo and has surgery scheduled for 2/29/2024. She will have a double mastectomy with DAVID flap reconstruction. She reports she is sleeping well. She is no longer working, but will go back when treatment is completed. She reports her appetite is good and is eating fairly healthy. She does crave more "comfort foods" than normally. She states she is exercising daily, walking on the treadmill and light weight lifting, other than the week after the AC treatment due to fatigue. She will take a nap daily after the AC. She has hot flashes that occur randomly through the day and they occur more at night. Overall, she states she is doing well. She continues to have a good support system.     Today's Visit  Arabella reports she is doing well overall. She does have some forgetfulness and trouble with word finding. She is sleeping well. She denies fatigue. She continues to exercise and does Beach Body at home. She has a good appetite and eats healthy. She gets hot flashes randomly, but much less than the "1-2 dozen" she was getting previously. They significantly decreased when she started using Veozah.   Overall, she is doing very well.       Pillars Assessment    Sleep  How many hours of sleep per night? 7-8 hours  Do you have trouble falling asleep, staying asleep or waking up earlier than you need to? no  Do you have daytime fatigue? no  Do you need medication for sleep? no  Do you use any supplements or other interventions for sleep? none    Resilience  Rate your current level of stress- low    Nutrition   Food allergies or sensitivities: no  Do you adhere to a particular type of diet? no  Do you have any concerns with your eating habits? no    Exercise  How would you describe your physical activity level? 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      Past Surgical History   Past Surgical History:   Procedure Laterality Date    BREAST BIOPSY Right 2015    benign    BREAST BIOPSY Right 07/26/2023    BREAST BIOPSY Right 07/26/2023    BREAST BIOPSY Right 08/16/2023    benign LN    ENDOSCOPIC ULTRASOUND OF UPPER GASTROINTESTINAL TRACT Left 3/26/2025    Procedure: ULTRASOUND, UPPER GI TRACT, ENDOSCOPIC;  Surgeon: Ventura Brar MD;  Location: [facility] ENDO;  Service: Endoscopy;  Laterality: Left;    ESOPHAGOGASTRODUODENOSCOPY N/A 3/26/2025    Procedure: EGD (ESOPHAGOGASTRODUODENOSCOPY);  Surgeon: Ventura Brar MD;  Location: [facility] ENDO;  Service: Endoscopy;  Laterality: N/A;    INSERTION OF TUNNELED CENTRAL VENOUS CATHETER (CVC) WITH SUBCUTANEOUS PORT N/A 08/15/2023    Procedure: [id number];  Surgeon: Skyler Aldrich MD;  Location: [facility] OR;  Service: General;  Laterality: N/A;    OOPHORECTOMY Right 2009    stage 1a dysgerminoma    RECONSTRUCTION OF BREAST WITH DEEP INFERIOR EPIGASTRIC ARTERY  (DAVID) FREE FLAP Bilateral 2/29/2024    Procedure: RECONSTRUCTION, BREAST, USING DAVID FREE FLAP;  Surgeon: Ed Randle MD;  Location: [facility] OR;  Service: Plastics;  Laterality: Bilateral;    REPAIR, NERVE USING ALLOGRAFT Bilateral 2/29/2024    Procedure: REPAIR, NERVE USING ALLOGRAFT;  Surgeon: Ed Randle MD;  Location: [facility] OR;  Service: Plastics;  Laterality: Bilateral;    SENTINEL LYMPH NODE BIOPSY Right 2/29/2024    Procedure: BIOPSY, LYMPH NODE, SENTINEL;  Surgeon: Monica Min MD;  Location: [facility] OR;  Service: General;  Laterality: Right;    SIMPLE MASTECTOMY Bilateral 2/29/2024    Procedure: MASTECTOMY, SIMPLE;  Surgeon: Monica Min MD;  Location: [facility] OR;  Service: General;  Laterality: Bilateral;    VAGINAL DELIVERY  12/13/2016      Family History   Family History   Problem Relation Name Age of  Onset    Breast cancer Mother  38    Cervical cancer Mother          dx in 20s    BRCA 1/2 Sister      Brain cancer Maternal Uncle      Colon cancer Neg Hx      Colon polyps Neg Hx      Esophageal cancer Neg Hx      Stomach cancer Neg Hx      Inflammatory bowel disease Neg Hx        Allergies  Review of patient's allergies indicates:  No Known Allergies     Current Medications:    Current Outpatient Medications:     ascorbic acid, vitamin C, (VITAMIN C) 500 MG tablet, Take 500 mg by mouth once daily., Disp: , Rfl:     estradioL (ESTRACE) 0.01 % (0.1 mg/gram) vaginal cream, , Disp: , Rfl:     Lactobacillus rhamnosus GG (CULTURELLE) 10 billion cell capsule, Take 1 capsule by mouth once daily., Disp: , Rfl:     magnesium 30 mg Tab, Take by mouth 2 (two) times a day., Disp: , Rfl:     multivitamin (THERAGRAN) per tablet, Take 1 tablet by mouth once daily., Disp: , Rfl:     omega-3 fatty acids/fish oil (FISH OIL-OMEGA-3 FATTY ACIDS) 300-1,000 mg capsule, Take 1 capsule by mouth 2 (two) times a day., Disp: , Rfl:     TURMERIC ORAL, Take 1 tablet by mouth 2 (two) times daily as needed., Disp: , Rfl:     VEOZAH 45 mg Tab, Take 1 tablet by mouth., Disp: , Rfl:   No current facility-administered medications for this visit.    Facility-Administered Medications Ordered in Other Visits:     HYDROmorphone injection 0.5 mg, 0.5 mg, Intravenous, Q5 Min PRN, Rosales Sousa MD    LIDOcaine (PF) 10 mg/ml (1%) injection 10 mg, 1 mL, Intradermal, Once, Niki Izaguirre NP    ondansetron injection 4 mg, 4 mg, Intravenous, Daily PRN, Rosales Sousa MD    prochlorperazine injection Soln 10 mg, 10 mg, Intravenous, Q30 Min PRN, Rosales Sousa MD     Review of Systems  Review of Systems   Constitutional:  Positive for malaise/fatigue.   HENT: Negative.     Eyes: Negative.    Respiratory: Negative.     Cardiovascular: Negative.    Gastrointestinal: Negative.    Genitourinary: Negative.    Musculoskeletal: Negative.   "  Skin: Negative.    Neurological: Negative.    Endo/Heme/Allergies: Negative.    Psychiatric/Behavioral: Negative.        Physical Exam      Vitals:    07/07/25 1409   BP: 107/61   BP Location: Left arm   Patient Position: Sitting   Pulse: 81   Temp: 97.7 °F (36.5 °C)   TempSrc: Temporal   SpO2: 98%   Weight: 58.2 kg (128 lb 6.4 oz)   Height: 5' 3" (1.6 m)       Physical Exam  Vitals reviewed.   Constitutional:       Appearance: Normal appearance.   Neurological:      Mental Status: She is alert.   Psychiatric:         Mood and Affect: Mood normal.         Behavior: Behavior normal.        ASSESSMENT :  1. Malignant neoplasm of lower-outer quadrant of right breast of female, estrogen receptor negative    2. Menopausal symptoms      PLAN:  Reviewed all information discussed at today's visit and all questions were answered.    Counseled on healthy lifestyle and behavior modifications:   We discussed her follow up appointments and dates per her request--MRI, breast ultrasound, high risk breast clinic, and dermatology.   I discussed and recommended the following support services:  [employer] and Yoga I suggested [employer] and/or Yoga as these practices reduce stress, increases flexibility and muscle strength, improves balance and promotes serenity in the power of movement to help fight disease and boost your immune system.   Music and relaxation therapy and Meditation can help reduce stress, pain, depression, and prepare the mind and body for sleep.     Follow up with Integrative Services as needed    I spent a total of 40 minutes on the day of the visit.This includes face to face time and non-face to face time preparing to see the patient (eg, review of tests), obtaining and/or reviewing separately obtained history, documenting clinical information in the electronic or other health record, independently interpreting results and communicating results to the patient/family/caregiver, or care coordinator.  "